# Patient Record
Sex: MALE | Race: WHITE | NOT HISPANIC OR LATINO | Employment: UNEMPLOYED | URBAN - METROPOLITAN AREA
[De-identification: names, ages, dates, MRNs, and addresses within clinical notes are randomized per-mention and may not be internally consistent; named-entity substitution may affect disease eponyms.]

---

## 2017-01-19 ENCOUNTER — TELEPHONE (OUTPATIENT)
Dept: CARDIOTHORACIC SURGERY | Facility: CLINIC | Age: 72
End: 2017-01-19

## 2017-01-19 NOTE — TELEPHONE ENCOUNTER
Received a call from Dr. Rico's nurse with a referral from [place] oncologist Dr. Cage. Referral was sent to Dr. Rico instead of thoracic surgery.  Pt was having SOB late last year and underwent a workup with his cardiologist at [facility]. CTA obtained on 1/13/17 and it noted a 3.1cm mass in NEIDA. Pt is also being followed by pulmonologist Dr. Ely. It is noted that Dr. Ely has ordered a lung biopsy.   Called the pt to acquire additional information and to explain the reason for referral. Pt states that Dr. Ely is primarily following him for this lung mass and has ordered the lung bx, however, it is not scheduled yet. I explained that if the bx is positive, he will likely require a PET scan. We will await further instruction from Dr. Ely's office on how to proceed with thoracic surgery referral after the pt has the lung bx. Pt reports that he had a PFT at [facility] as well as a nuclear stress test. Faxed a request to [facility], they faxed the PFT and stress test report.   Left a VM for Dr. Ely's nurse to call me back - will offer to fax these records to their office.

## 2017-01-19 NOTE — TELEPHONE ENCOUNTER
Received a call back from Dr. Ely's nurse, she updated me that the pt will get a PET on 1/26 and they are waiting on confirmation from the hospital for IR bx. She will fax me results as she receives them. She will also update Dr. Cage's office.

## 2017-01-31 ENCOUNTER — TELEPHONE (OUTPATIENT)
Dept: CARDIOTHORACIC SURGERY | Facility: CLINIC | Age: 72
End: 2017-01-31

## 2017-01-31 NOTE — TELEPHONE ENCOUNTER
Received a referral from Dr. Ely to Dr. Carrillo on this pt s/p PET and lung biopsy. Path is still pending for biopsy. NEIDA mass with SUV max of 11.4.   Spoke with the pt, he is agreeable to meet with Dr. Carrillo once pathology is confirmed and received. He states that he has an appt with a GI physician today and cardiology tomorrow. He will let me know if he is cleared for surgery by his cardiologist in the event surgery is indicated.   He is agreeable to meet with Dr. Carrillo on Wednesday 2/8 at 1030a, provided him with date/time/location information. He has the PET on CD and will inquire about the CTA chest to be sure he also has this image on CD. He will bring all imaging with him to the scheduled appt once pathology from lung bx is confirmed.   Called Dr. Ely's office, left a VM with my contact information to update his staff on the scheduled appt. Mailed appt slip along with campus map. Will touch base with the pt once pathology is confirmed.

## 2017-02-03 ENCOUNTER — TELEPHONE (OUTPATIENT)
Dept: CARDIOTHORACIC SURGERY | Facility: CLINIC | Age: 72
End: 2017-02-03

## 2017-02-03 NOTE — TELEPHONE ENCOUNTER
Called to check on the pt, his daughter answered his phone because the pt is in pre-op for his colonoscopy. He cancelled his cardiologist appt for yesterday due to bowel prep, it is r/s'ed to 2/9. Pt's daughter reports they did receive pathology from Dr. Ely's office. Called the office and they are closed on Fridays. Faxed a request for pathology report. Will f/u on Monday to acquire a pathology report.

## 2017-02-06 ENCOUNTER — TELEPHONE (OUTPATIENT)
Dept: CARDIOTHORACIC SURGERY | Facility: CLINIC | Age: 72
End: 2017-02-06

## 2017-02-06 NOTE — TELEPHONE ENCOUNTER
Called Dr. Ely's office x 2 and left voicemails, no return call or faxed pathology report. Spoke with the pt, he will go to Dr. Ely's office tomorrow to  a copy of his report. He also states that they biopsied 5 polyps during his colonoscopy last week. Path is still pending.  He agrees to bring pathology report and CD's with imaging to his confirmed appt for 2/8 at 1030. He received the appt information in the mail.

## 2017-02-08 ENCOUNTER — OFFICE VISIT (OUTPATIENT)
Dept: CARDIOTHORACIC SURGERY | Facility: CLINIC | Age: 72
End: 2017-02-08
Payer: MEDICARE

## 2017-02-08 VITALS
BODY MASS INDEX: 29.13 KG/M2 | DIASTOLIC BLOOD PRESSURE: 58 MMHG | HEART RATE: 93 BPM | WEIGHT: 203.5 LBS | HEIGHT: 70 IN | SYSTOLIC BLOOD PRESSURE: 96 MMHG | OXYGEN SATURATION: 79 %

## 2017-02-08 DIAGNOSIS — C34.12 MALIGNANT NEOPLASM OF UPPER LOBE OF LEFT LUNG: Primary | ICD-10-CM

## 2017-02-08 PROCEDURE — 99204 OFFICE O/P NEW MOD 45 MIN: CPT | Mod: S$GLB,,, | Performed by: THORACIC SURGERY (CARDIOTHORACIC VASCULAR SURGERY)

## 2017-02-08 PROCEDURE — 1160F RVW MEDS BY RX/DR IN RCRD: CPT | Mod: S$GLB,,, | Performed by: THORACIC SURGERY (CARDIOTHORACIC VASCULAR SURGERY)

## 2017-02-08 PROCEDURE — 1126F AMNT PAIN NOTED NONE PRSNT: CPT | Mod: S$GLB,,, | Performed by: THORACIC SURGERY (CARDIOTHORACIC VASCULAR SURGERY)

## 2017-02-08 PROCEDURE — 99999 PR PBB SHADOW E&M-EST. PATIENT-LVL III: CPT | Mod: PBBFAC,,, | Performed by: THORACIC SURGERY (CARDIOTHORACIC VASCULAR SURGERY)

## 2017-02-08 PROCEDURE — 1157F ADVNC CARE PLAN IN RCRD: CPT | Mod: S$GLB,,, | Performed by: THORACIC SURGERY (CARDIOTHORACIC VASCULAR SURGERY)

## 2017-02-08 PROCEDURE — 1159F MED LIST DOCD IN RCRD: CPT | Mod: S$GLB,,, | Performed by: THORACIC SURGERY (CARDIOTHORACIC VASCULAR SURGERY)

## 2017-02-08 RX ORDER — POLYETHYLENE GLYCOL-3350 AND ELECTROLYTES 236; 6.74; 5.86; 2.97; 22.74 G/274.31G; G/274.31G; G/274.31G; G/274.31G; G/274.31G
POWDER, FOR SOLUTION ORAL
COMMUNITY
Start: 2017-02-01 | End: 2017-07-05

## 2017-02-08 NOTE — PROGRESS NOTES
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 Patient follows with Dr. Ely for fibrosis and waxing/waning streaky hemoptysis and SOB, oncologist Dr. Cage. CTA obtained on 1/13/17 and it noted a 3.1cm mass in NEIDA. PET 1/26/17 significant for 2.5 x3.2cm NEIDA mass SUV max 11.4 and focal uptake in distal transverse colon. Colonoscopy last week - 20 polyps, 5 removed - pathology pending. Normal stress echo in Dec '16. PFTs on 12/16/16.  Patient sleeps with 2LNC. Reports Dr. Ely alternates antibiotics over the last year (doxycycline, Bactrim, cefoxime). Patient endorses 1 episode chest pain last week relieved by nitro. Scheduled to see cardiology with [facility] tomorrow. Currently on petal, ASA, Xarelto. Currently denies cp, SOB, cough, hemoptysis. Here today for surgical consultation.       Former smoker. Quit 27 years ago. Smoked for 30 years x 2ppd.   Retired. Worked as heavy  - outside.   PSH: CABG - 2011, tonsillectomy, Bilateral carpal tunnel release, trigger finger release, rotator cuff repair    Chief Complaint   Patient presents with    Consult       Review of patient's allergies indicates:   Allergen Reactions    Iodine and iodide containing products Hives     ALLERGIC TO SOFT SHELL CRAB ONLY       Current Outpatient Prescriptions   Medication Sig Dispense Refill    allopurinol (ZYLOPRIM) 100 MG tablet Take 100 mg by mouth once daily.      aspirin (ECOTRIN) 325 MG EC tablet Take 325 mg by mouth once daily.      atenolol (TENORMIN) 50 MG tablet Take 50 mg by mouth once daily.      cilostazol (PLETAL) 100 MG Tab 100 mg.      GAVILYTE-G 236-22.74-6.74 -5.86 gram suspension       pravastatin (PRAVACHOL) 40 MG tablet Take 40 mg by mouth  nightly.      ranitidine (ZANTAC) 75 MG tablet Take 75 mg by mouth nightly as needed.      terazosin (HYTRIN) 2 MG capsule Take 2 mg by mouth every evening. 2 TABS      XARELTO 20 mg Tab Take 20 mg by mouth once daily.       No current facility-administered medications for this visit.        Past Medical History   Diagnosis Date    Arthritis     BPH (benign prostatic hyperplasia)     Coronary artery disease      MI X 2    Gout, chronic     Heartburn     Hypertension     Myocardial infarction     PE (pulmonary embolism)     Presence of dental bridge      UPPER - NOT WEAR MUCH AS DOES NOT FIT WELL    Staph infection      Past Surgical History   Procedure Laterality Date    Cardiac surgery       CABG X 4 9-11    Tonsillectomy      Ctr       BILAT    Trigger finger release       right and left hands.    Rotator cuff repair       left     Family History   Problem Relation Age of Onset    Cancer Sister      gallbladder     Social History   Substance Use Topics    Smoking status: Former Smoker     Packs/day: 2.00     Years: 25.00     Quit date: 2/25/1990    Smokeless tobacco: Never Used    Alcohol use Yes      Comment: 2 PER DAY        Review of Systems:  Review of Systems   Constitutional: Negative for activity change, appetite change, chills, fatigue and fever.   HENT: Negative for congestion.    Eyes: Negative for pain.   Respiratory: Positive for cough (intermittent) and shortness of breath (intermittent).    Cardiovascular: Negative for palpitations and leg swelling.   Gastrointestinal: Negative for abdominal pain, constipation, diarrhea, nausea and vomiting.   Genitourinary: Negative for difficulty urinating.   Musculoskeletal: Negative for arthralgias.   Skin: Negative for color change.   Neurological: Negative for dizziness, seizures and syncope.   Psychiatric/Behavioral: The patient is not nervous/anxious.        OBJECTIVE:     Vital Signs (Most Recent)  Pulse: 93 (02/08/17 0934)  BP: (!)  "96/58 (02/08/17 0934)  SpO2: (!) 79 % (02/08/17 0934)  5' 10" (1.778 m)  92.3 kg (203 lb 7.8 oz)     Physical Exam:  Physical Exam   Constitutional: He is oriented to person, place, and time. He appears well-developed and well-nourished. No distress.   HENT:   Head: Normocephalic and atraumatic.   Eyes: EOM are normal.   Neck: Neck supple.   Cardiovascular: Normal rate, regular rhythm, normal heart sounds and intact distal pulses.  Exam reveals no gallop and no friction rub.    No murmur heard.  Pulmonary/Chest: Effort normal. No respiratory distress. He has decreased breath sounds in the left upper field. He has no wheezes. He has no rhonchi. He has no rales.   Abdominal: Soft.   Neurological: He is alert and oriented to person, place, and time.   Skin: Skin is warm and dry. No rash noted. He is not diaphoretic.   Psychiatric: He has a normal mood and affect. Thought content normal.   Vitals reviewed.      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 Colonoscopy pathology - pending ([facility]).    PLAN:Plan     Will present at thoracic multidisciplinary tumor board.   Keep follow-up with cardiology tomorrow.       ATTENDING ATTESTATION:    I evaluated the patient and I agree with the assessment and plan.  The patient is at high perioperative risk for multiple reasons.  He likely has a " dense inflammatory response with adhesion formation related to the prior CABG.  He also has a moderate to high reduction in his DLCO.  Furthermore, he has a hypercoagulable state and is at risk for recurrent pulmonary embolism.  He has a h/o right-sided PE and the right lung will be depended upon for oxygenation during surgery.  If he is deemed too high risk, I will refer him for definitive chemoradiation therapy, including an evaluation for SBRT.

## 2017-02-08 NOTE — MR AVS SNAPSHOT
[facility]  [address]  [facility] [address]  Phone: [phone]  Fax: [phone]                  Michael Shetty   2017 10:30 AM   Office Visit    Description:  Male : 1945   Provider:  Kristopher Carrillo MD   Department:  [facility]           Reason for Visit     Consult                To Do List           Goals (5 Years of Data)     None      [facility] On Call     [facility] On Call Nurse Care Line -  Assistance  Registered nurses in the [facility] On Call Center provide clinical advisement, health education, appointment booking, and other advisory services.  Call for this free service at [phone].             Medications           Message regarding Medications     Verify the changes and/or additions to your medication regime listed below are the same as discussed with your clinician today.  If any of these changes or additions are incorrect, please notify your healthcare provider.        STOP taking these medications     furosemide (LASIX) 20 MG tablet Take 20 mg by mouth once daily.    FLUZONE HIGH-DOSE , PF, 180 mcg/0.5 mL Syrg            Verify that the below list of medications is an accurate representation of the medications you are currently taking.  If none reported, the list may be blank. If incorrect, please contact your healthcare provider. Carry this list with you in case of emergency.           Current Medications     allopurinol (ZYLOPRIM) 100 MG tablet Take 100 mg by mouth once daily.    aspirin (ECOTRIN) 325 MG EC tablet Take 325 mg by mouth once daily.    atenolol (TENORMIN) 50 MG tablet Take 50 mg by mouth once daily.    cilostazol (PLETAL) 100 MG Tab 100 mg.    GAVILYTE-G 236-22.74-6.74 -5.86 gram suspension     pravastatin (PRAVACHOL) 40 MG tablet Take 40 mg by mouth nightly.    ranitidine (ZANTAC) 75 MG tablet Take 75 mg by mouth nightly as needed.    terazosin (HYTRIN) 2 MG capsule Take 2 mg by mouth every evening.  "2 TABS    XARELTO 20 mg Tab Take 20 mg by mouth once daily.           Clinical Reference Information           Your Vitals Were     BP Pulse Height Weight SpO2 BMI    96/58 93 5' 10" (1.778 m) 92.3 kg (203 lb 7.8 oz) 79% 29.2 kg/m2      Blood Pressure          Most Recent Value    BP  (!)  96/58      Allergies as of 2/8/2017     Iodine And Iodide Containing Products      Immunizations Administered on Date of Encounter - 2/8/2017     None      [facility] Sign-Up     Activating your [facility] account is as easy as 1-2-3!     1) Visit [email or web address], select Sign Up Now, enter this activation code and your date of birth, then select Next.  [email or web address]  Expires: 3/25/2017 10:45 AM      2) Create a username and password to use when you visit [facility] in the future and select a security question in case you lose your password and select Next.    3) Enter your e-mail address and click Sign Up!    Additional Information  If you have questions, please e-mail [email or web address] or call [phone] to talk to our [facility] staff. Remember, [facility] is NOT to be used for urgent needs. For medical emergencies, dial 911.         Language Assistance Services     ATTENTION: Language assistance services are available, free of charge. Please call [phone].      ATENCIÓN: Si habla español, tiene a collins disposición servicios gratuitos de asistencia lingüística. Llame al [phone].     CHÚ Ý: N?u b?n nói Ti?ng Vi?t, có các d?ch v? h? tr? ngôn ng? mi?n phí dành cho b?n. G?i s? [phone].         [facility] complies with applicable Federal civil rights laws and does not discriminate on the basis of race, color, national origin, age, disability, or sex.        "

## 2017-02-08 NOTE — LETTER
[facility]  [address]  [place] [address]  Phone: [phone]  Fax: [phone] February 8, 2017        Rodolfo Ely MD  [address] [facility] [address]    Patient: Michael Shetty   MR Number: [id number]   YOB: 1945   Date of Visit: 2/8/2017     Dear Dr. Ely:    Thank you for kindly consulting me to evaluated Mr. Shetty.  He presents today with newly diagnosed left upper lobe squamous cell cancer.  Of note, he has complicated past medical history, including a history of coronary artery disease status post coronary artery bypass grafting in 2011, history of a hypercoagulable state complicated by cerebrovascular accident, right-sided pulmonary embolism, and a current left calf deep venous thrombosis.  Lastly, he has a history of smoking-induced COPD with moderate reduction in his diffusion capacity.    While the left upper lobe squamous cell cancer is technically resectable, this is certainly not a straightforward case in the least.  I am confident that he will have significant scar tissue formation from the left pleural effusion that uniformly occurs post coronary artery bypass grafting.  Furthermore, the LIMA pedicle will lie in the area, where I will have to mobilize the left upper lobe away from the mediastinum.  This does not into account Mr. Shetty's history hypercoagulable disease and right-sided pulmonary embolism.  I will present Mr. Shetty's case at our weekly multidisciplinary lung conference.  If the consensus is to offer him surgery, I will proceed.  If, however, it is felt that the risks outweigh benefits from surgery, I will recommend that he receive definitive chemoradiation therapy.    Thank you for kindly soliciting my input into Mr. Shetty's care.    Sincerely,      Kristopher Carrillo MD  Section of Thoracic Surgery  [facility]    BLP/and    CC  MD Chris Carroll MD

## 2017-02-09 ENCOUNTER — DOCUMENTATION ONLY (OUTPATIENT)
Dept: CARDIOTHORACIC SURGERY | Facility: CLINIC | Age: 72
End: 2017-02-09

## 2017-02-09 NOTE — PROGRESS NOTES
Faxed patient's clinic note to referring provider, Dr. Ely. Faxed to # [phone] and called his office at phone # [phone] to confirm fax. Left a VM for Merna to notify her of the fax.   Dr. Carrillo spoke with Dr. Ely on the phone and discussed the plan for the pt to be presented at thoracic tumor board conference on 2/15/17.  Mailed CD's to pt's home as he requested to have his imaging back. Called the pt, notified him that the CD's are being mailed to his home.

## 2017-02-15 ENCOUNTER — TELEPHONE (OUTPATIENT)
Dept: CARDIOTHORACIC SURGERY | Facility: CLINIC | Age: 72
End: 2017-02-15

## 2017-02-15 ENCOUNTER — DOCUMENTATION ONLY (OUTPATIENT)
Dept: CARDIOTHORACIC SURGERY | Facility: CLINIC | Age: 72
End: 2017-02-15

## 2017-02-15 NOTE — TELEPHONE ENCOUNTER
Spoke with the pt about the thoracic tumor board recommendations, explained that the recommendations are not for surgical intervention, rather for radiation. Pt voices understanding that he does not need future f/u with thoracic surgery. He will await a phone call from Dr. Cage's office in regards to setting up an appt with rad/onc.      Called pt's oncologist, Dr. Cage and spoke with his nurse, Karla. Reported the plan from conference and Dr. Cage will refer the pt to rad/onc closer to his home. Faxed Dr. Carrillo's office note from 2/8 to her at fax #[phone]. Will call tomorrow to confirm receipt and ensure the pt's referral to rad/onc is set.  Called Dr. Ely's office and spoke with the phone staff, she will relay the message to Dr. Ely and his nurse Merna about the recommendation on referral to rad/onc.

## 2017-02-15 NOTE — PATIENT CARE CONFERENCE
[facility]      THORACIC MULTIDISCIPLINARY TUMOR BOARD  PATIENT REVIEW FORM  ________________________________________________________________________    CLINIC #: [id number]  DATE: 02/15/2017    TUMOR SITE:   NSCLC    PRESENTER:   Dr. 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 Patient is followed by Dr. Ely for fibrosis and waxing/waning streaky hemoptysis and SOB.  CTA obtained on 1/13/17 and it noted a 3.1cm mass in NEIDA. PET 1/26/17 significant for 2.5 x3.2cm NEIDA mass SUV max 11.4 and focal uptake in distal transverse colon. Colonoscopy last week - 20 polyps, 5 removed - pathology pending. Normal stress echo in Dec 2016. Patient sleeps with 2LNC. Patient endorses 1 episode chest pain last week relieved by nitro. Former smoker, quit 27 years ago, smoked for 30 years x 2ppd.     BOARD RECOMMENDATIONS:   Recommend SBRT as the pt is considered high risk for NEIDA lobectomy.  Stage is clinical staging based upon radiographs  CONSULT NEEDED:     [] Surgery    [] Hem/Onc    [x] Rad/Onc    [] Dietary                 [] Social Service    [] Psychology       [] Pulmonology    Clinical Stage: Tumor  Node(s)  Metastasis   Pathologic Stage: Tumor  Node(s)  Metastasis   Thyroid transcription factor (TTF): Negative       GROUP STAGE:     [] O    [] 1A    [x] IB    [] IIA    [] IIB     [] IIIA     [] IIIB     [] IIIC    [] IV                               [] Local recurrence     [] Regional recurrence     [] Distant recurrence                   [x] NSCLC     [] SCLC     Tumor type: Squamous cell    Unstageable:      [] Yes     [] No  Metastatic site(s):          [x] [facility] Treatment Guidelines reviewed and care planned is consistent with guidelines.         (i.e., NCCN, NCI, PD, ACO, AUA, etc.)    PRESENTATION AT  CANCER CONFERENCE:         [] Prospective    [] Retrospective     [] Follow-Up          [] Eligible for clinical trial

## 2017-02-27 ENCOUNTER — HISTORICAL (OUTPATIENT)
Dept: ADMINISTRATIVE | Facility: HOSPITAL | Age: 72
End: 2017-02-27

## 2017-03-01 ENCOUNTER — TELEPHONE (OUTPATIENT)
Dept: CARDIOTHORACIC SURGERY | Facility: CLINIC | Age: 72
End: 2017-03-01

## 2017-03-01 NOTE — TELEPHONE ENCOUNTER
Returned call concerning mailed copies of chest CD. Unable to locate CD's in mailroom.  Informed pt that he may have to return to hospital to obtain another copy for future appointments.  Verbalized understanding.

## 2017-05-17 ENCOUNTER — TELEPHONE (OUTPATIENT)
Dept: HEMATOLOGY/ONCOLOGY | Facility: CLINIC | Age: 72
End: 2017-05-17

## 2017-05-17 NOTE — TELEPHONE ENCOUNTER
----- Message from Viviane Diaz sent at 5/17/2017 11:38 AM CDT -----  Patient called in requesting someone to call him with his blood work results. Blood work was done 5/12 @[facility]. Please call patient back at [phone]

## 2017-05-17 NOTE — TELEPHONE ENCOUNTER
rtn call to pt labs discussed hgb 9.1/ hct 27.6 not a level we usually transfuse for. Pt c/o fatigue SOB and generalized weakness. Has appointment with pcp on 5/18/17 told to mention symptoms to Dr. Lara during appointment. If symptoms get worse in the mean time he should report to ER. Understanding verbalized. No other questions or concerns.

## 2017-05-19 DIAGNOSIS — C34.12 PRIMARY MALIGNANT NEOPLASM OF LEFT UPPER LOBE OF LUNG: ICD-10-CM

## 2017-05-22 ENCOUNTER — TELEPHONE (OUTPATIENT)
Dept: HEMATOLOGY/ONCOLOGY | Facility: CLINIC | Age: 72
End: 2017-05-22

## 2017-05-22 NOTE — TELEPHONE ENCOUNTER
Pt called in said he was released from hospital last Saturday and needed two week follow up. Pt said he has an appointment on 06/01/17 and that's close and would like to just keep that apt confirmed apt with pt

## 2017-05-25 RX ORDER — ASPIRIN 81 MG/1
TABLET ORAL
COMMUNITY
End: 2017-07-05 | Stop reason: ALTCHOICE

## 2017-05-25 RX ORDER — ATENOLOL 50 MG/1
TABLET ORAL
COMMUNITY
End: 2017-07-05 | Stop reason: DRUGHIGH

## 2017-05-25 RX ORDER — FUROSEMIDE 20 MG/1
TABLET ORAL
COMMUNITY
End: 2017-07-05

## 2017-05-25 RX ORDER — TERAZOSIN 2 MG/1
CAPSULE ORAL
COMMUNITY
End: 2017-07-05 | Stop reason: SDUPTHER

## 2017-05-25 RX ORDER — DOXYCYCLINE HYCLATE 100 MG
100 TABLET ORAL
Status: ON HOLD | COMMUNITY
End: 2019-01-01 | Stop reason: HOSPADM

## 2017-05-25 RX ORDER — NYSTATIN 100000 [USP'U]/ML
SUSPENSION ORAL
COMMUNITY
End: 2017-07-05

## 2017-05-25 RX ORDER — ALLOPURINOL 100 MG/1
100 TABLET ORAL DAILY
COMMUNITY

## 2017-05-25 RX ORDER — CILOSTAZOL 100 MG/1
TABLET ORAL
COMMUNITY
End: 2017-07-05 | Stop reason: SDUPTHER

## 2017-05-25 RX ORDER — CEFUROXIME AXETIL 500 MG/1
250 TABLET ORAL EVERY 12 HOURS
Status: ON HOLD | COMMUNITY
End: 2019-01-01 | Stop reason: HOSPADM

## 2017-05-25 RX ORDER — PRAVASTATIN SODIUM 40 MG/1
TABLET ORAL
COMMUNITY
End: 2017-07-05 | Stop reason: SDUPTHER

## 2017-05-25 RX ORDER — SULFAMETHOXAZOLE AND TRIMETHOPRIM 800; 160 MG/1; MG/1
TABLET ORAL
COMMUNITY
End: 2018-01-01

## 2017-06-01 ENCOUNTER — OFFICE VISIT (OUTPATIENT)
Dept: HEMATOLOGY/ONCOLOGY | Facility: CLINIC | Age: 72
End: 2017-06-01
Payer: MEDICARE

## 2017-06-01 VITALS
TEMPERATURE: 98 F | DIASTOLIC BLOOD PRESSURE: 70 MMHG | RESPIRATION RATE: 16 BRPM | HEIGHT: 69 IN | HEART RATE: 96 BPM | SYSTOLIC BLOOD PRESSURE: 110 MMHG | BODY MASS INDEX: 29.59 KG/M2 | WEIGHT: 199.81 LBS

## 2017-06-01 DIAGNOSIS — I25.10 CORONARY ARTERY DISEASE, ANGINA PRESENCE UNSPECIFIED, UNSPECIFIED VESSEL OR LESION TYPE, UNSPECIFIED WHETHER NATIVE OR TRANSPLANTED HEART: ICD-10-CM

## 2017-06-01 DIAGNOSIS — I25.2 HISTORY OF MI (MYOCARDIAL INFARCTION): ICD-10-CM

## 2017-06-01 DIAGNOSIS — C34.12 SQUAMOUS CELL CARCINOMA OF BRONCHUS IN LEFT UPPER LOBE: Primary | ICD-10-CM

## 2017-06-01 DIAGNOSIS — T45.1X5A ANEMIA ASSOCIATED WITH CHEMOTHERAPY: ICD-10-CM

## 2017-06-01 DIAGNOSIS — Z86.73 HISTORY OF CEREBROVASCULAR ACCIDENT (CVA) IN ADULTHOOD: ICD-10-CM

## 2017-06-01 DIAGNOSIS — Z86.718 HISTORY OF DEEP VENOUS THROMBOSIS (DVT) OF DISTAL VEIN OF LEFT LOWER EXTREMITY: ICD-10-CM

## 2017-06-01 DIAGNOSIS — D64.81 ANEMIA ASSOCIATED WITH CHEMOTHERAPY: ICD-10-CM

## 2017-06-01 DIAGNOSIS — Z86.711 HISTORY OF PULMONARY EMBOLISM: ICD-10-CM

## 2017-06-01 PROCEDURE — 99214 OFFICE O/P EST MOD 30 MIN: CPT | Mod: ,,, | Performed by: INTERNAL MEDICINE

## 2017-06-01 PROCEDURE — 1126F AMNT PAIN NOTED NONE PRSNT: CPT | Mod: ,,, | Performed by: INTERNAL MEDICINE

## 2017-06-01 PROCEDURE — 1159F MED LIST DOCD IN RCRD: CPT | Mod: ,,, | Performed by: INTERNAL MEDICINE

## 2017-06-01 RX ORDER — PREDNISONE 20 MG/1
TABLET ORAL
COMMUNITY
Start: 2017-05-21 | End: 2017-07-05 | Stop reason: ALTCHOICE

## 2017-06-01 RX ORDER — LEVOFLOXACIN 500 MG/1
TABLET, FILM COATED ORAL
COMMUNITY
Start: 2017-05-21 | End: 2017-07-05

## 2017-06-01 RX ORDER — HYDROCODONE BITARTRATE AND ACETAMINOPHEN 5; 325 MG/1; MG/1
TABLET ORAL
COMMUNITY
Start: 2017-02-28 | End: 2018-01-01

## 2017-06-01 RX ORDER — ALBUTEROL SULFATE 90 UG/1
1-2 AEROSOL, METERED RESPIRATORY (INHALATION) EVERY 6 HOURS PRN
COMMUNITY
Start: 2017-05-30

## 2017-06-01 RX ORDER — ONDANSETRON HYDROCHLORIDE 8 MG/1
TABLET, FILM COATED ORAL
COMMUNITY
Start: 2017-03-14 | End: 2018-01-01

## 2017-06-01 RX ORDER — DEXAMETHASONE 4 MG/1
TABLET ORAL
COMMUNITY
Start: 2017-03-14 | End: 2017-07-05

## 2017-06-01 NOTE — PROGRESS NOTES
[facility] Hematology/Oncology            PROGRESS NOTE      Subjective:       Patient ID:   NAME: Michael Shetty : 1945     72 y.o. male    Referring Doc: Michael Ellsworth MD  Other Physicians:    Chief Complaint:  Results (labs in media quest) and Follow-up (hospital [facility] admit  printed out)      History of Present Illness:     Patient returns today for a regularly scheduled follow-up visit.  The patient was recently hospitalized. He had bout of pneumonia and was hospitalized at [facility]; he saw Dr Ely in clinic the other day; he still has residual cough; no fevers; he is feeling much better; no N/V, or HA's            ROS:   GEN: normal without any fever, night sweats or weight loss  HEENT: normal with no HA's, sore throat, stiff neck, changes in vision  CV: normal with no CP, SOB, PND, STREETER or orthopnea  PULM: normal with no SOB, cough, hemoptysis, sputum or pleuritic pain  GI: normal with no abdominal pain, nausea, vomiting, constipation, diarrhea, melanotic stools, BRBPR, or hematemesis  : normal with no hematuria, dysuria  BREAST: normal with no mass, discharge, pain  SKIN: normal with no rash, erythema, bruising, or swelling    Allergies:  Review of patient's allergies indicates:   Allergen Reactions    Iodine and iodide containing products Hives     ALLERGIC TO SOFT SHELL CRAB ONLY    Shellfish containing products      soft shell crabs       Medications:    Current Outpatient Prescriptions:     allopurinol (ZYLOPRIM) 100 MG tablet, Take 100 mg by mouth once daily., Disp: , Rfl:     atenolol (TENORMIN) 50 MG tablet, Take by mouth., Disp: , Rfl:     cefUROXime (CEFTIN) 500 MG tablet, Take by mouth., Disp: , Rfl:     cilostazol (PLETAL) 100 MG Tab, 100 mg., Disp: , Rfl:     doxycycline (VIBRA-TABS) 100 MG tablet, Take by mouth., Disp: , Rfl:     pravastatin (PRAVACHOL) 40 MG tablet, Take 40 mg by mouth nightly., Disp: , Rfl:     ranitidine (ZANTAC 75) 75 MG tablet, Take by mouth.,  "Disp: , Rfl:     rivaroxaban (XARELTO) 20 mg Tab, Take by mouth., Disp: , Rfl:     sulfamethoxazole-trimethoprim 800-160mg (BACTRIM DS) 800-160 mg Tab, Take by mouth., Disp: , Rfl:     terazosin (HYTRIN) 2 MG capsule, Take by mouth., Disp: , Rfl:     VENTOLIN HFA 90 mcg/actuation inhaler, , Disp: , Rfl:     allopurinol (ZYLOPRIM) 100 MG tablet, Take by mouth., Disp: , Rfl:     aspirin (ECOTRIN) 325 MG EC tablet, Take 325 mg by mouth once daily., Disp: , Rfl:     aspirin (ECOTRIN) 81 MG EC tablet, Take by mouth., Disp: , Rfl:     atenolol (TENORMIN) 50 MG tablet, Take 50 mg by mouth once daily., Disp: , Rfl:     cilostazol (PLETAL) 100 MG Tab, Take by mouth., Disp: , Rfl:     dexamethasone (DECADRON) 4 MG Tab, , Disp: , Rfl:     folic acid-vit B6-vit B12 2.5-25-2 mg (FOLBIC OR EQUIV) 2.5-25-2 mg Tab, Take by mouth., Disp: , Rfl:     furosemide (LASIX) 20 MG tablet, Take by mouth., Disp: , Rfl:     GAVILYTE-G 236-22.74-6.74 -5.86 gram suspension, , Disp: , Rfl:     hydrocodone-acetaminophen 5-325mg (NORCO) 5-325 mg per tablet, , Disp: , Rfl:     levoFLOXacin (LEVAQUIN) 500 MG tablet, , Disp: , Rfl:     nystatin (MYCOSTATIN) 100,000 unit/mL suspension, Take by mouth., Disp: , Rfl:     ondansetron (ZOFRAN) 8 MG tablet, , Disp: , Rfl:     pravastatin (PRAVACHOL) 40 MG tablet, Take by mouth., Disp: , Rfl:     predniSONE (DELTASONE) 20 MG tablet, , Disp: , Rfl:     ranitidine (ZANTAC) 75 MG tablet, Take 75 mg by mouth nightly as needed., Disp: , Rfl:     terazosin (HYTRIN) 2 MG capsule, Take 2 mg by mouth every evening. 2 TABS, Disp: , Rfl:     XARELTO 20 mg Tab, Take 20 mg by mouth once daily., Disp: , Rfl:     PMHx/PSHx Updates:  See patient's last visit with me on 5/10/17.  See H&P on 12/9/2015      Pathology:  No matching staging information was found for the patient.          Objective:     Vitals:  Blood pressure 110/70, pulse 96, temperature 98.1 °F (36.7 °C), resp. rate 16, height 5' 9" (1.753 " m), weight 90.6 kg (199 lb 12.8 oz).    Physical Examination:   GEN: no apparent distress, comfortable; AAOx3  HEAD: atraumatic and normocephalic  EYES: no pallor, no icterus, PERRLA  ENT: OMM, no pharyngeal erythema, external ears WNL; no nasal discharge; no thrush  NECK: no masses, thyroid normal, trachea midline, no LAD/LN's, supple  CV: RRR with no murmur; normal pulse; normal S1 and S2; no pedal edema  CHEST: Normal respiratory effort; CTAB; normal breath sounds; no wheeze or crackles; good airflow with minimal coarseness  ABDOM: nontender and nondistended; soft; normal bowel sounds; no rebound/guarding  MUSC/Skeletal: ROM normal; no crepitus; joints normal; no deformities or arthropathy  EXTREM: no clubbing, cyanosis, inflammation or swelling  SKIN: no rashes, lesions, ulcers, petechiae or subcutaneous nodules  : no lucio  NEURO: grossly intact; motor/sensory WNL; AAOx3; no tremors  PSYCH: normal mood, affect and behavior  LYMPH: normal cervical, supraclavicular, axillary and groin LN's            Labs:   Lab Results   Component Value Date    WBC 6.5 02/25/2013    HGB 15.7 02/25/2013    HCT 47.6 02/25/2013    MCV 96.2 (H) 02/25/2013     02/25/2013    CMP  Sodium   Date Value Ref Range Status   02/25/2013 137 136 - 145 mmol/L Final     Potassium   Date Value Ref Range Status   02/25/2013 4.1 3.5 - 5.1 mmol/L Final     Chloride   Date Value Ref Range Status   02/25/2013 102 95 - 110 mmol/L Final     CO2   Date Value Ref Range Status   02/25/2013 22 (L) 23 - 29 mmol/L Final     BUN, Bld   Date Value Ref Range Status   02/25/2013 23 8 - 23 mg/dL Final     Creatinine   Date Value Ref Range Status   02/25/2013 1.1 0.5 - 1.4 mg/dL Final     Calcium   Date Value Ref Range Status   02/25/2013 9.3 8.7 - 10.5 mg/dL Final     Anion Gap   Date Value Ref Range Status   02/25/2013 13 5 - 15 meq/L Final     I have reviewed all available lab results and radiology reports.    Radiology/Diagnostic Studies:    CTA on  5/18    Assessment/Plan:   (1) 72 y.o. 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 Hold off on additional chemotherapy at this time and repeat scans in 2-3 months  2. F/u with dr Ely with pulm as scheduled (dec 5th)  3. F/u with dr Holliday with GI about eventual repeat colonoscopy  4. Check labs q month for now  5. RTc 1 month  6. Fax note to Dr Ely, Mg, Jodee Lewis and Wade            Discussion:     I have explained all of the above in detail and the patient understands all of the current recommendation(s). I have answered all of their questions to the best of my ability and to their complete satisfaction.   The patient is to continue with the current management plan.    RTC in 1 month        Electronically signed by Chirs Cage MD

## 2017-06-01 NOTE — LETTER
June 1, 2017      Michael Ellsworth MD  [address] [place] [address] [address]  [place] [address]           [facility]  [address] [facility]  [address]  [facility] [address]  Phone: [phone]  Fax: [phone]          Patient: Michael Shetty   MR Number: [id number]   YOB: 1945   Date of Visit: 6/1/2017       Dear Dr. Michael Ellsworth:    Thank you for referring Michael Shetty to me for evaluation. Attached you will find relevant portions of my assessment and plan of care.    If you have questions, please do not hesitate to call me. I look forward to following Michael Shetty along with you.    Sincerely,    Chris Cage MD    Enclosure  CC:  No Recipients    If you would like to receive this communication electronically, please contact [email or web address] or [phone] to request more information on "[employer]" Link access.    For providers and/or their staff who would like to refer a patient to [facility], please contact us through our one-stop-shop provider referral line, [facility], at [phone].    If you feel you have received this communication in error or would no longer like to receive these types of communications, please e-mail [email or web address]

## 2017-06-28 ENCOUNTER — OFFICE VISIT (OUTPATIENT)
Dept: RADIATION ONCOLOGY | Facility: CLINIC | Age: 72
End: 2017-06-28
Payer: MEDICARE

## 2017-06-28 VITALS
RESPIRATION RATE: 22 BRPM | BODY MASS INDEX: 29.68 KG/M2 | SYSTOLIC BLOOD PRESSURE: 93 MMHG | DIASTOLIC BLOOD PRESSURE: 59 MMHG | WEIGHT: 201 LBS | HEART RATE: 82 BPM

## 2017-06-28 DIAGNOSIS — C34.12 PRIMARY MALIGNANT NEOPLASM OF LEFT UPPER LOBE OF LUNG: Primary | ICD-10-CM

## 2017-06-28 PROCEDURE — 99215 OFFICE O/P EST HI 40 MIN: CPT | Mod: ,,, | Performed by: RADIOLOGY

## 2017-06-28 NOTE — PROGRESS NOTES
Michael Shetty  [id number]  1945  6/28/2017  Chris Cage Md  [address] [facility]  [address]  [place] [address]    DIAGNOSIS: IB, [id number] SCCa NEIDA    REASON FOR VISIT: Routine scheduled follow-up.    HISTORY OF PRESENT ILLNESS:   72M with bx-proven SCCa of NEIDA, a PET avid 3.2cm mass. No other sites of disease and a poor surgical candidate, FEV1 of 2L.    Completed definitive SBRT to 5000cGy in 5 frx ending 3/10/17.    INTERVAL HISTORY:   Mr. Shetty has completed 3c of adjuvant CTX with Dr. Cage and has incidental CTA associated with prior hospitalization that reveals tumor to have shrunk to 1.6cm.    He is planning for repeat C-scope in coming weeks. He has no complaints today and feels recovered from last CTX. No cough, rare trace hemoptysis is improved. No chest wall pain.    Review of Systems   Constitutional: Negative for appetite change, chills, fever and unexpected weight change.   HENT:   Negative for lump/mass, mouth sores, sore throat, trouble swallowing and voice change.    Eyes: Negative for eye problems and icterus.   Respiratory: Positive for hemoptysis and shortness of breath. Negative for chest tightness and cough.    Cardiovascular: Negative for chest pain and leg swelling.   Gastrointestinal: Negative for abdominal distention, abdominal pain, blood in stool, constipation, diarrhea, nausea and vomiting.   Genitourinary: Negative for bladder incontinence, difficulty urinating, dysuria, frequency, hematuria and nocturia.    Musculoskeletal: Negative for back pain, gait problem, neck pain and neck stiffness.   Neurological: Negative for extremity weakness, gait problem, headaches, numbness and seizures.   Hematological: Negative for adenopathy.     Past Medical History:   Diagnosis Date    Arthritis     BPH (benign prostatic hyperplasia)     Coronary artery disease     MI X 2    Gout, chronic     Heartburn     Hypertension     Myocardial infarction     PE (pulmonary embolism)      Presence of dental bridge     UPPER - NOT WEAR MUCH AS DOES NOT FIT WELL    Primary malignant neoplasm of left upper lobe of lung 5/19/2017    Staph infection      Past Surgical History:   Procedure Laterality Date    CARDIAC SURGERY      CABG X 4 9-11    CTR      BILAT    ROTATOR CUFF REPAIR      left    TONSILLECTOMY      TRIGGER FINGER RELEASE      right and left hands.     Social History     Social History    Marital status:      Spouse name: N/A    Number of children: N/A    Years of education: N/A     Social History Main Topics    Smoking status: Former Smoker     Packs/day: 2.00     Years: 25.00     Quit date: 2/25/1990    Smokeless tobacco: Never Used    Alcohol use Yes      Comment: 2 PER DAY    Drug use: No    Sexual activity: Not on file     Other Topics Concern    Not on file     Social History Narrative    No narrative on file     Family History   Problem Relation Age of Onset    Cancer Sister      gallbladder     Medication List with Changes/Refills   Current Medications    ALLOPURINOL (ZYLOPRIM) 100 MG TABLET    Take 100 mg by mouth once daily.    ALLOPURINOL (ZYLOPRIM) 100 MG TABLET    Take by mouth.    ASPIRIN (ECOTRIN) 325 MG EC TABLET    Take 325 mg by mouth once daily.    ASPIRIN (ECOTRIN) 81 MG EC TABLET    Take by mouth.    ATENOLOL (TENORMIN) 50 MG TABLET    Take 50 mg by mouth once daily.    ATENOLOL (TENORMIN) 50 MG TABLET    Take by mouth.    CEFUROXIME (CEFTIN) 500 MG TABLET    Take by mouth.    CILOSTAZOL (PLETAL) 100 MG TAB    100 mg.    CILOSTAZOL (PLETAL) 100 MG TAB    Take by mouth.    DEXAMETHASONE (DECADRON) 4 MG TAB        DOXYCYCLINE (VIBRA-TABS) 100 MG TABLET    Take by mouth.    FOLIC ACID-VIT B6-VIT B12 2.5-25-2 MG (FOLBIC OR EQUIV) 2.5-25-2 MG TAB    Take by mouth.    FUROSEMIDE (LASIX) 20 MG TABLET    Take by mouth.    GAVILYTE-G 236-22.74-6.74 -5.86 GRAM SUSPENSION        HYDROCODONE-ACETAMINOPHEN 5-325MG (NORCO) 5-325 MG PER TABLET         LEVOFLOXACIN (LEVAQUIN) 500 MG TABLET        NYSTATIN (MYCOSTATIN) 100,000 UNIT/ML SUSPENSION    Take by mouth.    ONDANSETRON (ZOFRAN) 8 MG TABLET        PRAVASTATIN (PRAVACHOL) 40 MG TABLET    Take 40 mg by mouth nightly.    PRAVASTATIN (PRAVACHOL) 40 MG TABLET    Take by mouth.    PREDNISONE (DELTASONE) 20 MG TABLET        RANITIDINE (ZANTAC 75) 75 MG TABLET    Take by mouth.    RANITIDINE (ZANTAC) 75 MG TABLET    Take 75 mg by mouth nightly as needed.    RIVAROXABAN (XARELTO) 20 MG TAB    Take by mouth.    SULFAMETHOXAZOLE-TRIMETHOPRIM 800-160MG (BACTRIM DS) 800-160 MG TAB    Take by mouth.    TERAZOSIN (HYTRIN) 2 MG CAPSULE    Take 2 mg by mouth every evening. 2 TABS    TERAZOSIN (HYTRIN) 2 MG CAPSULE    Take by mouth.    VENTOLIN HFA 90 MCG/ACTUATION INHALER        XARELTO 20 MG TAB    Take 20 mg by mouth once daily.     Review of patient's allergies indicates:   Allergen Reactions    Iodine and iodide containing products Hives     ALLERGIC TO SOFT SHELL CRAB ONLY    Shellfish containing products      soft shell crabs       QUALITY OF LIFE: 90%- Able to Carry on Normal Activity: Minor Symptoms of Disease    Vitals:    06/28/17 1027   BP: (!) 93/59   Pulse: 82   Resp: (!) 22   Weight: 91.2 kg (201 lb)   PainSc: 0-No pain       PHYSICAL EXAM:   GENERAL: alert; in no apparent distress.   HEAD: normocephalic, atraumatic.  EYES: pupils are equal, round, reactive to light and accommodation. Sclera anicteric. Conjunctiva not injected.   NOSE/THROAT: no nasal erythema or rhinorrhea. Oropharynx pink, without erythema, ulcerations or thrush.   NECK: no cervical motion rigidity; supple with no masses.  CHEST: clear to auscultation bilaterally; no wheezes, crackles or rubs. Patient is speaking comfortably on room air with normal work of breathing without using accessory muscles of respiration.  CARDIOVASCULAR: regular rate and rhythm; no murmurs, rubs or gallops.  MUSCULOSKELETAL: no tenderness to palpation along the  spine or scapulae. Normal range of motion.  NEUROLOGIC: cranial nerves II-XII intact bilaterally. Strength 5/5 in bilateral upper and lower extremities. Normal gait.  LYMPHATIC: no cervical, supraclavicular adenopathy appreciated bilaterally.   EXTREMITIES: mild clubbing; no cyanosis, edema.  SKIN: no erythema, rashes, ulcerations noted. Evidence of ecchymoses 2/2 blood thinners at B hands    ANCILLARY DATA:   5/18/17 CTA chest shows NEIDA mass now 1.4 x 1.6cm    ASSESSMENT: 72M with stage IB, [id number] SCCa NEIDA s/p definitive SBRT and adjuvant CTX.  PLAN:  Ms. Shetty is doing well and has nearly recovered from his CTX. He is planning another appt and CT C with Dr. Cage next month to eval his response with 5/17 CTA showing significant regression of tumor. He has no ill sequelae from SBRT and looks very good on today's visit. We discussed continued surveillance moving forward and I will continue to follow him with Dr. Cage and Dr. Ely.    All questions answered and contact information provided. Patient understands free to call us anytime with any questions or concerns regarding radiation therapy.    TIME SPENT WITH PATIENT: I have personally seen and evaluated this patient. Approximately 30 minutes were spent with the patient discussing follow-up careplan.     PHYSICIAN: Todd Olvera Jr, MD

## 2017-07-01 LAB
ALBUMIN SERPL-MCNC: 3.5 G/DL (ref 3.6–5.1)
ALBUMIN/GLOB SERPL: 1.5 (CALC) (ref 1–2.5)
ALP SERPL-CCNC: 67 U/L (ref 40–115)
ALT SERPL-CCNC: 13 U/L (ref 9–46)
AST SERPL-CCNC: 15 U/L (ref 10–35)
BASOPHILS # BLD AUTO: 21 CELLS/UL (ref 0–200)
BASOPHILS NFR BLD AUTO: 0.4 %
BILIRUB SERPL-MCNC: 2 MG/DL (ref 0.2–1.2)
BUN SERPL-MCNC: 17 MG/DL (ref 7–25)
BUN/CREAT SERPL: ABNORMAL (CALC) (ref 6–22)
CALCIUM SERPL-MCNC: 8.4 MG/DL (ref 8.6–10.3)
CHLORIDE SERPL-SCNC: 105 MMOL/L (ref 98–110)
CO2 SERPL-SCNC: 28 MMOL/L (ref 20–31)
CREAT SERPL-MCNC: 1.14 MG/DL (ref 0.7–1.18)
EOSINOPHIL # BLD AUTO: 68 CELLS/UL (ref 15–500)
EOSINOPHIL NFR BLD AUTO: 1.3 %
ERYTHROCYTE [DISTWIDTH] IN BLOOD BY AUTOMATED COUNT: 19 % (ref 11–15)
GFR SERPL CREATININE-BSD FRML MDRD: 64 ML/MIN/1.73M2
GLOBULIN SER CALC-MCNC: 2.3 G/DL (CALC) (ref 1.9–3.7)
GLUCOSE SERPL-MCNC: 98 MG/DL (ref 65–99)
HCT VFR BLD AUTO: 35 % (ref 38.5–50)
HGB BLD-MCNC: 11.3 G/DL (ref 13.2–17.1)
LYMPHOCYTES # BLD AUTO: 1014 CELLS/UL (ref 850–3900)
LYMPHOCYTES NFR BLD AUTO: 19.5 %
MCH RBC QN AUTO: 30.9 PG (ref 27–33)
MCHC RBC AUTO-ENTMCNC: 32.2 G/DL (ref 32–36)
MCV RBC AUTO: 95.8 FL (ref 80–100)
MONOCYTES # BLD AUTO: 530 CELLS/UL (ref 200–950)
MONOCYTES NFR BLD AUTO: 10.2 %
NEUTROPHILS # BLD AUTO: 3567 CELLS/UL (ref 1500–7800)
NEUTROPHILS NFR BLD AUTO: 68.6 %
PLATELET # BLD AUTO: 189 THOUSAND/UL (ref 140–400)
PMV BLD REES-ECKER: 6.5 FL (ref 7.5–12.5)
POTASSIUM SERPL-SCNC: 4.3 MMOL/L (ref 3.5–5.3)
PROT SERPL-MCNC: 5.8 G/DL (ref 6.1–8.1)
RBC # BLD AUTO: 3.65 MILLION/UL (ref 4.2–5.8)
SODIUM SERPL-SCNC: 140 MMOL/L (ref 135–146)
WBC # BLD AUTO: 5.2 THOUSAND/UL (ref 3.8–10.8)

## 2017-07-05 ENCOUNTER — OFFICE VISIT (OUTPATIENT)
Dept: HEMATOLOGY/ONCOLOGY | Facility: CLINIC | Age: 72
End: 2017-07-05
Payer: MEDICARE

## 2017-07-05 VITALS
SYSTOLIC BLOOD PRESSURE: 100 MMHG | BODY MASS INDEX: 29.23 KG/M2 | DIASTOLIC BLOOD PRESSURE: 62 MMHG | WEIGHT: 197.38 LBS | TEMPERATURE: 98 F | HEIGHT: 69 IN | HEART RATE: 90 BPM | RESPIRATION RATE: 18 BRPM

## 2017-07-05 DIAGNOSIS — C34.12 PRIMARY MALIGNANT NEOPLASM OF LEFT UPPER LOBE OF LUNG: Primary | ICD-10-CM

## 2017-07-05 DIAGNOSIS — Z86.711 HISTORY OF PULMONARY EMBOLISM: ICD-10-CM

## 2017-07-05 DIAGNOSIS — D64.81 ANEMIA ASSOCIATED WITH CHEMOTHERAPY: ICD-10-CM

## 2017-07-05 DIAGNOSIS — T45.1X5A ANEMIA ASSOCIATED WITH CHEMOTHERAPY: ICD-10-CM

## 2017-07-05 DIAGNOSIS — C34.12 SQUAMOUS CELL CARCINOMA OF BRONCHUS IN LEFT UPPER LOBE: ICD-10-CM

## 2017-07-05 DIAGNOSIS — Z86.718 HISTORY OF DEEP VENOUS THROMBOSIS (DVT) OF DISTAL VEIN OF LEFT LOWER EXTREMITY: ICD-10-CM

## 2017-07-05 PROCEDURE — 1126F AMNT PAIN NOTED NONE PRSNT: CPT | Mod: ,,, | Performed by: INTERNAL MEDICINE

## 2017-07-05 PROCEDURE — 1159F MED LIST DOCD IN RCRD: CPT | Mod: ,,, | Performed by: INTERNAL MEDICINE

## 2017-07-05 PROCEDURE — 99214 OFFICE O/P EST MOD 30 MIN: CPT | Mod: ,,, | Performed by: INTERNAL MEDICINE

## 2017-07-05 RX ORDER — HEPARIN 100 UNIT/ML
500 SYRINGE INTRAVENOUS
Status: CANCELLED | OUTPATIENT
Start: 2017-07-05

## 2017-07-05 RX ORDER — SODIUM CHLORIDE 0.9 % (FLUSH) 0.9 %
10 SYRINGE (ML) INJECTION
Status: CANCELLED | OUTPATIENT
Start: 2017-07-05

## 2017-07-05 RX ORDER — ATENOLOL 25 MG/1
25 TABLET ORAL DAILY PRN
COMMUNITY
End: 2018-01-01

## 2017-07-05 NOTE — PROGRESS NOTES
[facility] Hematology/Oncology            PROGRESS NOTE      Subjective:       Patient ID:   NAME: Michael Shetty : 1945     72 y.o. male    Referring Doc: Michael Ellsworth MD  Other Physicians:    Chief Complaint:  Lung ca f/u    History of Present Illness:     Patient returns today for a regularly scheduled follow-up visit.  The patient is doing ok with no new issues. Some chronic SOB and STREETER but otherwise stable. He  lsot a little weight about 4 lbs. No CP, Ha's or N/V; good appetite          ROS:   GEN: normal without any fever, night sweats or weight loss  HEENT: normal with no HA's, sore throat, stiff neck, changes in vision  CV: normal with no CP, SOB, PND, STREETER or orthopnea  PULM: normal with no SOB, cough, hemoptysis, sputum or pleuritic pain  GI: normal with no abdominal pain, nausea, vomiting, constipation, diarrhea, melanotic stools, BRBPR, or hematemesis  : normal with no hematuria, dysuria  BREAST: normal with no mass, discharge, pain  SKIN: normal with no rash, erythema, bruising, or swelling    Allergies:  Review of patient's allergies indicates:   Allergen Reactions    Iodine and iodide containing products Hives     ALLERGIC TO SOFT SHELL CRAB ONLY    Shellfish containing products      soft shell crabs       Medications:    Current Outpatient Prescriptions:     atenolol (TENORMIN) 25 MG tablet, Take 25 mg by mouth once daily., Disp: , Rfl:     cefUROXime (CEFTIN) 500 MG tablet, Take by mouth., Disp: , Rfl:     doxycycline (VIBRA-TABS) 100 MG tablet, Take by mouth., Disp: , Rfl:     pravastatin (PRAVACHOL) 40 MG tablet, Take 40 mg by mouth nightly., Disp: , Rfl:     ranitidine (ZANTAC 75) 75 MG tablet, Take by mouth., Disp: , Rfl:     rivaroxaban (XARELTO) 20 mg Tab, Take by mouth., Disp: , Rfl:     sulfamethoxazole-trimethoprim 800-160mg (BACTRIM DS) 800-160 mg Tab, Take by mouth., Disp: , Rfl:     terazosin (HYTRIN) 2 MG capsule, Take 2 mg by mouth every evening. 2 TABS, Disp:  ", Rfl:     VENTOLIN HFA 90 mcg/actuation inhaler, , Disp: , Rfl:     allopurinol (ZYLOPRIM) 100 MG tablet, Take by mouth., Disp: , Rfl:     cilostazol (PLETAL) 100 MG Tab, 100 mg., Disp: , Rfl:     hydrocodone-acetaminophen 5-325mg (NORCO) 5-325 mg per tablet, , Disp: , Rfl:     ondansetron (ZOFRAN) 8 MG tablet, , Disp: , Rfl:     PMHx/PSHx Updates:  See patient's last visit with me on 6/1/2017.  See H&P on 12/9/2015      Pathology:  Primary malignant neoplasm of left upper lobe of lung    Staging form: Lung, AJCC 7th Edition    - Clinical: Stage IB (T2a, N0, M0) - Signed by Todd Olvera Jr., MD on 6/28/2017          Objective:     Vitals:  Blood pressure 100/62, pulse 90, temperature 97.7 °F (36.5 °C), temperature source Oral, resp. rate 18, height 5' 9" (1.753 m), weight 89.5 kg (197 lb 6.4 oz).    Physical Examination:   GEN: no apparent distress, comfortable; AAOx3  HEAD: atraumatic and normocephalic  EYES: no pallor, no icterus, PERRLA  ENT: OMM, no pharyngeal erythema, external ears WNL; no nasal discharge; no thrush  NECK: no masses, thyroid normal, trachea midline, no LAD/LN's, supple  CV: RRR with no murmur; normal pulse; normal S1 and S2; no pedal edema  CHEST: Normal respiratory effort; CTAB; normal breath sounds; no wheeze or crackles  ABDOM: nontender and nondistended; soft; normal bowel sounds; no rebound/guarding  MUSC/Skeletal: ROM normal; no crepitus; joints normal; no deformities or arthropathy  EXTREM: no clubbing, cyanosis, inflammation or swelling  SKIN: no rashes, lesions, ulcers, petechiae or subcutaneous nodules  : no lucio  NEURO: grossly intact; motor/sensory WNL; AAOx3; no tremors  PSYCH: normal mood, affect and behavior  LYMPH: normal cervical, supraclavicular, axillary and groin LN's            Labs:   Lab Results   Component Value Date    WBC 5.2 06/30/2017    HGB 11.3 (L) 06/30/2017    HCT 35.0 (L) 06/30/2017    MCV 95.8 06/30/2017     06/30/2017    CMP  Sodium   Date " Value Ref Range Status   06/30/2017 140 135 - 146 mmol/L Final     Potassium   Date Value Ref Range Status   06/30/2017 4.3 3.5 - 5.3 mmol/L Final     Chloride   Date Value Ref Range Status   06/30/2017 105 98 - 110 mmol/L Final     CO2   Date Value Ref Range Status   06/30/2017 28 20 - 31 mmol/L Final     Glucose   Date Value Ref Range Status   06/30/2017 98 65 - 99 mg/dL Final     Comment:                   Fasting reference interval          BUN, Bld   Date Value Ref Range Status   06/30/2017 17 7 - 25 mg/dL Final     Creatinine   Date Value Ref Range Status   06/30/2017 1.14 0.70 - 1.18 mg/dL Final     Comment:     For patients >49 years of age, the reference limit  for Creatinine is approximately 13% higher for people  identified as -American.        02/25/2013 1.1 0.5 - 1.4 mg/dL Final     Calcium   Date Value Ref Range Status   06/30/2017 8.4 (L) 8.6 - 10.3 mg/dL Final   02/25/2013 9.3 8.7 - 10.5 mg/dL Final     Total Protein   Date Value Ref Range Status   06/30/2017 5.8 (L) 6.1 - 8.1 g/dL Final     Albumin   Date Value Ref Range Status   06/30/2017 3.5 (L) 3.6 - 5.1 g/dL Final     Total Bilirubin   Date Value Ref Range Status   06/30/2017 2.0 (H) 0.2 - 1.2 mg/dL Final     Alkaline Phosphatase   Date Value Ref Range Status   06/30/2017 67 40 - 115 U/L Final     AST   Date Value Ref Range Status   06/30/2017 15 10 - 35 U/L Final     ALT   Date Value Ref Range Status   06/30/2017 13 9 - 46 U/L Final     Anion Gap   Date Value Ref Range Status   02/25/2013 13 5 - 15 meq/L Final     eGFR if    Date Value Ref Range Status   06/30/2017 75 > OR = 60 mL/min/1.73m2 Final     eGFR if non    Date Value Ref Range Status   06/30/2017 64 > OR = 60 mL/min/1.73m2 Final     I have reviewed all available lab results and radiology reports.    Radiology/Diagnostic Studies:        Assessment/Plan:   (1) 72 y.o. 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 Hold off on additional chemotherapy at this time and repeat scans in 2 months  2. F/u with dr Ely with pulm as scheduled (dec 5th)  3. F/u with dr Holliday with GI with repeat colonoscopy due this Monday (he plans to hold the xarelto two days before)  4. Check labs q month for now  5. RTc 2 months  6. Fax note to Dr Ely, Mg, Joshua, Jodee and Wade  7. Set up PF        I spent over 25 mins with the patient, of which over half was face to face with the patient. Reviewing materials, labs, reports and studies. Making treatment and analytical decisions. Ordering necessary labs, tests and studies.      Discussion:     I have explained all of the above in detail and the patient understands all of the current recommendation(s). I have answered all of their questions to the best of my ability and to their complete satisfaction.   The patient is to continue with the current management plan.    RTC in  Aug 2017          Electronically signed by Chris Cage MD

## 2017-07-29 LAB
ALBUMIN SERPL-MCNC: 3.6 G/DL (ref 3.6–5.1)
ALBUMIN/GLOB SERPL: 1.4 (CALC) (ref 1–2.5)
ALP SERPL-CCNC: 69 U/L (ref 40–115)
ALT SERPL-CCNC: 10 U/L (ref 9–46)
AST SERPL-CCNC: 17 U/L (ref 10–35)
BASOPHILS # BLD AUTO: 31 CELLS/UL (ref 0–200)
BASOPHILS NFR BLD AUTO: 0.5 %
BILIRUB SERPL-MCNC: 1.4 MG/DL (ref 0.2–1.2)
BUN SERPL-MCNC: 19 MG/DL (ref 7–25)
BUN/CREAT SERPL: 15 (CALC) (ref 6–22)
CALCIUM SERPL-MCNC: 8.8 MG/DL (ref 8.6–10.3)
CHLORIDE SERPL-SCNC: 107 MMOL/L (ref 98–110)
CO2 SERPL-SCNC: 27 MMOL/L (ref 20–31)
CREAT SERPL-MCNC: 1.24 MG/DL (ref 0.7–1.18)
EOSINOPHIL # BLD AUTO: 79 CELLS/UL (ref 15–500)
EOSINOPHIL NFR BLD AUTO: 1.3 %
ERYTHROCYTE [DISTWIDTH] IN BLOOD BY AUTOMATED COUNT: 15.5 % (ref 11–15)
GFR SERPL CREATININE-BSD FRML MDRD: 58 ML/MIN/1.73M2
GLOBULIN SER CALC-MCNC: 2.6 G/DL (CALC) (ref 1.9–3.7)
GLUCOSE SERPL-MCNC: 110 MG/DL (ref 65–99)
HCT VFR BLD AUTO: 38.8 % (ref 38.5–50)
HGB BLD-MCNC: 12 G/DL (ref 13.2–17.1)
LYMPHOCYTES # BLD AUTO: 1061 CELLS/UL (ref 850–3900)
LYMPHOCYTES NFR BLD AUTO: 17.4 %
MCH RBC QN AUTO: 29.8 PG (ref 27–33)
MCHC RBC AUTO-ENTMCNC: 30.9 G/DL (ref 32–36)
MCV RBC AUTO: 96.3 FL (ref 80–100)
MONOCYTES # BLD AUTO: 537 CELLS/UL (ref 200–950)
MONOCYTES NFR BLD AUTO: 8.8 %
NEUTROPHILS # BLD AUTO: 4392 CELLS/UL (ref 1500–7800)
NEUTROPHILS NFR BLD AUTO: 72 %
PLATELET # BLD AUTO: 169 THOUSAND/UL (ref 140–400)
PMV BLD REES-ECKER: 8.9 FL (ref 7.5–12.5)
POTASSIUM SERPL-SCNC: 4.4 MMOL/L (ref 3.5–5.3)
PROT SERPL-MCNC: 6.2 G/DL (ref 6.1–8.1)
RBC # BLD AUTO: 4.03 MILLION/UL (ref 4.2–5.8)
SODIUM SERPL-SCNC: 143 MMOL/L (ref 135–146)
WBC # BLD AUTO: 6.1 THOUSAND/UL (ref 3.8–10.8)

## 2017-08-16 RX ORDER — HEPARIN 100 UNIT/ML
500 SYRINGE INTRAVENOUS
Status: CANCELLED | OUTPATIENT
Start: 2017-08-16

## 2017-08-16 RX ORDER — SODIUM CHLORIDE 0.9 % (FLUSH) 0.9 %
10 SYRINGE (ML) INJECTION
Status: CANCELLED | OUTPATIENT
Start: 2017-08-16

## 2017-09-01 LAB
ALBUMIN SERPL-MCNC: 3.5 G/DL (ref 3.6–5.1)
ALBUMIN/GLOB SERPL: 1.3 (CALC) (ref 1–2.5)
ALP SERPL-CCNC: 65 U/L (ref 40–115)
ALT SERPL-CCNC: 12 U/L (ref 9–46)
AST SERPL-CCNC: 15 U/L (ref 10–35)
BASOPHILS # BLD AUTO: 18 CELLS/UL (ref 0–200)
BASOPHILS NFR BLD AUTO: 0.3 %
BILIRUB SERPL-MCNC: 1.4 MG/DL (ref 0.2–1.2)
BUN SERPL-MCNC: 13 MG/DL (ref 7–25)
BUN/CREAT SERPL: ABNORMAL (CALC) (ref 6–22)
CALCIUM SERPL-MCNC: 8.7 MG/DL (ref 8.6–10.3)
CHLORIDE SERPL-SCNC: 107 MMOL/L (ref 98–110)
CO2 SERPL-SCNC: 29 MMOL/L (ref 20–31)
CREAT SERPL-MCNC: 1.06 MG/DL (ref 0.7–1.18)
EOSINOPHIL # BLD AUTO: 83 CELLS/UL (ref 15–500)
EOSINOPHIL NFR BLD AUTO: 1.4 %
ERYTHROCYTE [DISTWIDTH] IN BLOOD BY AUTOMATED COUNT: 15.5 % (ref 11–15)
GFR SERPL CREATININE-BSD FRML MDRD: 70 ML/MIN/1.73M2
GLOBULIN SER CALC-MCNC: 2.6 G/DL (CALC) (ref 1.9–3.7)
GLUCOSE SERPL-MCNC: 92 MG/DL (ref 65–99)
HCT VFR BLD AUTO: 40.5 % (ref 38.5–50)
HGB BLD-MCNC: 13 G/DL (ref 13.2–17.1)
LYMPHOCYTES # BLD AUTO: 1263 CELLS/UL (ref 850–3900)
LYMPHOCYTES NFR BLD AUTO: 21.4 %
MCH RBC QN AUTO: 29.8 PG (ref 27–33)
MCHC RBC AUTO-ENTMCNC: 32.1 G/DL (ref 32–36)
MCV RBC AUTO: 92.9 FL (ref 80–100)
MONOCYTES # BLD AUTO: 531 CELLS/UL (ref 200–950)
MONOCYTES NFR BLD AUTO: 9 %
NEUTROPHILS # BLD AUTO: 4006 CELLS/UL (ref 1500–7800)
NEUTROPHILS NFR BLD AUTO: 67.9 %
PLATELET # BLD AUTO: 185 THOUSAND/UL (ref 140–400)
PMV BLD REES-ECKER: 8.8 FL (ref 7.5–12.5)
POTASSIUM SERPL-SCNC: 4.8 MMOL/L (ref 3.5–5.3)
PROT SERPL-MCNC: 6.1 G/DL (ref 6.1–8.1)
RBC # BLD AUTO: 4.36 MILLION/UL (ref 4.2–5.8)
SODIUM SERPL-SCNC: 142 MMOL/L (ref 135–146)
WBC # BLD AUTO: 5.9 THOUSAND/UL (ref 3.8–10.8)

## 2017-09-08 LAB — GLUCOSE SERPL-MCNC: 110 MG/DL (ref 70–99)

## 2017-09-11 ENCOUNTER — OFFICE VISIT (OUTPATIENT)
Dept: HEMATOLOGY/ONCOLOGY | Facility: CLINIC | Age: 72
End: 2017-09-11
Payer: MEDICARE

## 2017-09-11 VITALS
BODY MASS INDEX: 28.84 KG/M2 | WEIGHT: 194.69 LBS | DIASTOLIC BLOOD PRESSURE: 64 MMHG | HEIGHT: 69 IN | TEMPERATURE: 98 F | RESPIRATION RATE: 18 BRPM | SYSTOLIC BLOOD PRESSURE: 97 MMHG | HEART RATE: 90 BPM

## 2017-09-11 DIAGNOSIS — C34.12 PRIMARY MALIGNANT NEOPLASM OF LEFT UPPER LOBE OF LUNG: ICD-10-CM

## 2017-09-11 DIAGNOSIS — Z86.711 HISTORY OF PULMONARY EMBOLISM: ICD-10-CM

## 2017-09-11 DIAGNOSIS — C34.12 SQUAMOUS CELL CARCINOMA OF BRONCHUS IN LEFT UPPER LOBE: Primary | ICD-10-CM

## 2017-09-11 DIAGNOSIS — Z86.718 HISTORY OF DEEP VENOUS THROMBOSIS (DVT) OF DISTAL VEIN OF LEFT LOWER EXTREMITY: ICD-10-CM

## 2017-09-11 PROCEDURE — 99214 OFFICE O/P EST MOD 30 MIN: CPT | Mod: ,,, | Performed by: INTERNAL MEDICINE

## 2017-09-11 PROCEDURE — 1159F MED LIST DOCD IN RCRD: CPT | Mod: ,,, | Performed by: INTERNAL MEDICINE

## 2017-09-11 PROCEDURE — 3008F BODY MASS INDEX DOCD: CPT | Mod: ,,, | Performed by: INTERNAL MEDICINE

## 2017-09-11 PROCEDURE — 1126F AMNT PAIN NOTED NONE PRSNT: CPT | Mod: ,,, | Performed by: INTERNAL MEDICINE

## 2017-09-11 NOTE — PROGRESS NOTES
"   [facility] Hematology/Oncology  PROGRESS NOTE      Subjective:       Patient ID:   NAME: Michael Shetty : 1945     72 y.o. male    Referring Doc: Michael Ellsworth MD  Other Physicians: Mg Ely    Chief Complaint:  Follow-up and Results (scan and labs in epic)      History of Present Illness:     Patient returns today for a regularly scheduled follow-up visit.  The patient is here to go over latest PET scan which was done last Friday; he had recent head cold which is resolving; he is feeling "good" overall; no CP, SOB, HA's or N/V. He had scope with dr minaya and it was clear of cancer per Dr Minaya. He sees Dr Minaya tomorrow            ROS:   GEN: normal without any fever, night sweats or weight loss  HEENT: normal with no HA's, sore throat, stiff neck, changes in vision  CV: normal with no CP, SOB, PND, STREETER or orthopnea  PULM: normal with no SOB, cough, hemoptysis, sputum or pleuritic pain  GI: normal with no abdominal pain, nausea, vomiting, constipation, diarrhea, melanotic stools, BRBPR, or hematemesis  : normal with no hematuria, dysuria  BREAST: normal with no mass, discharge, pain  SKIN: normal with no rash, erythema, bruising, or swelling    Allergies:  Review of patient's allergies indicates:   Allergen Reactions    Iodine and iodide containing products Hives     ALLERGIC TO SOFT SHELL CRAB ONLY    Shellfish containing products      soft shell crabs       Medications:    Current Outpatient Prescriptions:     allopurinol (ZYLOPRIM) 100 MG tablet, Take by mouth., Disp: , Rfl:     atenolol (TENORMIN) 25 MG tablet, Take 25 mg by mouth once daily., Disp: , Rfl:     cefUROXime (CEFTIN) 500 MG tablet, Take by mouth., Disp: , Rfl:     cilostazol (PLETAL) 100 MG Tab, 100 mg., Disp: , Rfl:     doxycycline (VIBRA-TABS) 100 MG tablet, Take by mouth., Disp: , Rfl:     hydrocodone-acetaminophen 5-325mg (NORCO) 5-325 mg per tablet, , Disp: , Rfl:     ondansetron (ZOFRAN) 8 MG tablet, , " "Disp: , Rfl:     pravastatin (PRAVACHOL) 40 MG tablet, Take 40 mg by mouth nightly., Disp: , Rfl:     ranitidine (ZANTAC 75) 75 MG tablet, Take by mouth., Disp: , Rfl:     rivaroxaban (XARELTO) 20 mg Tab, Take by mouth., Disp: , Rfl:     sulfamethoxazole-trimethoprim 800-160mg (BACTRIM DS) 800-160 mg Tab, Take by mouth., Disp: , Rfl:     terazosin (HYTRIN) 2 MG capsule, Take 2 mg by mouth every evening. 2 TABS, Disp: , Rfl:     VENTOLIN HFA 90 mcg/actuation inhaler, , Disp: , Rfl:     PMHx/PSHx Updates:  See patient's last visit with me on 7/5/2017.  See H&P on 12/9/2015        Pathology:  Primary malignant neoplasm of left upper lobe of lung    Staging form: Lung, AJCC 7th Edition    - Clinical: Stage IB (T2a, N0, M0) - Signed by Todd Olvera Jr., MD on 6/28/2017          Objective:     Vitals:  Blood pressure 97/64, pulse 90, temperature 98.2 °F (36.8 °C), resp. rate 18, height 5' 9" (1.753 m), weight 88.3 kg (194 lb 11.2 oz).    Physical Examination:   GEN: no apparent distress, comfortable; AAOx3  HEAD: atraumatic and normocephalic  EYES: no pallor, no icterus, PERRLA  ENT: OMM, no pharyngeal erythema, external ears WNL; no nasal discharge; no thrush  NECK: no masses, thyroid normal, trachea midline, no LAD/LN's, supple  CV: RRR with no murmur; normal pulse; normal S1 and S2; no pedal edema  CHEST: Normal respiratory effort; CTAB; normal breath sounds; no wheeze or crackles  ABDOM: nontender and nondistended; soft; normal bowel sounds; no rebound/guarding  MUSC/Skeletal: ROM normal; no crepitus; joints normal; no deformities or arthropathy  EXTREM: no clubbing, cyanosis, inflammation or swelling  SKIN: no rashes, lesions, ulcers, petechiae or subcutaneous nodules  : no lucio  NEURO: grossly intact; motor/sensory WNL; AAOx3; no tremors  PSYCH: normal mood, affect and behavior  LYMPH: normal cervical, supraclavicular, axillary and groin LN's            Labs:   8/31/17  Lab Results   Component Value Date "    WBC 5.9 08/31/2017    HGB 13.0 (L) 08/31/2017    HCT 40.5 08/31/2017    MCV 92.9 08/31/2017     08/31/2017     BMP  Lab Results   Component Value Date     08/31/2017    K 4.8 08/31/2017     08/31/2017    CO2 29 08/31/2017    BUN 13 08/31/2017    CREATININE 1.06 08/31/2017    CALCIUM 8.7 08/31/2017    ANIONGAP 13 02/25/2013    ESTGFRAFRICA 81 08/31/2017    EGFRNONAA 70 08/31/2017             Radiology/Diagnostic Studies:    Smhc Unknown Rad Eap    Result Date: 9/8/2017  INTEGRATED PET CT WITH IMAGE FUSION HISTORY:  C34.12 Z86.718 Z86.711 D64.81 subsequent treatment evaluation for left lung cancer. TECHNIQUE: Following the injection of 13.3 mCi of F-18 labeled FDG into a right antecubital vein, PET CT was performed from the vertex of the skull through the proximal thighs with an integrated full ring PET CT scanner with image fusion. The patient's serum glucose at the time of the exam was 110 mg/dL. COMPARISON: CTA chest dated 05/18/2017 and PET/CT dated 01/26/2017 FINDINGS: There is a 15 x 14 mm spiculated mass within the left upper lobe with mild FDG activity of 3.1. This has decreased in size when compared to the prior PET/CT. There is stable diffuse emphysematous changes throughout both lungs and fibrotic changes in the left upper lobe and lung bases. There is bronchiectasis with airspace disease in the right lower lobe. There is a 14 mm nonhypermetabolic nodule in the posterior middle left lower lobe which is stable. Inferior to the hypermetabolic nodule within the anterior aspect of the left upper lobe is a stable area of FDG activity with a max SUV of 4.7. There is no corresponding mass or osseous abnormality. There are reticular opacities within this region. This most likely represents infectious or inflammatory process. There are no new pulmonary nodules or infiltrates. There is no hilar, mediastinal or axillary adenopathy. The patient has had a prior CABG. There is bilateral  gynecomastia. CT of the head and neck show no intra-axial lesions or cervical adenopathy. There is moderate calcification at the carotid bifurcation. There is a right subclavian vein Port-A-Cath tip in the superior vena cava. CT of the abdomen and pelvis demonstrates physiologic activity in the GI tract and urinary system. There is sigmoid diverticulosis without diverticulitis. There is no adenopathy. The adrenal glands are normal. There is moderate vascular calcification. There are no lytic or blastic lesions. There are degenerative changes of the spine.     IMPRESSION: Decrease in size and FDG activity of the spiculated pleural-based lesion in the left upper lobe Stable FDG activity anteriorly in the left upper lobe corresponding to focal reticular opacity most likely infectious or inflammatory Diffuse emphysematous changes with fibrotic changes throughout the lungs Prior CABG No evidence of distant metastasis Read and electronically signed by: Trixie Bowens MD on 9/8/2017 1:31 PM CDT TRIXIE BOWENS MD      I have reviewed all available lab results and radiology reports.    Assessment/Plan:     (1) 72 y.o. male with diagnosis of NSCLC (Squamous cell)  - T2A lesion with 3.5cm involving NEIDA  - s/p monotherapy with XRT by Dr Olvera; followed by chemotherapy with 3 cycles of carbo/taxol  - followed by Dr Ely with Pulm  - CTA on 5/18 showed decrease size in the tumor mass     (2) prior admit with SOB and suspected pneumonia     (3) LLE DVT and PE hx - along with prior CVA  - followed by Dr Lewis  - on Xarelto and ASA per cardiology direction  - MTHFR gene abnormality     (4) Anemia secondary to chemotherapy     (5) CAD s/p MI in past - followed by Joshua     (6) Dr Holliday following and had colonoscopy     (7) BP issues - followed by Dr Lewis    PLAN:  1. F/u with with PCP, PULm, GI etc  2. Repeat scans in 3 months  3. Repeat labs in 3 months  4. RTC in 3 months  Fax note to Michael Ellsworth MD,  Mg Ely    I spent over 25 mins of time with the patient. Over half of this time was spent couseling and coordinating care.      Discussion:     I have explained all of the above in detail and the patient understands all of the current recommendation(s). I have answered all of their questions to the best of my ability and to their complete satisfaction.   The patient is to continue with the current management plan.            Electronically signed by Chris Cage MD

## 2017-09-11 NOTE — LETTER
September 11, 2017      Michael Ellsworth MD  [address] [place] [address] [address]  [facility] [address]           [facility] Hematology Oncology  [address] [facility]  [address]  [facility] [address]  Phone: [phone]  Fax: [phone]          Patient: Michael Shetty   MR Number: [id number]   YOB: 1945   Date of Visit: 9/11/2017       Dear Dr. Michael Ellsworth:    Thank you for referring Michael Shetty to me for evaluation. Attached you will find relevant portions of my assessment and plan of care.    If you have questions, please do not hesitate to call me. I look forward to following Michael Shetty along with you.    Sincerely,    Chris Cage MD    Enclosure  CC:  No Recipients    If you would like to receive this communication electronically, please contact [email or web address] or [phone] to request more information on [employer] Link access.    For providers and/or their staff who would like to refer a patient to [facility], please contact us through our one-stop-shop provider referral line, [facility] [place], at [phone].    If you feel you have received this communication in error or would no longer like to receive these types of communications, please e-mail [email or web address]

## 2017-09-27 RX ORDER — SODIUM CHLORIDE 0.9 % (FLUSH) 0.9 %
10 SYRINGE (ML) INJECTION
Status: CANCELLED | OUTPATIENT
Start: 2017-09-27

## 2017-09-27 RX ORDER — HEPARIN 100 UNIT/ML
500 SYRINGE INTRAVENOUS
Status: CANCELLED | OUTPATIENT
Start: 2017-09-27

## 2017-11-08 RX ORDER — SODIUM CHLORIDE 0.9 % (FLUSH) 0.9 %
10 SYRINGE (ML) INJECTION
Status: CANCELLED | OUTPATIENT
Start: 2017-11-08

## 2017-11-08 RX ORDER — HEPARIN 100 UNIT/ML
500 SYRINGE INTRAVENOUS
Status: CANCELLED | OUTPATIENT
Start: 2017-11-08

## 2017-12-06 LAB
ALBUMIN SERPL-MCNC: 3.7 G/DL (ref 3.6–5.1)
ALBUMIN/GLOB SERPL: 1.4 (CALC) (ref 1–2.5)
ALP SERPL-CCNC: 75 U/L (ref 40–115)
ALT SERPL-CCNC: 13 U/L (ref 9–46)
AST SERPL-CCNC: 17 U/L (ref 10–35)
BASOPHILS # BLD AUTO: 31 CELLS/UL (ref 0–200)
BASOPHILS NFR BLD AUTO: 0.5 %
BILIRUB SERPL-MCNC: 1.7 MG/DL (ref 0.2–1.2)
BUN SERPL-MCNC: 23 MG/DL (ref 7–25)
BUN/CREAT SERPL: 16 (CALC) (ref 6–22)
CALCIUM SERPL-MCNC: 8.7 MG/DL (ref 8.6–10.3)
CEA SERPL-MCNC: 3.1 NG/ML
CHLORIDE SERPL-SCNC: 103 MMOL/L (ref 98–110)
CO2 SERPL-SCNC: 26 MMOL/L (ref 20–31)
CREAT SERPL-MCNC: 1.4 MG/DL (ref 0.7–1.18)
EOSINOPHIL # BLD AUTO: 92 CELLS/UL (ref 15–500)
EOSINOPHIL NFR BLD AUTO: 1.5 %
ERYTHROCYTE [DISTWIDTH] IN BLOOD BY AUTOMATED COUNT: 16.6 % (ref 11–15)
GFR SERPL CREATININE-BSD FRML MDRD: 50 ML/MIN/1.73M2
GLOBULIN SER CALC-MCNC: 2.7 G/DL (CALC) (ref 1.9–3.7)
GLUCOSE SERPL-MCNC: 99 MG/DL (ref 65–99)
HCT VFR BLD AUTO: 42.7 % (ref 38.5–50)
HGB BLD-MCNC: 14.1 G/DL (ref 13.2–17.1)
LYMPHOCYTES # BLD AUTO: 1055 CELLS/UL (ref 850–3900)
LYMPHOCYTES NFR BLD AUTO: 17.3 %
MCH RBC QN AUTO: 30.5 PG (ref 27–33)
MCHC RBC AUTO-ENTMCNC: 33 G/DL (ref 32–36)
MCV RBC AUTO: 92.2 FL (ref 80–100)
MONOCYTES # BLD AUTO: 494 CELLS/UL (ref 200–950)
MONOCYTES NFR BLD AUTO: 8.1 %
NEUTROPHILS # BLD AUTO: 4429 CELLS/UL (ref 1500–7800)
NEUTROPHILS NFR BLD AUTO: 72.6 %
PLATELET # BLD AUTO: 164 THOUSAND/UL (ref 140–400)
PMV BLD REES-ECKER: 8.7 FL (ref 7.5–12.5)
POTASSIUM SERPL-SCNC: 4.3 MMOL/L (ref 3.5–5.3)
PROT SERPL-MCNC: 6.4 G/DL (ref 6.1–8.1)
RBC # BLD AUTO: 4.63 MILLION/UL (ref 4.2–5.8)
SODIUM SERPL-SCNC: 138 MMOL/L (ref 135–146)
WBC # BLD AUTO: 6.1 THOUSAND/UL (ref 3.8–10.8)

## 2017-12-14 ENCOUNTER — TELEPHONE (OUTPATIENT)
Dept: HEMATOLOGY/ONCOLOGY | Facility: CLINIC | Age: 72
End: 2017-12-14

## 2017-12-14 LAB — ISTAT CREATININE: 1.2 MG/DL (ref 0.6–1.4)

## 2017-12-14 NOTE — TELEPHONE ENCOUNTER
----- Message from Chris Cage MD sent at 12/14/2017  3:36 PM CST -----  Call patient - scan looks stable

## 2017-12-20 RX ORDER — SODIUM CHLORIDE 0.9 % (FLUSH) 0.9 %
10 SYRINGE (ML) INJECTION
Status: CANCELLED | OUTPATIENT
Start: 2017-12-20

## 2017-12-20 RX ORDER — HEPARIN 100 UNIT/ML
500 SYRINGE INTRAVENOUS
Status: CANCELLED | OUTPATIENT
Start: 2017-12-20

## 2017-12-28 ENCOUNTER — OFFICE VISIT (OUTPATIENT)
Dept: HEMATOLOGY/ONCOLOGY | Facility: CLINIC | Age: 72
End: 2017-12-28
Payer: MEDICARE

## 2017-12-28 ENCOUNTER — OFFICE VISIT (OUTPATIENT)
Dept: RADIATION ONCOLOGY | Facility: CLINIC | Age: 72
End: 2017-12-28
Payer: MEDICARE

## 2017-12-28 VITALS
HEART RATE: 97 BPM | WEIGHT: 198.63 LBS | DIASTOLIC BLOOD PRESSURE: 63 MMHG | SYSTOLIC BLOOD PRESSURE: 100 MMHG | TEMPERATURE: 98 F | RESPIRATION RATE: 18 BRPM | BODY MASS INDEX: 29.33 KG/M2

## 2017-12-28 VITALS — WEIGHT: 200 LBS | BODY MASS INDEX: 29.53 KG/M2

## 2017-12-28 DIAGNOSIS — Z86.718 HISTORY OF DEEP VENOUS THROMBOSIS (DVT) OF DISTAL VEIN OF LEFT LOWER EXTREMITY: Primary | ICD-10-CM

## 2017-12-28 DIAGNOSIS — C34.12 SQUAMOUS CELL CARCINOMA OF BRONCHUS IN LEFT UPPER LOBE: ICD-10-CM

## 2017-12-28 DIAGNOSIS — D64.81 ANEMIA ASSOCIATED WITH CHEMOTHERAPY: ICD-10-CM

## 2017-12-28 DIAGNOSIS — C34.12 PRIMARY MALIGNANT NEOPLASM OF LEFT UPPER LOBE OF LUNG: Primary | ICD-10-CM

## 2017-12-28 DIAGNOSIS — C34.12 PRIMARY MALIGNANT NEOPLASM OF LEFT UPPER LOBE OF LUNG: ICD-10-CM

## 2017-12-28 DIAGNOSIS — K86.2 PANCREATIC CYST: ICD-10-CM

## 2017-12-28 DIAGNOSIS — Z86.711 HISTORY OF PULMONARY EMBOLISM: ICD-10-CM

## 2017-12-28 DIAGNOSIS — T45.1X5A ANEMIA ASSOCIATED WITH CHEMOTHERAPY: ICD-10-CM

## 2017-12-28 PROCEDURE — 99215 OFFICE O/P EST HI 40 MIN: CPT | Mod: ,,, | Performed by: RADIOLOGY

## 2017-12-28 PROCEDURE — 99214 OFFICE O/P EST MOD 30 MIN: CPT | Mod: ,,, | Performed by: INTERNAL MEDICINE

## 2017-12-28 RX ORDER — DOXYCYCLINE 100 MG/1
100 CAPSULE ORAL
COMMUNITY
Start: 2017-10-09 | End: 2018-01-01

## 2017-12-28 NOTE — PROGRESS NOTES
"   [facility] Hematology/Oncology  PROGRESS NOTE      Subjective:       Patient ID:   NAME: Michael Shetty : 1945     72 y.o. male    Referring Doc: Jodee  Other Physicians: Mg Ely    Chief Complaint:      History of Present Illness:     Patient returns today for a regularly scheduled follow-up visit.  The patient is here to go over latest scan which was done ; he had recent head cold with cough; he is overall feeling "good" overall; no CP, SOB, HA's or N/V. He had colonoscopy in 2017 with Dr Holliday and sees him again in 2018. He has been having some changes in his stool and his latest bilirubin was elevated.         ROS:   GEN: normal without any fever, night sweats or weight loss  HEENT: normal with no HA's, sore throat, stiff neck, changes in vision  CV: normal with no CP, SOB, PND, STREETER or orthopnea  PULM: normal with no SOB, cough, hemoptysis, sputum or pleuritic pain  GI: normal with no abdominal pain, nausea, vomiting, constipation, diarrhea, melanotic stools, BRBPR, or hematemesis  : normal with no hematuria, dysuria  BREAST: normal with no mass, discharge, pain  SKIN: normal with no rash, erythema, bruising, or swelling    Allergies:  Review of patient's allergies indicates:   Allergen Reactions    Iodine and iodide containing products Hives     ALLERGIC TO SOFT SHELL CRAB ONLY    Shellfish containing products      soft shell crabs       Medications:    Current Outpatient Prescriptions:     allopurinol (ZYLOPRIM) 100 MG tablet, Take by mouth., Disp: , Rfl:     atenolol (TENORMIN) 25 MG tablet, Take 25 mg by mouth once daily., Disp: , Rfl:     cefUROXime (CEFTIN) 500 MG tablet, Take by mouth., Disp: , Rfl:     cilostazol (PLETAL) 100 MG Tab, 100 mg., Disp: , Rfl:     doxycycline (VIBRA-TABS) 100 MG tablet, Take by mouth., Disp: , Rfl:     doxycycline (VIBRAMYCIN) 100 MG Cap, , Disp: , Rfl:     hydrocodone-acetaminophen 5-325mg (NORCO) 5-325 mg per tablet, , Disp: , " Rfl:     ondansetron (ZOFRAN) 8 MG tablet, , Disp: , Rfl:     pravastatin (PRAVACHOL) 40 MG tablet, Take 40 mg by mouth nightly., Disp: , Rfl:     ranitidine (ZANTAC 75) 75 MG tablet, Take by mouth., Disp: , Rfl:     rivaroxaban (XARELTO) 20 mg Tab, Take by mouth., Disp: , Rfl:     sulfamethoxazole-trimethoprim 800-160mg (BACTRIM DS) 800-160 mg Tab, Take by mouth., Disp: , Rfl:     terazosin (HYTRIN) 2 MG capsule, Take 2 mg by mouth every evening. 2 TABS, Disp: , Rfl:     VENTOLIN HFA 90 mcg/actuation inhaler, , Disp: , Rfl:     PMHx/PSHx Updates:  See patient's last visit with me on 9/11/2017  See H&P on 12/9/2015        Pathology:  Primary malignant neoplasm of left upper lobe of lung    Staging form: Lung, AJCC 7th Edition    - Clinical: Stage IB (T2a, N0, M0) - Signed by Todd Olvera Jr., MD on 6/28/2017          Objective:     Vitals:  Blood pressure 100/63, pulse 97, temperature 98.4 °F (36.9 °C), resp. rate 18, weight 90.1 kg (198 lb 9.6 oz).    Physical Examination:   GEN: no apparent distress, comfortable; AAOx3  HEAD: atraumatic and normocephalic  EYES: no pallor, no icterus, PERRLA  ENT: OMM, no pharyngeal erythema, external ears WNL; no nasal discharge; no thrush  NECK: no masses, thyroid normal, trachea midline, no LAD/LN's, supple  CV: RRR with no murmur; normal pulse; normal S1 and S2; no pedal edema  CHEST: Normal respiratory effort; CTAB; normal breath sounds; no wheeze or crackles  ABDOM: nontender and nondistended; soft; normal bowel sounds; no rebound/guarding  MUSC/Skeletal: ROM normal; no crepitus; joints normal; no deformities or arthropathy  EXTREM: no clubbing, cyanosis, inflammation or swelling  SKIN: no rashes, lesions, ulcers, petechiae or subcutaneous nodules  : no lucio  NEURO: grossly intact; motor/sensory WNL; AAOx3; no tremors  PSYCH: normal mood, affect and behavior  LYMPH: normal cervical, supraclavicular, axillary and groin LN's            Labs:     CEA was 3.1  Lab  Results   Component Value Date    WBC 6.1 12/05/2017    HGB 14.1 12/05/2017    HCT 42.7 12/05/2017    MCV 92.2 12/05/2017     12/05/2017     BMP  Lab Results   Component Value Date     12/05/2017    K 4.3 12/05/2017     12/05/2017    CO2 26 12/05/2017    BUN 23 12/05/2017    CREATININE 1.40 (H) 12/05/2017    CALCIUM 8.7 12/05/2017    ANIONGAP 13 02/25/2013    ESTGFRAFRICA 58 (L) 12/05/2017    EGFRNONAA 50 (L) 12/05/2017     Lab Results   Component Value Date    ALT 13 12/05/2017    AST 17 12/05/2017    ALKPHOS 75 12/05/2017    BILITOT 1.7 (H) 12/05/2017           Radiology/Diagnostic Studies:    CT scan 12/14/2017  IMPRESSION:    1. Unchanged size of left upper lobe and left lower lobe nodules since  09/08/2017, suggesting at least partially treated malignancy.    2. No new abnormality of concern for regional or distant metastasis.    3. Unchanged severe emphysema.    4. No significant change of 13 x 21 mm cystic lesion in pancreatic head. This  could reflect a nonneoplastic mucinous cyst or other low-grade lesion such as  side branch IPMN (intraductal papillary mucinous neoplasm).    5. Focal fat stranding in right lower quadrant mesenteric fat adjacent to  segment of terminal ileum where mild terminal ileal wall thickening is  questioned, although the appearance could be related to inadequate distention.  Focal inflammation related to infectious or inflammatory enteritis is a  consideration, with the appearance otherwise nonspecific.  I have reviewed all available lab results and radiology reports.    Assessment/Plan:     (1) 72 y.o. male with diagnosis of NSCLC (Squamous cell)  - T2A lesion with 3.5cm involving NEIDA  - s/p monotherapy with XRT by Dr Olvera; followed by chemotherapy with 3 cycles of carbo/taxol  - followed by Dr Ely with Pulm - seen him on Dec 5th and f/u on Feb 6th  - CTA on 5/18 showed decrease size in the tumor mass  - latest CT on 12/14 with stable lung findings     (2)  prior admit with SOB and suspected pneumonia     (3) LLE DVT and PE hx - along with prior CVA  - followed by Dr Lewis  - on Xarelto and ASA per cardiology direction  - MTHFR gene abnormality     (4) Anemia secondary to chemotherapy - resolved     (5) CAD s/p MI in past - followed by Joshua     (6)  Colonoscopy in July 2017 with Dr Holliday - he sees him again on Jan 11th     (7) BP issues - followed by Dr Lewis    (8) Pancreatic nodule/cyst - stable in size - he is seeing Dr Holliday on Jan 11th  - increase in bilirubin    PLAN:  1. F/u with with PCP, PULm, GI etc  2. Repeat scans in 3 months  3. Repeat labs in 3 months  4. To see Dr Holliday on Jan 11th - will see if he needs EGD or ERCP to evaluate liver/pancreas; also make referral to Dr Knapp  4. RTC in 1 month  Fax note to Michael Ellsworth MD, Mg Ely Barrios    I spent over 25 mins of time with the patient. Over half of this time was spent couseling and coordinating care.      Discussion:     I have explained all of the above in detail and the patient understands all of the current recommendation(s). I have answered all of their questions to the best of my ability and to their complete satisfaction.   The patient is to continue with the current management plan.            Electronically signed by Chris Cage MD

## 2017-12-28 NOTE — LETTER
December 28, 2017      Michael Ellsworth MD  [address] [place] [address] [address]  [place] [address]           [facility] Hematology Oncology  [address] [facility]  [address]  [facility] [address]  Phone: [phone]  Fax: [phone]          Patient: Michael Shetty   MR Number: [id number]   YOB: 1945   Date of Visit: 12/28/2017       Dear Dr. Michael Ellsworth:    Thank you for referring Michael Shetty to me for evaluation. Attached you will find relevant portions of my assessment and plan of care.    If you have questions, please do not hesitate to call me. I look forward to following Michael Shetty along with you.    Sincerely,    Chris Cage MD    Enclosure  CC:  No Recipients    If you would like to receive this communication electronically, please contact [email or web address] or [phone] to request more information on [employer] Link access.    For providers and/or their staff who would like to refer a patient to [facility], please contact us through our one-stop-shop provider referral line, [facility], at [phone].    If you feel you have received this communication in error or would no longer like to receive these types of communications, please e-mail [email or web address]

## 2017-12-28 NOTE — PROGRESS NOTES
Michael Shetty  [id number]  1945  12/28/2017  Chris Cage Md  [address] [facility]  [address]  [place] [address]    DIAGNOSIS: IB, [id number] SCCa NEIDA    REASON FOR VISIT: Routine scheduled follow-up.    HISTORY OF PRESENT ILLNESS:   72M with bx-proven SCCa of NEIDA, a PET avid 3.2cm mass. No other sites of disease and a poor surgical candidate, FEV1 of 2L.    Completed definitive SBRT to 5000cGy in 5 frx ending 3/10/17.    Cmpleted 4c of adjuvant CTX with Dr. Cage and has incidental CTA associated with prior hospitalization that reveals tumor to have shrunk to 1.6cm.    INTERVAL HISTORY:   At today's visit patient is denying cough or hemoptysis he does have baseline shortness of breath as well as dyspnea on exertion. He is denying vision, hearing, cognition speech or gait changes. He does report having light-colored stools with occasional dark urine. He is not appreciating any difference in the color of his skin he does report having diarrhea although he hasn't appreciated this to be malodorous or to float. He also reports his sugar levels have been running higher than usual with his highest number recorded in the 130s. He presents today with an updated CT scan.    Review of Systems   Constitutional: Positive for chills and unexpected weight change. Negative for appetite change and fever.   HENT:   Negative for lump/mass, mouth sores, sore throat, trouble swallowing and voice change.    Eyes: Negative for eye problems and icterus.   Respiratory: Positive for shortness of breath. Negative for chest tightness and cough.    Cardiovascular: Negative for chest pain and leg swelling.   Gastrointestinal: Positive for diarrhea. Negative for abdominal distention, abdominal pain, blood in stool, constipation, nausea and vomiting.   Genitourinary: Negative for bladder incontinence, difficulty urinating, dysuria, frequency, hematuria and nocturia.    Musculoskeletal: Negative for back pain, gait problem, neck pain and  neck stiffness.   Neurological: Negative for extremity weakness, gait problem, headaches, numbness and seizures.   Hematological: Negative for adenopathy.     Past Medical History:   Diagnosis Date    Arthritis     BPH (benign prostatic hyperplasia)     Coronary artery disease     MI X 2    Gout, chronic     Heartburn     Hypertension     Myocardial infarction     PE (pulmonary embolism)     Presence of dental bridge     UPPER - NOT WEAR MUCH AS DOES NOT FIT WELL    Primary malignant neoplasm of left upper lobe of lung 5/19/2017    Staph infection      Past Surgical History:   Procedure Laterality Date    CARDIAC SURGERY      CABG X 4 9-11    CTR      BILAT    ROTATOR CUFF REPAIR      left    TONSILLECTOMY      TRIGGER FINGER RELEASE      right and left hands.     Social History     Social History    Marital status:      Spouse name: N/A    Number of children: N/A    Years of education: N/A     Social History Main Topics    Smoking status: Former Smoker     Packs/day: 2.00     Years: 25.00     Quit date: 2/25/1990    Smokeless tobacco: Never Used    Alcohol use Yes      Comment: 2 PER DAY    Drug use: No    Sexual activity: Not Asked     Other Topics Concern    None     Social History Narrative    None     Family History   Problem Relation Age of Onset    Cancer Sister      gallbladder     Medication List with Changes/Refills   Current Medications    ALLOPURINOL (ZYLOPRIM) 100 MG TABLET    Take by mouth.    ATENOLOL (TENORMIN) 25 MG TABLET    Take 25 mg by mouth once daily.    CEFUROXIME (CEFTIN) 500 MG TABLET    Take by mouth.    CILOSTAZOL (PLETAL) 100 MG TAB    100 mg.    DOXYCYCLINE (VIBRA-TABS) 100 MG TABLET    Take by mouth.    HYDROCODONE-ACETAMINOPHEN 5-325MG (NORCO) 5-325 MG PER TABLET        ONDANSETRON (ZOFRAN) 8 MG TABLET        PRAVASTATIN (PRAVACHOL) 40 MG TABLET    Take 40 mg by mouth nightly.    RANITIDINE (ZANTAC 75) 75 MG TABLET    Take by mouth.    RIVAROXABAN  (XARELTO) 20 MG TAB    Take by mouth.    SULFAMETHOXAZOLE-TRIMETHOPRIM 800-160MG (BACTRIM DS) 800-160 MG TAB    Take by mouth.    TERAZOSIN (HYTRIN) 2 MG CAPSULE    Take 2 mg by mouth every evening. 2 TABS    VENTOLIN HFA 90 MCG/ACTUATION INHALER         Review of patient's allergies indicates:   Allergen Reactions    Iodine and iodide containing products Hives     ALLERGIC TO SOFT SHELL CRAB ONLY    Shellfish containing products      soft shell crabs       QUALITY OF LIFE: 90%- Able to Carry on Normal Activity: Minor Symptoms of Disease    Vitals:    12/28/17 1324   Weight: 90.7 kg (200 lb)       PHYSICAL EXAM:   GENERAL: alert; in no apparent distress.   HEAD: normocephalic, atraumatic.  EYES: pupils are equal, round, reactive to light and accommodation. Sclera anicteric. Conjunctiva not injected.   NOSE/THROAT: no nasal erythema or rhinorrhea. Oropharynx pink, without erythema, ulcerations or thrush.   NECK: no cervical motion rigidity; supple with no masses.  CHEST: clear to auscultation bilaterally; no wheezes, crackles or rubs. Patient is speaking comfortably on room air with normal work of breathing without using accessory muscles of respiration.  CARDIOVASCULAR: regular rate and rhythm; no murmurs, rubs or gallops.  MUSCULOSKELETAL: no tenderness to palpation along the spine or scapulae. Normal range of motion.  NEUROLOGIC: cranial nerves II-XII intact bilaterally. Strength 5/5 in bilateral upper and lower extremities. Normal gait.  LYMPHATIC: no cervical, supraclavicular adenopathy appreciated bilaterally.   EXTREMITIES: mild clubbing; no cyanosis, edema.  SKIN: no erythema, rashes, ulcerations noted.     ANCILLARY DATA:   12/17 CT C/AP  1. Unchanged size of left upper lobe and left lower lobe nodules since 09/08/2017, suggesting at least partially treated malignancy. 2. No new abnormality of concern for regional or distant metastasis. 3. Unchanged severe emphysema. 4. No significant change of 13 x 21 mm  cystic lesion in pancreatic head. This could reflect a nonneoplastic mucinous cyst or other low-grade lesion such as side branch IPMN (intraductal papillary mucinous neoplasm). 5. Focal fat stranding in right lower quadrant mesenteric fat adjacent to segment of terminal ileum where mild terminal ileal wall thickening is questioned, although the appearance could be related to inadequate distention. Focal inflammation related to infectious or inflammatory enteritis is a consideration, with the appearance otherwise nonspecific.    ASSESSMENT: 72M with stage IB, [id number] SCCa NEIDA s/p definitive SBRT and adjuvant CTX.  PLAN:  Ms. Shetty tolerated his stereotactic body radiation therapy very well. He did some difficulty tolerating the adjuvant chemotherapy but I believe did complete 4 cycles. He reports he continues with baseline shortness of breath and feels as though he has chills since he went through all of his therapy. His CT of the chest demonstrates stability of the treated lesion which at this point may be residual scar tissue. There is an incidental appreciation of a pancreatic cyst which radiologist feels is stable after reviewing the December 2015 CT the abdomen and pelvis. On further questioning however the patient presents labwork demonstrates a bilirubin of 1.7, personal record of elevated glucose levels, lesli-colored stools, and occasional dark urine. Given this constellation of symptoms and the radiographic findings have spoken with Dr. Cage and we agreed he should meet with a endoscopist for consideration of evaluation of his pancreatic head cyst. I will carbon copy this note to Dr. Crowe who has been following the patient with colonoscopies. Notably September 2017 PET/CT scan did not show FDG avidity at this site. Regarding the chest findings I am very pleased with the radiographic response and we'll continue to follow patient every 6 months.     All questions answered and contact information  provided. Patient understands free to call us anytime with any questions or concerns regarding radiation therapy.    TIME SPENT WITH PATIENT: I have personally seen and evaluated this patient. Approximately 30 minutes were spent with the patient discussing follow-up careplan.     PHYSICIAN: Todd Olvera Jr, MD

## 2018-01-01 ENCOUNTER — HISTORICAL (OUTPATIENT)
Dept: ADMINISTRATIVE | Facility: HOSPITAL | Age: 73
End: 2018-01-01

## 2018-01-01 ENCOUNTER — DOCUMENTATION ONLY (OUTPATIENT)
Dept: RADIATION ONCOLOGY | Facility: CLINIC | Age: 73
End: 2018-01-01

## 2018-01-01 ENCOUNTER — TELEPHONE (OUTPATIENT)
Dept: GASTROENTEROLOGY | Facility: CLINIC | Age: 73
End: 2018-01-01

## 2018-01-01 ENCOUNTER — OFFICE VISIT (OUTPATIENT)
Dept: RADIATION ONCOLOGY | Facility: CLINIC | Age: 73
End: 2018-01-01
Payer: MEDICARE

## 2018-01-01 ENCOUNTER — OUTSIDE PLACE OF SERVICE (OUTPATIENT)
Dept: PULMONOLOGY | Facility: CLINIC | Age: 73
End: 2018-01-01

## 2018-01-01 ENCOUNTER — OFFICE VISIT (OUTPATIENT)
Dept: HEMATOLOGY/ONCOLOGY | Facility: CLINIC | Age: 73
End: 2018-01-01
Payer: MEDICARE

## 2018-01-01 ENCOUNTER — TELEPHONE (OUTPATIENT)
Dept: HEMATOLOGY/ONCOLOGY | Facility: CLINIC | Age: 73
End: 2018-01-01

## 2018-01-01 ENCOUNTER — OFFICE VISIT (OUTPATIENT)
Dept: GASTROENTEROLOGY | Facility: CLINIC | Age: 73
End: 2018-01-01
Payer: MEDICARE

## 2018-01-01 ENCOUNTER — HOSPITAL ENCOUNTER (INPATIENT)
Facility: HOSPITAL | Age: 73
LOS: 21 days | Discharge: NURSING, REHAB OR LONG-TERM CARE | DRG: 189 | End: 2019-01-15
Attending: INTERNAL MEDICINE | Admitting: INTERNAL MEDICINE
Payer: MEDICARE

## 2018-01-01 ENCOUNTER — CLINICAL SUPPORT (OUTPATIENT)
Dept: HEMATOLOGY/ONCOLOGY | Facility: CLINIC | Age: 73
End: 2018-01-01
Payer: MEDICARE

## 2018-01-01 VITALS
WEIGHT: 193 LBS | RESPIRATION RATE: 20 BRPM | BODY MASS INDEX: 28.58 KG/M2 | HEIGHT: 69 IN | SYSTOLIC BLOOD PRESSURE: 122 MMHG | DIASTOLIC BLOOD PRESSURE: 62 MMHG | HEART RATE: 110 BPM | TEMPERATURE: 98 F

## 2018-01-01 VITALS
DIASTOLIC BLOOD PRESSURE: 51 MMHG | HEART RATE: 141 BPM | BODY MASS INDEX: 28.42 KG/M2 | SYSTOLIC BLOOD PRESSURE: 120 MMHG | TEMPERATURE: 99 F | WEIGHT: 191.88 LBS | HEIGHT: 69 IN

## 2018-01-01 VITALS
WEIGHT: 191 LBS | HEIGHT: 69 IN | SYSTOLIC BLOOD PRESSURE: 110 MMHG | BODY MASS INDEX: 28.21 KG/M2 | WEIGHT: 186.81 LBS | TEMPERATURE: 97 F | BODY MASS INDEX: 27.67 KG/M2 | HEART RATE: 120 BPM | DIASTOLIC BLOOD PRESSURE: 68 MMHG

## 2018-01-01 VITALS
DIASTOLIC BLOOD PRESSURE: 64 MMHG | SYSTOLIC BLOOD PRESSURE: 99 MMHG | HEART RATE: 130 BPM | TEMPERATURE: 98 F | BODY MASS INDEX: 28.09 KG/M2 | RESPIRATION RATE: 18 BRPM | WEIGHT: 190.19 LBS

## 2018-01-01 VITALS
TEMPERATURE: 98 F | HEIGHT: 69 IN | SYSTOLIC BLOOD PRESSURE: 85 MMHG | DIASTOLIC BLOOD PRESSURE: 42 MMHG | HEART RATE: 109 BPM | WEIGHT: 192 LBS | BODY MASS INDEX: 28.44 KG/M2

## 2018-01-01 VITALS
RESPIRATION RATE: 20 BRPM | HEART RATE: 120 BPM | TEMPERATURE: 98 F | SYSTOLIC BLOOD PRESSURE: 110 MMHG | DIASTOLIC BLOOD PRESSURE: 72 MMHG

## 2018-01-01 VITALS
HEART RATE: 123 BPM | BODY MASS INDEX: 27.76 KG/M2 | SYSTOLIC BLOOD PRESSURE: 96 MMHG | DIASTOLIC BLOOD PRESSURE: 58 MMHG | TEMPERATURE: 98 F | WEIGHT: 188 LBS | RESPIRATION RATE: 18 BRPM

## 2018-01-01 VITALS
BODY MASS INDEX: 29.07 KG/M2 | DIASTOLIC BLOOD PRESSURE: 60 MMHG | TEMPERATURE: 98 F | HEIGHT: 69 IN | SYSTOLIC BLOOD PRESSURE: 100 MMHG | WEIGHT: 196.31 LBS | HEART RATE: 120 BPM

## 2018-01-01 VITALS
DIASTOLIC BLOOD PRESSURE: 59 MMHG | SYSTOLIC BLOOD PRESSURE: 90 MMHG | HEART RATE: 99 BPM | RESPIRATION RATE: 20 BRPM | TEMPERATURE: 98 F

## 2018-01-01 VITALS
HEART RATE: 135 BPM | TEMPERATURE: 98 F | SYSTOLIC BLOOD PRESSURE: 95 MMHG | HEIGHT: 69 IN | BODY MASS INDEX: 28.44 KG/M2 | DIASTOLIC BLOOD PRESSURE: 62 MMHG | WEIGHT: 192 LBS | RESPIRATION RATE: 22 BRPM

## 2018-01-01 VITALS
WEIGHT: 198.38 LBS | RESPIRATION RATE: 18 BRPM | BODY MASS INDEX: 29.3 KG/M2 | SYSTOLIC BLOOD PRESSURE: 85 MMHG | DIASTOLIC BLOOD PRESSURE: 52 MMHG | TEMPERATURE: 98 F | HEART RATE: 103 BPM

## 2018-01-01 VITALS
HEART RATE: 128 BPM | DIASTOLIC BLOOD PRESSURE: 56 MMHG | BODY MASS INDEX: 28.14 KG/M2 | SYSTOLIC BLOOD PRESSURE: 93 MMHG | TEMPERATURE: 98 F | WEIGHT: 190 LBS | HEIGHT: 69 IN

## 2018-01-01 VITALS
WEIGHT: 191.38 LBS | DIASTOLIC BLOOD PRESSURE: 62 MMHG | SYSTOLIC BLOOD PRESSURE: 104 MMHG | RESPIRATION RATE: 16 BRPM | TEMPERATURE: 98 F | BODY MASS INDEX: 28.35 KG/M2 | HEIGHT: 69 IN | HEART RATE: 120 BPM

## 2018-01-01 DIAGNOSIS — Z86.711 HISTORY OF PULMONARY EMBOLISM: Primary | ICD-10-CM

## 2018-01-01 DIAGNOSIS — C34.12 SQUAMOUS CELL CARCINOMA OF BRONCHUS IN LEFT UPPER LOBE: ICD-10-CM

## 2018-01-01 DIAGNOSIS — C34.12 PRIMARY MALIGNANT NEOPLASM OF LEFT UPPER LOBE OF LUNG: Primary | ICD-10-CM

## 2018-01-01 DIAGNOSIS — J15.1 PNEUMONIA OF LEFT LOWER LOBE DUE TO PSEUDOMONAS SPECIES: ICD-10-CM

## 2018-01-01 DIAGNOSIS — T45.1X5A ANEMIA ASSOCIATED WITH CHEMOTHERAPY: ICD-10-CM

## 2018-01-01 DIAGNOSIS — C34.12 PRIMARY MALIGNANT NEOPLASM OF LEFT UPPER LOBE OF LUNG: ICD-10-CM

## 2018-01-01 DIAGNOSIS — Z86.711 HISTORY OF PULMONARY EMBOLISM: ICD-10-CM

## 2018-01-01 DIAGNOSIS — C34.90 MALIGNANT NEOPLASM OF LUNG, UNSPECIFIED LATERALITY, UNSPECIFIED PART OF LUNG: ICD-10-CM

## 2018-01-01 DIAGNOSIS — T45.1X5A CHEMOTHERAPY-INDUCED NEUTROPENIA: ICD-10-CM

## 2018-01-01 DIAGNOSIS — D64.81 ANEMIA ASSOCIATED WITH CHEMOTHERAPY: ICD-10-CM

## 2018-01-01 DIAGNOSIS — J96.21 ACUTE ON CHRONIC RESPIRATORY FAILURE WITH HYPOXEMIA: ICD-10-CM

## 2018-01-01 DIAGNOSIS — C34.12 SQUAMOUS CELL CARCINOMA OF BRONCHUS IN LEFT UPPER LOBE: Primary | ICD-10-CM

## 2018-01-01 DIAGNOSIS — K21.9 GASTROESOPHAGEAL REFLUX DISEASE, ESOPHAGITIS PRESENCE NOT SPECIFIED: ICD-10-CM

## 2018-01-01 DIAGNOSIS — Z12.9 SCREENING FOR CANCER: ICD-10-CM

## 2018-01-01 DIAGNOSIS — K63.5 POLYP OF COLON, UNSPECIFIED PART OF COLON, UNSPECIFIED TYPE: ICD-10-CM

## 2018-01-01 DIAGNOSIS — Z86.718 HISTORY OF DEEP VENOUS THROMBOSIS (DVT) OF DISTAL VEIN OF LEFT LOWER EXTREMITY: ICD-10-CM

## 2018-01-01 DIAGNOSIS — J44.9 CHRONIC OBSTRUCTIVE PULMONARY DISEASE, UNSPECIFIED COPD TYPE: ICD-10-CM

## 2018-01-01 DIAGNOSIS — C34.90 SQUAMOUS CELL CARCINOMA OF LUNG, UNSPECIFIED LATERALITY: Primary | ICD-10-CM

## 2018-01-01 DIAGNOSIS — D70.1 CHEMOTHERAPY-INDUCED NEUTROPENIA: ICD-10-CM

## 2018-01-01 DIAGNOSIS — R91.1 PULMONARY NODULE, RIGHT: Primary | ICD-10-CM

## 2018-01-01 DIAGNOSIS — D64.9 ANEMIA, UNSPECIFIED TYPE: ICD-10-CM

## 2018-01-01 DIAGNOSIS — R91.1 PULMONARY NODULE, RIGHT: ICD-10-CM

## 2018-01-01 DIAGNOSIS — I26.99 PE (PULMONARY THROMBOEMBOLISM): ICD-10-CM

## 2018-01-01 DIAGNOSIS — Z86.718 HISTORY OF DEEP VENOUS THROMBOSIS (DVT) OF DISTAL VEIN OF LEFT LOWER EXTREMITY: Primary | ICD-10-CM

## 2018-01-01 DIAGNOSIS — D36.9 ADENOMATOUS POLYPS: Primary | ICD-10-CM

## 2018-01-01 DIAGNOSIS — K86.9 PANCREATIC LESION: Primary | ICD-10-CM

## 2018-01-01 DIAGNOSIS — R06.02 SOB (SHORTNESS OF BREATH): ICD-10-CM

## 2018-01-01 DIAGNOSIS — D49.0 IPMN (INTRADUCTAL PAPILLARY MUCINOUS NEOPLASM): ICD-10-CM

## 2018-01-01 DIAGNOSIS — Z86.010 HX OF ADENOMATOUS COLONIC POLYPS: ICD-10-CM

## 2018-01-01 LAB
ALBUMIN SERPL-MCNC: 2.7 G/DL (ref 3.1–4.7)
ALBUMIN SERPL-MCNC: 2.8 G/DL (ref 3.1–4.7)
ALBUMIN SERPL-MCNC: 3.1 G/DL (ref 3.1–4.7)
ALBUMIN SERPL-MCNC: 3.8 G/DL (ref 3.6–5.1)
ALBUMIN/GLOB SERPL: 1.4 (CALC) (ref 1–2.5)
ALLENS TEST: ABNORMAL
ALP SERPL-CCNC: 46 IU/L (ref 40–104)
ALP SERPL-CCNC: 46 IU/L (ref 40–104)
ALP SERPL-CCNC: 49 IU/L (ref 40–104)
ALP SERPL-CCNC: 66 U/L (ref 40–115)
ALT (SGPT): 39 IU/L (ref 3–33)
ALT (SGPT): 43 IU/L (ref 3–33)
ALT (SGPT): 48 IU/L (ref 3–33)
ALT SERPL-CCNC: 17 U/L (ref 9–46)
ANION GAP SERPL CALC-SCNC: 10 MMOL/L
ANION GAP SERPL CALC-SCNC: 11 MMOL/L
ANION GAP SERPL CALC-SCNC: 6 MMOL/L
ANION GAP SERPL CALC-SCNC: 8 MMOL/L
ANION GAP SERPL CALC-SCNC: 8 MMOL/L
APTT BLDCRRT: <21 SEC
APTT PPP: 111.3 SEC (ref 21.7–37.8)
APTT PPP: 130.9 SEC (ref 21.7–37.8)
APTT PPP: 139.8 SEC (ref 21.7–37.8)
APTT PPP: 149.8 SEC (ref 21.7–37.8)
APTT PPP: 25.6 SEC (ref 21.7–37.8)
APTT PPP: 29.7 SEC (ref 21.7–37.8)
APTT PPP: 31 SEC (ref 21.7–37.8)
APTT PPP: 32.3 SEC (ref 21.7–37.8)
APTT PPP: 43.9 SEC (ref 21.7–37.8)
APTT PPP: 51.3 SEC (ref 21.7–37.8)
APTT PPP: 56.7 SEC (ref 21.7–37.8)
APTT PPP: 63.2 SEC (ref 21.7–37.8)
APTT PPP: 72 SEC (ref 21.7–37.8)
APTT PPP: 72.8 SEC (ref 21.7–37.8)
APTT PPP: 76 SEC (ref 21.7–37.8)
APTT PPP: 97.2 SEC (ref 21.7–37.8)
ASCENDING AORTA: 4.3 CM
AST SERPL-CCNC: 16 U/L (ref 10–35)
AST SERPL-CCNC: 30 IU/L (ref 10–40)
AST SERPL-CCNC: 31 IU/L (ref 10–40)
AST SERPL-CCNC: 40 IU/L (ref 10–40)
BACTERIA BLD CULT: NORMAL
BACTERIA BLD CULT: NORMAL
BACTERIA SPEC AEROBE CULT: NORMAL
BACTERIA SPEC AEROBE CULT: NORMAL
BASOPHILS # BLD AUTO: 0.01 K/UL
BASOPHILS # BLD AUTO: 0.02 K/UL
BASOPHILS # BLD AUTO: 0.03 K/UL
BASOPHILS # BLD AUTO: 28 CELLS/UL (ref 0–200)
BASOPHILS NFR BLD AUTO: 0.5 %
BASOPHILS NFR BLD: 0.1 %
BASOPHILS NFR BLD: 0.2 %
BASOPHILS NFR BLD: 0.4 %
BILIRUB SERPL-MCNC: 2 MG/DL (ref 0.3–1)
BILIRUB SERPL-MCNC: 2.1 MG/DL (ref 0.3–1)
BILIRUB SERPL-MCNC: 2.2 MG/DL (ref 0.3–1)
BILIRUB SERPL-MCNC: 2.9 MG/DL (ref 0.2–1.2)
BNP SERPL-MCNC: 1336 PG/ML
BSA FOR ECHO PROCEDURE: 2.02 M2
BUN SERPL-MCNC: 16 MG/DL (ref 7–25)
BUN SERPL-MCNC: 23 MG/DL
BUN SERPL-MCNC: 27 MG/DL (ref 8–20)
BUN SERPL-MCNC: 28 MG/DL
BUN SERPL-MCNC: 28 MG/DL (ref 8–20)
BUN SERPL-MCNC: 30 MG/DL
BUN SERPL-MCNC: 31 MG/DL
BUN SERPL-MCNC: 32 MG/DL (ref 8–20)
BUN SERPL-MCNC: 34 MG/DL
BUN/CREAT SERPL: ABNORMAL (CALC) (ref 6–22)
CALCIUM SERPL-MCNC: 7.6 MG/DL
CALCIUM SERPL-MCNC: 7.6 MG/DL (ref 7.7–10.4)
CALCIUM SERPL-MCNC: 7.8 MG/DL (ref 7.7–10.4)
CALCIUM SERPL-MCNC: 7.9 MG/DL
CALCIUM SERPL-MCNC: 7.9 MG/DL (ref 7.7–10.4)
CALCIUM SERPL-MCNC: 8.1 MG/DL
CALCIUM SERPL-MCNC: 8.4 MG/DL
CALCIUM SERPL-MCNC: 8.6 MG/DL (ref 8.6–10.3)
CHLORIDE SERPL-SCNC: 102 MMOL/L
CHLORIDE SERPL-SCNC: 103 MMOL/L
CHLORIDE SERPL-SCNC: 104 MMOL/L
CHLORIDE SERPL-SCNC: 104 MMOL/L
CHLORIDE SERPL-SCNC: 104 MMOL/L (ref 98–110)
CHLORIDE SERPL-SCNC: 105 MMOL/L
CHLORIDE SERPL-SCNC: 99 MMOL/L
CHLORIDE SERPL-SCNC: 99 MMOL/L
CHLORIDE: 102 MMOL/L (ref 98–110)
CHLORIDE: 104 MMOL/L (ref 98–110)
CHLORIDE: 106 MMOL/L (ref 98–110)
CO2 SERPL-SCNC: 25 MMOL/L
CO2 SERPL-SCNC: 25.9 MMOL/L (ref 22.8–31.6)
CO2 SERPL-SCNC: 26 MMOL/L
CO2 SERPL-SCNC: 27 MMOL/L (ref 20–32)
CO2 SERPL-SCNC: 27.9 MMOL/L (ref 22.8–31.6)
CO2 SERPL-SCNC: 27.9 MMOL/L (ref 22.8–31.6)
CO2 SERPL-SCNC: 28 MMOL/L
CO2 SERPL-SCNC: 29 MMOL/L
CO2 SERPL-SCNC: 30 MMOL/L
CO2 SERPL-SCNC: 31 MMOL/L
CO2 SERPL-SCNC: 32 MMOL/L
CREAT SERPL-MCNC: 0.8 MG/DL
CREAT SERPL-MCNC: 0.9 MG/DL
CREAT SERPL-MCNC: 1 MG/DL
CREAT SERPL-MCNC: 1.01 MG/DL (ref 0.7–1.18)
CREATININE: 0.83 MG/DL (ref 0.6–1.4)
CREATININE: 0.88 MG/DL (ref 0.6–1.4)
CREATININE: 0.94 MG/DL (ref 0.6–1.4)
CV ECHO LV RWT: 0.3 CM
DELSYS: ABNORMAL
DIFFERENTIAL METHOD: ABNORMAL
DOP CALC LVOT AREA: 4.71 CM2
DOP CALC LVOT DIAMETER: 2.45 CM
ECHO LV POSTERIOR WALL: 0.82 CM (ref 0.6–1.1)
EOSINOPHIL # BLD AUTO: 0 K/UL
EOSINOPHIL # BLD AUTO: 39 CELLS/UL (ref 15–500)
EOSINOPHIL NFR BLD AUTO: 0.7 %
EOSINOPHIL NFR BLD: 0 %
EOSINOPHIL NFR BLD: 0 %
EOSINOPHIL NFR BLD: 0.1 %
EOSINOPHIL NFR BLD: 0.2 %
EOSINOPHIL NFR BLD: 0.3 %
ERYTHROCYTE [DISTWIDTH] IN BLOOD BY AUTOMATED COUNT: 16.5 % (ref 11–15)
ERYTHROCYTE [DISTWIDTH] IN BLOOD BY AUTOMATED COUNT: 17.5 %
ERYTHROCYTE [DISTWIDTH] IN BLOOD BY AUTOMATED COUNT: 17.6 %
ERYTHROCYTE [DISTWIDTH] IN BLOOD BY AUTOMATED COUNT: 17.8 %
ERYTHROCYTE [DISTWIDTH] IN BLOOD BY AUTOMATED COUNT: 18 %
ERYTHROCYTE [DISTWIDTH] IN BLOOD BY AUTOMATED COUNT: 18.1 %
EST. GFR  (AFRICAN AMERICAN): >60 ML/MIN/1.73 M^2
EST. GFR  (NON AFRICAN AMERICAN): >60 ML/MIN/1.73 M^2
FIO2: 60
FLOW: 15
FRACTIONAL SHORTENING: 9 % (ref 28–44)
GFR SERPL CREATININE-BSD FRML MDRD: 74 ML/MIN/1.73M2
GLOBULIN SER CALC-MCNC: 2.7 G/DL (CALC) (ref 1.9–3.7)
GLUCOSE SERPL-MCNC: 107 MG/DL
GLUCOSE SERPL-MCNC: 110 MG/DL
GLUCOSE SERPL-MCNC: 140 MG/DL
GLUCOSE SERPL-MCNC: 144 MG/DL
GLUCOSE SERPL-MCNC: 177 MG/DL
GLUCOSE SERPL-MCNC: 180 MG/DL
GLUCOSE SERPL-MCNC: 185 MG/DL
GLUCOSE SERPL-MCNC: 91 MG/DL (ref 65–99)
GLUCOSE SERPL-MCNC: 94 MG/DL (ref 70–99)
GLUCOSE: 121 MG/DL (ref 70–99)
GLUCOSE: 122 MG/DL (ref 70–99)
GLUCOSE: 126 MG/DL (ref 70–99)
GRAM STN SPEC: NORMAL
HCO3 UR-SCNC: 26.5 MMOL/L (ref 24–28)
HCT VFR BLD AUTO: 24.1 % (ref 39–55)
HCT VFR BLD AUTO: 24.2 % (ref 39–55)
HCT VFR BLD AUTO: 24.6 % (ref 39–55)
HCT VFR BLD AUTO: 24.7 % (ref 39–55)
HCT VFR BLD AUTO: 25.1 % (ref 39–55)
HCT VFR BLD AUTO: 26.3 % (ref 39–55)
HCT VFR BLD AUTO: 26.3 % (ref 39–55)
HCT VFR BLD AUTO: 30.9 %
HCT VFR BLD AUTO: 31.4 %
HCT VFR BLD AUTO: 32.1 %
HCT VFR BLD AUTO: 39.3 %
HCT VFR BLD AUTO: 39.6 %
HCT VFR BLD AUTO: 45.4 % (ref 38.5–50)
HGB BLD-MCNC: 12 G/DL
HGB BLD-MCNC: 12.1 G/DL
HGB BLD-MCNC: 15.1 G/DL (ref 13.2–17.1)
HGB BLD-MCNC: 8.1 G/DL (ref 14–16)
HGB BLD-MCNC: 8.1 G/DL (ref 14–16)
HGB BLD-MCNC: 8.2 G/DL (ref 14–16)
HGB BLD-MCNC: 8.3 G/DL (ref 14–16)
HGB BLD-MCNC: 8.4 G/DL (ref 14–16)
HGB BLD-MCNC: 8.6 G/DL (ref 14–16)
HGB BLD-MCNC: 8.9 G/DL (ref 14–16)
HGB BLD-MCNC: 9.3 G/DL
HGB BLD-MCNC: 9.3 G/DL
HGB BLD-MCNC: 9.8 G/DL
IMM GRANULOCYTES # BLD AUTO: 0.15 K/UL
IMM GRANULOCYTES # BLD AUTO: 0.18 K/UL
IMM GRANULOCYTES # BLD AUTO: 0.19 K/UL
IMM GRANULOCYTES # BLD AUTO: 0.19 K/UL
IMM GRANULOCYTES # BLD AUTO: 0.2 K/UL
IMM GRANULOCYTES NFR BLD AUTO: 1.8 %
IMM GRANULOCYTES NFR BLD AUTO: 2.2 %
IMM GRANULOCYTES NFR BLD AUTO: 2.2 %
IMM GRANULOCYTES NFR BLD AUTO: 2.7 %
IMM GRANULOCYTES NFR BLD AUTO: 3.1 %
INR PPP: 1.1
INR PPP: 1.1
INTERVENTRICULAR SEPTUM: 0.59 CM (ref 0.6–1.1)
ISTAT CREATININE: 1.2 MG/DL (ref 0.6–1.4)
LA MAJOR: 6.53 CM
LA WIDTH: 3.7 CM
LEFT ATRIUM SIZE: 3.31 CM
LEFT INTERNAL DIMENSION IN SYSTOLE: 4.95 CM (ref 2.1–4)
LEFT VENTRICLE DIASTOLIC VOLUME INDEX: 72.33 ML/M2
LEFT VENTRICLE DIASTOLIC VOLUME: 144.21 ML
LEFT VENTRICLE MASS INDEX: 67.5 G/M2
LEFT VENTRICLE SYSTOLIC VOLUME INDEX: 57.9 ML/M2
LEFT VENTRICLE SYSTOLIC VOLUME: 115.45 ML
LEFT VENTRICULAR INTERNAL DIMENSION IN DIASTOLE: 5.45 CM (ref 3.5–6)
LEFT VENTRICULAR MASS: 134.51 G
LYMPHOCYTES # BLD AUTO: 0.4 K/UL
LYMPHOCYTES # BLD AUTO: 0.5 K/UL
LYMPHOCYTES # BLD AUTO: 0.5 K/UL
LYMPHOCYTES # BLD AUTO: 0.6 K/UL
LYMPHOCYTES # BLD AUTO: 0.7 K/UL
LYMPHOCYTES # BLD AUTO: 528 CELLS/UL (ref 850–3900)
LYMPHOCYTES NFR BLD AUTO: 9.6 %
LYMPHOCYTES NFR BLD: 5 %
LYMPHOCYTES NFR BLD: 6.4 %
LYMPHOCYTES NFR BLD: 7.9 %
LYMPHOCYTES NFR BLD: 8.5 %
LYMPHOCYTES NFR BLD: 8.8 %
MAGNESIUM SERPL-MCNC: 1.8 MG/DL (ref 1.5–2.6)
MAGNESIUM SERPL-MCNC: 1.9 MG/DL (ref 1.5–2.6)
MAGNESIUM SERPL-MCNC: 2 MG/DL
MAGNESIUM SERPL-MCNC: 2 MG/DL (ref 1.5–2.6)
MAGNESIUM SERPL-MCNC: 2.2 MG/DL
MAGNESIUM SERPL-MCNC: 2.3 MG/DL
MAGNESIUM SERPL-MCNC: 2.4 MG/DL
MAGNESIUM SERPL-MCNC: 2.4 MG/DL
MCH RBC QN AUTO: 30.9 PG
MCH RBC QN AUTO: 31.4 PG
MCH RBC QN AUTO: 31.4 PG
MCH RBC QN AUTO: 31.7 PG
MCH RBC QN AUTO: 32 PG (ref 27–33)
MCH RBC QN AUTO: 32.3 PG
MCH RBC QN AUTO: 32.6 PG (ref 25–35)
MCH RBC QN AUTO: 32.7 PG (ref 25–35)
MCH RBC QN AUTO: 32.8 PG (ref 25–35)
MCH RBC QN AUTO: 32.8 PG (ref 25–35)
MCH RBC QN AUTO: 32.9 PG (ref 25–35)
MCH RBC QN AUTO: 33.1 PG (ref 25–35)
MCH RBC QN AUTO: 33.1 PG (ref 25–35)
MCHC RBC AUTO-ENTMCNC: 29.6 G/DL
MCHC RBC AUTO-ENTMCNC: 30.1 G/DL
MCHC RBC AUTO-ENTMCNC: 30.3 G/DL
MCHC RBC AUTO-ENTMCNC: 30.5 G/DL
MCHC RBC AUTO-ENTMCNC: 30.8 G/DL
MCHC RBC AUTO-ENTMCNC: 32.7 G/DL (ref 31–36)
MCHC RBC AUTO-ENTMCNC: 33.2 G/DL (ref 31–36)
MCHC RBC AUTO-ENTMCNC: 33.3 G/DL (ref 32–36)
MCHC RBC AUTO-ENTMCNC: 33.5 G/DL (ref 31–36)
MCHC RBC AUTO-ENTMCNC: 33.5 G/DL (ref 31–36)
MCHC RBC AUTO-ENTMCNC: 33.6 G/DL (ref 31–36)
MCHC RBC AUTO-ENTMCNC: 33.7 G/DL (ref 31–36)
MCHC RBC AUTO-ENTMCNC: 33.8 G/DL (ref 31–36)
MCV RBC AUTO: 103 FL
MCV RBC AUTO: 104 FL
MCV RBC AUTO: 104 FL
MCV RBC AUTO: 105 FL
MCV RBC AUTO: 105 FL
MCV RBC AUTO: 96.2 FL (ref 80–100)
MCV RBC AUTO: 97.7 FL (ref 80–100)
MCV RBC AUTO: 97.8 FL (ref 80–100)
MCV RBC AUTO: 98 FL (ref 80–100)
MCV RBC AUTO: 98.8 FL (ref 80–100)
MCV RBC AUTO: 99.6 FL (ref 80–100)
MODE: ABNORMAL
MONOCYTES # BLD AUTO: 0.2 K/UL
MONOCYTES # BLD AUTO: 0.3 K/UL
MONOCYTES # BLD AUTO: 0.4 K/UL
MONOCYTES # BLD AUTO: 330 CELLS/UL (ref 200–950)
MONOCYTES NFR BLD AUTO: 6 %
MONOCYTES NFR BLD: 2.7 %
MONOCYTES NFR BLD: 2.8 %
MONOCYTES NFR BLD: 4.4 %
MONOCYTES NFR BLD: 4.6 %
MONOCYTES NFR BLD: 4.7 %
NEUTROPHILS # BLD AUTO: 4.7 K/UL
NEUTROPHILS # BLD AUTO: 4576 CELLS/UL (ref 1500–7800)
NEUTROPHILS # BLD AUTO: 5.3 K/UL
NEUTROPHILS # BLD AUTO: 7.1 K/UL
NEUTROPHILS # BLD AUTO: 7.6 K/UL
NEUTROPHILS # BLD AUTO: 9.6 K/UL
NEUTROPHILS NFR BLD AUTO: 83.2 %
NEUTROPHILS NFR BLD: 83.8 %
NEUTROPHILS NFR BLD: 84.3 %
NEUTROPHILS NFR BLD: 85.8 %
NEUTROPHILS NFR BLD: 86.7 %
NEUTROPHILS NFR BLD: 89.9 %
NRBC BLD-RTO: 0 /100 WBC
NRBC BLD-RTO: 1 /100 WBC
NRBC BLD-RTO: 1 /100 WBC
NRBC BLD-RTO: 2 /100 WBC
NRBC BLD-RTO: 2 /100 WBC
NUCLEATED RBCS: 1 %
NUCLEATED RBCS: 1 %
NUCLEATED RBCS: 2 %
NUCLEATED RBCS: 3 %
NUCLEATED RBCS: 4 %
NUCLEATED RBCS: 5 %
NUCLEATED RBCS: 6 %
PCO2 BLDA: 36.6 MMHG (ref 35–45)
PH SMN: 7.47 [PH] (ref 7.35–7.45)
PHOSPHATE SERPL-MCNC: 2.5 MG/DL
PHOSPHATE SERPL-MCNC: 2.6 MG/DL
PHOSPHATE SERPL-MCNC: 2.7 MG/DL
PHOSPHATE SERPL-MCNC: 2.8 MG/DL
PHOSPHATE SERPL-MCNC: 3.7 MG/DL
PHOSPHATE SERPL-MCNC: 3.8 MG/DL
PHOSPHATE SERPL-MCNC: 3.8 MG/DL
PISA TR MAX VEL: 3.96 M/S
PLATELET # BLD AUTO: 109 K/UL
PLATELET # BLD AUTO: 127 K/UL
PLATELET # BLD AUTO: 131 K/UL
PLATELET # BLD AUTO: 137 K/UL
PLATELET # BLD AUTO: 138 K/UL
PLATELET # BLD AUTO: 156 THOUSAND/UL (ref 140–400)
PLATELET # BLD AUTO: 37 K/UL (ref 140–440)
PLATELET # BLD AUTO: 37 K/UL (ref 140–440)
PLATELET # BLD AUTO: 45 K/UL (ref 140–440)
PLATELET # BLD AUTO: 48 K/UL (ref 140–440)
PLATELET # BLD AUTO: 53 K/UL (ref 140–440)
PLATELET # BLD AUTO: 55 K/UL (ref 140–440)
PLATELET # BLD AUTO: 55 K/UL (ref 140–440)
PMV BLD AUTO: 10 FL
PMV BLD AUTO: 9.1 FL
PMV BLD AUTO: 9.3 FL
PMV BLD AUTO: 9.6 FL
PMV BLD AUTO: 9.8 FL
PMV BLD REES-ECKER: 9.3 FL (ref 7.5–12.5)
PO2 BLDA: 63 MMHG (ref 80–100)
POC BE: 3 MMOL/L
POC SATURATED O2: 93 % (ref 95–100)
POC TCO2: 28 MMOL/L (ref 23–27)
POTASSIUM SERPL-SCNC: 3.1 MMOL/L
POTASSIUM SERPL-SCNC: 3.7 MMOL/L (ref 3.5–5)
POTASSIUM SERPL-SCNC: 3.8 MMOL/L
POTASSIUM SERPL-SCNC: 3.9 MMOL/L
POTASSIUM SERPL-SCNC: 3.9 MMOL/L
POTASSIUM SERPL-SCNC: 3.9 MMOL/L (ref 3.5–5)
POTASSIUM SERPL-SCNC: 4.2 MMOL/L
POTASSIUM SERPL-SCNC: 4.2 MMOL/L (ref 3.5–5)
POTASSIUM SERPL-SCNC: 4.3 MMOL/L
POTASSIUM SERPL-SCNC: 4.3 MMOL/L (ref 3.5–5.3)
POTASSIUM SERPL-SCNC: 4.4 MMOL/L
PROT SERPL-MCNC: 5.3 G/DL (ref 6–8.2)
PROT SERPL-MCNC: 5.3 G/DL (ref 6–8.2)
PROT SERPL-MCNC: 5.5 G/DL (ref 6–8.2)
PROT SERPL-MCNC: 6.5 G/DL (ref 6.1–8.1)
PROTHROMBIN TIME: 11.4 SEC
PROTHROMBIN TIME: 11.7 SEC
RA MAJOR: 6.16 CM
RA PRESSURE: 3 MMHG
RA WIDTH: 4.56 CM
RBC # BLD AUTO: 2.46 M/UL (ref 4.3–5.9)
RBC # BLD AUTO: 2.47 M/UL (ref 4.3–5.9)
RBC # BLD AUTO: 2.5 M/UL (ref 4.3–5.9)
RBC # BLD AUTO: 2.51 M/UL (ref 4.3–5.9)
RBC # BLD AUTO: 2.57 M/UL (ref 4.3–5.9)
RBC # BLD AUTO: 2.64 M/UL (ref 4.3–5.9)
RBC # BLD AUTO: 2.69 M/UL (ref 4.3–5.9)
RBC # BLD AUTO: 2.96 M/UL
RBC # BLD AUTO: 3.01 M/UL
RBC # BLD AUTO: 3.12 M/UL
RBC # BLD AUTO: 3.75 M/UL
RBC # BLD AUTO: 3.79 M/UL
RBC # BLD AUTO: 4.72 MILLION/UL (ref 4.2–5.8)
RIGHT VENTRICULAR END-DIASTOLIC DIMENSION: 4.49 CM
SAMPLE: ABNORMAL
SINUS: 3.85 CM
SITE: ABNORMAL
SODIUM SERPL-SCNC: 139 MMOL/L
SODIUM SERPL-SCNC: 139 MMOL/L
SODIUM SERPL-SCNC: 140 MMOL/L
SODIUM SERPL-SCNC: 140 MMOL/L
SODIUM SERPL-SCNC: 140 MMOL/L (ref 135–146)
SODIUM SERPL-SCNC: 141 MMOL/L
SODIUM SERPL-SCNC: 141 MMOL/L
SODIUM SERPL-SCNC: 142 MMOL/L
SODIUM: 137 MMOL/L (ref 134–144)
SODIUM: 138 MMOL/L (ref 134–144)
SODIUM: 139 MMOL/L (ref 134–144)
STJ: 3.35 CM
TR MAX PG: 62.73 MMHG
TRICUSPID ANNULAR PLANE SYSTOLIC EXCURSION: 0.92 CM
TROPONIN I SERPL DL<=0.01 NG/ML-MCNC: 0.06 NG/ML
TV REST PULMONARY ARTERY PRESSURE: 66 MMHG
WBC # BLD AUTO: 0.8 K/UL (ref 5–10)
WBC # BLD AUTO: 1.1 K/UL (ref 5–10)
WBC # BLD AUTO: 10 K/UL (ref 5–10)
WBC # BLD AUTO: 10.68 K/UL
WBC # BLD AUTO: 10.7 K/UL (ref 5–10)
WBC # BLD AUTO: 2 K/UL (ref 5–10)
WBC # BLD AUTO: 5.3 K/UL (ref 5–10)
WBC # BLD AUTO: 5.5 THOUSAND/UL (ref 3.8–10.8)
WBC # BLD AUTO: 5.59 K/UL
WBC # BLD AUTO: 6.35 K/UL
WBC # BLD AUTO: 8.26 K/UL
WBC # BLD AUTO: 8.74 K/UL
WBC # BLD AUTO: 9.9 K/UL (ref 5–10)

## 2018-01-01 PROCEDURE — 27100171 HC OXYGEN HIGH FLOW UP TO 24 HOURS

## 2018-01-01 PROCEDURE — 94761 N-INVAS EAR/PLS OXIMETRY MLT: CPT

## 2018-01-01 PROCEDURE — 84484 ASSAY OF TROPONIN QUANT: CPT

## 2018-01-01 PROCEDURE — 84100 ASSAY OF PHOSPHORUS: CPT

## 2018-01-01 PROCEDURE — 87205 SMEAR GRAM STAIN: CPT

## 2018-01-01 PROCEDURE — 25000003 PHARM REV CODE 250: Performed by: STUDENT IN AN ORGANIZED HEALTH CARE EDUCATION/TRAINING PROGRAM

## 2018-01-01 PROCEDURE — 87077 CULTURE AEROBIC IDENTIFY: CPT

## 2018-01-01 PROCEDURE — 63600175 PHARM REV CODE 636 W HCPCS: Performed by: STUDENT IN AN ORGANIZED HEALTH CARE EDUCATION/TRAINING PROGRAM

## 2018-01-01 PROCEDURE — 1101F PT FALLS ASSESS-DOCD LE1/YR: CPT | Mod: ,,, | Performed by: INTERNAL MEDICINE

## 2018-01-01 PROCEDURE — 63600175 PHARM REV CODE 636 W HCPCS: Performed by: INTERNAL MEDICINE

## 2018-01-01 PROCEDURE — 80048 BASIC METABOLIC PNL TOTAL CA: CPT

## 2018-01-01 PROCEDURE — 99233 PR SUBSEQUENT HOSPITAL CARE,LEVL III: ICD-10-PCS | Mod: GC,,, | Performed by: INTERNAL MEDICINE

## 2018-01-01 PROCEDURE — 94664 DEMO&/EVAL PT USE INHALER: CPT

## 2018-01-01 PROCEDURE — 25000242 PHARM REV CODE 250 ALT 637 W/ HCPCS: Performed by: STUDENT IN AN ORGANIZED HEALTH CARE EDUCATION/TRAINING PROGRAM

## 2018-01-01 PROCEDURE — 99900035 HC TECH TIME PER 15 MIN (STAT)

## 2018-01-01 PROCEDURE — 20000000 HC ICU ROOM

## 2018-01-01 PROCEDURE — 87186 SC STD MICRODIL/AGAR DIL: CPT

## 2018-01-01 PROCEDURE — 99291 CRITICAL CARE FIRST HOUR: CPT | Mod: GC,,, | Performed by: INTERNAL MEDICINE

## 2018-01-01 PROCEDURE — 99223 PR INITIAL HOSPITAL CARE,LEVL III: ICD-10-PCS | Mod: ,,, | Performed by: INTERNAL MEDICINE

## 2018-01-01 PROCEDURE — 83735 ASSAY OF MAGNESIUM: CPT

## 2018-01-01 PROCEDURE — 99233 PR SUBSEQUENT HOSPITAL CARE,LEVL III: ICD-10-PCS | Mod: ,,, | Performed by: INTERNAL MEDICINE

## 2018-01-01 PROCEDURE — 99215 OFFICE O/P EST HI 40 MIN: CPT | Mod: ,,, | Performed by: INTERNAL MEDICINE

## 2018-01-01 PROCEDURE — 99214 OFFICE O/P EST MOD 30 MIN: CPT | Mod: ,,, | Performed by: INTERNAL MEDICINE

## 2018-01-01 PROCEDURE — 27100092 HC HIGH FLOW DELIVERY CANNULA

## 2018-01-01 PROCEDURE — 3008F BODY MASS INDEX DOCD: CPT | Mod: ,,, | Performed by: INTERNAL MEDICINE

## 2018-01-01 PROCEDURE — 36600 WITHDRAWAL OF ARTERIAL BLOOD: CPT

## 2018-01-01 PROCEDURE — 1126F AMNT PAIN NOTED NONE PRSNT: CPT | Mod: ,,, | Performed by: INTERNAL MEDICINE

## 2018-01-01 PROCEDURE — 94640 AIRWAY INHALATION TREATMENT: CPT

## 2018-01-01 PROCEDURE — 99233 SBSQ HOSP IP/OBS HIGH 50: CPT | Mod: GC,,, | Performed by: INTERNAL MEDICINE

## 2018-01-01 PROCEDURE — 99233 SBSQ HOSP IP/OBS HIGH 50: CPT | Mod: ,,, | Performed by: INTERNAL MEDICINE

## 2018-01-01 PROCEDURE — 87040 BLOOD CULTURE FOR BACTERIA: CPT

## 2018-01-01 PROCEDURE — 94668 MNPJ CHEST WALL SBSQ: CPT

## 2018-01-01 PROCEDURE — 87070 CULTURE OTHR SPECIMN AEROBIC: CPT

## 2018-01-01 PROCEDURE — 99232 SBSQ HOSP IP/OBS MODERATE 35: CPT | Mod: ,,, | Performed by: INTERNAL MEDICINE

## 2018-01-01 PROCEDURE — 27000646 HC AEROBIKA DEVICE

## 2018-01-01 PROCEDURE — 99232 PR SUBSEQUENT HOSPITAL CARE,LEVL II: ICD-10-PCS | Mod: ,,, | Performed by: INTERNAL MEDICINE

## 2018-01-01 PROCEDURE — 85025 COMPLETE CBC W/AUTO DIFF WBC: CPT

## 2018-01-01 PROCEDURE — 82803 BLOOD GASES ANY COMBINATION: CPT

## 2018-01-01 PROCEDURE — 1101F PT FALLS ASSESS-DOCD LE1/YR: CPT | Mod: ,,, | Performed by: RADIOLOGY

## 2018-01-01 PROCEDURE — 99291 PR CRITICAL CARE, E/M 30-74 MINUTES: ICD-10-PCS | Mod: GC,,, | Performed by: INTERNAL MEDICINE

## 2018-01-01 PROCEDURE — 99291 PR CRITICAL CARE, E/M 30-74 MINUTES: ICD-10-PCS | Mod: ,,, | Performed by: INTERNAL MEDICINE

## 2018-01-01 PROCEDURE — 85610 PROTHROMBIN TIME: CPT | Mod: 91

## 2018-01-01 PROCEDURE — 99223 1ST HOSP IP/OBS HIGH 75: CPT | Mod: ,,, | Performed by: INTERNAL MEDICINE

## 2018-01-01 PROCEDURE — 99215 OFFICE O/P EST HI 40 MIN: CPT | Mod: ,,, | Performed by: RADIOLOGY

## 2018-01-01 PROCEDURE — 99214 OFFICE O/P EST MOD 30 MIN: CPT | Mod: ,,, | Performed by: RADIOLOGY

## 2018-01-01 PROCEDURE — 99291 CRITICAL CARE FIRST HOUR: CPT | Mod: ,,, | Performed by: INTERNAL MEDICINE

## 2018-01-01 PROCEDURE — 99024 POSTOP FOLLOW-UP VISIT: CPT | Mod: ,,, | Performed by: RADIOLOGY

## 2018-01-01 PROCEDURE — 83880 ASSAY OF NATRIURETIC PEPTIDE: CPT

## 2018-01-01 PROCEDURE — 1159F MED LIST DOCD IN RCRD: CPT | Mod: ,,, | Performed by: INTERNAL MEDICINE

## 2018-01-01 PROCEDURE — 85730 THROMBOPLASTIN TIME PARTIAL: CPT

## 2018-01-01 RX ORDER — HEPARIN 100 UNIT/ML
500 SYRINGE INTRAVENOUS
Status: CANCELLED | OUTPATIENT
Start: 2018-01-01

## 2018-01-01 RX ORDER — SODIUM CHLORIDE 0.9 % (FLUSH) 0.9 %
10 SYRINGE (ML) INJECTION
Status: CANCELLED | OUTPATIENT
Start: 2018-01-01

## 2018-01-01 RX ORDER — TIOTROPIUM BROMIDE 18 UG/1
1 CAPSULE ORAL; RESPIRATORY (INHALATION) DAILY
Status: DISCONTINUED | OUTPATIENT
Start: 2018-01-01 | End: 2019-01-01 | Stop reason: HOSPADM

## 2018-01-01 RX ORDER — POLYETHYLENE GLYCOL 3350, SODIUM CHLORIDE, SODIUM BICARBONATE, POTASSIUM CHLORIDE 420; 11.2; 5.72; 1.48 G/4L; G/4L; G/4L; G/4L
POWDER, FOR SOLUTION ORAL
COMMUNITY
Start: 2018-01-11 | End: 2018-01-01

## 2018-01-01 RX ORDER — BUDESONIDE 0.5 MG/2ML
0.5 INHALANT ORAL EVERY 12 HOURS
Status: DISCONTINUED | OUTPATIENT
Start: 2018-01-01 | End: 2019-01-01 | Stop reason: HOSPADM

## 2018-01-01 RX ORDER — HEPARIN SODIUM,PORCINE/D5W 25000/250
18 INTRAVENOUS SOLUTION INTRAVENOUS CONTINUOUS
Status: DISCONTINUED | OUTPATIENT
Start: 2018-01-01 | End: 2018-01-01

## 2018-01-01 RX ORDER — MULTIVITAMIN
1 TABLET ORAL DAILY
COMMUNITY

## 2018-01-01 RX ORDER — SODIUM CHLORIDE 0.9 % (FLUSH) 0.9 %
3 SYRINGE (ML) INJECTION
Status: DISCONTINUED | OUTPATIENT
Start: 2018-01-01 | End: 2019-01-01 | Stop reason: HOSPADM

## 2018-01-01 RX ORDER — FUROSEMIDE 10 MG/ML
40 INJECTION INTRAMUSCULAR; INTRAVENOUS ONCE
Status: COMPLETED | OUTPATIENT
Start: 2018-01-01 | End: 2018-01-01

## 2018-01-01 RX ORDER — TAMSULOSIN HYDROCHLORIDE 0.4 MG/1
0.4 CAPSULE ORAL DAILY
Status: ON HOLD | COMMUNITY
End: 2019-01-01 | Stop reason: HOSPADM

## 2018-01-01 RX ORDER — IPRATROPIUM BROMIDE AND ALBUTEROL SULFATE 2.5; .5 MG/3ML; MG/3ML
3 SOLUTION RESPIRATORY (INHALATION) EVERY 6 HOURS
Status: DISCONTINUED | OUTPATIENT
Start: 2018-01-01 | End: 2019-01-01

## 2018-01-01 RX ORDER — FUROSEMIDE 20 MG/1
20 TABLET ORAL DAILY
Status: DISCONTINUED | OUTPATIENT
Start: 2018-01-01 | End: 2019-01-01

## 2018-01-01 RX ORDER — MIDODRINE HYDROCHLORIDE 5 MG/1
5 TABLET ORAL 3 TIMES DAILY
Status: DISCONTINUED | OUTPATIENT
Start: 2018-01-01 | End: 2018-01-01

## 2018-01-01 RX ORDER — PRAVASTATIN SODIUM 40 MG/1
40 TABLET ORAL NIGHTLY
Status: DISCONTINUED | OUTPATIENT
Start: 2018-01-01 | End: 2019-01-01 | Stop reason: HOSPADM

## 2018-01-01 RX ORDER — PREDNISONE 20 MG/1
60 TABLET ORAL DAILY
Status: DISCONTINUED | OUTPATIENT
Start: 2018-01-01 | End: 2019-01-01

## 2018-01-01 RX ORDER — ATENOLOL 25 MG/1
25 TABLET ORAL DAILY PRN
Status: ON HOLD | COMMUNITY
End: 2019-01-01 | Stop reason: HOSPADM

## 2018-01-01 RX ORDER — FUROSEMIDE 10 MG/ML
40 INJECTION INTRAMUSCULAR; INTRAVENOUS
Status: COMPLETED | OUTPATIENT
Start: 2018-01-01 | End: 2018-01-01

## 2018-01-01 RX ORDER — FUROSEMIDE 10 MG/ML
40 INJECTION INTRAMUSCULAR; INTRAVENOUS 2 TIMES DAILY
Status: DISCONTINUED | OUTPATIENT
Start: 2018-01-01 | End: 2018-01-01

## 2018-01-01 RX ORDER — MEROPENEM AND SODIUM CHLORIDE 1 G/50ML
1 INJECTION, SOLUTION INTRAVENOUS
Status: DISCONTINUED | OUTPATIENT
Start: 2018-01-01 | End: 2019-01-01 | Stop reason: HOSPADM

## 2018-01-01 RX ORDER — ALLOPURINOL 100 MG/1
100 TABLET ORAL DAILY
Status: DISCONTINUED | OUTPATIENT
Start: 2018-01-01 | End: 2019-01-01 | Stop reason: HOSPADM

## 2018-01-01 RX ORDER — POTASSIUM CHLORIDE 20 MEQ/15ML
40 SOLUTION ORAL ONCE
Status: COMPLETED | OUTPATIENT
Start: 2018-01-01 | End: 2018-01-01

## 2018-01-01 RX ORDER — NITROGLYCERIN 0.3 MG/1
0.3 TABLET SUBLINGUAL EVERY 5 MIN PRN
Status: ON HOLD | COMMUNITY
End: 2019-01-01 | Stop reason: HOSPADM

## 2018-01-01 RX ORDER — CIPROFLOXACIN 250 MG/1
250 TABLET, FILM COATED ORAL 2 TIMES DAILY
Status: ON HOLD | COMMUNITY
End: 2019-01-01 | Stop reason: HOSPADM

## 2018-01-01 RX ORDER — PANTOPRAZOLE SODIUM 40 MG/1
40 TABLET, DELAYED RELEASE ORAL DAILY
Status: DISCONTINUED | OUTPATIENT
Start: 2018-01-01 | End: 2019-01-01 | Stop reason: HOSPADM

## 2018-01-01 RX ADMIN — APIXABAN 5 MG: 5 TABLET, FILM COATED ORAL at 09:12

## 2018-01-01 RX ADMIN — BUDESONIDE 0.5 MG: 0.5 INHALANT RESPIRATORY (INHALATION) at 08:12

## 2018-01-01 RX ADMIN — APIXABAN 5 MG: 5 TABLET, FILM COATED ORAL at 08:12

## 2018-01-01 RX ADMIN — POTASSIUM CHLORIDE 40 MEQ: 20 SOLUTION ORAL at 05:12

## 2018-01-01 RX ADMIN — MEROPENEM AND SODIUM CHLORIDE 1 G: 1 INJECTION, SOLUTION INTRAVENOUS at 11:12

## 2018-01-01 RX ADMIN — ATENOLOL 12.5 MG: 25 TABLET ORAL at 08:12

## 2018-01-01 RX ADMIN — PRAVASTATIN SODIUM 40 MG: 40 TABLET ORAL at 10:12

## 2018-01-01 RX ADMIN — IPRATROPIUM BROMIDE AND ALBUTEROL SULFATE 3 ML: .5; 3 SOLUTION RESPIRATORY (INHALATION) at 12:12

## 2018-01-01 RX ADMIN — PANTOPRAZOLE SODIUM 40 MG: 40 TABLET, DELAYED RELEASE ORAL at 08:12

## 2018-01-01 RX ADMIN — PRAVASTATIN SODIUM 40 MG: 40 TABLET ORAL at 09:12

## 2018-01-01 RX ADMIN — BUDESONIDE 0.5 MG: 0.5 INHALANT RESPIRATORY (INHALATION) at 04:12

## 2018-01-01 RX ADMIN — ATENOLOL 12.5 MG: 25 TABLET ORAL at 09:12

## 2018-01-01 RX ADMIN — IPRATROPIUM BROMIDE AND ALBUTEROL SULFATE 3 ML: .5; 3 SOLUTION RESPIRATORY (INHALATION) at 01:12

## 2018-01-01 RX ADMIN — HEPARIN SODIUM AND DEXTROSE 18 UNITS/KG/HR: 10000; 5 INJECTION INTRAVENOUS at 04:12

## 2018-01-01 RX ADMIN — THERA TABS 1 TABLET: TAB at 09:12

## 2018-01-01 RX ADMIN — BUDESONIDE 0.5 MG: 0.5 INHALANT RESPIRATORY (INHALATION) at 07:12

## 2018-01-01 RX ADMIN — MEROPENEM AND SODIUM CHLORIDE 1 G: 1 INJECTION, SOLUTION INTRAVENOUS at 10:12

## 2018-01-01 RX ADMIN — IPRATROPIUM BROMIDE AND ALBUTEROL SULFATE 3 ML: .5; 3 SOLUTION RESPIRATORY (INHALATION) at 08:12

## 2018-01-01 RX ADMIN — THERA TABS 1 TABLET: TAB at 08:12

## 2018-01-01 RX ADMIN — PIPERACILLIN AND TAZOBACTAM 4.5 G: 4; .5 INJECTION, POWDER, LYOPHILIZED, FOR SOLUTION INTRAVENOUS; PARENTERAL at 07:12

## 2018-01-01 RX ADMIN — MEROPENEM AND SODIUM CHLORIDE 1 G: 1 INJECTION, SOLUTION INTRAVENOUS at 02:12

## 2018-01-01 RX ADMIN — PREDNISONE 60 MG: 20 TABLET ORAL at 09:12

## 2018-01-01 RX ADMIN — PREDNISONE 60 MG: 20 TABLET ORAL at 08:12

## 2018-01-01 RX ADMIN — PRAVASTATIN SODIUM 40 MG: 40 TABLET ORAL at 08:12

## 2018-01-01 RX ADMIN — PANTOPRAZOLE SODIUM 40 MG: 40 TABLET, DELAYED RELEASE ORAL at 09:12

## 2018-01-01 RX ADMIN — TIOTROPIUM BROMIDE 18 MCG: 18 CAPSULE ORAL; RESPIRATORY (INHALATION) at 08:12

## 2018-01-01 RX ADMIN — TIOTROPIUM BROMIDE 18 MCG: 18 CAPSULE ORAL; RESPIRATORY (INHALATION) at 09:12

## 2018-01-01 RX ADMIN — IPRATROPIUM BROMIDE AND ALBUTEROL SULFATE 3 ML: .5; 3 SOLUTION RESPIRATORY (INHALATION) at 07:12

## 2018-01-01 RX ADMIN — ALLOPURINOL 100 MG: 100 TABLET ORAL at 08:12

## 2018-01-01 RX ADMIN — FUROSEMIDE 40 MG: 10 INJECTION, SOLUTION INTRAMUSCULAR; INTRAVENOUS at 02:12

## 2018-01-01 RX ADMIN — PIPERACILLIN AND TAZOBACTAM 4.5 G: 4; .5 INJECTION, POWDER, LYOPHILIZED, FOR SOLUTION INTRAVENOUS; PARENTERAL at 11:12

## 2018-01-01 RX ADMIN — PIPERACILLIN AND TAZOBACTAM 4.5 G: 4; .5 INJECTION, POWDER, LYOPHILIZED, FOR SOLUTION INTRAVENOUS; PARENTERAL at 04:12

## 2018-01-01 RX ADMIN — MEROPENEM AND SODIUM CHLORIDE 1 G: 1 INJECTION, SOLUTION INTRAVENOUS at 05:12

## 2018-01-01 RX ADMIN — PIPERACILLIN AND TAZOBACTAM 4.5 G: 4; .5 INJECTION, POWDER, LYOPHILIZED, FOR SOLUTION INTRAVENOUS; PARENTERAL at 03:12

## 2018-01-01 RX ADMIN — FUROSEMIDE 20 MG: 20 TABLET ORAL at 04:12

## 2018-01-01 RX ADMIN — ALLOPURINOL 100 MG: 100 TABLET ORAL at 09:12

## 2018-01-01 RX ADMIN — APIXABAN 5 MG: 5 TABLET, FILM COATED ORAL at 10:12

## 2018-01-01 RX ADMIN — PIPERACILLIN AND TAZOBACTAM 4.5 G: 4; .5 INJECTION, POWDER, LYOPHILIZED, FOR SOLUTION INTRAVENOUS; PARENTERAL at 12:12

## 2018-01-01 RX ADMIN — ATENOLOL 12.5 MG: 25 TABLET ORAL at 10:12

## 2018-01-01 RX ADMIN — MEROPENEM AND SODIUM CHLORIDE 1 G: 1 INJECTION, SOLUTION INTRAVENOUS at 06:12

## 2018-01-01 RX ADMIN — BUDESONIDE 0.5 MG: 0.5 INHALANT RESPIRATORY (INHALATION) at 09:12

## 2018-01-01 RX ADMIN — TIOTROPIUM BROMIDE 18 MCG: 18 CAPSULE ORAL; RESPIRATORY (INHALATION) at 03:12

## 2018-01-01 RX ADMIN — PIPERACILLIN AND TAZOBACTAM 4.5 G: 4; .5 INJECTION, POWDER, LYOPHILIZED, FOR SOLUTION INTRAVENOUS; PARENTERAL at 08:12

## 2018-01-01 RX ADMIN — FUROSEMIDE 20 MG: 20 TABLET ORAL at 08:12

## 2018-01-01 RX ADMIN — FUROSEMIDE 40 MG: 10 INJECTION, SOLUTION INTRAMUSCULAR; INTRAVENOUS at 06:12

## 2018-01-01 RX ADMIN — FUROSEMIDE 40 MG: 10 INJECTION, SOLUTION INTRAMUSCULAR; INTRAVENOUS at 09:12

## 2018-01-01 RX ADMIN — PREDNISONE 60 MG: 20 TABLET ORAL at 03:12

## 2018-01-01 RX ADMIN — APIXABAN 5 MG: 5 TABLET, FILM COATED ORAL at 06:12

## 2018-01-01 RX ADMIN — MEROPENEM AND SODIUM CHLORIDE 1 G: 1 INJECTION, SOLUTION INTRAVENOUS at 07:12

## 2018-01-01 RX ADMIN — THERA TABS 1 TABLET: TAB at 12:12

## 2018-01-11 ENCOUNTER — OFFICE VISIT (OUTPATIENT)
Dept: GASTROENTEROLOGY | Facility: CLINIC | Age: 73
End: 2018-01-11
Payer: MEDICARE

## 2018-01-11 VITALS
WEIGHT: 198.63 LBS | RESPIRATION RATE: 16 BRPM | HEART RATE: 97 BPM | HEIGHT: 69 IN | TEMPERATURE: 98 F | BODY MASS INDEX: 29.42 KG/M2 | DIASTOLIC BLOOD PRESSURE: 72 MMHG | SYSTOLIC BLOOD PRESSURE: 110 MMHG

## 2018-01-11 DIAGNOSIS — J44.9 CHRONIC OBSTRUCTIVE PULMONARY DISEASE, UNSPECIFIED COPD TYPE: ICD-10-CM

## 2018-01-11 DIAGNOSIS — J45.909 ASTHMA, UNSPECIFIED ASTHMA SEVERITY, UNSPECIFIED WHETHER COMPLICATED, UNSPECIFIED WHETHER PERSISTENT: ICD-10-CM

## 2018-01-11 DIAGNOSIS — K86.89 PANCREATIC MASS: ICD-10-CM

## 2018-01-11 DIAGNOSIS — C34.90 MALIGNANT NEOPLASM OF LUNG, UNSPECIFIED LATERALITY, UNSPECIFIED PART OF LUNG: ICD-10-CM

## 2018-01-11 DIAGNOSIS — D12.6 DYSPLASIA OF COLON: ICD-10-CM

## 2018-01-11 DIAGNOSIS — Z95.1 HX OF CABG: ICD-10-CM

## 2018-01-11 DIAGNOSIS — Z86.711 HISTORY OF PULMONARY EMBOLISM: ICD-10-CM

## 2018-01-11 DIAGNOSIS — D36.9 ADENOMATOUS POLYPS: ICD-10-CM

## 2018-01-11 DIAGNOSIS — Z86.010 HX OF ADENOMATOUS COLONIC POLYPS: Primary | ICD-10-CM

## 2018-01-11 PROCEDURE — 99215 OFFICE O/P EST HI 40 MIN: CPT | Mod: ,,, | Performed by: INTERNAL MEDICINE

## 2018-01-11 RX ORDER — POLYETHYLENE GLYCOL 3350, SODIUM SULFATE ANHYDROUS, SODIUM BICARBONATE, SODIUM CHLORIDE, POTASSIUM CHLORIDE 236; 22.74; 6.74; 5.86; 2.97 G/4L; G/4L; G/4L; G/4L; G/4L
4 POWDER, FOR SOLUTION ORAL ONCE
Qty: 1 BOTTLE | Refills: 0 | Status: SHIPPED | OUTPATIENT
Start: 2018-01-11 | End: 2018-01-11

## 2018-01-18 ENCOUNTER — OFFICE VISIT (OUTPATIENT)
Dept: HEMATOLOGY/ONCOLOGY | Facility: CLINIC | Age: 73
End: 2018-01-18
Payer: MEDICARE

## 2018-01-18 VITALS
TEMPERATURE: 98 F | BODY MASS INDEX: 28.61 KG/M2 | WEIGHT: 193.19 LBS | HEIGHT: 69 IN | SYSTOLIC BLOOD PRESSURE: 72 MMHG | DIASTOLIC BLOOD PRESSURE: 41 MMHG | RESPIRATION RATE: 18 BRPM | HEART RATE: 109 BPM

## 2018-01-18 DIAGNOSIS — Z86.711 HISTORY OF PULMONARY EMBOLISM: ICD-10-CM

## 2018-01-18 DIAGNOSIS — Z86.718 HISTORY OF DEEP VENOUS THROMBOSIS (DVT) OF DISTAL VEIN OF LEFT LOWER EXTREMITY: Primary | ICD-10-CM

## 2018-01-18 DIAGNOSIS — D64.81 ANEMIA ASSOCIATED WITH CHEMOTHERAPY: ICD-10-CM

## 2018-01-18 DIAGNOSIS — C34.12 SQUAMOUS CELL CARCINOMA OF BRONCHUS IN LEFT UPPER LOBE: ICD-10-CM

## 2018-01-18 DIAGNOSIS — C34.12 PRIMARY MALIGNANT NEOPLASM OF LEFT UPPER LOBE OF LUNG: ICD-10-CM

## 2018-01-18 DIAGNOSIS — T45.1X5A ANEMIA ASSOCIATED WITH CHEMOTHERAPY: ICD-10-CM

## 2018-01-18 PROCEDURE — 99214 OFFICE O/P EST MOD 30 MIN: CPT | Mod: ,,, | Performed by: INTERNAL MEDICINE

## 2018-01-18 NOTE — PROGRESS NOTES
[facility] Hematology/Oncology  PROGRESS NOTE      Subjective:       Patient ID:   NAME: Michael Shetty : 1945     73 y.o. male    Referring Doc: Jodee  Other Physicians: Mg Ely Barrios, Dauterive, Wade    Chief Complaint:      History of Present Illness:     Patient returns today for a regularly scheduled follow-up visit.  He has been doing ok; no CP, SOB, HA's or N/V. He had colonoscopy in 2017 with Dr Holliday and saw him again in 2018 with repeat colonoscopy planned for 2018. On  he is having EGD with ERCP with Dr Knapp.         ROS:   GEN: normal without any fever, night sweats or weight loss  HEENT: normal with no HA's, sore throat, stiff neck, changes in vision  CV: normal with no CP, SOB, PND, STREETER or orthopnea  PULM: normal with no SOB, cough, hemoptysis, sputum or pleuritic pain  GI: normal with no abdominal pain, nausea, vomiting, constipation, diarrhea, melanotic stools, BRBPR, or hematemesis  : normal with no hematuria, dysuria  BREAST: normal with no mass, discharge, pain  SKIN: normal with no rash, erythema, bruising, or swelling    Allergies:  Review of patient's allergies indicates:   Allergen Reactions    Iodine and iodide containing products Hives     ALLERGIC TO SOFT SHELL CRAB ONLY    Shellfish containing products      soft shell crabs       Medications:    Current Outpatient Prescriptions:     allopurinol (ZYLOPRIM) 100 MG tablet, Take by mouth., Disp: , Rfl:     atenolol (TENORMIN) 25 MG tablet, Take 25 mg by mouth once daily., Disp: , Rfl:     cefUROXime (CEFTIN) 500 MG tablet, Take by mouth., Disp: , Rfl:     cilostazol (PLETAL) 100 MG Tab, 100 mg., Disp: , Rfl:     doxycycline (VIBRA-TABS) 100 MG tablet, Take by mouth., Disp: , Rfl:     doxycycline (VIBRAMYCIN) 100 MG Cap, , Disp: , Rfl:     hydrocodone-acetaminophen 5-325mg (NORCO) 5-325 mg per tablet, , Disp: , Rfl:     ondansetron (ZOFRAN) 8 MG tablet, , Disp: , Rfl:      "pravastatin (PRAVACHOL) 40 MG tablet, Take 40 mg by mouth nightly., Disp: , Rfl:     ranitidine (ZANTAC 75) 75 MG tablet, Take by mouth., Disp: , Rfl:     rivaroxaban (XARELTO) 20 mg Tab, Take by mouth., Disp: , Rfl:     sulfamethoxazole-trimethoprim 800-160mg (BACTRIM DS) 800-160 mg Tab, Take by mouth., Disp: , Rfl:     terazosin (HYTRIN) 2 MG capsule, Take 2 mg by mouth every evening. 2 TABS, Disp: , Rfl:     VENTOLIN HFA 90 mcg/actuation inhaler, , Disp: , Rfl:     PMHx/PSHx Updates:  See patient's last visit with me on 12/28/2017  See H&P on 12/9/2015        Pathology:  Primary malignant neoplasm of left upper lobe of lung    Staging form: Lung, AJCC 7th Edition    - Clinical: Stage IB (T2a, N0, M0) - Signed by Todd Olvera Jr., MD on 6/28/2017          Objective:     Vitals:  Blood pressure (!) 72/41, pulse 109, temperature 98 °F (36.7 °C), resp. rate 18, height 5' 9" (1.753 m), weight 87.6 kg (193 lb 3.2 oz).    Physical Examination:   GEN: no apparent distress, comfortable; AAOx3  HEAD: atraumatic and normocephalic  EYES: no pallor, no icterus, PERRLA  ENT: OMM, no pharyngeal erythema, external ears WNL; no nasal discharge; no thrush  NECK: no masses, thyroid normal, trachea midline, no LAD/LN's, supple  CV: RRR with no murmur; normal pulse; normal S1 and S2; no pedal edema  CHEST: Normal respiratory effort; CTAB; normal breath sounds; no wheeze or crackles  ABDOM: nontender and nondistended; soft; normal bowel sounds; no rebound/guarding  MUSC/Skeletal: ROM normal; no crepitus; joints normal; no deformities or arthropathy  EXTREM: no clubbing, cyanosis, inflammation or swelling  SKIN: no rashes, lesions, ulcers, petechiae or subcutaneous nodules  : no lucio  NEURO: grossly intact; motor/sensory WNL; AAOx3; no tremors  PSYCH: normal mood, affect and behavior  LYMPH: normal cervical, supraclavicular, axillary and groin LN's            Labs:     CEA was 3.1  Lab Results   Component Value Date    WBC " 6.1 12/05/2017    HGB 14.1 12/05/2017    HCT 42.7 12/05/2017    MCV 92.2 12/05/2017     12/05/2017     BMP  Lab Results   Component Value Date     12/05/2017    K 4.3 12/05/2017     12/05/2017    CO2 26 12/05/2017    BUN 23 12/05/2017    CREATININE 1.40 (H) 12/05/2017    CALCIUM 8.7 12/05/2017    ANIONGAP 13 02/25/2013    ESTGFRAFRICA 58 (L) 12/05/2017    EGFRNONAA 50 (L) 12/05/2017     Lab Results   Component Value Date    ALT 13 12/05/2017    AST 17 12/05/2017    ALKPHOS 75 12/05/2017    BILITOT 1.7 (H) 12/05/2017           Radiology/Diagnostic Studies:    CT scan 12/14/2017  IMPRESSION:    1. Unchanged size of left upper lobe and left lower lobe nodules since  09/08/2017, suggesting at least partially treated malignancy.    2. No new abnormality of concern for regional or distant metastasis.    3. Unchanged severe emphysema.    4. No significant change of 13 x 21 mm cystic lesion in pancreatic head. This  could reflect a nonneoplastic mucinous cyst or other low-grade lesion such as  side branch IPMN (intraductal papillary mucinous neoplasm).    5. Focal fat stranding in right lower quadrant mesenteric fat adjacent to  segment of terminal ileum where mild terminal ileal wall thickening is  questioned, although the appearance could be related to inadequate distention.  Focal inflammation related to infectious or inflammatory enteritis is a  consideration, with the appearance otherwise nonspecific.  I have reviewed all available lab results and radiology reports.    Assessment/Plan:     (1) 72 y.o. male with diagnosis of NSCLC (Squamous cell)  - T2A lesion with 3.5cm involving NEIDA  - s/p monotherapy with XRT by Dr Olvera; followed by chemotherapy with 3 cycles of carbo/taxol  - followed by Dr Ely with Pulm - seen him on Dec 5th and f/u on Feb 6th  - CTA on 5/18 showed decrease size in the tumor mass  - latest CT on 12/14 with stable lung findings     (2) prior admit with SOB and suspected  pneumonia     (3) LLE DVT and PE hx - along with prior CVA  - followed by Dr Lewis  - on Xarelto and ASA per cardiology direction  - MTHFR gene abnormality     (4) Anemia secondary to chemotherapy - resolved     (5) CAD s/p MI in past - followed by Joshua     (6)  Colonoscopy in July 2017 with Dr Holliday - he sees him again on Jan 11th     (7) BP issues - followed by Dr Lewis    (8) Pancreatic nodule/cyst - stable in size - he saw Dr Holliday on Jan 11th; and he has ERCP on Jan 30th planned with Dr Knapp  - increase in bilirubin    PLAN:  1. F/u with with PCP, PULm, GI etc  2. Planned ERCP with Dr Knapp on Jan 30th  4. RTC in 1 month  Fax note to Mg Ely Barrios, Dauterive, Wade and Jodee    I spent over 25 mins of time with the patient. Over half of this time was spent couseling and coordinating care.      Discussion:     I have explained all of the above in detail and the patient understands all of the current recommendation(s). I have answered all of their questions to the best of my ability and to their complete satisfaction.   The patient is to continue with the current management plan.            Electronically signed by Chris Cage MD

## 2018-02-14 NOTE — PROGRESS NOTES
[facility] Hematology/Oncology  PROGRESS NOTE      Subjective:       Patient ID:   NAME: Michael Shetty : 1945     73 y.o. male    Referring Doc: Jodee  Other Physicians: Mg Ely Barrios, Dauterive, Wade    Chief Complaint:  Lung ca f/u    History of Present Illness:     Patient returns today for a regularly scheduled follow-up visit.  He has been doing ok; no CP, SOB, HA's or N/V. He had colonoscopy in 2017 with Dr Holliday and saw him again in 2018 with repeat colonoscopy planned for 2018. On  he had an EGD with ERCP with Dr Knapp but the lesion was too small to biopsy. He saw Dr Ely two weeks ago. He is still on oral antibiotics.         ROS:   GEN: normal without any fever, night sweats or weight loss  HEENT: normal with no HA's, sore throat, stiff neck, changes in vision  CV: normal with no CP, SOB, PND, STREETER or orthopnea  PULM: normal with no SOB, cough, hemoptysis, sputum or pleuritic pain  GI: normal with no abdominal pain, nausea, vomiting, constipation, diarrhea, melanotic stools, BRBPR, or hematemesis  : normal with no hematuria, dysuria  BREAST: normal with no mass, discharge, pain  SKIN: normal with no rash, erythema, bruising, or swelling    Allergies:  Review of patient's allergies indicates:   Allergen Reactions    Iodine and iodide containing products Hives     ALLERGIC TO SOFT SHELL CRAB ONLY    Shellfish containing products      soft shell crabs       Medications:    Current Outpatient Prescriptions:     allopurinol (ZYLOPRIM) 100 MG tablet, Take by mouth., Disp: , Rfl:     atenolol (TENORMIN) 25 MG tablet, Take 25 mg by mouth once daily., Disp: , Rfl:     cefUROXime (CEFTIN) 500 MG tablet, Take by mouth., Disp: , Rfl:     cilostazol (PLETAL) 100 MG Tab, 100 mg., Disp: , Rfl:     doxycycline (VIBRA-TABS) 100 MG tablet, Take by mouth., Disp: , Rfl:     doxycycline (VIBRAMYCIN) 100 MG Cap, , Disp: , Rfl:      hydrocodone-acetaminophen 5-325mg (NORCO) 5-325 mg per tablet, , Disp: , Rfl:     ondansetron (ZOFRAN) 8 MG tablet, , Disp: , Rfl:     peg-electrolyte soln 420 gram SolR, , Disp: , Rfl:     pravastatin (PRAVACHOL) 40 MG tablet, Take 40 mg by mouth nightly., Disp: , Rfl:     ranitidine (ZANTAC 75) 75 MG tablet, Take by mouth., Disp: , Rfl:     rivaroxaban (XARELTO) 20 mg Tab, Take by mouth., Disp: , Rfl:     sulfamethoxazole-trimethoprim 800-160mg (BACTRIM DS) 800-160 mg Tab, Take by mouth., Disp: , Rfl:     terazosin (HYTRIN) 2 MG capsule, Take 4 mg by mouth every evening. 2 TABS, Disp: , Rfl:     VENTOLIN HFA 90 mcg/actuation inhaler, , Disp: , Rfl:     PMHx/PSHx Updates:  See patient's last visit with me on 1/18/2018  See H&P on 12/9/2015        Pathology:  Primary malignant neoplasm of left upper lobe of lung    Staging form: Lung, AJCC 7th Edition    - Clinical: Stage IB (T2a, N0, M0) - Signed by Todd Olvera Jr., MD on 6/28/2017          Objective:     Vitals:  Blood pressure (!) 85/52, pulse 103, temperature 97.8 °F (36.6 °C), resp. rate 18, weight 90 kg (198 lb 6.4 oz).    Physical Examination:   GEN: no apparent distress, comfortable; AAOx3  HEAD: atraumatic and normocephalic  EYES: no pallor, no icterus, PERRLA  ENT: OMM, no pharyngeal erythema, external ears WNL; no nasal discharge; no thrush  NECK: no masses, thyroid normal, trachea midline, no LAD/LN's, supple  CV: RRR with no murmur; normal pulse; normal S1 and S2; no pedal edema  CHEST: Normal respiratory effort; CTAB; normal breath sounds; no wheeze or crackles  ABDOM: nontender and nondistended; soft; normal bowel sounds; no rebound/guarding  MUSC/Skeletal: ROM normal; no crepitus; joints normal; no deformities or arthropathy  EXTREM: no clubbing, cyanosis, inflammation or swelling  SKIN: no rashes, lesions, ulcers, petechiae or subcutaneous nodules  : no lucio  NEURO: grossly intact; motor/sensory WNL; AAOx3; no tremors  PSYCH: normal  mood, affect and behavior  LYMPH: normal cervical, supraclavicular, axillary and groin LN's            Labs:   12/5/2017  CEA was 3.1  Lab Results   Component Value Date    WBC 6.1 12/05/2017    HGB 14.1 12/05/2017    HCT 42.7 12/05/2017    MCV 92.2 12/05/2017     12/05/2017     BMP  Lab Results   Component Value Date     12/05/2017    K 4.3 12/05/2017     12/05/2017    CO2 26 12/05/2017    BUN 23 12/05/2017    CREATININE 1.40 (H) 12/05/2017    CALCIUM 8.7 12/05/2017    ANIONGAP 13 02/25/2013    ESTGFRAFRICA 58 (L) 12/05/2017    EGFRNONAA 50 (L) 12/05/2017     Lab Results   Component Value Date    ALT 13 12/05/2017    AST 17 12/05/2017    ALKPHOS 75 12/05/2017    BILITOT 1.7 (H) 12/05/2017           Radiology/Diagnostic Studies:    CT scan 12/14/2017  IMPRESSION:    1. Unchanged size of left upper lobe and left lower lobe nodules since  09/08/2017, suggesting at least partially treated malignancy.    2. No new abnormality of concern for regional or distant metastasis.    3. Unchanged severe emphysema.    4. No significant change of 13 x 21 mm cystic lesion in pancreatic head. This  could reflect a nonneoplastic mucinous cyst or other low-grade lesion such as  side branch IPMN (intraductal papillary mucinous neoplasm).    5. Focal fat stranding in right lower quadrant mesenteric fat adjacent to  segment of terminal ileum where mild terminal ileal wall thickening is  questioned, although the appearance could be related to inadequate distention.  Focal inflammation related to infectious or inflammatory enteritis is a  consideration, with the appearance otherwise nonspecific.  I have reviewed all available lab results and radiology reports.    Assessment/Plan:     (1) 72 y.o. male with diagnosis of NSCLC (Squamous cell)  - T2A lesion with 3.5cm involving NEIDA  - s/p monotherapy with XRT by Dr Olvera; followed by chemotherapy with 3 cycles of carbo/taxol  - followed by Dr Ely with Pulm - seen him  on Dec 5th and f/u on Feb 6th  - CTA on 5/18 showed decrease size in the tumor mass  - latest CT on 12/14 with stable lung findings     (2) prior admit with SOB and suspected pneumonia     (3) LLE DVT and PE hx - along with prior CVA  - followed by Dr Lewis  - on Xarelto and ASA per cardiology direction  - MTHFR gene abnormality     (4) Anemia secondary to chemotherapy - resolved     (5) CAD s/p MI in past - followed by Joshua     (6)  Colonoscopy in July 2017 with Dr Holliday - he sees him again on Jan 11th     (7) BP issues - followed by Dr Lewis    (8) Pancreatic nodule/cyst - stable in size - he saw Dr Holliday on Jan 11th; and he had ERCP on Jan 30th with Dr Knapp but the lesion in question was too small to biopsy  - increase in bilirubin    PLAN:  1. F/u with with PCP, PULm, GI etc  2. Need reports from Dr Knapp from Jan 30th  3. Repeat CT chest/abdom in April or May  4. RTC in 2-3 months  Fax note to Mg Ely Barrios, Dauterive, Gwendolyn    I spent over 25 mins of time with the patient. Over half of this time was spent couseling and coordinating care.      Discussion:     I have explained all of the above in detail and the patient understands all of the current recommendation(s). I have answered all of their questions to the best of my ability and to their complete satisfaction.   The patient is to continue with the current management plan.            Electronically signed by Chris Cage MD

## 2018-02-14 NOTE — LETTER
February 14, 2018      Michael Ellsworth MD  [address] [place] [address] [address]  [facility] [address]           [facility] Hematology Oncology  [address] [facility]  [address]  [facility] [address]  Phone: [phone]  Fax: [phone]          Patient: Michael Shetty   MR Number: [id number]   YOB: 1945   Date of Visit: 2/14/2018       Dear Dr. Michael Ellsworth:    Thank you for referring Michael Shetty to me for evaluation. Attached you will find relevant portions of my assessment and plan of care.    If you have questions, please do not hesitate to call me. I look forward to following Michael Shetty along with you.    Sincerely,    Chris Cage MD    Enclosure  CC:  No Recipients    If you would like to receive this communication electronically, please contact [email or web address] or [phone] to request more information on [employer] access.    For providers and/or their staff who would like to refer a patient to [facility], please contact us through our one-stop-shop provider referral line, [facility], at [phone].    If you feel you have received this communication in error or would no longer like to receive these types of communications, please e-mail [email or web address]

## 2018-04-12 NOTE — TELEPHONE ENCOUNTER
Patient called and wants to speak with you about medications that he has been off since Colonoscopy on yesterday. Please call patient.

## 2018-04-26 NOTE — PROGRESS NOTES
[facility] Established Patient Visit    Subjective:           PCP: Michael Ellsworth    Chief Complaint: Follow-up (colonoscopy )       HPI:  Michael Shetty is a 73 y.o. male here for for follow-upof  Colonoscopy. He shouldn't have a lesion in the transverse colon may need EMR case was discussed with Dr. CAN  He is going to see the patient in the office on 15th of May and then consider possible EMR. Have discussed in detail regarding his medical problems including the hyper coagulable syndrome and he needs to be off the anticoagulants as little as possible. CT scan of the chest abdomen pelvis was reviewed under direct vision and he was no evidence of any recurrent malignant disease nodule in his lung has gotten minimally larger than before.     ROS:   Constitutional: No fevers, chills, weight loss  ENT: No allergies, sore throat, rhinorrhea  CV: No chest pain, palpitations, edema  Pulm: No cough, shortness of breath, wheezing  Ophtho: No vision changes  GI: No blood in stools, change in bowel habits, nausea/vomiting  Denies alternating diarrhea/constipation.   Derm: No rash  Heme: No easy bruising or lymphadenopathy  MSK: No arthralgias or myalgias  : No dysuria, hematuria, frequency, polyuria, or flank tenderness  Endo: No hot or cold intolerance  Neuro: No syncope or seizure, or dizziness  Psych: No hallucination, depression or suicidal ideation      Medical History:  has a past medical history of Arthritis; BPH (benign prostatic hyperplasia); Coronary artery disease; Gout, chronic; Heartburn; Hypertension; Myocardial infarction; PE (pulmonary embolism); Presence of dental bridge; Primary malignant neoplasm of left upper lobe of lung (5/19/2017); and Staph infection.      Surgical History:  has a past surgical history that includes Cardiac surgery; Tonsillectomy; CTR; Trigger finger release; and Rotator cuff repair.    Family History:   Family History   Problem Relation Age of Onset    Cancer  "Sister      gallbladder       Social History:   Social History   Substance Use Topics    Smoking status: Former Smoker     Packs/day: 2.00     Years: 25.00     Quit date: 2/25/1990    Smokeless tobacco: Never Used    Alcohol use Yes      Comment: 2 PER DAY       The patient's social and family histories were reviewed by me and updated in the appropriate section of the electronic medical record.    Allergies:   Review of patient's allergies indicates:   Allergen Reactions    Iodine and iodide containing products Hives     ALLERGIC TO SOFT SHELL CRAB ONLY    Shellfish containing products      soft shell crabs         Medications:   Current Outpatient Prescriptions   Medication Sig Dispense Refill    allopurinol (ZYLOPRIM) 100 MG tablet Take 100 mg by mouth once daily.       atenolol (TENORMIN) 25 MG tablet Take 25 mg by mouth daily as needed.       cefUROXime (CEFTIN) 500 MG tablet Take 500 mg by mouth. Every third week      cilostazol (PLETAL) 100 MG Tab 100 mg 2 (two) times daily.       doxycycline (VIBRA-TABS) 100 MG tablet Take 100 mg by mouth. Every third week      pravastatin (PRAVACHOL) 40 MG tablet Take 40 mg by mouth nightly.      ranitidine (ZANTAC 75) 75 MG tablet Take 75 mg by mouth nightly.       rivaroxaban (XARELTO) 20 mg Tab Take 20 mg by mouth once daily.       sulfamethoxazole-trimethoprim 800-160mg (BACTRIM DS) 800-160 mg Tab Take by mouth.      terazosin (HYTRIN) 2 MG capsule Take 4 mg by mouth every evening. 2 TABS      VENTOLIN HFA 90 mcg/actuation inhaler Inhale 1-2 puffs into the lungs every 6 (six) hours as needed.        No current facility-administered medications for this visit.      All medications and past history have been reviewed.        Objective:        Vital Signs:  Blood pressure 122/62, pulse 110, temperature 98.1 °F (36.7 °C), height 5' 9" (1.753 m), weight 87.5 kg (193 lb). Body mass index is 28.5 kg/m².    Physical Exam:   General Appearance: Well appearing in " no acute distress, well developed, well                nourished  Head: Normocephalic, without obvious abnormality  Eyes:  Pupils equal, round and reactive to light  Throat: Lips, mucosa, and tongue normal; teeth and gums normal  Lungs: CTA bilaterally in anterior and posterior fields, no wheezes, no crackles  Heart:  Regular rate and rhythm, no murmurs heard  Abdomen: Soft, non tender, non distended with positive bowel sounds in all four quadrants. No hepatosplenomegaly, ascites, or mass. Negative for succusion splash  : male   Extremities: No cyanosis, edema or deformity  Skin: No rash  Neurologic: Normal exam    Labs:  Lab Results   Component Value Date    WBC 6.1 12/05/2017    HGB 14.1 12/05/2017    HCT 42.7 12/05/2017     12/05/2017    ALT 13 12/05/2017    AST 17 12/05/2017     12/05/2017    K 4.3 12/05/2017     12/05/2017    CREATININE 1.40 (H) 12/05/2017    BUN 23 12/05/2017    CO2 26 12/05/2017       Imaging:     All lab results and imaging results have been reviewed and discussed with the patient      Assessment:       No diagnosis found.    Plan:       There are no diagnoses linked to this encounter.    See HPI    No Follow-up on file.    The plan was discussed with the patient and all questions/concerns have been answered to the patient's satisfaction.        Electronically signed by Eula Holliday MD    This note was dictated using voice recognition software and may contain grammatical errors.

## 2018-04-30 NOTE — PROGRESS NOTES
[facility] Hematology/Oncology  PROGRESS NOTE      Subjective:       Patient ID:   NAME: Michael Shetty : 1945     73 y.o. male    Referring Doc: Jodee  Other Physicians: Mg Ely Barrios, Dauterive, Mannina    Chief Complaint:  Lung ca f/u    History of Present Illness:     Patient returns today for a regularly scheduled follow-up visit.  He has been doing ok; no CP, SOB, HA's or N/V. He had recent chest scan on .       ROS:   GEN: normal without any fever, night sweats or weight loss  HEENT: normal with no HA's, sore throat, stiff neck, changes in vision  CV: normal with no CP, SOB, PND, STREETER or orthopnea  PULM: normal with no SOB, cough, hemoptysis, sputum or pleuritic pain  GI: normal with no abdominal pain, nausea, vomiting, constipation, diarrhea, melanotic stools, BRBPR, or hematemesis  : normal with no hematuria, dysuria  BREAST: normal with no mass, discharge, pain  SKIN: normal with no rash, erythema, bruising, or swelling    Allergies:  Review of patient's allergies indicates:   Allergen Reactions    Iodine and iodide containing products Hives     ALLERGIC TO SOFT SHELL CRAB ONLY    Shellfish containing products      soft shell crabs       Medications:    Current Outpatient Prescriptions:     allopurinol (ZYLOPRIM) 100 MG tablet, Take 100 mg by mouth once daily. , Disp: , Rfl:     atenolol (TENORMIN) 25 MG tablet, Take 25 mg by mouth daily as needed. , Disp: , Rfl:     cefUROXime (CEFTIN) 500 MG tablet, Take 500 mg by mouth. Every third week, Disp: , Rfl:     cilostazol (PLETAL) 100 MG Tab, 100 mg 2 (two) times daily. , Disp: , Rfl:     doxycycline (VIBRA-TABS) 100 MG tablet, Take 100 mg by mouth. Every third week, Disp: , Rfl:     pravastatin (PRAVACHOL) 40 MG tablet, Take 40 mg by mouth nightly., Disp: , Rfl:     ranitidine (ZANTAC 75) 75 MG tablet, Take 75 mg by mouth nightly. , Disp: , Rfl:     rivaroxaban (XARELTO) 20 mg Tab, Take 20 mg by mouth once daily. , Disp: ,  Rfl:     sulfamethoxazole-trimethoprim 800-160mg (BACTRIM DS) 800-160 mg Tab, Take by mouth., Disp: , Rfl:     terazosin (HYTRIN) 2 MG capsule, Take 4 mg by mouth every evening. 2 TABS, Disp: , Rfl:     VENTOLIN HFA 90 mcg/actuation inhaler, Inhale 1-2 puffs into the lungs every 6 (six) hours as needed. , Disp: , Rfl:     PMHx/PSHx Updates:  See patient's last visit with me on 1/18/2018  See H&P on 12/9/2015        Pathology:  Cancer Staging  Primary malignant neoplasm of left upper lobe of lung  Staging form: Lung, AJCC 7th Edition  - Clinical: Stage IB (T2a, N0, M0) - Signed by Todd Olvera Jr., MD on 6/28/2017            Objective:     Vitals:  There were no vitals taken for this visit.    Physical Examination:   GEN: no apparent distress, comfortable; AAOx3  HEAD: atraumatic and normocephalic  EYES: no pallor, no icterus, PERRLA  ENT: OMM, no pharyngeal erythema, external ears WNL; no nasal discharge; no thrush  NECK: no masses, thyroid normal, trachea midline, no LAD/LN's, supple  CV: RRR with no murmur; normal pulse; normal S1 and S2; no pedal edema  CHEST: Normal respiratory effort; CTAB; normal breath sounds; no wheeze or crackles  ABDOM: nontender and nondistended; soft; normal bowel sounds; no rebound/guarding  MUSC/Skeletal: ROM normal; no crepitus; joints normal; no deformities or arthropathy  EXTREM: no clubbing, cyanosis, inflammation or swelling  SKIN: no rashes, lesions, ulcers, petechiae or subcutaneous nodules  : no lucio  NEURO: grossly intact; motor/sensory WNL; AAOx3; no tremors  PSYCH: normal mood, affect and behavior  LYMPH: normal cervical, supraclavicular, axillary and groin LN's            Labs:   12/5/2017  CEA was 3.1  Lab Results   Component Value Date    WBC 6.1 12/05/2017    HGB 14.1 12/05/2017    HCT 42.7 12/05/2017    MCV 92.2 12/05/2017     12/05/2017     BMP  Lab Results   Component Value Date     12/05/2017    K 4.3 12/05/2017     12/05/2017    CO2 26  12/05/2017    BUN 23 12/05/2017    CREATININE 1.40 (H) 12/05/2017    CALCIUM 8.7 12/05/2017    ANIONGAP 13 02/25/2013    ESTGFRAFRICA 58 (L) 12/05/2017    EGFRNONAA 50 (L) 12/05/2017     Lab Results   Component Value Date    ALT 13 12/05/2017    AST 17 12/05/2017    ALKPHOS 75 12/05/2017    BILITOT 1.7 (H) 12/05/2017           Radiology/Diagnostic Studies:    CT scan 12/14/2017  IMPRESSION:    1. Unchanged size of left upper lobe and left lower lobe nodules since  09/08/2017, suggesting at least partially treated malignancy.    2. No new abnormality of concern for regional or distant metastasis.    3. Unchanged severe emphysema.    4. No significant change of 13 x 21 mm cystic lesion in pancreatic head. This  could reflect a nonneoplastic mucinous cyst or other low-grade lesion such as  side branch IPMN (intraductal papillary mucinous neoplasm).    5. Focal fat stranding in right lower quadrant mesenteric fat adjacent to  segment of terminal ileum where mild terminal ileal wall thickening is  questioned, although the appearance could be related to inadequate distention.  Focal inflammation related to infectious or inflammatory enteritis is a  consideration, with the appearance otherwise nonspecific.  I have reviewed all available lab results and radiology reports.      CT's 4/23/2018  IMPRESSION: Slight increase in size of the nodular density in the left upper  lobe with stable nodule within the medial left lower lobe    Stable diffuse emphysematous changes with chronic fibrotic changes    Stable patchy airspace disease within the right lung base with associated  calcifications    Prior CABG    Colonic diverticulosis    Right inguinal hernia    No evidence of metastatic disease  Assessment/Plan:     (1) 73 y.o. male with diagnosis of NSCLC (Squamous cell)  - T2A lesion with 3.5cm involving NEIDA  - s/p monotherapy with XRT by Dr Olvera; followed by chemotherapy with 3 cycles of carbo/taxol  - followed by Dr Ely  with Pulm - seen him on Dec 5th and f/u on Feb 6th  - CTA on 5/18/17 showed decrease size in the tumor mass  - CT on 12/14/17 with stable lung findings  - Latest CT scans show slight increase in size of the NEIDA nodule     (2) prior admit with SOB and suspected pneumonia     (3) LLE DVT and PE hx - along with prior CVA  - followed by Dr Lewis  - on Xarelto and ASA per cardiology direction  - MTHFR gene abnormality     (4) Anemia secondary to chemotherapy - resolved     (5) CAD s/p MI in past - followed by Joshua     (6)  Colonoscopy in July 2017 with Dr Holliday - He is seeing Dr Hunter at [facility] on May 15th 2018    (7) BP issues - followed by Dr Lewis    (8) Pancreatic nodule/cyst - stable in size - he saw Dr Holliday on Jan 11th; and he had ERCP on Jan 30th with Dr Knapp but the lesion in question was too small to biopsy  - increase in bilirubin  - he now seeing Dr Bobby Hunter on May 15th in [place]    PLAN:  1. F/u with with PCP, PULM, GI etc  2. proceed with GI workup with Dr Hunter  3. Schedule PET and f/u with Dr Ely  to evaluate latest CT findings on latest scan involving NEIDA  4. RTC in 1 month      Fax note to Mg Ely Barrios, Ra, Wade and Jodee    I spent over 25 mins of time with the patient. Over half of this time was spent couseling and coordinating care.      Discussion:     I have explained all of the above in detail and the patient understands all of the current recommendation(s). I have answered all of their questions to the best of my ability and to their complete satisfaction.   The patient is to continue with the current management plan.            Electronically signed by Chris Cage MD

## 2018-04-30 NOTE — LETTER
April 30, 2018      Michael Ellsworth MD  [address] [place] [address] [address]  [facility] [address]           [facility]  [address] [facility]  [address]  [facility] [address]  Phone: [phone]  Fax: [phone]          Patient: Michael Shetty   MR Number: [id number]   YOB: 1945   Date of Visit: 4/30/2018       Dear Dr. Michael Ellsworth:    Thank you for referring Michael Shetty to me for evaluation. Attached you will find relevant portions of my assessment and plan of care.    If you have questions, please do not hesitate to call me. I look forward to following Michael Shetty along with you.    Sincerely,    Chris Cage MD    Enclosure  CC:  No Recipients    If you would like to receive this communication electronically, please contact [email or web address] or [phone] to request more information on [employer] Link access.    For providers and/or their staff who would like to refer a patient to [facility], please contact us through our one-stop-shop provider referral line, [facility], [place], at [phone].    If you feel you have received this communication in error or would no longer like to receive these types of communications, please e-mail [email or web address]

## 2018-05-31 NOTE — LETTER
May 31, 2018      Michael Ellsworth MD  [address] [place] [address] [address]  [facility] [address]           [facility]ematology On[facility]  [address] [facility]  [address]  [facility] [address]  Phone: [phone]  Fax: [phone]          Patient: Michael Shetty   MR Number: [id number]   YOB: 1945   Date of Visit: 5/31/2018       Dear Dr. Michael Ellsworth:    Thank you for referring Michael Shetty to me for evaluation. Attached you will find relevant portions of my assessment and plan of care.    If you have questions, please do not hesitate to call me. I look forward to following Michael Shetty along with you.    Sincerely,    Chris Cage MD    Enclosure  CC:  No Recipients    If you would like to receive this communication electronically, please contact [email or web address] or [phone] to request more information on [employer] Link access.    For providers and/or their staff who would like to refer a patient to [facility], please contact us through our one-stop-shop provider referral line, [facility], at [phone].    If you feel you have received this communication in error or would no longer like to receive these types of communications, please e-mail [email or web address]

## 2018-05-31 NOTE — PROGRESS NOTES
[facility] Hematology/Oncology  PROGRESS NOTE      Subjective:       Patient ID:   NAME: Michael Shetty : 1945     73 y.o. male    Referring Doc: Jodee  Other Physicians: Mg Ely Barrios, Dauterive, Mannina    Chief Complaint:  Lung ca f/u    History of Present Illness:     Patient returns today for a regularly scheduled follow-up visit.  He has been doing ok; no CP, SOB, HA's or N/V. He had recent chest scan on 18 and then a PEt scan on 2018. He saw Dr Ely on 2018.       ROS:   GEN: normal without any fever, night sweats or weight loss  HEENT: normal with no HA's, sore throat, stiff neck, changes in vision  CV: normal with no CP, SOB, PND, STREETER or orthopnea  PULM: normal with no SOB, cough, hemoptysis, sputum or pleuritic pain  GI: normal with no abdominal pain, nausea, vomiting, constipation, diarrhea, melanotic stools, BRBPR, or hematemesis  : normal with no hematuria, dysuria  BREAST: normal with no mass, discharge, pain  SKIN: normal with no rash, erythema, bruising, or swelling    Allergies:  Review of patient's allergies indicates:   Allergen Reactions    Iodine and iodide containing products Hives     ALLERGIC TO SOFT SHELL CRAB ONLY    Shellfish containing products      soft shell crabs       Medications:    Current Outpatient Prescriptions:     allopurinol (ZYLOPRIM) 100 MG tablet, Take 100 mg by mouth once daily. , Disp: , Rfl:     atenolol (TENORMIN) 25 MG tablet, Take 25 mg by mouth daily as needed. , Disp: , Rfl:     cefUROXime (CEFTIN) 500 MG tablet, Take 500 mg by mouth. Every third week, Disp: , Rfl:     cilostazol (PLETAL) 100 MG Tab, 100 mg 2 (two) times daily. , Disp: , Rfl:     doxycycline (VIBRA-TABS) 100 MG tablet, Take 100 mg by mouth. Every third week, Disp: , Rfl:     pravastatin (PRAVACHOL) 40 MG tablet, Take 40 mg by mouth nightly., Disp: , Rfl:     ranitidine (ZANTAC 75) 75 MG tablet, Take 75 mg by mouth nightly. , Disp: , Rfl:      rivaroxaban (XARELTO) 20 mg Tab, Xarelto 20 mg tablet  Take 1 tablet every day by oral route., Disp: , Rfl:     sulfamethoxazole-trimethoprim 800-160mg (BACTRIM DS) 800-160 mg Tab, Take by mouth., Disp: , Rfl:     terazosin (HYTRIN) 2 MG capsule, Take 4 mg by mouth every evening. 2 TABS, Disp: , Rfl:     VENTOLIN HFA 90 mcg/actuation inhaler, Inhale 1-2 puffs into the lungs every 6 (six) hours as needed. , Disp: , Rfl:     PMHx/PSHx Updates:  See patient's last visit with me on 1/18/2018  See H&P on 12/9/2015        Pathology:  Cancer Staging  Primary malignant neoplasm of left upper lobe of lung  Staging form: Lung, AJCC 7th Edition  - Clinical: Stage IB (T2a, N0, M0) - Signed by Todd Olvera Jr., MD on 6/28/2017          Objective:     Vitals:  There were no vitals taken for this visit.    Physical Examination:   GEN: no apparent distress, comfortable; AAOx3  HEAD: atraumatic and normocephalic  EYES: no pallor, no icterus, PERRLA  ENT: OMM, no pharyngeal erythema, external ears WNL; no nasal discharge; no thrush  NECK: no masses, thyroid normal, trachea midline, no LAD/LN's, supple  CV: RRR with no murmur; normal pulse; normal S1 and S2; no pedal edema  CHEST: Normal respiratory effort; CTAB; normal breath sounds; no wheeze or crackles  ABDOM: nontender and nondistended; soft; normal bowel sounds; no rebound/guarding  MUSC/Skeletal: ROM normal; no crepitus; joints normal; no deformities or arthropathy  EXTREM: no clubbing, cyanosis, inflammation or swelling  SKIN: no rashes, lesions, ulcers, petechiae or subcutaneous nodules  : no lucio  NEURO: grossly intact; motor/sensory WNL; AAOx3; no tremors  PSYCH: normal mood, affect and behavior  LYMPH: normal cervical, supraclavicular, axillary and groin LN's            Labs:   12/5/2017  CEA was 3.1  Lab Results   Component Value Date    WBC 6.1 12/05/2017    HGB 14.1 12/05/2017    HCT 42.7 12/05/2017    MCV 92.2 12/05/2017     12/05/2017     [facility]  Lab  Results   Component Value Date     12/05/2017    K 4.3 12/05/2017     12/05/2017    CO2 26 12/05/2017    BUN 23 12/05/2017    CREATININE 1.40 (H) 12/05/2017    CALCIUM 8.7 12/05/2017    ANIONGAP 13 02/25/2013    ESTGFRAFRICA 58 (L) 12/05/2017    EGFRNONAA 50 (L) 12/05/2017     Lab Results   Component Value Date    ALT 13 12/05/2017    AST 17 12/05/2017    ALKPHOS 75 12/05/2017    BILITOT 1.7 (H) 12/05/2017           Radiology/Diagnostic Studies:      PET 5/9/2018:  IMPRESSION:    1. Ill-defined groundglass opacity now lies at site of previous confluent left  upper lobe nodular density on 04/23/2018 CT, with improved appearance  suggesting resolving alveolar opacity either reflecting treated malignancy or  improved infectious or inflammatory consolidation.    2. Unchanged 12 mm left lower lobe nodular density with very little associated  FDG activity since 09/08/2017.    3. Pleural based foci of FDG uptake laterally in left hemithorax showing little  change since 09/08/2017. Nearby calcified left pleural plaque suggesting this  could be inflammatory in nature.    4. Increased FDG uptake which is focal at site of right lower lobe  consolidative subpleural opacity measuring 35 x 13 mm with interval enlargement  since 09/08/2017. Although this could represent infectious or inflammatory  consolidation, progressive atelectasis, parenchymal scarring, developing  malignancy is also a consideration. Continued CT thorax without IV contrast  follow-up is recommended in 3 months.      CT scan 12/14/2017  IMPRESSION:    1. Unchanged size of left upper lobe and left lower lobe nodules since  09/08/2017, suggesting at least partially treated malignancy.    2. No new abnormality of concern for regional or distant metastasis.    3. Unchanged severe emphysema.    4. No significant change of 13 x 21 mm cystic lesion in pancreatic head. This  could reflect a nonneoplastic mucinous cyst or other low-grade lesion such  as  side branch IPMN (intraductal papillary mucinous neoplasm).    5. Focal fat stranding in right lower quadrant mesenteric fat adjacent to  segment of terminal ileum where mild terminal ileal wall thickening is  questioned, although the appearance could be related to inadequate distention.  Focal inflammation related to infectious or inflammatory enteritis is a  consideration, with the appearance otherwise nonspecific.  I have reviewed all available lab results and radiology reports.      CT's 4/23/2018  IMPRESSION: Slight increase in size of the nodular density in the left upper  lobe with stable nodule within the medial left lower lobe    Stable diffuse emphysematous changes with chronic fibrotic changes    Stable patchy airspace disease within the right lung base with associated  calcifications    Prior CABG    Colonic diverticulosis    Right inguinal hernia    No evidence of metastatic disease  Assessment/Plan:     (1) 73 y.o. male with diagnosis of NSCLC (Squamous cell)  - T2A lesion with 3.5cm involving NEIDA  - s/p monotherapy with XRT by Dr Olvera; followed by chemotherapy with 3 cycles of carbo/taxol  - followed by Dr Ely with Pulm - seen him on Dec 5th and f/u on Feb 6th  - CTA on 5/18/17 showed decrease size in the tumor mass  - CT on 12/14/17 with stable lung findings  - Latest CT scans show slight increase in size of the NEIDA nodule  - he had repeat PET on 5/9/2018 with over stable except for the one spot in the right lung  - he sees Dr Ely again June 6th and Dr Olvera on June 28th  - he may need XRT to this spot if feasible     (2) prior admit with SOB and suspected pneumonia     (3) LLE DVT and PE hx - along with prior CVA  - followed by Dr Lewis  - on Xarelto and ASA per cardiology direction  - MTHFR gene abnormality     (4) Anemia secondary to chemotherapy - resolved     (5) CAD s/p MI in past - followed by Joshua     (6)  Colonoscopy in July 2017 with Dr Holliday - He is saw Dr Hunter  at [facility] on May 15th 2018 and they are planning endoscopies on June 14th    (7) BP issues - followed by Dr Lewis    (8) Pancreatic nodule/cyst - stable in size - he saw Dr Holliday on Jan 11th; and he had ERCP on Jan 30th with Dr Knapp but the lesion in question was too small to biopsy  - increase in bilirubin  - he saw Dr Bobby Hunter on May 15th in [place] and they are planning endoscopy with biopsy on June 14th    PLAN:  1. F/u with with PCP, PULM, GI, rad/onc etc  2. proceed with GI workup with Dr Hunter  3. F/u with Dr Ely and Dr Olvera - he may need XRT to this lung nodule on the right that is getting bigger  4. RTC in 1 month      Fax note to Mg Ely Barrios, Ra, Wade, Dale and Jodee    I spent over 25 mins of time with the patient. Over half of this time was spent couseling and coordinating care.      Discussion:     I have explained all of the above in detail and the patient understands all of the current recommendation(s). I have answered all of their questions to the best of my ability and to their complete satisfaction.   The patient is to continue with the current management plan.            Electronically signed by Chris Cage MD

## 2018-06-15 PROBLEM — R91.1 PULMONARY NODULE, RIGHT: Status: ACTIVE | Noted: 2018-01-01

## 2018-06-15 NOTE — PROGRESS NOTES
Michael Shetty  [id number]  1945  6/15/2018  No referring provider defined for this encounter.    DIAGNOSIS: IB, [id number] SCCa NEIDA with suspected non-small cell lung carcinoma of right lower lobe    REASON FOR VISIT: Routine scheduled follow-up.    HISTORY OF PRESENT ILLNESS:   72M with bx-proven SCCa of NEIDA, a PET avid 3.2cm mass. No other sites of disease and a poor surgical candidate, FEV1 of 2L.    Completed definitive SBRT to 5000cGy in 5 frx ending 3/10/17.    Cmpleted 4c of adjuvant CTX with Dr. Cage and has incidental CTA associated with prior hospitalization that reveals tumor to have shrunk to 1.6cm.    *4/18 CT C/AP  Slight increase in size of the nodular density in the left upper lobe with stable nodule within the medial left lower lobe Stable diffuse emphysematous changes with chronic fibrotic changes Stable patchy airspace disease within the right lung base with associated calcifications Prior CABG Colonic diverticulosis Right inguinal hernia No evidence of metastatic disease    INTERVAL HISTORY:   *5/18 PET/CT  1. Ill-defined groundglass opacity now lies at site of previous confluent left upper lobe nodular density on 04/23/2018 CT, with improved appearance suggesting resolving alveolar opacity either reflecting treated malignancy or improved infectious or inflammatory consolidation. 2. Unchanged 12 mm left lower lobe nodular density with very little associated FDG activity since 09/08/2017. 3. Pleural based foci of FDG uptake laterally in left hemithorax showing little change since 09/08/2017. Nearby calcified left pleural plaque suggesting this could be inflammatory in nature. 4. Increased FDG uptake which is focal at site of right lower lobe consolidative subpleural opacity measuring 35 x 13 mm with interval enlargement since 09/08/2017. Although this could represent infectious or inflammatory consolidation, progressive atelectasis, parenchymal scarring, developing malignancy is also a  consideration. Continued CT thorax without IV contrast follow-up is recommended in 3 months.    *Dr. Ely: pt cannot undergo bx safely; not accessible by bronch*  *Dr. Cage: consider RT; +/- CTX    At today's visit patient denies fever, chills, chest pain, cough or hemoptysis. He continues to have baseline shortness of breath. He denies appetite, energy or weight changes. He denies vision, hearing, cognition, speech changes, focal numbness or weakness. He recent underwent colonoscopy at [facility] and reports feeling dehydrated after completing his preparation.    Review of Systems   Constitutional: Negative for appetite change and fever.   HENT:   Negative for lump/mass, mouth sores, sore throat, trouble swallowing and voice change.    Eyes: Negative for eye problems and icterus.   Respiratory: Positive for shortness of breath. Negative for chest tightness and cough.    Cardiovascular: Negative for chest pain and leg swelling.   Gastrointestinal: Negative for abdominal distention, abdominal pain, blood in stool, constipation, nausea and vomiting.   Genitourinary: Negative for bladder incontinence, difficulty urinating, dysuria, frequency, hematuria and nocturia.    Musculoskeletal: Negative for back pain, gait problem, neck pain and neck stiffness.   Neurological: Negative for extremity weakness, gait problem, headaches, numbness and seizures.   Hematological: Negative for adenopathy.     Past Medical History:   Diagnosis Date    Arthritis     BPH (benign prostatic hyperplasia)     Coronary artery disease     MI X 2    Gout, chronic     Heartburn     Hypertension     Myocardial infarction     PE (pulmonary embolism)     Presence of dental bridge     UPPER - NOT WEAR MUCH AS DOES NOT FIT WELL    Primary malignant neoplasm of left upper lobe of lung 5/19/2017    Staph infection      Past Surgical History:   Procedure Laterality Date    CARDIAC SURGERY      CABG X 4 9-11    CTR       BILAT    ROTATOR CUFF REPAIR      left    TONSILLECTOMY      TRIGGER FINGER RELEASE      right and left hands.     Social History     Social History    Marital status:      Spouse name: N/A    Number of children: N/A    Years of education: N/A     Social History Main Topics    Smoking status: Former Smoker     Packs/day: 2.00     Years: 25.00     Quit date: 2/25/1990    Smokeless tobacco: Never Used    Alcohol use Yes      Comment: 2 PER DAY    Drug use: No    Sexual activity: Not Asked     Other Topics Concern    None     Social History Narrative    None     Family History   Problem Relation Age of Onset    Cancer Sister         gallbladder     Medication List with Changes/Refills   Current Medications    ALLOPURINOL (ZYLOPRIM) 100 MG TABLET    Take 100 mg by mouth once daily.     ATENOLOL (TENORMIN) 25 MG TABLET    Take 25 mg by mouth daily as needed.     CEFUROXIME (CEFTIN) 500 MG TABLET    Take 250 mg by mouth every 12 (twelve) hours. Every third week    CILOSTAZOL (PLETAL) 100 MG TAB    100 mg 2 (two) times daily.     CIPROFLOXACIN HCL (CIPRO) 250 MG TABLET    Take 250 mg by mouth 2 (two) times daily.    DOXYCYCLINE (VIBRA-TABS) 100 MG TABLET    Take 100 mg by mouth. Every third week    PRAVASTATIN (PRAVACHOL) 40 MG TABLET    Take 40 mg by mouth nightly.    RANITIDINE (ZANTAC 75) 75 MG TABLET    Take 75 mg by mouth nightly.     RIVAROXABAN (XARELTO) 20 MG TAB    Xarelto 20 mg tablet   Take 1 tablet every day by oral route.    TERAZOSIN (HYTRIN) 2 MG CAPSULE    Take 4 mg by mouth every evening. 2 TABS    VENTOLIN HFA 90 MCG/ACTUATION INHALER    Inhale 1-2 puffs into the lungs every 6 (six) hours as needed.    Discontinued Medications    SULFAMETHOXAZOLE-TRIMETHOPRIM 800-160MG (BACTRIM DS) 800-160 MG TAB    Take by mouth.     Review of patient's allergies indicates:   Allergen Reactions    Iodine and iodide containing products Hives     ALLERGIC TO SOFT SHELL CRAB ONLY    Shellfish  "containing products      soft shell crabs       QUALITY OF LIFE: 90%- Able to Carry on Normal Activity: Minor Symptoms of Disease    Vitals:    06/15/18 1500   BP: 95/62   Pulse: (!) 135   Resp: (!) 22   Temp: 97.6 °F (36.4 °C)   TempSrc: Oral   Weight: 87.1 kg (192 lb)   Height: 5' 9" (1.753 m)   PainSc: 0-No pain       PHYSICAL EXAM:   GENERAL: alert; in no apparent distress.   HEAD: normocephalic, atraumatic.  EYES: pupils are equal, round, reactive to light and accommodation. Sclera anicteric. Conjunctiva not injected.   NOSE/THROAT: no nasal erythema or rhinorrhea. Oropharynx pink, without erythema, ulcerations or thrush.   NECK: no cervical motion rigidity; supple with no masses.  CHEST: clear to auscultation bilaterally; no wheezes, crackles or rubs. Patient is speaking comfortably on room air with normal work of breathing without using accessory muscles of respiration.  CARDIOVASCULAR: regular rate and rhythm; no murmurs, rubs or gallops.  MUSCULOSKELETAL: no tenderness to palpation along the spine or scapulae. Normal range of motion.  NEUROLOGIC: cranial nerves II-XII intact bilaterally. Strength 5/5 in bilateral upper and lower extremities. Normal gait.  LYMPHATIC: no cervical, supraclavicular adenopathy appreciated bilaterally.   EXTREMITIES: mild clubbing; no cyanosis, edema.  SKIN: no erythema, rashes, ulcerations noted.     ANCILLARY DATA:   As above    ASSESSMENT: 72M with stage IB, [id number] SCCa NEIDA s/p definitive SBRT and adjuvant CTX now with suspected non-small cell lung carcinoma of right lower lobe.  PLAN:  Mr. Shetty presents today with enlarging right lower lobe irregularity with evidence of PET avidity concerning for malignancy. He was previously treated with stereotactic body radiation therapy to a left upper lobe lesion and is had a nice response at the site. He completed 3 cycles of adjuvant chemotherapy as well. He is without complaint at today's visit is very concerned regarding this new " finding and would like to be aggressive with therapy. His pulmonologist Dr. Ely feels this will not be amenable to biopsy and Dr. Cage recommends consideration of radiotherapy. In reviewing the films I suspect this can be approached stereotactically with maximal sparing of the normal lung parenchyma and using ablative dosing. I carefully explained to the patient until I have his CT simulation completed I'll not be able to ensure back and provide a 5 fraction course and ultimately may have to stretch this out to 8 fractions. He expressed understanding. We discussed again the process of CT simulation using 4D CT technology, abdominal compression, rigid immobilization and daily image guidance prior to treatment. The patient is familiar with the process and would like to proceed.    We discussed the risks and benefits of the above treatment and have gone over in detail the acute and late toxicities of radiation therapy to the right lower lobe. The patient expressed  understanding and has signed a consent form which is included in the patients chart. The patient has our contact information and understands that they are free to contact us at any time with questions or concerns regarding radiation therapy.    All questions answered and contact information provided. Patient understands free to call us anytime with any questions or concerns regarding radiation therapy.    TIME SPENT WITH PATIENT: I have personally seen and evaluated this patient. Approximately 30 minutes were spent with the patient discussing follow-up careplan.     PHYSICIAN: Todd Olvera Jr, MD

## 2018-06-15 NOTE — PROGRESS NOTES
Michael Shetty  [id number]  1945  6/15/2018  No referring provider defined for this encounter.    DIAGNOSIS: Cancer Staging  Primary malignant neoplasm of left upper lobe of lung  Staging form: Lung, AJCC 7th Edition  - Clinical: Stage IB (T2a, N0, M0) - Signed by Todd Olvera Jr., MD on 6/28/2017      TREATMENT SITE(S): Right lower lobe    INTENT: CURATIVE    TREATMENT SETTING: RT ALONE     MODALITY: PHOTON    TECHNIQUE:  STEREOTACTIC BODY RADIOTHERAPY (SBRT)    IMRT MEDICAL NECESSITY: Target volume irregular shape/proximate to critical structures, Narrow margins needed to protect adjacent structures, High precision necessary, Conventional planning maneuvers insufficient, Concave target volume with critical tissues in concavity and Dose escalation exceeds conventional treatment planning    I have personally performed treatment planning for the patient, reviewing relevant history/physical and imaging. I have defined GTV, CTV, PTV and organs at risk.     In order to accomplish this plan, I am ordering:  SIMULATION: CT SIMULATION + 4D RESPIRATORY MIPP    CONTRAST: none    TO ACCOMPLISH REPRODUCIBLE POSITION: VACLOC and PROLOCK (SBRT)    DEVICES FOR BEAM SHAPING: CUSTOMIZED MLC    CUSTOMIZED BOLUS: none    IMAGING: CBCT DAILY    I have ordered a weekly physics check.    SPECIAL PHYSICS CONSULT: YES  REASON: SBRT    SPECIAL TREATMENT CIRCUMSTANCE: YES  Concurrent or recent administration of chemotherapeutic agents which are known potent radiosensitizers and thus will require vigilant monitoring for exaggerated radiation toxicities.    LABS: NONE    ANTICIPATED PRESCRIPTION TO ISOCENTER: 50Gy/5frx    ENERGY: 6X    TREATMENT: DAILY    PHYSICIAN: Todd Olvera Jr, MD

## 2018-06-27 NOTE — PROGRESS NOTES
[facility] Established Patient Visit    Subjective:           PCP: Michael Ellsworth    Chief Complaint: Follow-up       HPI:  Michael Shetty is a 73 y.o. male here for f/u of recent endoscopy. Patient was seen by Dr. Hunter and had a colonoscopy done. This needs to be repeated. Unfortunately, patient has had recurrent lung malignancy and is going to start radiation this coming Thursday. A detailed examination was done and probably would consider holding his colonoscopy until he finishes his radiation.       ROS:   Constitutional: No fevers, chills, weight loss  ENT: No allergies, sore throat, rhinorrhea  CV: No chest pain, palpitations, edema  Pulm: No cough, shortness of breath, wheezing  Ophtho: No vision changes  GI: No blood in stools, change in bowel habits, nausea/vomiting  Denies alternating diarrhea/constipation.   Derm: No rash  Heme: No easy bruising or lymphadenopathy  MSK: No arthralgias or myalgias  : No dysuria, hematuria, frequency, polyuria, or flank tenderness  Endo: No hot or cold intolerance  Neuro: No syncope or seizure, or dizziness  Psych: No hallucination, depression or suicidal ideation      Medical History:  has a past medical history of Arthritis; BPH (benign prostatic hyperplasia); Coronary artery disease; Gout, chronic; Heartburn; Hypertension; Myocardial infarction; PE (pulmonary embolism); Presence of dental bridge; Primary malignant neoplasm of left upper lobe of lung (5/19/2017); and Staph infection.      Surgical History:  has a past surgical history that includes Cardiac surgery; Tonsillectomy; CTR; Trigger finger release; and Rotator cuff repair.    Family History:   Family History   Problem Relation Age of Onset    Cancer Sister         gallbladder       Social History:   Social History   Substance Use Topics    Smoking status: Former Smoker     Packs/day: 2.00     Years: 25.00     Quit date: 2/25/1990    Smokeless tobacco: Never Used    Alcohol use Yes       "Comment: 2 PER DAY       The patient's social and family histories were reviewed by me and updated in the appropriate section of the electronic medical record.    Allergies:   Review of patient's allergies indicates:   Allergen Reactions    Shellfish containing products      soft shell crabs         Medications:   Current Outpatient Prescriptions   Medication Sig Dispense Refill    allopurinol (ZYLOPRIM) 100 MG tablet Take 100 mg by mouth once daily.       atenolol (TENORMIN) 25 MG tablet Take 25 mg by mouth daily as needed.      cefUROXime (CEFTIN) 500 MG tablet Take 250 mg by mouth every 12 (twelve) hours. Every third week      cilostazol (PLETAL) 100 MG Tab 100 mg 2 (two) times daily.       ciprofloxacin HCl (CIPRO) 250 MG tablet Take 250 mg by mouth 2 (two) times daily.      doxycycline (VIBRA-TABS) 100 MG tablet Take 100 mg by mouth. Every third week      multivitamin (MEN'S MULTI-VITAMIN) per tablet Take 1 tablet by mouth once daily.      nitroGLYCERIN (NITROSTAT) 0.3 MG SL tablet Place 0.3 mg under the tongue every 5 (five) minutes as needed for Chest pain.      pravastatin (PRAVACHOL) 40 MG tablet Take 40 mg by mouth nightly.      ranitidine (ZANTAC 75) 75 MG tablet Take 75 mg by mouth nightly.       rivaroxaban (XARELTO) 20 mg Tab Xarelto 20 mg tablet   Take 1 tablet every day by oral route.      terazosin (HYTRIN) 2 MG capsule Take 4 mg by mouth every evening. 2 TABS      VENTOLIN HFA 90 mcg/actuation inhaler Inhale 1-2 puffs into the lungs every 6 (six) hours as needed.        No current facility-administered medications for this visit.      All medications and past history have been reviewed.        Objective:        Vital Signs:  Blood pressure 104/62, pulse (!) 120, temperature 98.2 °F (36.8 °C), resp. rate 16, height 5' 9" (1.753 m), weight 86.8 kg (191 lb 6.4 oz). Body mass index is 28.26 kg/m².    Physical Exam:   General Appearance: Well appearing in no acute distress, well developed, " well                nourished  Head: Normocephalic, without obvious abnormality  Eyes:  Pupils equal, round and reactive to light  Throat: Lips, mucosa, and tongue normal; teeth and gums normal  Lungs: CTA bilaterally in anterior and posterior fields, no wheezes, no crackles  Heart:  Regular rate and rhythm, no murmurs heard  Abdomen: Soft, non tender, non distended with positive bowel sounds in all four quadrants. No hepatosplenomegaly, ascites, or mass. Negative for succusion splash  : male   Extremities: No cyanosis, edema or deformity  Skin: No rash  Neurologic: Normal exam    Labs:  Lab Results   Component Value Date    WBC 6.1 12/05/2017    HGB 14.1 12/05/2017    HCT 42.7 12/05/2017     12/05/2017    ALT 13 12/05/2017    AST 17 12/05/2017     12/05/2017    K 4.3 12/05/2017     12/05/2017    CREATININE 1.40 (H) 12/05/2017    BUN 23 12/05/2017    CO2 26 12/05/2017       Imaging:     All lab results and imaging results have been reviewed and discussed with the patient      Assessment:       No diagnosis found.    Plan:       There are no diagnoses linked to this encounter.    See HPI    No Follow-up on file.    The plan was discussed with the patient and all questions/concerns have been answered to the patient's satisfaction.        Electronically signed by Eula Holliday MD    This note was dictated using voice recognition software and may contain grammatical errors.

## 2018-06-28 NOTE — LETTER
June 28, 2018      Michael Ellsworth MD  [address] [place] [address] [address]  [place] [address]           [facility] - Hematology Oncology  [address]  [address]  [place] [address]  Phone: [phone]  Fax: [phone]          Patient: Michael Shetty   MR Number: [id number]   YOB: 1945   Date of Visit: 6/28/2018       Dear Dr. Michael Ellsworth:    Thank you for referring Michael Shetty to me for evaluation. Attached you will find relevant portions of my assessment and plan of care.    If you have questions, please do not hesitate to call me. I look forward to following Michael Shetty along with you.    Sincerely,    Chris Cage MD    Enclosure  CC:  No Recipients    If you would like to receive this communication electronically, please contact [email or web address] or [phone] to request more information on [employer] Link access.    For providers and/or their staff who would like to refer a patient to [facility], please contact us through our one-stop-shop provider referral line, [facility], at [phone].    If you feel you have received this communication in error or would no longer like to receive these types of communications, please e-mail [email or web address]

## 2018-06-28 NOTE — PROGRESS NOTES
[facility] Hematology/Oncology  PROGRESS NOTE      Subjective:       Patient ID:   NAME: Michael Shetty : 1945     73 y.o. male    Referring Doc: Jodee  Other Physicians: Mg Ely Barrios, Dauterive, Mannina    Chief Complaint:  Lung ca f/u    History of Present Illness:     Patient returns today for a regularly scheduled follow-up visit.  He has been doing ok; no CP, SOB, HA's or N/V. He had recent chest scan on 18 and then a PET scan on 2018. He saw Dr Ely on 2018. He is starting radiation this Tuesday with Dr Olvera. He saw Dr Hernandez yesterday; he had endoscopies with Dr Hunter on .       ROS:   GEN: normal without any fever, night sweats or weight loss  HEENT: normal with no HA's, sore throat, stiff neck, changes in vision  CV: normal with no CP, SOB, PND, STREETER or orthopnea  PULM: normal with no SOB, cough, hemoptysis, sputum or pleuritic pain  GI: normal with no abdominal pain, nausea, vomiting, constipation, diarrhea, melanotic stools, BRBPR, or hematemesis  : normal with no hematuria, dysuria  BREAST: normal with no mass, discharge, pain  SKIN: normal with no rash, erythema, bruising, or swelling    Allergies:  Review of patient's allergies indicates:   Allergen Reactions    Iodine and iodide containing products Hives     ALLERGIC TO SOFT SHELL CRAB ONLY    Shellfish containing products      soft shell crabs       Medications:    Current Outpatient Prescriptions:     allopurinol (ZYLOPRIM) 100 MG tablet, Take 100 mg by mouth once daily. , Disp: , Rfl:     atenolol (TENORMIN) 25 MG tablet, Take 25 mg by mouth daily as needed., Disp: , Rfl:     cefUROXime (CEFTIN) 500 MG tablet, Take 250 mg by mouth every 12 (twelve) hours. Every third week, Disp: , Rfl:     cilostazol (PLETAL) 100 MG Tab, 100 mg 2 (two) times daily. , Disp: , Rfl:     ciprofloxacin HCl (CIPRO) 250 MG tablet, Take 250 mg by mouth 2 (two) times daily., Disp: , Rfl:     doxycycline (VIBRA-TABS)  100 MG tablet, Take 100 mg by mouth. Every third week, Disp: , Rfl:     multivitamin (MEN'S MULTI-VITAMIN) per tablet, Take 1 tablet by mouth once daily., Disp: , Rfl:     nitroGLYCERIN (NITROSTAT) 0.3 MG SL tablet, Place 0.3 mg under the tongue every 5 (five) minutes as needed for Chest pain., Disp: , Rfl:     pravastatin (PRAVACHOL) 40 MG tablet, Take 40 mg by mouth nightly., Disp: , Rfl:     ranitidine (ZANTAC 75) 75 MG tablet, Take 75 mg by mouth nightly. , Disp: , Rfl:     rivaroxaban (XARELTO) 20 mg Tab, Xarelto 20 mg tablet  Take 1 tablet every day by oral route., Disp: , Rfl:     terazosin (HYTRIN) 2 MG capsule, Take 4 mg by mouth every evening. 2 TABS, Disp: , Rfl:     VENTOLIN HFA 90 mcg/actuation inhaler, Inhale 1-2 puffs into the lungs every 6 (six) hours as needed. , Disp: , Rfl:     PMHx/PSHx Updates:  See patient's last visit with me on 5/31/2018  See H&P on 12/9/2015        Pathology:  Cancer Staging  Primary malignant neoplasm of left upper lobe of lung  Staging form: Lung, AJCC 7th Edition  - Clinical: Stage IB (T2a, N0, M0) - Signed by Todd Olvera Jr., MD on 6/28/2017          Objective:     Vitals:  Blood pressure 99/64, pulse (!) 130, temperature 97.6 °F (36.4 °C), resp. rate 18, weight 86.3 kg (190 lb 3.2 oz).    Physical Examination:   GEN: no apparent distress, comfortable; AAOx3  HEAD: atraumatic and normocephalic  EYES: no pallor, no icterus, PERRLA  ENT: OMM, no pharyngeal erythema, external ears WNL; no nasal discharge; no thrush  NECK: no masses, thyroid normal, trachea midline, no LAD/LN's, supple  CV: RRR with no murmur; normal pulse; normal S1 and S2; no pedal edema  CHEST: Normal respiratory effort; CTAB; normal breath sounds; no wheeze or crackles  ABDOM: nontender and nondistended; soft; normal bowel sounds; no rebound/guarding  MUSC/Skeletal: ROM normal; no crepitus; joints normal; no deformities or arthropathy  EXTREM: no clubbing, cyanosis, inflammation or  swelling  SKIN: no rashes, lesions, ulcers, petechiae or subcutaneous nodules  : no lucio  NEURO: grossly intact; motor/sensory WNL; AAOx3; no tremors  PSYCH: normal mood, affect and behavior  LYMPH: normal cervical, supraclavicular, axillary and groin LN's            Labs:   12/5/2017  CEA was 3.1  Lab Results   Component Value Date    WBC 6.1 12/05/2017    HGB 14.1 12/05/2017    HCT 42.7 12/05/2017    MCV 92.2 12/05/2017     12/05/2017     BMP  Lab Results   Component Value Date     12/05/2017    K 4.3 12/05/2017     12/05/2017    CO2 26 12/05/2017    BUN 23 12/05/2017    CREATININE 1.40 (H) 12/05/2017    CALCIUM 8.7 12/05/2017    ANIONGAP 13 02/25/2013    ESTGFRAFRICA 58 (L) 12/05/2017    EGFRNONAA 50 (L) 12/05/2017     Lab Results   Component Value Date    ALT 13 12/05/2017    AST 17 12/05/2017    ALKPHOS 75 12/05/2017    BILITOT 1.7 (H) 12/05/2017           Radiology/Diagnostic Studies:      PET 5/9/2018:  IMPRESSION:    1. Ill-defined groundglass opacity now lies at site of previous confluent left  upper lobe nodular density on 04/23/2018 CT, with improved appearance  suggesting resolving alveolar opacity either reflecting treated malignancy or  improved infectious or inflammatory consolidation.    2. Unchanged 12 mm left lower lobe nodular density with very little associated  FDG activity since 09/08/2017.    3. Pleural based foci of FDG uptake laterally in left hemithorax showing little  change since 09/08/2017. Nearby calcified left pleural plaque suggesting this  could be inflammatory in nature.    4. Increased FDG uptake which is focal at site of right lower lobe  consolidative subpleural opacity measuring 35 x 13 mm with interval enlargement  since 09/08/2017. Although this could represent infectious or inflammatory  consolidation, progressive atelectasis, parenchymal scarring, developing  malignancy is also a consideration. Continued CT thorax without IV contrast  follow-up is  recommended in 3 months.      CT scan 12/14/2017  IMPRESSION:    1. Unchanged size of left upper lobe and left lower lobe nodules since  09/08/2017, suggesting at least partially treated malignancy.    2. No new abnormality of concern for regional or distant metastasis.    3. Unchanged severe emphysema.    4. No significant change of 13 x 21 mm cystic lesion in pancreatic head. This  could reflect a nonneoplastic mucinous cyst or other low-grade lesion such as  side branch IPMN (intraductal papillary mucinous neoplasm).    5. Focal fat stranding in right lower quadrant mesenteric fat adjacent to  segment of terminal ileum where mild terminal ileal wall thickening is  questioned, although the appearance could be related to inadequate distention.  Focal inflammation related to infectious or inflammatory enteritis is a  consideration, with the appearance otherwise nonspecific.  I have reviewed all available lab results and radiology reports.      CT's 4/23/2018  IMPRESSION: Slight increase in size of the nodular density in the left upper  lobe with stable nodule within the medial left lower lobe    Stable diffuse emphysematous changes with chronic fibrotic changes    Stable patchy airspace disease within the right lung base with associated  calcifications    Prior CABG    Colonic diverticulosis    Right inguinal hernia    No evidence of metastatic disease  Assessment/Plan:     (1) 73 y.o. male with diagnosis of NSCLC (Squamous cell)  - T2A lesion with 3.5cm involving NEIDA  - s/p monotherapy with XRT by Dr Olvera; followed by chemotherapy with 3 cycles of carbo/taxol  - followed by Dr Ely with Pulm - seen him on Dec 5th and f/u on Feb 6th  - CTA on 5/18/17 showed decrease size in the tumor mass  - CT on 12/14/17 with stable lung findings  - Latest CT scans show slight increase in size of the NEIDA nodule  - he had repeat PET on 5/9/2018 with over stable except for the one spot in the right lung  - he saw Dr Ely  again June 6th and saw Dr Olvera on June 19th  - he starting XRT again for the isolated spot next Tuesday     (2) prior admit with SOB and suspected pneumonia     (3) LLE DVT and PE hx - along with prior CVA  - followed by Dr Lewis  - on Xarelto and ASA per cardiology direction  - MTHFR gene abnormality     (4) Anemia secondary to chemotherapy - resolved     (5) CAD s/p MI in past - followed by Joshua     (6)  Colonoscopy in July 2017 with Dr Holliday - He is saw Dr Galdamez at [facility] on May 15th 2018 and had endoscopies on June 14th  - suspicious findings in the pancreas but they were unable to biopsy    (7) BP issues - followed by Dr Lewis    (8) Pancreatic nodule/cyst - stable in size - he saw Dr Holliday on Jan 11th; and he had ERCP on Jan 30th with Dr Knapp but the lesion in question was too small to biopsy  - increase in bilirubin  - he saw Dr Bobby Galdamez on May 15th in [place] and they are planning endoscopy with biopsy on June 14th      1. 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 Primary malignant neoplasm of left upper lobe of lung     3. History of deep venous thrombosis (DVT) of distal vein of left lower extremity     4. History of pulmonary embolism     5. Pulmonary nodule, right     6. Anemia associated with chemotherapy         PLAN:  1. F/u with with PCP, PULM, GI, rad/onc etc  2. proceed with monotherapy XRT starting next Tuesday  3. Need notes from Dr Hernandez and Dr galdamez  4. He will most likely need to resume chemotherapy or immunologic therapy sometime thereafter once XRT completed (need to see how he does with XRT first)  5. RTC in 3 weeks      Fax note to Mg Ely Barrios, Dauterive, Dale Olvera and Jodee    I spent over 25 mins of time with the patient. Over half of this time was spent couseling and coordinating care.      Discussion:     I have explained all of the above in detail and the patient understands all of the current recommendation(s).  I have answered all of their questions to the best of my ability and to their complete satisfaction.   The patient is to continue with the current management plan.            Electronically signed by Chris Cage MD

## 2018-07-03 NOTE — PROGRESS NOTES
Michael Shetty  [id number]  1945  7/3/2018  No referring provider defined for this encounter.  DIAGNOSIS: IB, [id number] SCCa NEIDA with suspected non-small cell lung carcinoma of right lower lobe      TODAY'S DOSE (cGy): 1000    CURRENT DOSE (cGy): 1000/5000    SITE: right lower lobe    TITLE OF PROCEDURE: STEREOTACTIC BODY RADIATION THERAPY    PROCEDURE IN DETAIL:  Patient arrived as an outpatient to the [facility] Radiation Oncology department and was escorted to the [facility] Linear Accelerator vault and placed on the treatment couch and immobilized in customized Prolock device with respiratory compression. Once found to be in good alignment with in-room GPS positioning system, cone beam CT was completed in axial, coronal and sagittal planes. I personally confirmed agreement with planning CT scan and treatment was delivered using 6MV photons prescribed to isocenter using an IMRT algorithm. At completion of treatment, patient developed no ill sequelae and was released from Prolock immobilization and given instructions for next fraction/follow-up.    PHYSICIAN: Todd Olvera Jr, MD

## 2018-07-05 NOTE — PROGRESS NOTES
Michael Shetty  [id number]  1945  7/5/2018  No referring provider defined for this encounter.     Diagnosis:  IB, [id number] SCCa NEIDA with suspected non-small cell lung carcinoma of right lower lobe    TODAY'S DOSE (cGy): 1000 cGy    SITE: Right lower lobe mass    TITLE OF PROCEDURE: STEREOTACTIC BODY RADIATION THERAPY    PROCEDURE IN DETAIL:  Patient arrived as an outpatient to the [facility] Radiation Oncology department and was escorted to the [facility] Linear Accelerator vault and placed on the treatment couch and immobilized in customized Prolock device with respiratory compression. Once found to be in good alignment with in-room GPS positioning system, cone beam CT was completed in axial, coronal and sagittal planes. I personally confirmed agreement with planning CT scan and treatment was delivered using 6MV photons prescribed to isocenter using an IMRT algorithm. At completion of treatment, patient developed no ill sequelae and was released from Prolock immobilization and given instructions for next fraction/follow-up.    This now completes his fraction #2.  Total dose 2000 cGy. Patient doing well    PHYSICIAN: Michael Owens MD

## 2018-07-09 NOTE — PROGRESS NOTES
Michael Shetty  [id number]  1945  7/9/2018  No referring provider defined for this encounter.    DIAGNOSIS: IB, [id number] SCCa NEIDA with suspected non-small cell lung carcinoma of right lower lobe    TODAY'S DOSE (cGy): 1000 cGy    SITE: Right lower lobe mass    TITLE OF PROCEDURE: STEREOTACTIC BODY RADIATION THERAPY    PROCEDURE IN DETAIL:  Patient arrived as an outpatient to the [facility] Radiation Oncology department and was escorted to the [facility] Linear Accelerator vault and placed on the treatment couch and immobilized in customized Prolock device with respiratory compression. Once found to be in good alignment with in-room GPS positioning system, cone beam CT was completed in axial, coronal and sagittal planes. I personally confirmed agreement with planning CT scan and treatment was delivered using 6MV photons prescribed to isocenter using an IMRT algorithm. At completion of treatment, patient developed no ill sequelae and was released from Prolock immobilization and given instructions for next fraction/follow-up.    This now completes fraction #3. He has 2 more fractions to complete his treatment course.    PHYSICIAN: Michael Owens MD

## 2018-07-11 NOTE — PROGRESS NOTES
Michael Shetty  [id number]  1945  7/11/2018  No referring provider defined for this encounter.  DIAGNOSIS: IB, [id number] SCCa NEIDA with suspected non-small cell lung carcinoma of right lower lobe      TODAY'S DOSE (cGy): 1000    CURRENT DOSE (cGy): 4000/5000    SITE: right lower lobe    TITLE OF PROCEDURE: STEREOTACTIC BODY RADIATION THERAPY    PROCEDURE IN DETAIL:  Patient arrived as an outpatient to the [facility] Radiation Oncology department and was escorted to the [facility] Linear Accelerator vault and placed on the treatment couch and immobilized in customized Prolock device with respiratory compression. Once found to be in good alignment with in-room GPS positioning system, cone beam CT was completed in axial, coronal and sagittal planes. I personally confirmed agreement with planning CT scan and treatment was delivered using 6MV photons prescribed to isocenter using an IMRT algorithm. At completion of treatment, patient developed no ill sequelae and was released from Prolock immobilization and given instructions for next fraction/follow-up.    PHYSICIAN: Todd Olvera Jr, MD

## 2018-07-13 NOTE — PROGRESS NOTES
Michael Shetty  [id number]  1945  7/13/2018  No referring provider defined for this encounter.  DIAGNOSIS: IB, [id number] SCCa NEIDA with suspected non-small cell lung carcinoma of right lower lobe      TODAY'S DOSE (cGy): 1000    CURRENT DOSE (cGy): 5000/5000    SITE: right lower lobe    TITLE OF PROCEDURE: STEREOTACTIC BODY RADIATION THERAPY    PROCEDURE IN DETAIL:  Patient arrived as an outpatient to the [facility] Radiation Oncology department and was escorted to the [facility] Linear Accelerator vault and placed on the treatment couch and immobilized in customized Prolock device with respiratory compression. Once found to be in good alignment with in-room GPS positioning system, cone beam CT was completed in axial, coronal and sagittal planes. I personally confirmed agreement with planning CT scan and treatment was delivered using 6MV photons prescribed to isocenter using an IMRT algorithm. At completion of treatment, patient developed no ill sequelae and was released from Prolock immobilization and given instructions for next fraction/follow-up.    PHYSICIAN: Todd Olvera Jr, MD

## 2018-07-31 NOTE — PROGRESS NOTES
[facility] Hematology/Oncology  PROGRESS NOTE      Subjective:       Patient ID:   NAME: Michael Shetty : 1945     73 y.o. male    Referring Doc: Jodee  Other Physicians: Mg Ely, Ra Lewis Mannina    Chief Complaint:  Lung ca f/u    History of Present Illness:     Patient returns today for a regularly scheduled follow-up visit.  He has been doing ok; no CP, SOB, HA's or N/V. He completed 5 XRT's on  with Dr Olvera; he had endoscopies with Dr Hunter on 18. He is doing ok but has a lot of fatigue and STREETER. He saw Dr Olvera on  and saw Dr Holliday on . He sees Dr Ely again on Oct 23rd.       ROS:   GEN: normal without any fever, night sweats or weight loss  HEENT: normal with no HA's, sore throat, stiff neck, changes in vision  CV: normal with no CP, SOB, PND, STREETER or orthopnea  PULM: normal with no SOB, cough, hemoptysis, sputum or pleuritic pain  GI: normal with no abdominal pain, nausea, vomiting, constipation, diarrhea, melanotic stools, BRBPR, or hematemesis  : normal with no hematuria, dysuria  BREAST: normal with no mass, discharge, pain  SKIN: normal with no rash, erythema, bruising, or swelling    Allergies:  Review of patient's allergies indicates:   Allergen Reactions    Iodine and iodide containing products Hives     ALLERGIC TO SOFT SHELL CRAB ONLY    Shellfish containing products      soft shell crabs       Medications:    Current Outpatient Prescriptions:     allopurinol (ZYLOPRIM) 100 MG tablet, Take 100 mg by mouth once daily. , Disp: , Rfl:     atenolol (TENORMIN) 25 MG tablet, Take 25 mg by mouth daily as needed., Disp: , Rfl:     cefUROXime (CEFTIN) 500 MG tablet, Take 250 mg by mouth every 12 (twelve) hours. Every third week, Disp: , Rfl:     cilostazol (PLETAL) 100 MG Tab, 100 mg 2 (two) times daily. , Disp: , Rfl:     ciprofloxacin HCl (CIPRO) 250 MG tablet, Take 250 mg by mouth 2 (two) times daily., Disp: , Rfl:     doxycycline  "(VIBRA-TABS) 100 MG tablet, Take 100 mg by mouth. Every third week, Disp: , Rfl:     multivitamin (MEN'S MULTI-VITAMIN) per tablet, Take 1 tablet by mouth once daily., Disp: , Rfl:     nitroGLYCERIN (NITROSTAT) 0.3 MG SL tablet, Place 0.3 mg under the tongue every 5 (five) minutes as needed for Chest pain., Disp: , Rfl:     pravastatin (PRAVACHOL) 40 MG tablet, Take 40 mg by mouth nightly., Disp: , Rfl:     ranitidine (ZANTAC 75) 75 MG tablet, Take 75 mg by mouth nightly. , Disp: , Rfl:     rivaroxaban (XARELTO) 20 mg Tab, Xarelto 20 mg tablet  Take 1 tablet every day by oral route., Disp: , Rfl:     terazosin (HYTRIN) 2 MG capsule, Take 4 mg by mouth every evening. 2 TABS, Disp: , Rfl:     VENTOLIN HFA 90 mcg/actuation inhaler, Inhale 1-2 puffs into the lungs every 6 (six) hours as needed. , Disp: , Rfl:     PMHx/PSHx Updates:  See patient's last visit with me on 6/28/2018  See H&P on 12/9/2015        Pathology:  Cancer Staging  Primary malignant neoplasm of left upper lobe of lung  Staging form: Lung, AJCC 7th Edition  - Clinical: Stage IB (T2a, N0, M0) - Signed by Todd Olvera Jr., MD on 6/28/2017          Objective:     Vitals:  Blood pressure (!) 120/51, pulse (!) 141, temperature 98.8 °F (37.1 °C), height 5' 9" (1.753 m), weight 87 kg (191 lb 14.4 oz).    Physical Examination:   GEN: no apparent distress, comfortable; AAOx3  HEAD: atraumatic and normocephalic  EYES: no pallor, no icterus, PERRLA  ENT: OMM, no pharyngeal erythema, external ears WNL; no nasal discharge; no thrush  NECK: no masses, thyroid normal, trachea midline, no LAD/LN's, supple  CV: RRR with no murmur; normal pulse; normal S1 and S2; no pedal edema  CHEST: Normal respiratory effort; CTAB; normal breath sounds; no wheeze or crackles  ABDOM: nontender and nondistended; soft; normal bowel sounds; no rebound/guarding  MUSC/Skeletal: ROM normal; no crepitus; joints normal; no deformities or arthropathy  EXTREM: no clubbing, cyanosis, " inflammation or swelling  SKIN: no rashes, lesions, ulcers, petechiae or subcutaneous nodules  : no lucio  NEURO: grossly intact; motor/sensory WNL; AAOx3; no tremors  PSYCH: normal mood, affect and behavior  LYMPH: normal cervical, supraclavicular, axillary and groin LN's            Labs:   5/15/2018 on chart        Radiology/Diagnostic Studies:      PET 5/9/2018:  IMPRESSION:    1. Ill-defined groundglass opacity now lies at site of previous confluent left  upper lobe nodular density on 04/23/2018 CT, with improved appearance  suggesting resolving alveolar opacity either reflecting treated malignancy or  improved infectious or inflammatory consolidation.    2. Unchanged 12 mm left lower lobe nodular density with very little associated  FDG activity since 09/08/2017.    3. Pleural based foci of FDG uptake laterally in left hemithorax showing little  change since 09/08/2017. Nearby calcified left pleural plaque suggesting this  could be inflammatory in nature.    4. Increased FDG uptake which is focal at site of right lower lobe  consolidative subpleural opacity measuring 35 x 13 mm with interval enlargement  since 09/08/2017. Although this could represent infectious or inflammatory  consolidation, progressive atelectasis, parenchymal scarring, developing  malignancy is also a consideration. Continued CT thorax without IV contrast  follow-up is recommended in 3 months.      CT scan 12/14/2017  IMPRESSION:    1. Unchanged size of left upper lobe and left lower lobe nodules since  09/08/2017, suggesting at least partially treated malignancy.    2. No new abnormality of concern for regional or distant metastasis.    3. Unchanged severe emphysema.    4. No significant change of 13 x 21 mm cystic lesion in pancreatic head. This  could reflect a nonneoplastic mucinous cyst or other low-grade lesion such as  side branch IPMN (intraductal papillary mucinous neoplasm).    5. Focal fat stranding in right lower quadrant  mesenteric fat adjacent to  segment of terminal ileum where mild terminal ileal wall thickening is  questioned, although the appearance could be related to inadequate distention.  Focal inflammation related to infectious or inflammatory enteritis is a  consideration, with the appearance otherwise nonspecific.  I have reviewed all available lab results and radiology reports.      CT's 4/23/2018  IMPRESSION: Slight increase in size of the nodular density in the left upper  lobe with stable nodule within the medial left lower lobe    Stable diffuse emphysematous changes with chronic fibrotic changes    Stable patchy airspace disease within the right lung base with associated  calcifications    Prior CABG    Colonic diverticulosis    Right inguinal hernia    No evidence of metastatic disease  Assessment/Plan:     (1) 73 y.o. male with diagnosis of NSCLC (Squamous cell)  - T2A lesion with 3.5cm involving NEIDA  - s/p monotherapy with XRT by Dr Olvera; followed by chemotherapy with 3 cycles of carbo/taxol  - followed by Dr Ely with Pulm - seen him on Dec 5th and f/u on Feb 6th  - CTA on 5/18/17 showed decrease size in the tumor mass  - CT on 12/14/17 with stable lung findings  - Latest CT scans show slight increase in size of the NEIDA nodule  - he had repeat PET on 5/9/2018 with over stable except for the one spot in the right lung  - he saw Dr Ely again June 6th and saw Dr Olvera on June 19th  - he completed XRT x 5 on July 13th     (2) prior admit with SOB and suspected pneumonia     (3) LLE DVT and PE hx - along with prior CVA  - followed by Dr Lewis  - on Xarelto and ASA per cardiology direction  - MTHFR gene abnormality     (4) Anemia secondary to chemotherapy - resolved     (5) CAD s/p MI in past - followed by Joshua     (6)  Colonoscopy in July 2017 with Dr Holliday - He is saw Dr Hunter at [facility] on May 15th 2018 and had endoscopies on June 14th  - suspicious findings in the pancreas but they were unable  to biopsy     (7) BP issues - followed by Dr Lewis    (8) Pancreatic nodule/cyst - stable in size - he saw Dr Holliday on Jan 11th; and he had ERCP on Jan 30th with Dr Knapp but the lesion in question was too small to biopsy  - increase in bilirubin  - he saw Dr Bobby Hunter on May 15th in [place] and they did endoscopy with biopsy on June 14th and were supposed to do repeat scope but patient cancelled that appointment at request of Dr Holliday      1. 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 Primary malignant neoplasm of left upper lobe of lung     3. History of pulmonary embolism     4. History of deep venous thrombosis (DVT) of distal vein of left lower extremity     5. Pulmonary nodule, right     6. Anemia associated with chemotherapy         PLAN:  1. F/u with with PCP, PULM, GI, rad/onc etc  2. He will need a break from the recent XRT prior to proceeding with any form of therapy  3. discussed potential need to resume chemotherapy or immunologic therapy; however, I am concerned about the pancreas and the possibility that this could be another primary  4. He sees Dr Olvera on Aug 6th  5. RTC next Aug 6th      Fax note to Mg Ely Barrios, Ra, Dale Olvera and Jodee    I spent over 25 mins of time with the patient. Over half of this time was spent couseling and coordinating care.      Discussion:     I have explained all of the above in detail and the patient understands all of the current recommendation(s). I have answered all of their questions to the best of my ability and to their complete satisfaction.   The patient is to continue with the current management plan.            Electronically signed by Chris Cage MD

## 2018-07-31 NOTE — LETTER
July 31, 2018      Michael Ellsworth MD  [address] [place] [address] [address]  [place] [address]           [facility] - Hematology Oncology  [address] [facility]  [address]  [place] [address]  Phone: [phone]  Fax: [phone]          Patient: Michael Shetty   MR Number: [id number]   YOB: 1945   Date of Visit: 7/31/2018       Dear Dr. Michael Ellsworth:    Thank you for referring Michael Shetty to me for evaluation. Attached you will find relevant portions of my assessment and plan of care.    If you have questions, please do not hesitate to call me. I look forward to following Michael Shetty along with you.    Sincerely,    Chris Cage MD    Enclosure  CC:  No Recipients    If you would like to receive this communication electronically, please contact [email or web address] or [phone] to request more information on [employer] access.    For providers and/or their staff who would like to refer a patient to [facility], please contact us through our one-stop-shop provider referral line, [facility], at [phone].    If you feel you have received this communication in error or would no longer like to receive these types of communications, please e-mail [email or web address]

## 2018-08-06 NOTE — PROGRESS NOTES
Michael CONNOLLY Sena  [id number]  1945  8/6/2018  Chris Cage Md  [address] [facility]  [address]  [place] [address]    DIAGNOSIS: IB, [id number] SCCa NEIDA with suspected non-small cell lung carcinoma of right lower lobe    REASON FOR VISIT: Routine scheduled follow-up.    HISTORY OF PRESENT ILLNESS:   72M with bx-proven SCCa of NEIDA, a PET avid 3.2cm mass. No other sites of disease and a poor surgical candidate, FEV1 of 2L.    Completed definitive SBRT to 5000cGy in 5 frx ending 3/10/17.    Cmpleted 4c of adjuvant CTX with Dr. Cage and has incidental CTA associated with prior hospitalization that reveals tumor to have shrunk to 1.6cm.    *4/18 CT C/AP  Slight increase in size of the nodular density in the left upper lobe with stable nodule within the medial left lower lobe Stable diffuse emphysematous changes with chronic fibrotic changes Stable patchy airspace disease within the right lung base with associated calcifications Prior CABG Colonic diverticulosis Right inguinal hernia No evidence of metastatic disease    *5/18 PET/CT  1. Ill-defined groundglass opacity now lies at site of previous confluent left upper lobe nodular density on 04/23/2018 CT, with improved appearance suggesting resolving alveolar opacity either reflecting treated malignancy or improved infectious or inflammatory consolidation. 2. Unchanged 12 mm left lower lobe nodular density with very little associated FDG activity since 09/08/2017. 3. Pleural based foci of FDG uptake laterally in left hemithorax showing little change since 09/08/2017. Nearby calcified left pleural plaque suggesting this could be inflammatory in nature. 4. Increased FDG uptake which is focal at site of right lower lobe consolidative subpleural opacity measuring 35 x 13 mm with interval enlargement since 09/08/2017. Although this could represent infectious or inflammatory consolidation, progressive atelectasis, parenchymal scarring, developing malignancy is also a  consideration. Continued CT thorax without IV contrast follow-up is recommended in 3 months.    *Dr. Ely: pt cannot undergo bx safely; not accessible by bronch*  *Dr. Cage: consider RT; +/- CTX    Patient completed stereotactic body radiation therapy, 5000 cGy in 5 fractions in July 2018. At the end of treatment patient had mild exacerbation of his shortness of breath.    INTERVAL HISTORY:   6/18 EUS Dr. Hunter  Cystic lesion in pancreatic body sonographically suspicious for branched intraductal papillary mucinous neoplasm; no biopsy obtained    Patient of therapy patient has met with Dr. Cage who recommended presenting his case to multidisciplinary tumor conference to discuss further workup and/or empiric treatment to suspicious pancreatic mass. It appears as though this cannot be safely biopsied.    Patient tolerated his most recent radiation course well and at today's visit denies fever, chills, chest pain. He continues to have shortness of breath and dyspnea on exertion but denies cough or hemoptysis. He denies appetite, energy or weight changes. He denies dark urine, pale stool, pruritus or discoloration of the eyes and skin. He denies right upper quadrant pain.    Review of Systems   Constitutional: Negative for appetite change, chills, fever and unexpected weight change.   HENT:   Negative for lump/mass, mouth sores, sore throat, trouble swallowing and voice change.    Eyes: Negative for eye problems and icterus.   Respiratory: Positive for shortness of breath. Negative for chest tightness, cough and hemoptysis.    Cardiovascular: Negative for chest pain and leg swelling.   Gastrointestinal: Negative for abdominal distention, abdominal pain, blood in stool, constipation, diarrhea, nausea and vomiting.   Genitourinary: Negative for bladder incontinence, difficulty urinating, dysuria, frequency, hematuria and nocturia.    Musculoskeletal: Negative for back pain, gait problem, neck pain and neck  stiffness.   Neurological: Negative for extremity weakness, gait problem, headaches, numbness and seizures.   Hematological: Negative for adenopathy.     Past Medical History:   Diagnosis Date    Arthritis     BPH (benign prostatic hyperplasia)     Coronary artery disease     MI X 2    Gout, chronic     Heartburn     Hypertension     Myocardial infarction     PE (pulmonary embolism)     Presence of dental bridge     UPPER - NOT WEAR MUCH AS DOES NOT FIT WELL    Primary malignant neoplasm of left upper lobe of lung 5/19/2017    Staph infection      Past Surgical History:   Procedure Laterality Date    CARDIAC SURGERY      CABG X 4 9-11    CTR      BILAT    ROTATOR CUFF REPAIR      left    TONSILLECTOMY      TRIGGER FINGER RELEASE      right and left hands.     Social History     Social History    Marital status:      Spouse name: N/A    Number of children: N/A    Years of education: N/A     Social History Main Topics    Smoking status: Former Smoker     Packs/day: 2.00     Years: 25.00     Quit date: 2/25/1990    Smokeless tobacco: Never Used    Alcohol use Yes      Comment: 2 PER DAY    Drug use: No    Sexual activity: Not Asked     Other Topics Concern    None     Social History Narrative    None     Family History   Problem Relation Age of Onset    Cancer Sister         gallbladder     Medication List with Changes/Refills   Current Medications    ALLOPURINOL (ZYLOPRIM) 100 MG TABLET    Take 100 mg by mouth once daily.     ATENOLOL (TENORMIN) 25 MG TABLET    Take 25 mg by mouth daily as needed.    CEFUROXIME (CEFTIN) 500 MG TABLET    Take 250 mg by mouth every 12 (twelve) hours. Every third week    CILOSTAZOL (PLETAL) 100 MG TAB    100 mg 2 (two) times daily.     CIPROFLOXACIN HCL (CIPRO) 250 MG TABLET    Take 250 mg by mouth 2 (two) times daily.    DOXYCYCLINE (VIBRA-TABS) 100 MG TABLET    Take 100 mg by mouth. Every third week    MULTIVITAMIN (MEN'S MULTI-VITAMIN) PER TABLET     Take 1 tablet by mouth once daily.    NITROGLYCERIN (NITROSTAT) 0.3 MG SL TABLET    Place 0.3 mg under the tongue every 5 (five) minutes as needed for Chest pain.    PRAVASTATIN (PRAVACHOL) 40 MG TABLET    Take 40 mg by mouth nightly.    RANITIDINE (ZANTAC 75) 75 MG TABLET    Take 75 mg by mouth nightly.     RIVAROXABAN (XARELTO) 20 MG TAB    Xarelto 20 mg tablet   Take 1 tablet every day by oral route.    TERAZOSIN (HYTRIN) 2 MG CAPSULE    Take 4 mg by mouth every evening. 2 TABS    VENTOLIN HFA 90 MCG/ACTUATION INHALER    Inhale 1-2 puffs into the lungs every 6 (six) hours as needed.      Review of patient's allergies indicates:   Allergen Reactions    Iodine and iodide containing products Hives     ALLERGIC TO SOFT SHELL CRAB ONLY    Shellfish containing products      soft shell crabs       QUALITY OF LIFE: 80%    Vitals:    08/06/18 1058   Weight: 86.6 kg (191 lb)   PainSc: 0-No pain       PHYSICAL EXAM:   GENERAL: alert; in no apparent distress.   HEAD: normocephalic, atraumatic.  EYES: pupils are equal, round, reactive to light and accommodation. Sclera anicteric. Conjunctiva not injected.   NOSE/THROAT: no nasal erythema or rhinorrhea. Oropharynx pink, without erythema, ulcerations or thrush.   NECK: no cervical motion rigidity; supple with no masses.  CHEST: clear to auscultation bilaterally; no wheezes, crackles or rubs. Patient is speaking comfortably on room air with normal work of breathing without using accessory muscles of respiration. Poor air movement  CARDIOVASCULAR: regular rate and rhythm; no murmurs, rubs or gallops.  MUSCULOSKELETAL: no tenderness to palpation along the spine or scapulae. Normal range of motion.  NEUROLOGIC: cranial nerves II-XII intact bilaterally. Strength 5/5 in bilateral upper and lower extremities. Normal gait.  LYMPHATIC: no cervical, supraclavicular adenopathy appreciated bilaterally.   EXTREMITIES: mild clubbing; no cyanosis, edema.  SKIN: no erythema, rashes,  ulcerations noted.     ANCILLARY DATA:   As above    ASSESSMENT: 72M with stage IB, [id number] SCCa NEIDA s/p definitive SBRT and adjuvant CTX s/p 50Gy SBRT RLL, now with suspicious pancreatic head mass.  PLAN:  Mr. Shetty tolerated his most recent radiation course very well with a mild exacerbation of his shortness of breath that has since corrected. He will need follow-up imaging presumed at 3, 6 and 12 months with CT of the chest. This will be weighed with consideration of further workup for pancreatic body mass suspicious for IMPN. We will discuss both his lung condition as well as pink-red mass at multidisciplinary tumor conference tomorrow. If the team is in agreement the pancreatic mass is consistent with malignancy I suspect the patient would not be a surgical candidate and I have RT spoke with Dr. Cage regarding empiric chemoradiation with presumed 5-FU sensitization. In this instance we would hold off on further imaging until after treatment. However, the conference may elect further observation the pancreas and lung in which case imaging may proceed further treatment. I've explained these scenarios to the patient and he expressed understanding. He'll meet with Dr. Cage following the review of multidisciplinary tumor conference tomorrow. For now return to clinic in 3 months.    The patient has our contact information and understands that they are free to contact us at any time with questions or concerns regarding radiation therapy.    All questions answered and contact information provided. Patient understands free to call us anytime with any questions or concerns regarding radiation therapy.    TIME SPENT WITH PATIENT: I have personally seen and evaluated this patient. Approximately 30 minutes were spent with the patient discussing follow-up careplan.     PHYSICIAN: Todd Olvera Jr, MD

## 2018-08-08 NOTE — PROGRESS NOTES
[facility] Hematology/Oncology  PROGRESS NOTE      Subjective:       Patient ID:   NAME: Michael Shetty : 1945     73 y.o. male    Referring Doc: Jodee  Other Physicians: Mg Ely Barrios, Dauterive, Mannina    Chief Complaint:  Lung ca f/u    History of Present Illness:     Patient returns today for a regularly scheduled follow-up visit.  He has been doing ok; no CP, SOB, HA's or N/V. He completed 5 XRT's on  with Dr Olvera; he had endoscopies with Dr Hunter on 18. He saw Dr Olvera on Monday and saw Dr Holliday on . He sees Dr Ely again on Oct 23rd. I discussed his case at the [facility] Tumor Board yesterday where Dr Az Castaneda and Dr Olvera, pathology, surgery, and radiology. I also discussed with Dr Knapp yesterday by phone. His breathing is about the same.       ROS:   GEN: normal without any fever, night sweats or weight loss  HEENT: normal with no HA's, sore throat, stiff neck, changes in vision  CV: normal with no CP, SOB, PND, STREETER or orthopnea  PULM: normal with no SOB, cough, hemoptysis, sputum or pleuritic pain  GI: normal with no abdominal pain, nausea, vomiting, constipation, diarrhea, melanotic stools, BRBPR, or hematemesis  : normal with no hematuria, dysuria  BREAST: normal with no mass, discharge, pain  SKIN: normal with no rash, erythema, bruising, or swelling    Allergies:  Review of patient's allergies indicates:   Allergen Reactions    Iodine and iodide containing products Hives     ALLERGIC TO SOFT SHELL CRAB ONLY    Shellfish containing products      soft shell crabs       Medications:    Current Outpatient Prescriptions:     allopurinol (ZYLOPRIM) 100 MG tablet, Take 100 mg by mouth once daily. , Disp: , Rfl:     atenolol (TENORMIN) 25 MG tablet, Take 25 mg by mouth daily as needed., Disp: , Rfl:     cefUROXime (CEFTIN) 500 MG tablet, Take 250 mg by mouth every 12 (twelve) hours. Every third week, Disp: , Rfl:     cilostazol (PLETAL) 100 MG  "Tab, 100 mg 2 (two) times daily. , Disp: , Rfl:     ciprofloxacin HCl (CIPRO) 250 MG tablet, Take 250 mg by mouth 2 (two) times daily., Disp: , Rfl:     doxycycline (VIBRA-TABS) 100 MG tablet, Take 100 mg by mouth. Every third week, Disp: , Rfl:     multivitamin (MEN'S MULTI-VITAMIN) per tablet, Take 1 tablet by mouth once daily., Disp: , Rfl:     nitroGLYCERIN (NITROSTAT) 0.3 MG SL tablet, Place 0.3 mg under the tongue every 5 (five) minutes as needed for Chest pain., Disp: , Rfl:     pravastatin (PRAVACHOL) 40 MG tablet, Take 40 mg by mouth nightly., Disp: , Rfl:     ranitidine (ZANTAC 75) 75 MG tablet, Take 75 mg by mouth nightly. , Disp: , Rfl:     rivaroxaban (XARELTO) 20 mg Tab, Xarelto 20 mg tablet  Take 1 tablet every day by oral route., Disp: , Rfl:     terazosin (HYTRIN) 2 MG capsule, Take 4 mg by mouth every evening. 2 TABS, Disp: , Rfl:     VENTOLIN HFA 90 mcg/actuation inhaler, Inhale 1-2 puffs into the lungs every 6 (six) hours as needed. , Disp: , Rfl:     PMHx/PSHx Updates:  See patient's last visit with me on 6/28/2018  See H&P on 12/9/2015        Pathology:  Cancer Staging  Primary malignant neoplasm of left upper lobe of lung  Staging form: Lung, AJCC 7th Edition  - Clinical: Stage IB (T2a, N0, M0) - Signed by Todd Olvera Jr., MD on 6/28/2017          Objective:     Vitals:  Blood pressure (!) 93/56, pulse (!) 128, temperature 97.8 °F (36.6 °C), height 5' 9" (1.753 m), weight 86.2 kg (190 lb).    Physical Examination:   GEN: no apparent distress, comfortable; AAOx3  HEAD: atraumatic and normocephalic  EYES: no pallor, no icterus, PERRLA  ENT: OMM, no pharyngeal erythema, external ears WNL; no nasal discharge; no thrush  NECK: no masses, thyroid normal, trachea midline, no LAD/LN's, supple  CV: RRR with no murmur; normal pulse; normal S1 and S2; no pedal edema  CHEST: Normal respiratory effort; CTAB; normal breath sounds; no wheeze or crackles  ABDOM: nontender and nondistended; soft; " normal bowel sounds; no rebound/guarding  MUSC/Skeletal: ROM normal; no crepitus; joints normal; no deformities or arthropathy  EXTREM: no clubbing, cyanosis, inflammation or swelling  SKIN: no rashes, lesions, ulcers, petechiae or subcutaneous nodules  : no lucio  NEURO: grossly intact; motor/sensory WNL; AAOx3; no tremors  PSYCH: normal mood, affect and behavior  LYMPH: normal cervical, supraclavicular, axillary and groin LN's            Labs:   5/15/2018 on chart        Radiology/Diagnostic Studies:      PET 5/9/2018:  IMPRESSION:    1. Ill-defined groundglass opacity now lies at site of previous confluent left  upper lobe nodular density on 04/23/2018 CT, with improved appearance  suggesting resolving alveolar opacity either reflecting treated malignancy or  improved infectious or inflammatory consolidation.    2. Unchanged 12 mm left lower lobe nodular density with very little associated  FDG activity since 09/08/2017.    3. Pleural based foci of FDG uptake laterally in left hemithorax showing little  change since 09/08/2017. Nearby calcified left pleural plaque suggesting this  could be inflammatory in nature.    4. Increased FDG uptake which is focal at site of right lower lobe  consolidative subpleural opacity measuring 35 x 13 mm with interval enlargement  since 09/08/2017. Although this could represent infectious or inflammatory  consolidation, progressive atelectasis, parenchymal scarring, developing  malignancy is also a consideration. Continued CT thorax without IV contrast  follow-up is recommended in 3 months.      CT scan 12/14/2017  IMPRESSION:    1. Unchanged size of left upper lobe and left lower lobe nodules since  09/08/2017, suggesting at least partially treated malignancy.    2. No new abnormality of concern for regional or distant metastasis.    3. Unchanged severe emphysema.    4. No significant change of 13 x 21 mm cystic lesion in pancreatic head. This  could reflect a nonneoplastic  mucinous cyst or other low-grade lesion such as  side branch IPMN (intraductal papillary mucinous neoplasm).    5. Focal fat stranding in right lower quadrant mesenteric fat adjacent to  segment of terminal ileum where mild terminal ileal wall thickening is  questioned, although the appearance could be related to inadequate distention.  Focal inflammation related to infectious or inflammatory enteritis is a  consideration, with the appearance otherwise nonspecific.  I have reviewed all available lab results and radiology reports.      CT's 4/23/2018  IMPRESSION: Slight increase in size of the nodular density in the left upper  lobe with stable nodule within the medial left lower lobe    Stable diffuse emphysematous changes with chronic fibrotic changes    Stable patchy airspace disease within the right lung base with associated  calcifications    Prior CABG    Colonic diverticulosis    Right inguinal hernia    No evidence of metastatic disease  Assessment/Plan:     (1) 73 y.o. male with diagnosis of NSCLC (Squamous cell)  - T2A lesion with 3.5cm involving NEIDA  - s/p monotherapy with XRT by Dr Olvera; followed by chemotherapy with 3 cycles of carbo/taxol  - followed by Dr Ely with Pulm - seen him on Dec 5th and f/u on Feb 6th  - CTA on 5/18/17 showed decrease size in the tumor mass  - CT on 12/14/17 with stable lung findings  - Latest CT scans show slight increase in size of the NEIDA nodule  - he had repeat PET on 5/9/2018 with over stable except for the one spot in the right lung  - he saw Dr Ely again June 6th and saw Dr Olvera on June 19th  - he completed XRT x 5 on July 13th  -  I discussed his case at the [facility] Tumor Board yesterday where Dr Az Castaneda and Dr Olvera, pathology, surgery, and radiology.   - the boards recommendation was to proceed with observation only with regular scans; they felt he was too high risk for surgery and immunologics     (2) prior admit with SOB and suspected  pneumonia     (3) LLE DVT and PE hx - along with prior CVA  - followed by Dr Lewis  - on Xarelto and ASA per cardiology direction  - MTHFR gene abnormality     (4) Anemia secondary to chemotherapy - resolved     (5) CAD s/p MI in past - followed by Joshua     (6)  Colonoscopy in July 2017 with Dr Holliday - He is saw Dr Hunter at [facility] on May 15th 2018 and had endoscopies on June 14th  - suspicious findings in the pancreas but they were unable to biopsy     (7) BP issues - followed by Dr Lewis    (8) Pancreatic nodule/cyst - stable in size - he saw Dr Holliday on Jan 11th; and he had ERCP on Jan 30th with Dr Knapp but the lesion in question was too small to biopsy  - increase in bilirubin  - he saw Dr Bobby Hunter on May 15th in [place] and they did endoscopy with biopsy on June 14th and were supposed to do repeat scope but patient cancelled that appointment at request of Dr Holliday  - the scope was suspicious but not conclusive since they were unable to get biopsy of a intraductal papillary mucinous neoplasm  - I discussed with Dr Knapp yesterday and he feels that this nodule is stable and most likely benign      1. 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 Primary malignant neoplasm of left upper lobe of lung     3. Anemia associated with chemotherapy     4. History of pulmonary embolism     5. History of deep venous thrombosis (DVT) of distal vein of left lower extremity     6. Pulmonary nodule, right         PLAN:  1. F/u with with PCP, PULM, GI, rad/onc etc  2. Discussed referral to MD Downey and patient will think about it  3.  Repeat PET/CT scan  4. RTC 4 weeks      Fax note to Mg Ely Barrios, Dauterive, Mannina, Joshi and Jodee    I spent over 25 mins of time with the patient. Over half of this time was spent couseling and coordinating care.      Discussion:     I have explained all of the above in detail and the patient understands all of the current  recommendation(s). I have answered all of their questions to the best of my ability and to their complete satisfaction.   The patient is to continue with the current management plan.            Electronically signed by Chris Cage MD

## 2018-08-21 NOTE — PROGRESS NOTES
[facility] Established Patient Visit    Subjective:           PCP: Michael Ellsworth    Chief Complaint: Gastroesophageal Reflux (8 week follow up ) and Anemia       HPI:  Michael Shetty is a 73 y.o. male here for his multiple problems. He had carcinoma of the lung. He has 2 tumors. His pancreatitis is suspected to be IPMN and we are keeping an eye on it . He has a lesion on his lungs for which he will have a PET scan done on 8/30/18. He has a colonic lesion for which he will need a colonoscopy after a consultation with an oncologist. He is going to have a PET scan this month and which will make the decision as to what to do with his problems. Patient will be referred to Dr. Hunter for EMR.       ROS:   Constitutional: No fevers, chills, weight loss  ENT: No allergies, sore throat, rhinorrhea  CV: No chest pain, palpitations, edema  Pulm: No cough, shortness of breath, wheezing  Ophtho: No vision changes  GI: No blood in stools, change in bowel habits, nausea/vomiting  Denies alternating diarrhea/constipation.   Derm: No rash  Heme: No easy bruising or lymphadenopathy  MSK: No arthralgias or myalgias  : No dysuria, hematuria, frequency, polyuria, or flank tenderness  Endo: No hot or cold intolerance  Neuro: No syncope or seizure, or dizziness  Psych: No hallucination, depression or suicidal ideation      Medical History:  has a past medical history of Arthritis, BPH (benign prostatic hyperplasia), Coronary artery disease, Gout, chronic, Heartburn, Hypertension, Myocardial infarction, PE (pulmonary embolism), Presence of dental bridge, Primary malignant neoplasm of left upper lobe of lung (5/19/2017), and Staph infection.      Surgical History:  has a past surgical history that includes Cardiac surgery; Tonsillectomy; CTR; Trigger finger release; Rotator cuff repair; and OSTEOTOMY, METACARPAL BONE (Right, 3/1/2013).    Family History:   Family History   Problem Relation Age of Onset    Cancer Sister          gallbladder       Social History:   Social History     Tobacco Use    Smoking status: Former Smoker     Packs/day: 2.00     Years: 25.00     Pack years: 50.00     Last attempt to quit: 1990     Years since quittin.5    Smokeless tobacco: Never Used   Substance Use Topics    Alcohol use: Yes     Comment: 2 PER DAY    Drug use: No       The patient's social and family histories were reviewed by me and updated in the appropriate section of the electronic medical record.    Allergies:   Review of patient's allergies indicates:   Allergen Reactions    Shellfish containing products      soft shell crabs         Medications:   Current Outpatient Medications   Medication Sig Dispense Refill    allopurinol (ZYLOPRIM) 100 MG tablet Take 100 mg by mouth once daily.       atenolol (TENORMIN) 25 MG tablet Take 25 mg by mouth daily as needed.      cefUROXime (CEFTIN) 500 MG tablet Take 250 mg by mouth every 12 (twelve) hours. Every third week      cilostazol (PLETAL) 100 MG Tab 100 mg 2 (two) times daily.       ciprofloxacin HCl (CIPRO) 250 MG tablet Take 250 mg by mouth 2 (two) times daily.      doxycycline (VIBRA-TABS) 100 MG tablet Take 100 mg by mouth. Every third week      multivitamin (MEN'S MULTI-VITAMIN) per tablet Take 1 tablet by mouth once daily.      nitroGLYCERIN (NITROSTAT) 0.3 MG SL tablet Place 0.3 mg under the tongue every 5 (five) minutes as needed for Chest pain.      pravastatin (PRAVACHOL) 40 MG tablet Take 40 mg by mouth nightly.      ranitidine (ZANTAC 75) 75 MG tablet Take 75 mg by mouth nightly.       rivaroxaban (XARELTO) 20 mg Tab Xarelto 20 mg tablet   Take 1 tablet every day by oral route.      terazosin (HYTRIN) 2 MG capsule Take 4 mg by mouth every evening. 2 TABS      VENTOLIN HFA 90 mcg/actuation inhaler Inhale 1-2 puffs into the lungs every 6 (six) hours as needed.        No current facility-administered medications for this visit.      All medications and past  "history have been reviewed.        Objective:        Vital Signs:  Blood pressure 110/68, pulse (!) 120, temperature 97.3 °F (36.3 °C), height 5' 9" (1.753 m), weight 84.7 kg (186 lb 12.8 oz). Body mass index is 27.59 kg/m².    Physical Exam:   General Appearance: Well appearing in no acute distress, well developed, well                nourished  Head: Normocephalic, without obvious abnormality  Eyes:  Pupils equal, round and reactive to light  Throat: Lips, mucosa, and tongue normal; teeth and gums normal  Lungs: CTA bilaterally in anterior and posterior fields, no wheezes, no crackles  Heart:  Regular rate and rhythm, no murmurs heard  Abdomen: Soft, non tender, non distended with positive bowel sounds in all four quadrants. No hepatosplenomegaly, ascites, or mass. Negative for succusion splash  : male   Extremities: No cyanosis, edema or deformity  Skin: No rash  Neurologic: Normal exam    Labs:  Lab Results   Component Value Date    WBC 6.1 12/05/2017    HGB 14.1 12/05/2017    HCT 42.7 12/05/2017     12/05/2017    ALT 13 12/05/2017    AST 17 12/05/2017     12/05/2017    K 4.3 12/05/2017     12/05/2017    CREATININE 1.40 (H) 12/05/2017    BUN 23 12/05/2017    CO2 26 12/05/2017       Imaging:     All lab results and imaging results have been reviewed and discussed with the patient      Assessment:       No diagnosis found.    Plan:       There are no diagnoses linked to this encounter.    See HPI    No Follow-up on file.    The plan was discussed with the patient and all questions/concerns have been answered to the patient's satisfaction.        Electronically signed by Eula Holliday MD    This note was dictated using voice recognition software and may contain grammatical errors.      "

## 2018-09-04 NOTE — TELEPHONE ENCOUNTER
----- Message from Ellen Barrera sent at 9/4/2018  9:47 AM CDT -----  PT HAD PET SCAN ON 8/30/18.  DOES DR DENNIS WANT HIM TO SEE DR CAN FOR ANOTHER COLON AND POSSIBLY ANOTHER EUS?  DR DENNIS WANTED TO BE REMINDED.

## 2018-09-06 PROBLEM — T45.1X5A CHEMOTHERAPY-INDUCED NEUTROPENIA: Status: ACTIVE | Noted: 2018-01-01

## 2018-09-06 PROBLEM — D70.1 CHEMOTHERAPY-INDUCED NEUTROPENIA: Status: ACTIVE | Noted: 2018-01-01

## 2018-09-06 NOTE — LETTER
September 6, 2018      Michael Ellsworth MD  [address] [place] [address] [address]  [place] [address]           [facility] - Hematology Oncology  [address] [facility]  [address]  [place] [address]  Phone: [phone]  Fax: [phone]          Patient: Michael Shetty   MR Number: [id number]   YOB: 1945   Date of Visit: 9/6/2018       Dear Dr. Michael Ellsworth:    Thank you for referring Michael Shetty to me for evaluation. Attached you will find relevant portions of my assessment and plan of care.    If you have questions, please do not hesitate to call me. I look forward to following Michael Shetty along with you.    Sincerely,    Chris Cage MD    Enclosure  CC:  No Recipients    If you would like to receive this communication electronically, please contact [email or web address] or [phone] to request more information on [employer] access.    For providers and/or their staff who would like to refer a patient to [facility], please contact us through our one-stop-shop provider referral line, [facility], at [phone].    If you feel you have received this communication in error or would no longer like to receive these types of communications, please e-mail [email or web address]

## 2018-09-06 NOTE — PROGRESS NOTES
[facility]  PROGRESS NOTE      Subjective:       Patient ID:   NAME: Michael Shetty : 1945     73 y.o. male    Referring Doc: Jodee  Other Physicians: Mg Ely Barrios, Dauterive, Mannina    Chief Complaint:  Lung ca f/u    History of Present Illness:     Patient returns today for a regularly scheduled follow-up visit.  He has been doing ok; no CP, SOB, HA's or N/V. He completed 5 XRT's on  with Dr Olvera; he had endoscopies with Dr Hunter on 18. He sees Dr Ely again on Oct 23rd. I previously discussed his case at the [facility] Board. Breathing is stable.  Repeat PEt scan unfortunately is suggestive of progressive disease.     ROS:   GEN: normal without any fever, night sweats or weight loss  HEENT: normal with no HA's, sore throat, stiff neck, changes in vision  CV: normal with no CP, SOB, PND, STREETER or orthopnea  PULM: normal with no SOB, cough, hemoptysis, sputum or pleuritic pain  GI: normal with no abdominal pain, nausea, vomiting, constipation, diarrhea, melanotic stools, BRBPR, or hematemesis  : normal with no hematuria, dysuria  BREAST: normal with no mass, discharge, pain  SKIN: normal with no rash, erythema, bruising, or swelling    Allergies:  Review of patient's allergies indicates:   Allergen Reactions    Iodine and iodide containing products Hives     ALLERGIC TO SOFT SHELL CRAB ONLY    Shellfish containing products      soft shell crabs       Medications:    Current Outpatient Medications:     allopurinol (ZYLOPRIM) 100 MG tablet, Take 100 mg by mouth once daily. , Disp: , Rfl:     atenolol (TENORMIN) 25 MG tablet, Take 25 mg by mouth daily as needed., Disp: , Rfl:     cefUROXime (CEFTIN) 500 MG tablet, Take 250 mg by mouth every 12 (twelve) hours. Every third week, Disp: , Rfl:     cilostazol (PLETAL) 100 MG Tab, 100 mg 2 (two) times daily. , Disp: , Rfl:     ciprofloxacin HCl (CIPRO) 250 MG tablet, Take 250 mg by mouth 2 (two) times  daily., Disp: , Rfl:     doxycycline (VIBRA-TABS) 100 MG tablet, Take 100 mg by mouth. Every third week, Disp: , Rfl:     multivitamin (MEN'S MULTI-VITAMIN) per tablet, Take 1 tablet by mouth once daily., Disp: , Rfl:     nitroGLYCERIN (NITROSTAT) 0.3 MG SL tablet, Place 0.3 mg under the tongue every 5 (five) minutes as needed for Chest pain., Disp: , Rfl:     pravastatin (PRAVACHOL) 40 MG tablet, Take 40 mg by mouth nightly., Disp: , Rfl:     ranitidine (ZANTAC 75) 75 MG tablet, Take 75 mg by mouth nightly. , Disp: , Rfl:     rivaroxaban (XARELTO) 20 mg Tab, Xarelto 20 mg tablet  Take 1 tablet every day by oral route., Disp: , Rfl:     terazosin (HYTRIN) 2 MG capsule, Take 4 mg by mouth every evening. 2 TABS, Disp: , Rfl:     VENTOLIN HFA 90 mcg/actuation inhaler, Inhale 1-2 puffs into the lungs every 6 (six) hours as needed. , Disp: , Rfl:     PMHx/PSHx Updates:  See patient's last visit with me on 6/28/2018  See H&P on 12/9/2015        Pathology:  Cancer Staging  Primary malignant neoplasm of left upper lobe of lung  Staging form: Lung, AJCC 7th Edition  - Clinical: Stage IB (T2a, N0, M0) - Signed by Todd Olvera Jr., MD on 6/28/2017          Objective:     Vitals:  Blood pressure (!) 96/58, pulse (!) 123, temperature 97.5 °F (36.4 °C), resp. rate 18, weight 85.3 kg (188 lb).    Physical Examination:   GEN: no apparent distress, comfortable; AAOx3  HEAD: atraumatic and normocephalic  EYES: no pallor, no icterus, PERRLA  ENT: OMM, no pharyngeal erythema, external ears WNL; no nasal discharge; no thrush  NECK: no masses, thyroid normal, trachea midline, no LAD/LN's, supple  CV: RRR with no murmur; normal pulse; normal S1 and S2; no pedal edema  CHEST: Normal respiratory effort; CTAB; normal breath sounds; no wheeze or crackles  ABDOM: nontender and nondistended; soft; normal bowel sounds; no rebound/guarding  MUSC/Skeletal: ROM normal; no crepitus; joints normal; no deformities or arthropathy  EXTREM: no  clubbing, cyanosis, inflammation or swelling  SKIN: no rashes, lesions, ulcers, petechiae or subcutaneous nodules  : no lucio  NEURO: grossly intact; motor/sensory WNL; AAOx3; no tremors  PSYCH: normal mood, affect and behavior  LYMPH: normal cervical, supraclavicular, axillary and groin LN's            Labs:   5/15/2018 on chart        Radiology/Diagnostic Studies:      PET/CT 8/8/2018  IMPRESSION:    1. Enlarging focal ground glass opacity involving the anterior subpleural  aspect of the left upper lobe, with increasing FDG avidity. This is concerning  for disease progression.  2. Essentially stable CT appearance of FDG avid irregular parenchymal opacity  involving the lateral aspect of the left upper lobe.  3. Diminished FDG avidity of dependent right lower lobe airspace opacity and  prominent precarinal lymph node.  4. No evidence of extrathoracic metastasis.  5. New right inguinal hernia containing nonobstructed small bowel and mesentery.  6. Atherosclerosis and other stable findings as above      PET 5/9/2018:  IMPRESSION:    1. Ill-defined groundglass opacity now lies at site of previous confluent left  upper lobe nodular density on 04/23/2018 CT, with improved appearance  suggesting resolving alveolar opacity either reflecting treated malignancy or  improved infectious or inflammatory consolidation.    2. Unchanged 12 mm left lower lobe nodular density with very little associated  FDG activity since 09/08/2017.    3. Pleural based foci of FDG uptake laterally in left hemithorax showing little  change since 09/08/2017. Nearby calcified left pleural plaque suggesting this  could be inflammatory in nature.    4. Increased FDG uptake which is focal at site of right lower lobe  consolidative subpleural opacity measuring 35 x 13 mm with interval enlargement  since 09/08/2017. Although this could represent infectious or inflammatory  consolidation, progressive atelectasis, parenchymal scarring,  developing  malignancy is also a consideration. Continued CT thorax without IV contrast  follow-up is recommended in 3 months.      CT scan 12/14/2017  IMPRESSION:    1. Unchanged size of left upper lobe and left lower lobe nodules since  09/08/2017, suggesting at least partially treated malignancy.    2. No new abnormality of concern for regional or distant metastasis.    3. Unchanged severe emphysema.    4. No significant change of 13 x 21 mm cystic lesion in pancreatic head. This  could reflect a nonneoplastic mucinous cyst or other low-grade lesion such as  side branch IPMN (intraductal papillary mucinous neoplasm).    5. Focal fat stranding in right lower quadrant mesenteric fat adjacent to  segment of terminal ileum where mild terminal ileal wall thickening is  questioned, although the appearance could be related to inadequate distention.  Focal inflammation related to infectious or inflammatory enteritis is a  consideration, with the appearance otherwise nonspecific.  I have reviewed all available lab results and radiology reports.      CT's 4/23/2018  IMPRESSION: Slight increase in size of the nodular density in the left upper  lobe with stable nodule within the medial left lower lobe    Stable diffuse emphysematous changes with chronic fibrotic changes    Stable patchy airspace disease within the right lung base with associated  calcifications    Prior CABG    Colonic diverticulosis    Right inguinal hernia    No evidence of metastatic disease  Assessment/Plan:     (1) 73 y.o. male with diagnosis of NSCLC (Squamous cell)  - T2A lesion with 3.5cm involving NEIDA  - s/p monotherapy with XRT by Dr Olvera; followed by chemotherapy with 3 cycles of carbo/taxol  - followed by Dr Ely with Pulm - seen him on Dec 5th and f/u on Feb 6th  - CTA on 5/18/17 showed decrease size in the tumor mass  - CT on 12/14/17 with stable lung findings  - Latest CT scans show slight increase in size of the NEIDA nodule  - he had  repeat PET on 5/9/2018 with over stable except for the one spot in the right lung  - he saw Dr Ely again June 6th and saw Dr Olvera on June 19th  - he completed XRT x 5 on July 13th  -  I discussed his case at the [facility] Tumor Board yesterday where Dr Az Csataneda and Dr Olvera, pathology, surgery, and radiology.   - the boards recommendation previously was to proceed with observation only with regular scans; they felt he was too high risk for surgery and immunologics  - however, repeat PET on 8/8/2018 is showing an enlarging nodule in NEIDA with increasing FDG activity and worrisome for progressive disease     (2) prior admit with SOB and suspected pneumonia     (3) LLE DVT and PE hx - along with prior CVA  - followed by Dr Lewis  - on Xarelto and ASA per cardiology direction  - MTHFR gene abnormality     (4) Anemia secondary to chemotherapy - resolved     (5) CAD s/p MI in past - followed by Joshua     (6)  Colonoscopy in July 2017 with Dr Holliday - He is saw Dr Hunter at [facility] on May 15th 2018 and had endoscopies on June 14th  - suspicious findings in the pancreas but they were unable to biopsy     (7) BP issues - followed by Dr Lewis    (8) Pancreatic nodule/cyst - stable in size - he saw Dr Holliday on Jan 11th; and he had ERCP on Jan 30th with Dr Knapp but the lesion in question was too small to biopsy  - increase in bilirubin  - he saw Dr Bobby Hunter on May 15th in [place] and they did endoscopy with biopsy on June 14th and were supposed to do repeat scope but patient cancelled that appointment at request of Dr Holliday  - the scope was suspicious but not conclusive since they were unable to get biopsy of a intraductal papillary mucinous neoplasm  - I discussed with Dr Knapp yesterday and he feels that this nodule is stable and most likely benign      1. History of deep venous thrombosis (DVT) of distal vein of left lower extremity  Comprehensive metabolic panel    CBC auto differential     Ambulatory referral to [facility]    Comprehensive metabolic panel    CBC auto differential   2. History of pulmonary embolism  Comprehensive metabolic panel    CBC auto differential    Ambulatory referral to [facility]    Comprehensive metabolic panel    CBC auto differential   3. Pulmonary nodule, right  Comprehensive metabolic panel    CBC auto differential    Ambulatory referral to [facility]    Comprehensive metabolic panel    CBC auto differential   4. Squamous cell carcinoma of bronchus in left upper lobe  Comprehensive metabolic panel    CBC auto differential    Ambulatory referral to [facility]    Comprehensive metabolic panel    CBC auto differential   5. Primary malignant neoplasm of left upper lobe of lung  Comprehensive metabolic panel    CBC auto differential    Ambulatory referral to [facility]    Comprehensive metabolic panel    CBC auto differential   6. Anemia associated with chemotherapy  Comprehensive metabolic panel    CBC auto differential    Ambulatory referral to [facility]    Comprehensive metabolic panel    CBC auto differential   7. Chemotherapy-induced neutropenia  Comprehensive metabolic panel    CBC auto differential    Ambulatory referral to [facility]    Comprehensive metabolic panel    CBC auto differential       PLAN:  1. F/u with with PCP, PULM, GI, rad/onc etc  2. Previously discussed referral to [facility] but patient is declining  3.  Discussed resuming some form of chemotherapy and he is very much agreeable - discussed and provided literature on carbo/gemzar  4. Discussed the potential side-effects from the drugs and set up with chemotherapy school  5. He will need weekly labs  6. RTC 3-4 weeks      Fax note to Mg Ely Barrios, Dauterive, Mannina, Joshi and Jodee    I spent over 45 mins of time with the patient. Over half of this time was spent couseling and coordinating care.      Discussion:     I have explained all of the above in detail and  the patient understands all of the current recommendation(s). I have answered all of their questions to the best of my ability and to their complete satisfaction.   The patient is to continue with the current management plan.            Electronically signed by Chris Cage MD

## 2018-09-10 NOTE — PROGRESS NOTES
Michael CATHLEEN Shetty  [id number]    [facility]    TITLE: PLAN OF CARE FOR THE CHEMOTHERAPY PATIENT / TEACHING PROTOCOL    PURPOSE: To involve the patient / significant other in the plan of care and to provide teaching to the significant other & patient receiving chemotherapy.    LEVEL: Independent.    CONTENT: The Plan of Care for the chemotherapy patient is individualized and appropriate to the patients needs, strengths, limitations, & goals.  Education includes information regarding chemotherapy side effects, the treatment itself, and self-care  Activities.    GOAL / OUTCOME STANDARDS    PHYSIOLOGIC: The client will remain free or experience minimal side effects or toxicities throughout the chemotherapy treatment period.     PSYCHOLOGIC: The client/significant others will demonstrate positive coping mechanisms in relation to chemotherapy and its side effects.      COGINITIVE: The client/significant others will verbalize understanding of self-care measure to avoid/minimize side effects of the chemotherapy regime.    EVALUATION / COMMENT KEY:    V = Audiovisual/Video  S = Successfully meets outcome  N = Needs further instruction  NA = Not applicable to the patient  P = Previous knowledge  U = Unable to comprehend  * = See progress notes          PLAN OF CARE  INFORMATION TO BE DELIVERED / NURSING INTERVENTIONS DATE EVALUATION   1. Assessment of client/caregiver,         knowledge of cancer diagnosis,         and chemotherapy as a treatment. 1a. Evaluate patient/caregiver learning ability    b. Plan teaching sessions with patient/caregiver according to needs and present anxiety level/ability to learn.    c. Provide Chemotherapy Education Packet,        Mouth Care Protocol,         Specific Patient Education Sheets. 09/10/2018 S   2. Individual chemotherapy treatment         plan. 2a. Review of Chemotherapy Education handout from [employer]            09/10/2018   S   3. Knowledge Deficit &  Self-Management of general side effects common to all chemotherapy:  a. Nausea/Vomiting  b.   Diarrhea  c. Mouth Care  d. Dental care  e. Constipation  f. Hair Loss  g. Potential for infection  h. Fatigue   3a. Reinforce that the majority of side effects from chemotherapy are reversible and are  controlled both in the hospital and at home        (blood counts recover, hair grows back).   b.  Refer to the following for reinforcement of         information post-treatment:  1. Mouth Care Protocol.  2. Bowel Protocol for constipation or diarrhea.  3.  Drug Specific Chemotherapy Information Sheets for each medication patient receiving.    09/10/2018     S     PLAN OF CARE  INFORMATION TO BE DELIVERED / NURSING INTERVENTIONS DATE EVALUATION   h. Potential for bleeding         i. Potential anemia/fatigue         j. Potential sunburn         k. Birth control measures  l. Safety measures post treatment 4.  Chemotherapy Home Care Instruction  and Safety Information Sheet.  A. patient/caregivers to thoroughly cook shellfish (shrimp, crab, etc) to decrease the chance of infection.    B.  Use sunscreen and protective clothing while in the sun.   09/10/2018      4. Knowledge deficit & Self Management of Drug Specific  Side Effects.    a. BLADDER EFFECTS        (Hemorrhagic Cystitis)                  Preventable with adequate hydration; occurs 2-3 days or more post treatment.   1.  Instruct patient to:  a.   Void at least every 2 hours; increase intake.  b.   DO NOT hold urine; go when urge is felt.  c.    Empty bladder at bedtime and on         awakening.  d.   Observe for color changes (red to tea           colored), amount and frequency changes.  e.   Notify oncologist of any abnormalities           in urine or voiding or if you cannot               drink adequate fluids.   09/10/2018   S   b.   CHANGES IN URINE        COLOR:      1.   Instruct patient:  a.   Most evident in first 2-3 voidings after            administration.  b. Lasts less than 24 hours.  c. If urine is discolored 2 or more days post- treatment, notify oncologist.      09/10/2018 S   c.    KIDNEY EFFECTS           (Nephrotoxicity)   1.  Instruct patient to:  a.   Drink 8-16 glasses of fluid/day the day   pre-treatment and 3-4 days post-treatment to maintain hydration; the best way to minimize kidney problems.  b.   Notify oncologist immediately if unable to drink fluids or if changes are noted in urinary elimination.     09/10/2018   S   a. PULMONARY TOXICITY    1. Instruct patient to report symptoms such as shortness of breath, chest pain, shallow breathing, or chest wall discomfort to physician.  2. Reinforce preventative measures used by the health care team.  a. Baseline and periodic PFT and chest x-ray.   09/10/2018   S     PLAN OF CARE INFORMATION TO BE DELIVERED / NURSING INTERVENTIONS DATE EVALUATION   b. NERVE & MUSCLE EFFECTS (neurotoxocity; neuropathy, possible visual/hearing changes)        3. Instruct patient to:    a. Report numbness or tingling of the hands/feet, loss of fine motor movement (buttoning shirt, tying shoelaces), or gait changes to your oncologist.  b. If numbness/tingling are present:  1. protect feet with shoes at all times.  2. Use gloves for washing dishes/gardening & potholders in kitchen.       09/10/2018   S   c. CARDIOTOXICITY  Decreased effectiveness of             cardiac function. Effective are                  cumulative and irreversible.                                    CARDIAC ARRYTHMIAS              4   Instruct:  a. Heart function may be tested before treatment and perdiocally during treatment.  b. Notify oncologist of irregular pulse, palpitations, shortness of breath, or swelling in lower extremities/feet.          Taxol and Taxotere can cause arrhythmias on infusion that resolve once infusion discontinued. Instruct nurse if any irregularity felt.    09/10/2018   S   d. EXTRAVASTION  Occurs when vesicants  leak outside of vein and cause damage to the skin and underlying tissues.   1. Reinforce preventive measures used to avoid complications.  a. Fresh IV site or central line monitored continuously with vesicant IVP.  b. Continuous infusion via central line site and blood return monitored periodically around the clock.  2. Instruct to:  a. Notify nurse of any discomfort, burning, stinging, etc. at IV site during chemotherapy administration.  b. Notify oncologist of any redness, pain, or swelling at IV site after discharge from hospital.   09/10/2018   S   e. HYPERSENSITIVITY can happen with any medication.   1. Instruct patient:  a. Nurse is with them during the initial part of treatment and will be close by to monitor.  b. Pre-medication ordered by the oncologist must be taken on time. If doses are missed, treatment will need to be re-scheduled.  c. Skin redness, itching, or hives appearing after discharge should be reported to oncologist. 09/10/2018   S       PLAN OF CARE INFORMATION TO BE DELIVERED / NURSING INTERVENTIONS DATE EVALUATION   f. FLU-LIKE SYNDROME      1. Instruct patient symptoms are hard to prevent and may include fever, shaking chills, muscle and body aches.  a. Taking prescribed medications from physician if needed.  b. Adequate fluids are important.    2. Reinforce the need to call if temperature is         elevated to 100.4 or more  09/10/2018   S   g. HAND-FOOT SYNDROME  causes painful, symmetric swelling and redness of palms and soles                  5. Instruct patient to report any numbness or tingling in the hands or feet.  6. Explain prevention techniques, such as     a. Use heavy moisturizers to lessen skin dryness and itching, but to avoid if skin is cracked or broken  b. Bathe in tepid water, use non-perfumed soap, and wash gently. Baths with oatmeal or diluted baking soda may be soothing.  c. Avoid tight fitting shoes and repetitive actions, such as rubbing hands or applying pressure to  hands/feet.  7. Review measures to take should syndrome occur:  a. Cold compresses and elevation for          edema  b. Pain medications and other measures as ordered by oncologist.   4.   Syndrome resolves few weeks after therapy. 09/10/2018   S   5. DISCHARGE PLANNING /        EDUCATION 1.    Explain importance of compliance with follow- up  tests (CBC, CMP).  2.    Verify patient/caregiver know:  a.    Oncologists office phone number.  b.    Dates of follow-up appointments.  c.    Prescriptions given for nausea  3.   Review side effects to monitor and notify          oncologist about.  4.   Reinforce the need for patient and caregivers to:  a.    Review information given.  b.    Call oncologists office with questions          or symptoms  5.   Provide [facility] [place] Brochure make referrals if needed for financial or .   09/10/2018   S     PROGRESS NOTES: I met with the patient today for chemotherapy education. he will be starting treatment with Gemzar/Carboplatin. We discussed the mechanism of action, potential side effects of this treatment as well as ways he can manage them at home. Some of these side effects include but or not limited to fever, nausea, vomiting, decreased appetite, fatigue, weakness, cytopenias, myalgia/arthralgia, constipation, diarrhea, bleeding, headache, shortness of breath, nail changes, taste change, hair thinning/loss, mood disturbances, or edema. We also discussed dietary modifications he should make although this will be discussed in more detail with the dietician. he was provided with anti-emetic medication, a copy of all of the information we discussed today as well as our contact information. he will be provided a schedule on his first day of treatment. We will obtain labs on a weekly basis and the patient will follow-up with the physician for toxicity monitoring throughout treatment. All questions were answered and an informed consent was obtained. he was  reminded to certainly contact us sooner if needed.

## 2018-10-02 NOTE — TELEPHONE ENCOUNTER
Called nurses station and gave them the order to hold grannix and take off neutropenic precautions

## 2018-10-24 NOTE — PROGRESS NOTES
[facility] Hematology/Oncology  PROGRESS NOTE      Subjective:       Patient ID:   NAME: Michael Shetty : 1945     73 y.o. male    Referring Doc: Jodee  Other Physicians: Mg Ely Barrios, Dauterive, Mannina    Chief Complaint:  Lung ca f/u    History of Present Illness:     Patient returns today for a regularly scheduled follow-up visit.  He just got out of rehab where he had been for respiratory issues after recently hospitalization at [facility] for recurrent pulmonary emboli. He is here with his daughter. He is feeling better. He is on O2 portable. His breathing is stable. He is on Eliquis now. He wants to resume chemotherapy - will set up for next week as long the labs are ok       ROS:   GEN: normal without any fever, night sweats or weight loss  HEENT: normal with no HA's, sore throat, stiff neck, changes in vision  CV: normal with no CP, SOB, PND, STREETER or orthopnea  PULM: normal with no SOB, cough, hemoptysis, sputum or pleuritic pain  GI: normal with no abdominal pain, nausea, vomiting, constipation, diarrhea, melanotic stools, BRBPR, or hematemesis  : normal with no hematuria, dysuria  BREAST: normal with no mass, discharge, pain  SKIN: normal with no rash, erythema, bruising, or swelling    Allergies:  Review of patient's allergies indicates:   Allergen Reactions    Iodine and iodide containing products Hives     ALLERGIC TO SOFT SHELL CRAB ONLY    Shellfish containing products      soft shell crabs       Medications:    Current Outpatient Medications:     allopurinol (ZYLOPRIM) 100 MG tablet, Take 100 mg by mouth once daily. , Disp: , Rfl:     apixaban (ELIQUIS ORAL), Take by mouth., Disp: , Rfl:     atenolol (TENORMIN) 25 MG tablet, Take 25 mg by mouth daily as needed., Disp: , Rfl:     cilostazol (PLETAL) 100 MG Tab, 100 mg 2 (two) times daily. , Disp: , Rfl:     multivitamin (MEN'S MULTI-VITAMIN) per tablet, Take 1 tablet by mouth once daily., Disp: , Rfl:     nitroGLYCERIN  (NITROSTAT) 0.3 MG SL tablet, Place 0.3 mg under the tongue every 5 (five) minutes as needed for Chest pain., Disp: , Rfl:     pravastatin (PRAVACHOL) 40 MG tablet, Take 40 mg by mouth nightly., Disp: , Rfl:     PREDNISOLONE ORAL, Take by mouth., Disp: , Rfl:     ranitidine (ZANTAC 75) 75 MG tablet, Take 75 mg by mouth nightly. , Disp: , Rfl:     tamsulosin (FLOMAX) 0.4 mg Cap, Take 0.4 mg by mouth once daily., Disp: , Rfl:     VENTOLIN HFA 90 mcg/actuation inhaler, Inhale 1-2 puffs into the lungs every 6 (six) hours as needed. , Disp: , Rfl:     cefUROXime (CEFTIN) 500 MG tablet, Take 250 mg by mouth every 12 (twelve) hours. Every third week, Disp: , Rfl:     ciprofloxacin HCl (CIPRO) 250 MG tablet, Take 250 mg by mouth 2 (two) times daily., Disp: , Rfl:     doxycycline (VIBRA-TABS) 100 MG tablet, Take 100 mg by mouth. Every third week, Disp: , Rfl:     rivaroxaban (XARELTO) 20 mg Tab, Xarelto 20 mg tablet  Take 1 tablet every day by oral route., Disp: , Rfl:     terazosin (HYTRIN) 2 MG capsule, Take 4 mg by mouth every evening. 2 TABS, Disp: , Rfl:     PMHx/PSHx Updates:  See patient's last visit with me on 9/6/2018  See H&P on 12/9/2015        Pathology:  Cancer Staging  Primary malignant neoplasm of left upper lobe of lung  Staging form: Lung, AJCC 7th Edition  - Clinical: Stage IB (T2a, N0, M0) - Signed by Todd Olvera Jr., MD on 6/28/2017          Objective:     Vitals:  Blood pressure (!) 90/59, pulse 99, temperature 98.4 °F (36.9 °C), resp. rate 20.    Physical Examination:   GEN: no apparent distress, comfortable; AAOx3  HEAD: atraumatic and normocephalic  EYES: no pallor, no icterus, PERRLA  ENT: OMM, no pharyngeal erythema, external ears WNL; no nasal discharge; no thrush  NECK: no masses, thyroid normal, trachea midline, no LAD/LN's, supple  CV: RRR with no murmur; normal pulse; normal S1 and S2; no pedal edema  CHEST: Normal respiratory effort; CTAB; normal breath sounds; no wheeze or  crackles  ABDOM: nontender and nondistended; soft; normal bowel sounds; no rebound/guarding  MUSC/Skeletal: ROM normal; no crepitus; joints normal; no deformities or arthropathy  EXTREM: no clubbing, cyanosis, inflammation or swelling  SKIN: no rashes, lesions, ulcers, petechiae or subcutaneous nodules  : no lucio  NEURO: grossly intact; motor/sensory WNL; AAOx3; no tremors  PSYCH: normal mood, affect and behavior  LYMPH: normal cervical, supraclavicular, axillary and groin LN's            Labs:   10/6/2018     Lab Results   Component Value Date    WBC 10.0 10/06/2018    HGB 8.6 (L) 10/06/2018    HCT 26.3 (L) 10/06/2018    MCV 99.6 10/06/2018    PLT 55 (L) 10/06/2018     BMP  Lab Results   Component Value Date     10/01/2018    K 4.2 10/01/2018     10/01/2018    CO2 27.9 10/01/2018    BUN 32 (H) 10/01/2018    CREATININE 0.94 10/01/2018    CALCIUM 7.8 10/01/2018    ANIONGAP 13 02/25/2013    ESTGFRAFRICA 75 09/21/2018    EGFRNONAA 65 09/21/2018     Lab Results   Component Value Date    ALT 18 09/21/2018    AST 31 10/01/2018    ALKPHOS 46 10/01/2018    BILITOT 2.2 (H) 10/01/2018               Radiology/Diagnostic Studies:      PET/CT 8/8/2018  IMPRESSION:    1. Enlarging focal ground glass opacity involving the anterior subpleural  aspect of the left upper lobe, with increasing FDG avidity. This is concerning  for disease progression.  2. Essentially stable CT appearance of FDG avid irregular parenchymal opacity  involving the lateral aspect of the left upper lobe.  3. Diminished FDG avidity of dependent right lower lobe airspace opacity and  prominent precarinal lymph node.  4. No evidence of extrathoracic metastasis.  5. New right inguinal hernia containing nonobstructed small bowel and mesentery.  6. Atherosclerosis and other stable findings as above      PET 5/9/2018:  IMPRESSION:    1. Ill-defined groundglass opacity now lies at site of previous confluent left  upper lobe nodular density on  04/23/2018 CT, with improved appearance  suggesting resolving alveolar opacity either reflecting treated malignancy or  improved infectious or inflammatory consolidation.    2. Unchanged 12 mm left lower lobe nodular density with very little associated  FDG activity since 09/08/2017.    3. Pleural based foci of FDG uptake laterally in left hemithorax showing little  change since 09/08/2017. Nearby calcified left pleural plaque suggesting this  could be inflammatory in nature.    4. Increased FDG uptake which is focal at site of right lower lobe  consolidative subpleural opacity measuring 35 x 13 mm with interval enlargement  since 09/08/2017. Although this could represent infectious or inflammatory  consolidation, progressive atelectasis, parenchymal scarring, developing  malignancy is also a consideration. Continued CT thorax without IV contrast  follow-up is recommended in 3 months.      CT scan 12/14/2017  IMPRESSION:    1. Unchanged size of left upper lobe and left lower lobe nodules since  09/08/2017, suggesting at least partially treated malignancy.    2. No new abnormality of concern for regional or distant metastasis.    3. Unchanged severe emphysema.    4. No significant change of 13 x 21 mm cystic lesion in pancreatic head. This  could reflect a nonneoplastic mucinous cyst or other low-grade lesion such as  side branch IPMN (intraductal papillary mucinous neoplasm).    5. Focal fat stranding in right lower quadrant mesenteric fat adjacent to  segment of terminal ileum where mild terminal ileal wall thickening is  questioned, although the appearance could be related to inadequate distention.  Focal inflammation related to infectious or inflammatory enteritis is a  consideration, with the appearance otherwise nonspecific.  I have reviewed all available lab results and radiology reports.      CT's 4/23/2018  IMPRESSION: Slight increase in size of the nodular density in the left upper  lobe with stable nodule  within the medial left lower lobe    Stable diffuse emphysematous changes with chronic fibrotic changes    Stable patchy airspace disease within the right lung base with associated  calcifications    Prior CABG    Colonic diverticulosis    Right inguinal hernia    No evidence of metastatic disease  Assessment/Plan:     (1) 73 y.o. male with diagnosis of NSCLC (Squamous cell)  - T2A lesion with 3.5cm involving NEIDA  - s/p monotherapy with XRT by Dr Olvera; followed by chemotherapy with 3 cycles of carbo/taxol  - followed by Dr Ely with Pulm - seen him on Dec 5th and f/u on Feb 6th  - CTA on 5/18/17 showed decrease size in the tumor mass  - CT on 12/14/17 with stable lung findings  - Latest CT scans show slight increase in size of the NEIDA nodule  - he had repeat PET on 5/9/2018 with over stable except for the one spot in the right lung  - he saw Dr Ely again June 6th and saw Dr Olvera on June 19th  - he completed XRT x 5 on July 13th  -  I discussed his case at the [facility] Tumor Board yesterday where Dr Az Castaneda and Dr Olvera, pathology, surgery, and radiology.   - the boards recommendation previously was to proceed with observation only with regular scans; they felt he was too high risk for surgery and immunologics  - however, repeat PET on 8/8/2018 is showing an enlarging nodule in NEIDA with increasing FDG activity and worrisome for progressive disease   - he received his first cycle of chemotherapy with carbo/gemzar the week before his hospitalization      (2) prior admit with SOB and suspected pneumonia     (3) LLE DVT and PE hx - along with prior CVA  - followed by Dr Lewis  - on Xarelto and ASA per cardiology direction  - MTHFR gene abnormality     (4) Anemia secondary to chemotherapy - resolved     (5) CAD s/p MI in past - followed by Joshua     (6)  Colonoscopy in July 2017 with Dr Holliday - He is saw Dr Hunter at [facility] on May 15th 2018 and had endoscopies on June 14th  - suspicious findings in  the pancreas but they were unable to biopsy     (7) BP issues - followed by Dr Lewis    (8) Pancreatic nodule/cyst - stable in size - he saw Dr Holliday on Jan 11th; and he had ERCP on Jan 30th with Dr Knapp but the lesion in question was too small to biopsy  - increase in bilirubin  - he saw Dr Bobby Hunter on May 15th in [place] and they did endoscopy with biopsy on June 14th and were supposed to do repeat scope but patient cancelled that appointment at request of Dr Holliday  - the scope was suspicious but not conclusive since they were unable to get biopsy of a intraductal papillary mucinous neoplasm  - I discussed with Dr Knapp yesterday and he feels that this nodule is stable and most likely benign      1. History of deep venous thrombosis (DVT) of distal vein of left lower extremity     2. History of pulmonary embolism     3. Squamous cell carcinoma of bronchus in left upper lobe     4. Primary malignant neoplasm of left upper lobe of lung     5. Chemotherapy-induced neutropenia     6. Anemia associated with chemotherapy         PLAN:  1. F/u with with PCP, PULM, GI, rad/onc etc  2. Previously discussed referral to MD Downey but patient declined  3.  He wants to resume chemotherapy - will check labs this week; set up for chemotherapy next Wednesday if labs are adequate; will reduce dose by 20%  4.  Check labs weekly    RTC 3-4 weeks      Fax note to Mg Ely Barrios, Dauterive, Dale Olvera and Jodee    I spent over 45 mins of time with the patient. Over half of this time was spent couseling and coordinating care.      Discussion:     I have explained all of the above in detail and the patient understands all of the current recommendation(s). I have answered all of their questions to the best of my ability and to their complete satisfaction.   The patient is to continue with the current management plan.            Electronically signed by Chris Cage MD

## 2018-10-30 NOTE — TELEPHONE ENCOUNTER
----- Message from Ema Arevalo sent at 10/30/2018 11:15 AM CDT -----  Pt needs to know if labs are good enough for chemo tomorrow.    # [phone]    Thank you,  Ema

## 2018-11-07 NOTE — TELEPHONE ENCOUNTER
Called angelina 11/07/2018 told her to take patient off schedule he went home from hospital on hospice

## 2018-11-08 NOTE — PROGRESS NOTES
Michael Shetty  [id number]  1945  11/8/2018  No referring provider defined for this encounter.    DIAGNOSIS: IB, [id number] SCCa NEIDA with suspected non-small cell lung carcinoma of right lower lobe    REASON FOR VISIT: Routine scheduled follow-up.    HISTORY OF PRESENT ILLNESS:   72M with bx-proven SCCa of NEIDA, a PET avid 3.2cm mass. No other sites of disease and a poor surgical candidate, FEV1 of 2L.    Completed definitive SBRT to 5000cGy in 5 frx ending 3/10/17.    Cmpleted 4c of adjuvant CTX with Dr. Cage and has incidental CTA associated with prior hospitalization that reveals tumor to have shrunk to 1.6cm.    *4/18 CT C/AP  Slight increase in size of the nodular density in the left upper lobe with stable nodule within the medial left lower lobe Stable diffuse emphysematous changes with chronic fibrotic changes Stable patchy airspace disease within the right lung base with associated calcifications Prior CABG Colonic diverticulosis Right inguinal hernia No evidence of metastatic disease    *5/18 PET/CT  1. Ill-defined groundglass opacity now lies at site of previous confluent left upper lobe nodular density on 04/23/2018 CT, with improved appearance suggesting resolving alveolar opacity either reflecting treated malignancy or improved infectious or inflammatory consolidation. 2. Unchanged 12 mm left lower lobe nodular density with very little associated FDG activity since 09/08/2017. 3. Pleural based foci of FDG uptake laterally in left hemithorax showing little change since 09/08/2017. Nearby calcified left pleural plaque suggesting this could be inflammatory in nature. 4. Increased FDG uptake which is focal at site of right lower lobe consolidative subpleural opacity measuring 35 x 13 mm with interval enlargement since 09/08/2017. Although this could represent infectious or inflammatory consolidation, progressive atelectasis, parenchymal scarring, developing malignancy is also a consideration. Continued  CT thorax without IV contrast follow-up is recommended in 3 months.    *Dr. Ely: pt cannot undergo bx safely; not accessible by bronch*  *Dr. Cage: consider RT; +/- CTX    Patient completed stereotactic body radiation therapy, 5000 cGy in 5 fractions in July 2018. At the end of treatment patient had mild exacerbation of his shortness of breath.    INTERVAL HISTORY:   6/18 EUS Dr. Hunter  Cystic lesion in pancreatic body sonographically suspicious for branched intraductal papillary mucinous neoplasm; no biopsy obtained    Patient of therapy patient has met with Dr. Cage who recommended presenting his case to multidisciplinary tumor conference to discuss further workup and/or empiric treatment to suspicious pancreatic mass. It appears as though this cannot be safely biopsied and recommendation was to continue to monitor radiographically.    Patient is scheduled to begin systemic therapy and was hospitalized with primary embolus and change to Eliquis. He was discharged to an LTAC and re-presented for consideration systemic therapy with laboratory evaluation concerning for elevated white blood count, subsequent workup revealing pneumonia which required inpatient stay and he has recently been discharged on oral antibiotics and steroids. He has follow-up with pulmonology next week and with Dr. Cage the week after.    Today he denies fever, chills, chest pain. He does endorse occasional cough that is nonproductive and shortness of breath requiring supplement oxygen at times up to 5 L.    Review of Systems   Constitutional: Negative for appetite change, chills, fever and unexpected weight change.   HENT:   Negative for lump/mass, mouth sores, sore throat, trouble swallowing and voice change.    Eyes: Negative for eye problems and icterus.   Respiratory: Positive for shortness of breath. Negative for chest tightness, cough and hemoptysis.    Cardiovascular: Negative for chest pain and leg swelling.    Gastrointestinal: Negative for abdominal distention, abdominal pain, blood in stool, constipation, diarrhea, nausea and vomiting.   Genitourinary: Negative for bladder incontinence, difficulty urinating, dysuria, frequency, hematuria and nocturia.    Musculoskeletal: Negative for back pain, gait problem, neck pain and neck stiffness.   Neurological: Negative for extremity weakness, gait problem, headaches, numbness and seizures.   Hematological: Negative for adenopathy. Bruises/bleeds easily.     Past Medical History:   Diagnosis Date    Arthritis     BPH (benign prostatic hyperplasia)     Chemotherapy-induced neutropenia 2018    Chemotherapy-induced neutropenia 2018    Coronary artery disease     MI X 2    Gout, chronic     Heartburn     Hypertension     Myocardial infarction     PE (pulmonary embolism)     Presence of dental bridge     UPPER - NOT WEAR MUCH AS DOES NOT FIT WELL    Primary malignant neoplasm of left upper lobe of lung 2017    Staph infection      Past Surgical History:   Procedure Laterality Date    CARDIAC SURGERY      CABG X 4 9-11    CTR      BILAT    OSTEOTOMY, METACARPAL BONE Right 3/1/2013    Performed by Daron Jalloh Jr., MD at [facility] OR    ROTATOR CUFF REPAIR      left    TONSILLECTOMY      TRIGGER FINGER RELEASE      right and left hands.     Social History     Socioeconomic History    Marital status:      Spouse name: None    Number of children: None    Years of education: None    Highest education level: None   Social Needs    Financial resource strain: None    Food insecurity - worry: None    Food insecurity - inability: None    Transportation needs - medical: None    Transportation needs - non-medical: None   Occupational History    None   Tobacco Use    Smoking status: Former Smoker     Packs/day: 2.00     Years: 25.00     Pack years: 50.00     Last attempt to quit: 1990     Years since quittin.7    Smokeless tobacco:  Never Used   Substance and Sexual Activity    Alcohol use: Yes     Comment: 2 PER DAY    Drug use: No    Sexual activity: None   Other Topics Concern    None   Social History Narrative    None     Family History   Problem Relation Age of Onset    Cancer Sister         gallbladder        Medication List           Accurate as of 11/8/18  4:57 PM. If you have any questions, ask your nurse or doctor.               CONTINUE taking these medications    atenolol 25 MG tablet  Commonly known as:  TENORMIN     cefUROXime 500 MG tablet  Commonly known as:  CEFTIN     cilostazol 100 MG Tab  Commonly known as:  PLETAL     ciprofloxacin HCl 250 MG tablet  Commonly known as:  CIPRO     doxycycline 100 MG tablet  Commonly known as:  VIBRA-TABS     ELIQUIS ORAL     MEN'S MULTI-VITAMIN per tablet  Generic drug:  multivitamin     nitroGLYCERIN 0.3 MG SL tablet  Commonly known as:  NITROSTAT     pravastatin 40 MG tablet  Commonly known as:  PRAVACHOL     PREDNISOLONE ORAL     tamsulosin 0.4 mg Cap  Commonly known as:  FLOMAX     terazosin 2 MG capsule  Commonly known as:  HYTRIN     VENTOLIN HFA 90 mcg/actuation inhaler  Generic drug:  albuterol     XARELTO 20 mg Tab  Generic drug:  rivaroxaban     ZANTAC 75 75 MG tablet  Generic drug:  ranitidine     ZYLOPRIM 100 MG tablet  Generic drug:  allopurinol          Review of patient's allergies indicates:   Allergen Reactions    Iodine and iodide containing products Hives     ALLERGIC TO SOFT SHELL CRAB ONLY    Shellfish containing products      soft shell crabs       QUALITY OF LIFE: 70%    Vitals:    11/08/18 1518   PainSc: 0-No pain       PHYSICAL EXAM:   GENERAL: alert; in no apparent distress.   HEAD: normocephalic, atraumatic.  EYES: pupils are equal, round, reactive to light and accommodation. Sclera anicteric. Conjunctiva not injected.   NOSE/THROAT: no nasal erythema or rhinorrhea. Oropharynx pink, without erythema, ulcerations or thrush.   NECK: no cervical motion  rigidity; supple with no masses.  CHEST: clear to auscultation bilaterally; no definitive wheezes, crackles or rubs. Patient with normal work of breathing without using accessory muscles of respiration. Poor air movement; he is on 3 L of oxygen at today's visit.  CARDIOVASCULAR: regular rate and rhythm; no murmurs, rubs or gallops.  MUSCULOSKELETAL: no tenderness to palpation along the spine or scapulae. Normal range of motion.  NEUROLOGIC: cranial nerves II-XII intact bilaterally. Strength 5/5 in bilateral upper and lower extremities. Normal gait.  LYMPHATIC: no cervical, supraclavicular adenopathy appreciated bilaterally.   EXTREMITIES: mild clubbing; no cyanosis, edema.  SKIN: Ecchymoses and senile purpura bilateral upper extremities    ANCILLARY DATA:   9/18 CT C  Positive study for pulmonary embolism with large burden of tubular branching occlusive thrombus filling several dilated right lower lobe segmental pulmonary arteries, plus other sites of nonocclusive thrombus across right middle lobe and left lower lobe segmental and subsegmental branches.      ASSESSMENT: 72M with stage IB, [id number] SCCa NEIDA s/p definitive SBRT and adjuvant CTX s/p 50Gy SBRT RLL.  PLAN:  Mr. Shetty tolerated his radiotherapy courses well was treated with adjuvant systemic therapy after the first course. He was plan for further systemic therapy after decision by multidisciplinary tumor conference to observe pancreatic abnormality. Unfortunately he developed primary embolus requiring change in his anticoagulation and prolonged rehabilitation stay. He then presented with pneumonia and is previously been discharged and remains on antibiotics. I directed him to discuss the utility of prophylactic antibiotic therapy which he was on prior to hospitalization, with Dr. Ely his pulmonologist. He also remains on oxygen was inquiring about the utility of a humidifier which I directed him to avoid until discussing with pulmonology. I reviewed  his films which are difficult to interpret in the setting of pneumonia and point embolus but appears as though he has at least stable disease from the 2 previously treated sites. He will continue on antibiotic therapy and prednisone until presenting to pulmonology and will be reconsidered for systemic therapy the following week when he meets with Dr. Cage. He can follow-up with [facility] as needed.    The patient has our contact information and understands that they are free to contact us at any time with questions or concerns regarding radiation therapy.    All questions answered and contact information provided. Patient understands free to call us anytime with any questions or concerns regarding radiation therapy.    TIME SPENT WITH PATIENT: I have personally seen and evaluated this patient. Approximately 30 minutes were spent with the patient discussing follow-up careplan.     PHYSICIAN: Todd Olvera Jr, MD

## 2018-12-11 NOTE — PROGRESS NOTES
[facility] Hematology/Oncology  PROGRESS NOTE      Subjective:       Patient ID:   NAME: Michael Shetty : 1945     73 y.o. male    Referring Doc: Jodee  Other Physicians: Mg Ely Barrios, Dauterive, Mannina    Chief Complaint:  Lung ca f/u    History of Present Illness:     Patient returns today for a regularly scheduled follow-up visit.  He has been in and out of the hospital and was just recently discharged from [facility]. He is here with   His wife. He is still requiring O2 and has chronic STREETER and SOB. He uses a scooter to move around. He had another bout of pneumonia and has since seen Dr Ely and finished the hospital oral antibiotics.           ROS:   GEN: normal without any fever, night sweats or weight loss  HEENT: normal with no HA's, sore throat, stiff neck, changes in vision  CV: normal with no CP, SOB, PND, STREETER or orthopnea  PULM: normal with no SOB, cough, hemoptysis, sputum or pleuritic pain  GI: normal with no abdominal pain, nausea, vomiting, constipation, diarrhea, melanotic stools, BRBPR, or hematemesis  : normal with no hematuria, dysuria  BREAST: normal with no mass, discharge, pain  SKIN: normal with no rash, erythema, bruising, or swelling    Allergies:  Review of patient's allergies indicates:   Allergen Reactions    Iodine and iodide containing products Hives     ALLERGIC TO SOFT SHELL CRAB ONLY    Shellfish containing products      soft shell crabs       Medications:    Current Outpatient Medications:     allopurinol (ZYLOPRIM) 100 MG tablet, Take 100 mg by mouth once daily. , Disp: , Rfl:     apixaban (ELIQUIS ORAL), Take by mouth., Disp: , Rfl:     atenolol (TENORMIN) 25 MG tablet, Take 25 mg by mouth daily as needed., Disp: , Rfl:     cefUROXime (CEFTIN) 500 MG tablet, Take 250 mg by mouth every 12 (twelve) hours. Every third week, Disp: , Rfl:     cilostazol (PLETAL) 100 MG Tab, 100 mg 2 (two) times daily. , Disp: , Rfl:     ciprofloxacin HCl (CIPRO) 250 MG  tablet, Take 250 mg by mouth 2 (two) times daily., Disp: , Rfl:     doxycycline (VIBRA-TABS) 100 MG tablet, Take 100 mg by mouth. Every third week, Disp: , Rfl:     multivitamin (MEN'S MULTI-VITAMIN) per tablet, Take 1 tablet by mouth once daily., Disp: , Rfl:     nitroGLYCERIN (NITROSTAT) 0.3 MG SL tablet, Place 0.3 mg under the tongue every 5 (five) minutes as needed for Chest pain., Disp: , Rfl:     pravastatin (PRAVACHOL) 40 MG tablet, Take 40 mg by mouth nightly., Disp: , Rfl:     PREDNISOLONE ORAL, Take by mouth., Disp: , Rfl:     ranitidine (ZANTAC 75) 75 MG tablet, Take 75 mg by mouth nightly. , Disp: , Rfl:     rivaroxaban (XARELTO) 20 mg Tab, Xarelto 20 mg tablet  Take 1 tablet every day by oral route., Disp: , Rfl:     tamsulosin (FLOMAX) 0.4 mg Cap, Take 0.4 mg by mouth once daily., Disp: , Rfl:     terazosin (HYTRIN) 2 MG capsule, Take 4 mg by mouth every evening. 2 TABS, Disp: , Rfl:     VENTOLIN HFA 90 mcg/actuation inhaler, Inhale 1-2 puffs into the lungs every 6 (six) hours as needed. , Disp: , Rfl:     PMHx/PSHx Updates:  See patient's last visit with me on 10/25//2018  See H&P on 12/9/2015        Pathology:  Cancer Staging  Primary malignant neoplasm of left upper lobe of lung  Staging form: Lung, AJCC 7th Edition  - Clinical: Stage IB (T2a, N0, M0) - Signed by Todd Olvera Jr., MD on 6/28/2017          Objective:     Vitals:  Blood pressure 110/72, pulse (!) 120, temperature 97.9 °F (36.6 °C), resp. rate 20.    Physical Examination:   GEN: no apparent distress, comfortable; AAOx3  HEAD: atraumatic and normocephalic  EYES: no pallor, no icterus, PERRLA  ENT: OMM, no pharyngeal erythema, external ears WNL; no nasal discharge; no thrush  NECK: no masses, thyroid normal, trachea midline, no LAD/LN's, supple  CV: RRR with no murmur; normal pulse; normal S1 and S2; no pedal edema  CHEST: diffuse decrease BS; no wheeze; on O2 continuously  ABDOM: nontender and nondistended; soft; normal  bowel sounds; no rebound/guarding  MUSC/Skeletal: ROM normal; no crepitus; joints normal; no deformities or arthropathy; uses scooter  EXTREM: no clubbing, cyanosis, inflammation or swelling  SKIN: no rashes, lesions, ulcers, petechiae or subcutaneous nodules  : no lucio  NEURO: grossly intact; motor/sensory WNL; AAOx3; no tremors  PSYCH: normal mood, affect and behavior  LYMPH: normal cervical, supraclavicular, axillary and groin LN's            Labs:   12/6/2018     Lab Results   Component Value Date    WBC 14.7 (H) 12/06/2018    HGB 12.6 (L) 12/06/2018    HCT 37.4 (L) 12/06/2018    MCV 99.7 12/06/2018     12/06/2018     BMP  Lab Results   Component Value Date     12/06/2018    K 4.4 12/06/2018    CL 99 12/06/2018    CO2 32 12/06/2018    BUN 24 12/06/2018    CREATININE 0.81 12/06/2018    CALCIUM 8.9 12/06/2018    ANIONGAP 13 02/25/2013    ESTGFRAFRICA 103 12/06/2018    EGFRNONAA 89 12/06/2018     Lab Results   Component Value Date    ALT 22 12/06/2018    AST 20 12/06/2018    ALKPHOS 53 12/06/2018    BILITOT 2.0 (H) 12/06/2018               Radiology/Diagnostic Studies:      CTA 11/27/2018:    IMPRESSION: Positive PE study.    1. Small to moderate volume of occlusive pulmonary thromboembolus in the  posterior medial segment of the right lower lobe similar in imaging  appearance/distribution the previous exam. There is a consolidative airspace  opacity in the same distribution.    2. No evidence of right heart strain or failure.    3. Unchanged ectasia of the ace ascending thoracic aorta (4.4 cm in diameter)  with atheromatous calcifications in the aorta and coronary arteries status post  CABG.    4. Moderate to severe apical predominant emphysematous lung disease.    5. Unchanged mixed groundglass and interstitial lung markings bilaterally with  a peripheral and basilar predominance.      PET/CT 8/8/2018  IMPRESSION:    1. Enlarging focal ground glass opacity involving the anterior subpleural  aspect  of the left upper lobe, with increasing FDG avidity. This is concerning  for disease progression.  2. Essentially stable CT appearance of FDG avid irregular parenchymal opacity  involving the lateral aspect of the left upper lobe.  3. Diminished FDG avidity of dependent right lower lobe airspace opacity and  prominent precarinal lymph node.  4. No evidence of extrathoracic metastasis.  5. New right inguinal hernia containing nonobstructed small bowel and mesentery.  6. Atherosclerosis and other stable findings as above      PET 5/9/2018:  IMPRESSION:    1. Ill-defined groundglass opacity now lies at site of previous confluent left  upper lobe nodular density on 04/23/2018 CT, with improved appearance  suggesting resolving alveolar opacity either reflecting treated malignancy or  improved infectious or inflammatory consolidation.    2. Unchanged 12 mm left lower lobe nodular density with very little associated  FDG activity since 09/08/2017.    3. Pleural based foci of FDG uptake laterally in left hemithorax showing little  change since 09/08/2017. Nearby calcified left pleural plaque suggesting this  could be inflammatory in nature.    4. Increased FDG uptake which is focal at site of right lower lobe  consolidative subpleural opacity measuring 35 x 13 mm with interval enlargement  since 09/08/2017. Although this could represent infectious or inflammatory  consolidation, progressive atelectasis, parenchymal scarring, developing  malignancy is also a consideration. Continued CT thorax without IV contrast  follow-up is recommended in 3 months.      CT scan 12/14/2017  IMPRESSION:    1. Unchanged size of left upper lobe and left lower lobe nodules since  09/08/2017, suggesting at least partially treated malignancy.    2. No new abnormality of concern for regional or distant metastasis.    3. Unchanged severe emphysema.    4. No significant change of 13 x 21 mm cystic lesion in pancreatic head. This  could reflect a  nonneoplastic mucinous cyst or other low-grade lesion such as  side branch IPMN (intraductal papillary mucinous neoplasm).    5. Focal fat stranding in right lower quadrant mesenteric fat adjacent to  segment of terminal ileum where mild terminal ileal wall thickening is  questioned, although the appearance could be related to inadequate distention.  Focal inflammation related to infectious or inflammatory enteritis is a  consideration, with the appearance otherwise nonspecific.  I have reviewed all available lab results and radiology reports.      CT's 4/23/2018  IMPRESSION: Slight increase in size of the nodular density in the left upper  lobe with stable nodule within the medial left lower lobe    Stable diffuse emphysematous changes with chronic fibrotic changes    Stable patchy airspace disease within the right lung base with associated  calcifications    Prior CABG    Colonic diverticulosis    Right inguinal hernia    No evidence of metastatic disease  Assessment/Plan:     (1) 73 y.o. male with diagnosis of NSCLC (Squamous cell)  - T2A lesion with 3.5cm involving NEIDA  - s/p monotherapy with XRT by Dr Olvera; followed by chemotherapy with 3 cycles of carbo/taxol  - followed by Dr Ely with Pulm - seen him on Dec 5th and f/u on Feb 6th  - CTA on 5/18/17 showed decrease size in the tumor mass  - CT on 12/14/17 with stable lung findings  - Latest CT scans show slight increase in size of the NEIDA nodule  - he had repeat PET on 5/9/2018 with over stable except for the one spot in the right lung  - he saw Dr Ely again June 6th and saw Dr Olvera on June 19th  - he completed XRT x 5 on July 13th  -  I discussed his case at the [facility] Board yesterday where Dr Az Castaneda and Dr Olvera, pathology, surgery, and radiology.   - the boards recommendation previously was to proceed with observation only with regular scans; they felt he was too high risk for surgery and immunologics  - however, repeat PET on  8/8/2018 is showing an enlarging nodule in NEIDA with increasing FDG activity and worrisome for progressive disease   - he received his first and only cycle of chemotherapy with carbo/gemzar the week before his prior hospitalization      (2) re-admit with SOB and suspected pneumonia again at end of Nov 2018   - repeat CTA looks stable per report     (3) LLE DVT and PE hx - along with prior CVA  - followed by Dr Lewis  - on Xarelto and ASA per cardiology direction  - MTHFR gene abnormality     (4) Anemia secondary to chemotherapy - resolved     (5) CAD s/p MI in past - followed by Joshua     (6)  Colonoscopy in July 2017 with Dr Holliday - He is saw Dr Hunter at [facility] on May 15th 2018 and had endoscopies on June 14th  - suspicious findings in the pancreas but they were unable to biopsy     (7) BP issues - followed by Dr Lewis    (8) Pancreatic nodule/cyst - stable in size - he saw Dr Holliday on Jan 11th; and he had ERCP on Jan 30th with Dr Knapp but the lesion in question was too small to biopsy  - increase in bilirubin  - he saw Dr Bobby Hunter on May 15th in [place] and they did endoscopy with biopsy on June 14th and were supposed to do repeat scope but patient cancelled that appointment at request of Dr Holliday  - the scope was suspicious but not conclusive since they were unable to get biopsy of a intraductal papillary mucinous neoplasm  - I discussed with Dr Knapp yesterday and he feels that this nodule is stable and most likely benign      1. 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 History of deep venous thrombosis (DVT) of distal vein of left lower extremity     3. History of pulmonary embolism     4. Primary malignant neoplasm of left upper lobe of lung     5. Chemotherapy-induced neutropenia     6. Anemia associated with chemotherapy         PLAN:  1. F/u with with PCP, PULM, GI, rad/onc etc  2. Previously discussed referral to MD [facility] but patient declined  3.  Will  consider resuming chemotherapy in Jan 2019 - will most likely need to reduce dose by 20%  4.  Check labs prior to next visit    RTC 3-4 weeks      Fax note to Mg Ely Barrios, Dauterive, Mannina, Joshi and Jodee    I spent over 45 mins of time with the patient. Over half of this time was spent couseling and coordinating care.      Discussion:     I have explained all of the above in detail and the patient understands all of the current recommendation(s). I have answered all of their questions to the best of my ability and to their complete satisfaction.   The patient is to continue with the current management plan.            Electronically signed by Chris Cage MD

## 2018-12-11 NOTE — LETTER
December 11, 2018      Michael Ellsworth MD  [address] [place] [address] [address]  [place] [address]           [facility] - Hematology Oncology  [address] [facility]  [address]  [place] [address]  Phone: [phone]  Fax: [phone]          Patient: Michael Shetty   MR Number: [id number]   YOB: 1945   Date of Visit: 12/11/2018       Dear Dr. Michael Ellsworth:    Thank you for referring Michael Shetty to me for evaluation. Attached you will find relevant portions of my assessment and plan of care.    If you have questions, please do not hesitate to call me. I look forward to following Michael Shetty along with you.    Sincerely,    Chris Cage MD    Enclosure  CC:  No Recipients    If you would like to receive this communication electronically, please contact [email or web address] or [phone] to request more information on [employer] Link access.    For providers and/or their staff who would like to refer a patient to [facility], please contact us through our one-stop-shop provider referral line, [facility], at [phone].    If you feel you have received this communication in error or would no longer like to receive these types of communications, please e-mail [email or web address]

## 2018-12-25 PROBLEM — I95.89 CHRONIC HYPOTENSION: Status: ACTIVE | Noted: 2018-01-01

## 2018-12-25 PROBLEM — J18.9 CHRONIC PNEUMONIA: Status: ACTIVE | Noted: 2018-01-01

## 2018-12-25 PROBLEM — J43.9 PULMONARY EMPHYSEMA: Status: ACTIVE | Noted: 2018-01-01

## 2018-12-25 PROBLEM — I26.09 OTHER PULMONARY EMBOLISM WITH ACUTE COR PULMONALE: Status: ACTIVE | Noted: 2018-01-01

## 2018-12-25 PROBLEM — J96.21 ACUTE ON CHRONIC RESPIRATORY FAILURE WITH HYPOXEMIA: Status: ACTIVE | Noted: 2018-01-01

## 2018-12-25 PROBLEM — I25.810 CORONARY ARTERY DISEASE INVOLVING CORONARY BYPASS GRAFT: Status: ACTIVE | Noted: 2018-01-01

## 2018-12-25 NOTE — PLAN OF CARE
Problem: Adult Inpatient Plan of Care  Goal: Plan of Care Review  Outcome: Ongoing (interventions implemented as appropriate)  SR/ST on telemetry, 2+ pulses, MAP > 65  AAOx4, moves all extremities, up to commode  High flow NC 25L 70% FiO2, NRB mask @ bedside if SOB  NPO, voids per bedpan  Voids per urinal, hernia belt on  Heparin gtt to be started    VS and assessment per flowsheet

## 2018-12-25 NOTE — HPI
Mr. 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     HPI: 4 days ago he had acute SOB requring 10L of oxygen at home when he is at a baseline of 6L. At [facility] his CTPA showed known PE of RLL pulmonary artery & increased size of right sided pleural effusion. Doppler u/s of BLE was negative for DVT. OSH continued patient on home eliquis regimen of 5mg BID and per patient did not notice improvement in oxygen requirements so he was sent to [facility].      Vitals during initial interview: 135/75, -104, 25-20L comfort flow O2 FiO2 70%.       Of note patient has chronic severe pseudomonas PNA for which he takes an alternating regimen of doxycycline, cipro, & cefuroxime. He quit smoking 30 years ago.

## 2018-12-26 PROBLEM — Z51.5 PALLIATIVE CARE ENCOUNTER: Status: ACTIVE | Noted: 2018-01-01

## 2018-12-26 PROBLEM — I27.82 CHRONIC PULMONARY EMBOLISM: Status: ACTIVE | Noted: 2018-01-01

## 2018-12-26 PROBLEM — Z71.89 GOALS OF CARE, COUNSELING/DISCUSSION: Status: ACTIVE | Noted: 2018-01-01

## 2018-12-26 NOTE — ASSESSMENT & PLAN NOTE
74yo presents with acute SOB on a background of PEs & MTHFR mutation found to have recurrent PE on CTPA  -Interventional cardiology consulted  -bedside TTE performed 12/25/2018  -seems less likely to be a candidate for catheter assisted thrombolysis at this time due to his active cancer  -appreciate recs  -continuing eliquis 5mg BID for now

## 2018-12-26 NOTE — CONSULTS
Palliative Care Acknowledgement of Consult - 12/26/18    Consult received. Palliative Care Provider: Dr. DREAD Dunn will touch base with team prior to seeing patient. Full consult to follow.    Thank you for allowing us to be a part of the care of this patient.          Alyssa Nuñez, FERNANDO, ACHP-SW

## 2018-12-26 NOTE — PLAN OF CARE
Problem: Adult Inpatient Plan of Care  Goal: Plan of Care Review  Outcome: Ongoing (interventions implemented as appropriate)  VSS within the shift. Patient is conscious and coherent. Afebrile without pain concerns. On sinus rhythm. No hypotension noted. Comfort flow titrated accordingly following an O2 sat goal of 88 to 92%. Productive cough noted. NPO diet observed. Voiding freely. Patient is still for evaluation for endovascular intervention. POC discussed with patient will continue to monitor..

## 2018-12-26 NOTE — ASSESSMENT & PLAN NOTE
Palliative Care Encounter / Goals of care discussion:     Narrative:   Michael Shetty is a 73 y.o. male patient with lung cancer, undergoing CTX, XRT (on hold for PE and PNA), clotting disorder s/p several PE, now severe hypoxema requiring increasing doses of O2 and likely superimposed PNA    1: Pain / Physical symptom Assessment:   - pain assesment: denies   - dyspnea assessment: moderate to severe   - anxiety assessment: mild   - depression assessment: denies    2: Social Background    - lives with daughter    3: Palliative care meeting participants:    Daughter and patient    4: Goals of care Discussion details:   Pt. Has good insight in his disease and is aware of his prognosis   His goal is to return home as independent as possible. He would like to receive home health after discharge.    He is hopeful that with ABX his O2 requirement will decline and he will be able to return home   He will consider continuing CTX once he sees his recovery.      Will need to assign POA, will do paperwork   We will discuss extent of care desired and complete [place] Post on follow up   Will further clarify his wishes in case of code blue (partial code currently)    5: Goals of care Decisions / Recommendations / Plan:   Goals defined   Will assign POA on follow up   Will complete [place] Post   Will discuss discharge options including hospice on follow up (currently opposed).     6: Follow up plans:    daily    Thank you for your consult. I will follow along with you. Please call [phone] with questions.

## 2018-12-26 NOTE — HPI
Michael Shetty is a nice 74 yo mald with a past history of MTHFR mutation and several past PE and DVT episodes. He had been on several OAC, most recently on Eliquis. He has SCLC and is currently undergoing CTX and XRT, treatment had been paused due to PE and recurrent pseudomonas PNA.   He developed acute worsening of his dyspnea (usually able to ambulate to BR and do his ADL on ~ 5L O2), severe exertional dyspnea and needing to increase his oxygen to 10l/min.   He presented to [facility] but was transferred to our facility for evaluation of catheter assisted thrombolysis.     He was thought not a candidate for thrombolysis due to poor risk benefit ratio, he is felt to have a pneumonic infiltrate and is currently on ABX. He is requiring high flow O2 to sustain O2 saturation of ~89%.     I am being asked to discuss goals of care with the pt.

## 2018-12-26 NOTE — H&P
[facility]  Critical Care Medicine  History & Physical    Patient Name: Michael Shetty  MRN: [id number]  Admission Date: 12/25/2018  Hospital Length of Stay: 0 days  Code Status: Partial Code  Attending Physician: Rowdy Valdivia MD   Primary Care Provider: Michael Ellsworth MD   Principal Problem: Acute on chronic respiratory failure with hypoxemia    Subjective:     HPI:  Mr. Shetty is a 72yo man with PMH of MTHFR mutation, history of PEs on Eliquis, CAD s/p CABGn with chronically elevated troponin, hypotension on midodrine, squamous cell lung carcinoma of NEIDA s/p 1 cycle of adjuvant CTX & radiation therapy (which was stopped due to recurrent pseudomonas PNA & PEs), COPD, 54 pack years (quit 1990), chronic severe pseudomonas pna (3 abx for over a year) who presents as a transfer from [facility] for Interventional Cardiology evaluation for catheter assisted thrombolysis.     HPI: 4 days ago he had acute SOB requring 10L of oxygen at home when he is at a baseline of 6L. At [facility] his CTPA showed known PE of RLL pulmonary artery & increased size of right sided pleural effusion. Doppler u/s of BLE was negative for DVT. OSH continued patient on home eliquis regimen of 5mg BID and per patient did not notice improvement in oxygen requirements so he was sent to [facility] [place].      Vitals during initial interview: 135/75, -104, 25-20L comfort flow O2 FiO2 70%.       Of note patient has chronic severe pseudomonas PNA for which he takes an alternating regimen of doxycycline, cipro, & cefuroxime. He quit smoking 30 years ago.     Hospital/ICU Course:  No notes on file     Past Medical History:   Diagnosis Date    Arthritis     BPH (benign prostatic hyperplasia)     Chemotherapy-induced neutropenia 9/6/2018    Chemotherapy-induced neutropenia 9/6/2018    Coronary artery disease     MI X 2    Gout, chronic     Heartburn     Hypertension     Myocardial infarction     PE (pulmonary embolism)     Presence of dental  bridge     UPPER - NOT WEAR MUCH AS DOES NOT FIT WELL    Primary malignant neoplasm of left upper lobe of lung 2017    Staph infection        Past Surgical History:   Procedure Laterality Date    CARDIAC SURGERY      CABG X 4 9-11    CTR      BILAT    OSTEOTOMY, METACARPAL BONE Right 3/1/2013    Performed by [facility]oh [facility] OR    ROTATOR CUFF REPAIR      left    TONSILLECTOMY      TRIGGER FINGER RELEASE      right and left hands.       Review of patient's allergies indicates:   Allergen Reactions    Shellfish containing products      soft shell crabs       Family History     Problem Relation (Age of Onset)    Cancer Sister        Tobacco Use    Smoking status: Former Smoker     Packs/day: 2.00     Years: 25.00     Pack years: 50.00     Last attempt to quit: 1990     Years since quittin.8    Smokeless tobacco: Never Used   Substance and Sexual Activity    Alcohol use: Yes     Comment: 2 PER DAY    Drug use: No    Sexual activity: Not on file      Review of Systems   Constitutional: Negative for chills, fatigue, fever and unexpected weight change.   Respiratory: Positive for cough and shortness of breath. Negative for wheezing.    Cardiovascular: Negative for chest pain, palpitations and leg swelling.   Gastrointestinal: Negative for abdominal distention, constipation, diarrhea, nausea and vomiting.        Hernia   Endocrine: Negative for cold intolerance and heat intolerance.   Genitourinary: Negative for decreased urine volume, difficulty urinating, flank pain, frequency, hematuria and urgency.   Musculoskeletal: Negative for arthralgias, back pain and myalgias.   Skin: Negative for wound.   Neurological: Negative for facial asymmetry and numbness.   Psychiatric/Behavioral: Negative for agitation, behavioral problems, confusion and decreased concentration. The patient is not nervous/anxious.      Objective:     Vital Signs (Most Recent):  Temp: 97.6 °F (36.4 °C)  (12/25/18 1334)  Pulse: 84 (12/25/18 1800)  Resp: (!) 26 (12/25/18 1800)  BP: (!) 152/89 (12/25/18 1800)  SpO2: (!) 94 % (12/25/18 1800) Vital Signs (24h Range):  Temp:  [97.6 °F (36.4 °C)] 97.6 °F (36.4 °C)  Pulse:  [] 84  Resp:  [17-33] 26  SpO2:  [88 %-98 %] 94 %  BP: (123-152)/(68-89) 152/89   Weight: 83.7 kg (184 lb 8.4 oz)  Body mass index is 27.25 kg/m².      Intake/Output Summary (Last 24 hours) at 12/25/2018 1924  Last data filed at 12/25/2018 1715  Gross per 24 hour   Intake 109.27 ml   Output 525 ml   Net -415.73 ml       Physical Exam   Constitutional: He is oriented to person, place, and time. He appears well-developed and well-nourished. No distress.   On 30L O2 comfort flow FiO2 70%   HENT:   Head: Normocephalic and atraumatic.   Eyes: EOM are normal. Pupils are equal, round, and reactive to light.   Neck: Normal range of motion. Neck supple. No JVD present.   Cardiovascular: Regular rhythm.   Murmur heard.  Tachycardia  Systolic murmur   Pulmonary/Chest: No respiratory distress.   Abdominal: Soft. Bowel sounds are normal.   Musculoskeletal: He exhibits no edema.   Chronic bruising on BUE   Neurological: He is alert and oriented to person, place, and time. No cranial nerve deficit. Coordination normal.   Skin: Skin is warm and dry.   Psychiatric: He has a normal mood and affect. His behavior is normal. Judgment and thought content normal.       Vents:  Oxygen Concentration (%): 70 (12/25/18 1800)  Lines/Drains/Airways     Peripheral Intravenous Line                 Peripheral IV - Single Lumen 12/25/18 1300 Right Antecubital less than 1 day         Peripheral IV - Single Lumen 12/25/18 1500 Left Antecubital less than 1 day              Significant Labs:    CBC/Anemia Profile:  Recent Labs   Lab 12/25/18  1504   WBC 8.74   HGB 9.3*   HCT 31.4*   *   *   RDW 17.6*        Chemistries:  Recent Labs   Lab 12/25/18  1504      K 4.3      CO2 28   BUN 30*   CREATININE 1.0    CALCIUM 8.4*   MG 2.3   PHOS 2.6*       All pertinent labs within the past 24 hours have been reviewed.    Significant Imaging: I have reviewed and interpreted all pertinent imaging results/findings within the past 24 hours.    Assessment/Plan:     Pulmonary   * Acute on chronic respiratory failure with hypoxemia    72yo presents with acute SOB on a background of PEs & MTHFR mutation found to have recurrent PE on CTPA  -Interventional cardiology consulted  -bedside TTE performed 12/25/2018  -seems less likely to be a candidate for catheter assisted thrombolysis at this time due to his active cancer  -appreciate recs  -continuing eliquis 5mg BID for now     Chronic pneumonia    Chronic severe Pseudomonas PNA   -continue zosyn     Pulmonary emphysema    -duo nebs q6hr   -ICS budesonide 0.5mg q12hr  -tiotroprium 18mcg   -prednisone 60mg PO      Cardiac/Vascular   Coronary artery disease involving coronary bypass graft    S/p CABG     Chronic hypotension    -on midodrine 5mg TID  -BP stable  -will hold if sBP >130     Oncology   Primary malignant neoplasm of left upper lobe of lung    73 y.o. male with diagnosis of NSCLC (Squamous cell)  - T2A lesion with 3.5cm involving NEIDA  - s/p monotherapy with XRT by Dr Olvera; followed by chemotherapy with 3 cycles of carbo/taxol  - followed by Dr Ely with Pulm - seen him on Dec 5th and f/u on Feb 6th  - CTA on 5/18/17 showed decrease size in the tumor mass  - CT on 12/14/17 with stable lung findings  - Latest CT scans show slight increase in size of the NEIDA nodule  - he had repeat PET on 5/9/2018 with over stable except for the one spot in the right lung  - he saw Dr Ely again June 6th and saw Dr Olvera on June 19th  - he completed XRT x 5 on July 13th  -  discussed his case at the [facility] Tumor Board yesterday where Dr Az Castaneda and Dr Olvera, pathology, surgery, and radiology.   - the boards recommendation previously was to proceed with observation only with  regular scans; they felt he was too high risk for surgery and immunologics  - however, repeat PET on 8/8/2018 is showing an enlarging nodule in NEIDA with increasing FDG activity and worrisome for progressive disease   - he received his first and only cycle of chemotherapy with carbo/gemzar the week before his prior hospitalization             Critical Care Daily Checklist:    A: Awake: RASS Goal/Actual Goal: RASS Goal: 0-->alert and calm  Actual: Rob Agitation Sedation Scale (RASS): Alert and calm   B: Spontaneous Breathing Trial Performed?     C: SAT & SBT Coordinated?  n/a                      D: Delirium: CAM-ICU Overall CAM-ICU: Negative   E: Early Mobility Performed? Yes   F: Feeding Goal:    Status:     Current Diet Order   Procedures    Diet NPO      AS: Analgesia/Sedation n/a   T: Thromboembolic Prophylaxis    H: HOB > 300 Yes   U: Stress Ulcer Prophylaxis (if needed) yes   G: Glucose Control n/a   B: Bowel Function     I: Indwelling Catheter (Lines & Bassett) Necessity n/a   D: De-escalation of Antimicrobials/Pharmacotherapies zosyn    Plan for the day/ETD Pending cardiology recs     Code Status:  Family/Goals of Care: Partial Code  DNI        Critical secondary to Patient has a condition that poses threat to life and bodily function: Pulmonary Embolism and Severe Respiratory Distress     Critical care was time spent personally by me on the following activities: development of treatment plan with patient or surrogate and bedside caregivers, discussions with consultants, evaluation of patient's response to treatment, examination of patient, ordering and performing treatments and interventions, ordering and review of laboratory studies, ordering and review of radiographic studies, pulse oximetry, re-evaluation of patient's condition. This critical care time did not overlap with that of any other provider or involve time for any procedures.     Elisa Pagan MD  Internal Medicine, PGY-I  CCS1 spectralink  #[id number]  [facility]

## 2018-12-26 NOTE — SUBJECTIVE & OBJECTIVE
Past Medical History:   Diagnosis Date    Arthritis     BPH (benign prostatic hyperplasia)     Chemotherapy-induced neutropenia 2018    Chemotherapy-induced neutropenia 2018    Coronary artery disease     MI X 2    Gout, chronic     Heartburn     Hypertension     Myocardial infarction     PE (pulmonary embolism)     Presence of dental bridge     UPPER - NOT WEAR MUCH AS DOES NOT FIT WELL    Primary malignant neoplasm of left upper lobe of lung 2017    Staph infection        Past Surgical History:   Procedure Laterality Date    CARDIAC SURGERY      CABG X 4 9-11    CTR      BILAT    OSTEOTOMY, METACARPAL BONE Right 3/1/2013    Performed by Daron Jalloh Jr., MD at [facility] OR    ROTATOR CUFF REPAIR      left    TONSILLECTOMY      TRIGGER FINGER RELEASE      right and left hands.       Review of patient's allergies indicates:   Allergen Reactions    Shellfish containing products      soft shell crabs       Family History     Problem Relation (Age of Onset)    Cancer Sister        Tobacco Use    Smoking status: Former Smoker     Packs/day: 2.00     Years: 25.00     Pack years: 50.00     Last attempt to quit: 1990     Years since quittin.8    Smokeless tobacco: Never Used   Substance and Sexual Activity    Alcohol use: Yes     Comment: 2 PER DAY    Drug use: No    Sexual activity: Not on file      Review of Systems   Constitutional: Negative for chills, fatigue, fever and unexpected weight change.   Respiratory: Positive for cough and shortness of breath. Negative for wheezing.    Cardiovascular: Negative for chest pain, palpitations and leg swelling.   Gastrointestinal: Negative for abdominal distention, constipation, diarrhea, nausea and vomiting.        Hernia   Endocrine: Negative for cold intolerance and heat intolerance.   Genitourinary: Negative for decreased urine volume, difficulty urinating, flank pain, frequency, hematuria and urgency.   Musculoskeletal: Negative  for arthralgias, back pain and myalgias.   Skin: Negative for wound.   Neurological: Negative for facial asymmetry and numbness.   Psychiatric/Behavioral: Negative for agitation, behavioral problems, confusion and decreased concentration. The patient is not nervous/anxious.      Objective:     Vital Signs (Most Recent):  Temp: 97.6 °F (36.4 °C) (12/25/18 1334)  Pulse: 84 (12/25/18 1800)  Resp: (!) 26 (12/25/18 1800)  BP: (!) 152/89 (12/25/18 1800)  SpO2: (!) 94 % (12/25/18 1800) Vital Signs (24h Range):  Temp:  [97.6 °F (36.4 °C)] 97.6 °F (36.4 °C)  Pulse:  [] 84  Resp:  [17-33] 26  SpO2:  [88 %-98 %] 94 %  BP: (123-152)/(68-89) 152/89   Weight: 83.7 kg (184 lb 8.4 oz)  Body mass index is 27.25 kg/m².      Intake/Output Summary (Last 24 hours) at 12/25/2018 1924  Last data filed at 12/25/2018 1715  Gross per 24 hour   Intake 109.27 ml   Output 525 ml   Net -415.73 ml       Physical Exam   Constitutional: He is oriented to person, place, and time. He appears well-developed and well-nourished. No distress.   On 30L O2 comfort flow FiO2 70%   HENT:   Head: Normocephalic and atraumatic.   Eyes: EOM are normal. Pupils are equal, round, and reactive to light.   Neck: Normal range of motion. Neck supple. No JVD present.   Cardiovascular: Regular rhythm.   Murmur heard.  Tachycardia  Systolic murmur   Pulmonary/Chest: No respiratory distress.   Abdominal: Soft. Bowel sounds are normal.   Musculoskeletal: He exhibits no edema.   Chronic bruising on BUE   Neurological: He is alert and oriented to person, place, and time. No cranial nerve deficit. Coordination normal.   Skin: Skin is warm and dry.   Psychiatric: He has a normal mood and affect. His behavior is normal. Judgment and thought content normal.       Vents:  Oxygen Concentration (%): 70 (12/25/18 1800)  Lines/Drains/Airways     Peripheral Intravenous Line                 Peripheral IV - Single Lumen 12/25/18 1300 Right Antecubital less than 1 day          Peripheral IV - Single Lumen 12/25/18 1500 Left Antecubital less than 1 day              Significant Labs:    CBC/Anemia Profile:  Recent Labs   Lab 12/25/18  1504   WBC 8.74   HGB 9.3*   HCT 31.4*   *   *   RDW 17.6*        Chemistries:  Recent Labs   Lab 12/25/18  1504      K 4.3      CO2 28   BUN 30*   CREATININE 1.0   CALCIUM 8.4*   MG 2.3   PHOS 2.6*       All pertinent labs within the past 24 hours have been reviewed.    Significant Imaging: I have reviewed and interpreted all pertinent imaging results/findings within the past 24 hours.

## 2018-12-26 NOTE — SUBJECTIVE & OBJECTIVE
Past Medical History:   Diagnosis Date    Arthritis     BPH (benign prostatic hyperplasia)     Chemotherapy-induced neutropenia 9/6/2018    Chemotherapy-induced neutropenia 9/6/2018    Coronary artery disease     MI X 2    Gout, chronic     Heartburn     Hypertension     Myocardial infarction     PE (pulmonary embolism)     Presence of dental bridge     UPPER - NOT WEAR MUCH AS DOES NOT FIT WELL    Primary malignant neoplasm of left upper lobe of lung 5/19/2017    Staph infection        Past Surgical History:   Procedure Laterality Date    CARDIAC SURGERY      CABG X 4 9-11    CTR      BILAT    OSTEOTOMY, METACARPAL BONE Right 3/1/2013    Performed by Daron Jalloh Jr[facility] MD at [facility] OR    ROTATOR CUFF REPAIR      left    TONSILLECTOMY      TRIGGER FINGER RELEASE      right and left hands.       Review of patient's allergies indicates:   Allergen Reactions    Shellfish containing products      soft shell crabs       No current facility-administered medications on file prior to encounter.      Current Outpatient Medications on File Prior to Encounter   Medication Sig    allopurinol (ZYLOPRIM) 100 MG tablet Take 100 mg by mouth once daily.     apixaban (ELIQUIS ORAL) Take by mouth.    atenolol (TENORMIN) 25 MG tablet Take 25 mg by mouth daily as needed.    cefUROXime (CEFTIN) 500 MG tablet Take 250 mg by mouth every 12 (twelve) hours. Every third week    cilostazol (PLETAL) 100 MG Tab 100 mg 2 (two) times daily.     ciprofloxacin HCl (CIPRO) 250 MG tablet Take 250 mg by mouth 2 (two) times daily.    doxycycline (VIBRA-TABS) 100 MG tablet Take 100 mg by mouth. Every third week    multivitamin (MEN'S MULTI-VITAMIN) per tablet Take 1 tablet by mouth once daily.    nitroGLYCERIN (NITROSTAT) 0.3 MG SL tablet Place 0.3 mg under the tongue every 5 (five) minutes as needed for Chest pain.    pravastatin (PRAVACHOL) 40 MG tablet Take 40 mg by mouth nightly.    PREDNISOLONE ORAL Take by mouth.     ranitidine (ZANTAC 75) 75 MG tablet Take 75 mg by mouth nightly.     rivaroxaban (XARELTO) 20 mg Tab Xarelto 20 mg tablet   Take 1 tablet every day by oral route.    tamsulosin (FLOMAX) 0.4 mg Cap Take 0.4 mg by mouth once daily.    terazosin (HYTRIN) 2 MG capsule Take 4 mg by mouth every evening. 2 TABS    VENTOLIN HFA 90 mcg/actuation inhaler Inhale 1-2 puffs into the lungs every 6 (six) hours as needed.      Family History     Problem Relation (Age of Onset)    Cancer Sister        Tobacco Use    Smoking status: Former Smoker     Packs/day: 2.00     Years: 25.00     Pack years: 50.00     Last attempt to quit: 1990     Years since quittin.8    Smokeless tobacco: Never Used   Substance and Sexual Activity    Alcohol use: Yes     Comment: 2 PER DAY    Drug use: No    Sexual activity: Not on file     Review of Systems   Constitution: Negative.   HENT: Negative.    Eyes: Negative.    Cardiovascular: Positive for dyspnea on exertion. Negative for chest pain, claudication, cyanosis, irregular heartbeat, leg swelling, near-syncope, orthopnea, palpitations, paroxysmal nocturnal dyspnea and syncope.   Respiratory: Positive for cough, shortness of breath and sputum production. Negative for hemoptysis, sleep disturbances due to breathing, snoring and wheezing.    Endocrine: Negative.    Gastrointestinal: Negative.    Genitourinary: Negative.      Objective:     Vital Signs (Most Recent):  Temp: 97.7 °F (36.5 °C) (18 0300)  Pulse: (!) 57 (18 0700)  Resp: 16 (18 0700)  BP: 135/65 (18 0700)  SpO2: (!) 93 % (18 0700) Vital Signs (24h Range):  Temp:  [97.6 °F (36.4 °C)-98.2 °F (36.8 °C)] 97.7 °F (36.5 °C)  Pulse:  [] 57  Resp:  [8-35] 16  SpO2:  [86 %-100 %] 93 %  BP: (123-166)/(63-94) 135/65     Weight: 83.7 kg (184 lb 8.4 oz)  Body mass index is 27.25 kg/m².    SpO2: (!) 93 %  O2 Device (Oxygen Therapy): Comfort Flow      Intake/Output Summary (Last 24 hours) at 2018  0718  Last data filed at 12/26/2018 0200  Gross per 24 hour   Intake 319.27 ml   Output 975 ml   Net -655.73 ml       Lines/Drains/Airways     Peripheral Intravenous Line                 Peripheral IV - Single Lumen 12/25/18 1300 Right Antecubital less than 1 day         Peripheral IV - Single Lumen 12/25/18 1500 Left Antecubital less than 1 day                Physical Exam   Constitutional: He is oriented to person, place, and time. He appears well-developed and well-nourished.   HENT:   Head: Normocephalic and atraumatic.   Eyes: Conjunctivae are normal.   Neck: Neck supple. No JVD present. No thyromegaly present.   Cardiovascular: Normal rate, regular rhythm and normal heart sounds. Exam reveals no gallop and no friction rub.   No murmur heard.  Pulmonary/Chest: Effort normal and breath sounds normal. No respiratory distress. He has no wheezes. He has no rales.   Abdominal: Soft. Bowel sounds are normal. He exhibits no distension. There is no tenderness. There is no rebound.   Musculoskeletal: Normal range of motion.   Neurological: He is oriented to person, place, and time.   Skin: Skin is warm and dry.   Nursing note and vitals reviewed.      Significant Labs:   ABG:   Recent Labs   Lab 12/25/18  1502   PH 7.469*   PCO2 36.6   HCO3 26.5   POCSATURATED 93*   BE 3   , Blood Culture:   Recent Labs   Lab 12/25/18  1505 12/25/18  1624   LABBLOO No Growth to date No Growth to date   , BMP:   Recent Labs   Lab 12/25/18  1504 12/26/18  0317   * 144*    140   K 4.3 3.9    105   CO2 28 25   BUN 30* 30*   CREATININE 1.0 0.9   CALCIUM 8.4* 8.1*   MG 2.3 2.3   , CMP   Recent Labs   Lab 12/25/18  1504 12/26/18  0317    140   K 4.3 3.9    105   CO2 28 25   * 144*   BUN 30* 30*   CREATININE 1.0 0.9   CALCIUM 8.4* 8.1*   ANIONGAP 8 10   ESTGFRAFRICA >60.0 >60.0   EGFRNONAA >60.0 >60.0   , CBC   Recent Labs   Lab 12/25/18  1504 12/26/18  0321   WBC 8.74 6.35   HGB 9.3* 9.3*   HCT 31.4* 30.9*    * 127*   , Troponin No results for input(s): TROPONINI in the last 48 hours. and All pertinent lab results from the last 24 hours have been reviewed.    Significant Imaging: CT scan: CT ABDOMEN PELVIS WITH CONTRAST: No results found for this visit on 12/25/18. and CT ABDOMEN PELVIS WITHOUT CONTRAST: No results found for this visit on 12/25/18. and Echocardiogram: 2D echo with color flow doppler: No results found for this or any previous visit. and Transthoracic echo (TTE) complete (Cupid Only): No results found for this or any previous visit.

## 2018-12-26 NOTE — HPI
Mr. Shetty is a 72yo man with PMH of MTHFR mutation, history of PEs on Eliquis, CAD s/p CABGn with chronically elevated troponin, hypotension on midodrine, squamous cell lung carcinoma of NEIDA s/p 1 cycle of adjuvant CTX & radiation therapy (which was stopped due to recurrent pseudomonas PNA & PEs), COPD, 54 pack years (quit 1990), chronic severe pseudomonas pna (3 abx for over a year) who presents as a transfer from [facility] for IPE management.      HPI: 4 days ago he had acute SOB requring 10L of oxygen at home when he is at a baseline of 6L. At [facility] his CTPA showed known PE of RLL pulmonary artery & increased size of right sided pleural effusion. Doppler u/s of BLE was negative for DVT. OSH continued patient on home eliquis regimen of 5mg BID and per patient did not notice improvement in oxygen requirements so [place] [facility] [place]  [place] was transferred for possible thrombolysis.   Of note, he has history of chronic PE and currently managed with eliquis. His prior echos showed decreased RV function and increased pulmonary pressures. Bedside echo done showed LVEF 35% and increased RV size with decreased RV function. Moderate to severe TR. Patient has history os Pseudomonas pneumonia in the past and had his chemo radiation stopped secondary to clinical decompensation from pneumonia.    Since admission he is still SOB and with vital signs stable although on 25-20L comfort flow O2 FiO2 70%.

## 2018-12-26 NOTE — ASSESSMENT & PLAN NOTE
73 y.o. male with diagnosis of NSCLC (Squamous cell)  - T2A lesion with 3.5cm involving NEIDA  - s/p monotherapy with XRT by Dr Olvera; followed by chemotherapy with 3 cycles of carbo/taxol  - followed by Dr Ely with Pulm - seen him on Dec 5th and f/u on Feb 6th  - CTA on 5/18/17 showed decrease size in the tumor mass  - CT on 12/14/17 with stable lung findings  - Latest CT scans show slight increase in size of the NEIDA nodule  - he had repeat PET on 5/9/2018 with over stable except for the one spot in the right lung  - he saw Dr Ely again June 6th and saw Dr Olvera on June 19th  - he completed XRT x 5 on July 13th  -  discussed his case at the [facility] Tumor Board yesterday where Dr Az Castaneda and Dr Olvera, pathology, surgery, and radiology.   - the boards recommendation previously was to proceed with observation only with regular scans; they felt he was too high risk for surgery and immunologics  - however, repeat PET on 8/8/2018 is showing an enlarging nodule in NEIDA with increasing FDG activity and worrisome for progressive disease   - he received his first and only cycle of chemotherapy with carbo/gemzar the week before his prior hospitalization

## 2018-12-26 NOTE — CONSULTS
[facility] [address]  Palliative Medicine  Consult Note    Patient Name: Michael Shetty  MRN: [id number]  Admission Date: 12/25/2018  Hospital Length of Stay: 1 days  Code Status: Partial Code   Attending Provider: Rowdy Valdivia MD  Consulting Provider: Power Dunn MD  Primary Care Physician: Michael Ellsworth MD  Principal Problem:Acute on chronic respiratory failure with hypoxemia    Patient information was obtained from patient.      Consults  Assessment/Plan:     Palliative care encounter    Palliative Care Encounter / Goals of care discussion:     Narrative:   Michael Shetty is a 73 y.o. male patient with lung cancer, undergoing CTX, XRT (on hold for PE and PNA), clotting disorder s/p several PE, now severe hypoxema requiring increasing doses of O2 and likely superimposed PNA    1: Pain / Physical symptom Assessment:   - pain assesment: denies   - dyspnea assessment: moderate to severe   - anxiety assessment: mild   - depression assessment: denies    2: Social Background    - lives with daughter    3: Palliative care meeting participants:    Daughter and patient    4: Goals of care Discussion details:   Pt. Has good insight in his disease and is aware of his prognosis   His goal is to return home as independent as possible. He would like to receive home health after discharge.    He is hopeful that with ABX his O2 requirement will decline and he will be able to return home   He will consider continuing CTX once he sees his recovery.      Will need to assign POA, will do paperwork   We will discuss extent of care desired and complete LA Post on follow up   Will further clarify his wishes in case of code blue (partial code currently)    5: Goals of care Decisions / Recommendations / Plan:   Goals defined   Will assign POA on follow up   Will complete LA Post   Will discuss discharge options including hospice on follow up (currently opposed).     6: Follow up plans:    daily    Thank you for your  consult. I will follow along with you. Please call [phone] with questions.            Thank you for your consult. I will follow-up with patient. Please contact us if you have any additional questions.    Subjective:     HPI:   Michael Shetty is a nice 72 yo mald with a past history of MTHFR mutation and several past PE and DVT episodes. He had been on several OAC, most recently on Eliquis. He has SCLC and is currently undergoing CTX and XRT, treatment had been paused due to PE and recurrent pseudomonas PNA.   He developed acute worsening of his dyspnea (usually able to ambulate to BR and do his ADL on ~ 5L O2), severe exertional dyspnea and needing to increase his oxygen to 10l/min.   He presented to [facility] but was transferred to our facility for evaluation of catheter assisted thrombolysis.     He was thought not a candidate for thrombolysis due to poor risk benefit ratio, he is felt to have a pneumonic infiltrate and is currently on ABX. He is requiring high flow O2 to sustain O2 saturation of ~89%.     I am being asked to discuss goals of care with the pt.         Hospital Course:  No notes on file    Interval History:     Past Medical History:   Diagnosis Date    Arthritis     BPH (benign prostatic hyperplasia)     Chemotherapy-induced neutropenia 9/6/2018    Chemotherapy-induced neutropenia 9/6/2018    Coronary artery disease     MI X 2    Gout, chronic     Heartburn     Hypertension     Myocardial infarction     PE (pulmonary embolism)     Presence of dental bridge     UPPER - NOT WEAR MUCH AS DOES NOT FIT WELL    Primary malignant neoplasm of left upper lobe of lung 5/19/2017    Staph infection        Past Surgical History:   Procedure Laterality Date    CARDIAC SURGERY      CABG X 4 9-11    CTR      BILAT    OSTEOTOMY, METACARPAL BONE Right 3/1/2013    Performed by [facility]oh [facility] at [facility] OR    ROTATOR CUFF REPAIR      left    TONSILLECTOMY      TRIGGER FINGER RELEASE       right and left hands.       Review of patient's allergies indicates:   Allergen Reactions    Shellfish containing products      soft shell crabs       Medications:  Continuous Infusions:  Scheduled Meds:   albuterol-ipratropium  3 mL Nebulization Q6H    allopurinol  100 mg Oral Daily    apixaban  5 mg Oral BID    atenolol  12.5 mg Oral BID    budesonide  0.5 mg Nebulization Q12H    pantoprazole  40 mg Oral Daily    piperacillin-tazobactam (ZOSYN) IVPB  4.5 g Intravenous Q8H    pravastatin  40 mg Oral QHS    predniSONE  60 mg Oral Daily    tiotropium  1 capsule Inhalation Daily     PRN Meds:sodium chloride 0.9%    Family History     Problem Relation (Age of Onset)    Cancer Sister        Tobacco Use    Smoking status: Former Smoker     Packs/day: 2.00     Years: 25.00     Pack years: 50.00     Last attempt to quit: 1990     Years since quittin.8    Smokeless tobacco: Never Used   Substance and Sexual Activity    Alcohol use: Yes     Comment: 2 PER DAY    Drug use: No    Sexual activity: Not on file       Review of Systems   Constitutional: Positive for activity change and fatigue.   Respiratory: Positive for chest tightness and shortness of breath.    Cardiovascular: Positive for chest pain.   Gastrointestinal: Negative for abdominal pain.     Objective:     Vital Signs (Most Recent):  Temp: 97.8 °F (36.6 °C) (18 1500)  Pulse: 94 (18 1500)  Resp: (!) 32 (18 1500)  BP: 123/71 (18 1500)  SpO2: (!) 92 % (18 1500) Vital Signs (24h Range):  Temp:  [97.4 °F (36.3 °C)-98.2 °F (36.8 °C)] 97.8 °F (36.6 °C)  Pulse:  [] 94  Resp:  [8-35] 32  SpO2:  [86 %-100 %] 92 %  BP: (105-166)/(63-94) 123/71     Weight: 83.5 kg (184 lb)  Body mass index is 27.17 kg/m².    Review of Symptoms  Symptom Assessment (ESAS 0-10 scale)   ESAS 0 1 2 3 4 5 6 7 8 9 10   Pain              Dyspnea              Anxiety              Nausea              Depression               Anorexia               Fatigue              Insomnia              Restlessness               Agitation              CAM / Delirium __ --  ___+   Constipation     __ --  ___+   Diarrhea           __ --  ___+  Bowel Management Plan (BMP): No      Pain Assessment: denies    OME in 24 hours:     Performance Status: 70    ECOG Performance Status Grade: 2 - Ambulates, capable of self care only, currently unable to do self care.     Physical Exam   Constitutional: He appears well-developed. No distress.   Eyes: Right eye exhibits no discharge.   Neck: Normal range of motion.   Cardiovascular:   Irregular, tachycardia   Pulmonary/Chest: He is in respiratory distress.   Abdominal: Soft.   Neurological: He is alert.       Significant Labs: All pertinent labs within the past 24 hours have been reviewed.  CBC:   Recent Labs   Lab 12/26/18  0321   WBC 6.35   HGB 9.3*   HCT 30.9*   *   *     BMP:  Recent Labs   Lab 12/26/18  0317   *      K 3.9      CO2 25   BUN 30*   CREATININE 0.9   CALCIUM 8.1*   MG 2.3     LFT:  Lab Results   Component Value Date    AST 20 12/06/2018    ALKPHOS 53 12/06/2018    BILITOT 2.0 (H) 12/06/2018     Albumin:   Albumin   Date Value Ref Range Status   12/06/2018 3.6 3.6 - 5.1 g/dL Final   10/01/2018 3.1 3.1 - 4.7 g/dL      Protein:   Total Protein   Date Value Ref Range Status   12/06/2018 6.0 (L) 6.1 - 8.1 g/dL Final     Lactic acid:   No results found for: LACTATE    Significant Imaging: I have reviewed all pertinent imaging results/findings within the past 24 hours.    Advanced Directives::  Living Will: No  LaPOST: No  Do Not Resuscitate Status: Yes (no intubation)  Medical Power of : No    Decision-Making Capacity: Patient answered questions    Living Arrangements: Lives with family    Psychosocial/Cultural:  Patient's most important priorities:  Keeping independence    Patient's biggest concerns/fears:  Being dependent and unable to care for himself    Previous death/end of  life care history:  Wife and mother  in hospice    Patient's goals/hopes:  Getting home as soon as possible    Spiritual:     F- Donna and Belief: spiritual    I - Importance: not involved, but more prayors lately  .  C - Community: no    A - Address in Care: no      > 50% of 70 min visit spent in chart review, face to face discussion of goals of care,  symptom assessment, coordination of care and emotional support.    Power Dunn MD  Palliative Medicine  [facility]

## 2018-12-26 NOTE — SUBJECTIVE & OBJECTIVE
Interval History:     Past Medical History:   Diagnosis Date    Arthritis     BPH (benign prostatic hyperplasia)     Chemotherapy-induced neutropenia 2018    Chemotherapy-induced neutropenia 2018    Coronary artery disease     MI X 2    Gout, chronic     Heartburn     Hypertension     Myocardial infarction     PE (pulmonary embolism)     Presence of dental bridge     UPPER - NOT WEAR MUCH AS DOES NOT FIT WELL    Primary malignant neoplasm of left upper lobe of lung 2017    Staph infection        Past Surgical History:   Procedure Laterality Date    CARDIAC SURGERY      CABG X 4 9-11    CTR      BILAT    OSTEOTOMY, METACARPAL BONE Right 3/1/2013    Performed by [facility] at [facility] OR    ROTATOR CUFF REPAIR      left    TONSILLECTOMY      TRIGGER FINGER RELEASE      right and left hands.       Review of patient's allergies indicates:   Allergen Reactions    Shellfish containing products      soft shell crabs       Medications:  Continuous Infusions:  Scheduled Meds:   albuterol-ipratropium  3 mL Nebulization Q6H    allopurinol  100 mg Oral Daily    apixaban  5 mg Oral BID    atenolol  12.5 mg Oral BID    budesonide  0.5 mg Nebulization Q12H    pantoprazole  40 mg Oral Daily    piperacillin-tazobactam (ZOSYN) IVPB  4.5 g Intravenous Q8H    pravastatin  40 mg Oral QHS    predniSONE  60 mg Oral Daily    tiotropium  1 capsule Inhalation Daily     PRN Meds:sodium chloride 0.9%    Family History     Problem Relation (Age of Onset)    Cancer Sister        Tobacco Use    Smoking status: Former Smoker     Packs/day: 2.00     Years: 25.00     Pack years: 50.00     Last attempt to quit: 1990     Years since quittin.8    Smokeless tobacco: Never Used   Substance and Sexual Activity    Alcohol use: Yes     Comment: 2 PER DAY    Drug use: No    Sexual activity: Not on file       Review of Systems   Constitutional: Positive for activity change and fatigue.    Respiratory: Positive for chest tightness and shortness of breath.    Cardiovascular: Positive for chest pain.   Gastrointestinal: Negative for abdominal pain.     Objective:     Vital Signs (Most Recent):  Temp: 97.8 °F (36.6 °C) (12/26/18 1500)  Pulse: 94 (12/26/18 1500)  Resp: (!) 32 (12/26/18 1500)  BP: 123/71 (12/26/18 1500)  SpO2: (!) 92 % (12/26/18 1500) Vital Signs (24h Range):  Temp:  [97.4 °F (36.3 °C)-98.2 °F (36.8 °C)] 97.8 °F (36.6 °C)  Pulse:  [] 94  Resp:  [8-35] 32  SpO2:  [86 %-100 %] 92 %  BP: (105-166)/(63-94) 123/71     Weight: 83.5 kg (184 lb)  Body mass index is 27.17 kg/m².    Review of Symptoms  Symptom Assessment (ESAS 0-10 scale)   ESAS 0 1 2 3 4 5 6 7 8 9 10   Pain              Dyspnea              Anxiety              Nausea              Depression               Anorexia              Fatigue              Insomnia              Restlessness               Agitation              CAM / Delirium __ --  ___+   Constipation     __ --  ___+   Diarrhea           __ --  ___+  Bowel Management Plan (BMP): No      Pain Assessment: denies    OME in 24 hours:     Performance Status: 70    ECOG Performance Status Grade: 2 - Ambulates, capable of self care only, currently unable to do self care.     Physical Exam   Constitutional: He appears well-developed. No distress.   Eyes: Right eye exhibits no discharge.   Neck: Normal range of motion.   Cardiovascular:   Irregular, tachycardia   Pulmonary/Chest: He is in respiratory distress.   Abdominal: Soft.   Neurological: He is alert.       Significant Labs: All pertinent labs within the past 24 hours have been reviewed.  CBC:   Recent Labs   Lab 12/26/18  0321   WBC 6.35   HGB 9.3*   HCT 30.9*   *   *     BMP:  Recent Labs   Lab 12/26/18  0317   *      K 3.9      CO2 25   BUN 30*   CREATININE 0.9   CALCIUM 8.1*   MG 2.3     LFT:  Lab Results   Component Value Date    AST 20 12/06/2018    ALKPHOS 53 12/06/2018     BILITOT 2.0 (H) 2018     Albumin:   Albumin   Date Value Ref Range Status   2018 3.6 3.6 - 5.1 g/dL Final   10/01/2018 3.1 3.1 - 4.7 g/dL      Protein:   Total Protein   Date Value Ref Range Status   2018 6.0 (L) 6.1 - 8.1 g/dL Final     Lactic acid:   No results found for: LACTATE    Significant Imaging: I have reviewed all pertinent imaging results/findings within the past 24 hours.    Advanced Directives::  Living Will: No  LaPOST: No  Do Not Resuscitate Status: Yes (no intubation)  Medical Power of : No    Decision-Making Capacity: Patient answered questions    Living Arrangements: Lives with family    Psychosocial/Cultural:  Patient's most important priorities:  Keeping independence    Patient's biggest concerns/fears:  Being dependent and unable to care for himself    Previous death/end of life care history:  Wife and mother  in hospice    Patient's goals/hopes:  Getting home as soon as possible    Spiritual:     F- Donna and Belief: spiritual    I - Importance: not involved, but more prayors lately  .  C - Community: no    A - Address in Care: no

## 2018-12-26 NOTE — ASSESSMENT & PLAN NOTE
-Patient with clinical history of pulmonary embolism and managed by cardiologist at [facility] with eliquis. He has signs of RV failure and PAH secondary to PE  -Current presentation is a combination of advanced lung cancer and pneumonia on top of the chronic PE and less likely new development of acute severe PE. Even though, he is not candidate for PE given his clinical condition of active cancer, infection and poor functional status. The risks of the procedure which include severe bleeding and profound anemia certainly overweighs the benefits.    -Recommend medical therapy and to start patient on lovenox since the etiology of PE most likely due to related to cancer

## 2018-12-26 NOTE — CONSULTS
[facility]  Interventional Cardiology  Consult Note    Patient Name: Michael Shetty  MRN: [id number]  Admission Date: 12/25/2018  Hospital Length of Stay: 1 days  Code Status: Partial Code   Attending Provider: Rowdy Valdivia MD   Consulting Provider: Felipe Robledo MD  Primary Care Physician: Michael Ellsworth MD  Principal Problem:Acute on chronic respiratory failure with hypoxemia    Patient information was obtained from patient and ER records.     Inpatient consult to Interventional Cardiology  Consult performed by: Felipe Robledo MD  Consult ordered by: Elisa Pagan MD  Reason for consult: PE  Assessment/Recommendations: Medical therapy with lovenox         Subjective:     Chief Complaint:  SOB      HPI:   Mr. Shetty is a 74yo man with PMH of MTHFR mutation, history of PEs on Eliquis, CAD s/p CABGn with chronically elevated troponin, hypotension on midodrine, squamous cell lung carcinoma of NEIDA s/p 1 cycle of adjuvant CTX & radiation therapy (which was stopped due to recurrent pseudomonas PNA & PEs), COPD, 54 pack years (quit 1990), chronic severe pseudomonas pna (3 abx for over a year) who presents as a transfer from [facility] for IPE management.      HPI: 4 days ago he had acute SOB requring 10L of oxygen at home when he is at a baseline of 6L. At [facility] his CTPA showed known PE of RLL pulmonary artery & increased size of right sided pleural effusion. Doppler u/s of BLE was negative for DVT. OSH continued patient on home eliquis regimen of 5mg BID and per patient did not notice improvement in oxygen requirements so he was sent [place] [facility]   Patient was transferred for possible thrombolysis.   Of note, he has history of chronic PE and currently managed with eliquis. His prior echos showed decreased RV function and increased pulmonary pressures. Bedside echo done showed LVEF 35% and increased RV size with decreased RV function. Moderate to severe TR. Patient has history os Pseudomonas pneumonia in the  past and had his chemo radiation stopped secondary to clinical decompensation from pneumonia.    Since admission he is still SOB and with vital signs stable although on 25-20L comfort flow O2 FiO2 70%.        Past Medical History:   Diagnosis Date    Arthritis     BPH (benign prostatic hyperplasia)     Chemotherapy-induced neutropenia 9/6/2018    Chemotherapy-induced neutropenia 9/6/2018    Coronary artery disease     MI X 2    Gout, chronic     Heartburn     Hypertension     Myocardial infarction     PE (pulmonary embolism)     Presence of dental bridge     UPPER - NOT WEAR MUCH AS DOES NOT FIT WELL    Primary malignant neoplasm of left upper lobe of lung 5/19/2017    Staph infection        Past Surgical History:   Procedure Laterality Date    CARDIAC SURGERY      CABG X 4 9-11    CTR      BILAT    OSTEOTOMY, METACARPAL BONE Right 3/1/2013    Performed by D[facility]oh [facility] at [facility] OR    ROTATOR CUFF REPAIR      left    TONSILLECTOMY      TRIGGER FINGER RELEASE      right and left hands.       Review of patient's allergies indicates:   Allergen Reactions    Shellfish containing products      soft shell crabs       No current facility-administered medications on file prior to encounter.      Current Outpatient Medications on File Prior to Encounter   Medication Sig    allopurinol (ZYLOPRIM) 100 MG tablet Take 100 mg by mouth once daily.     apixaban (ELIQUIS ORAL) Take by mouth.    atenolol (TENORMIN) 25 MG tablet Take 25 mg by mouth daily as needed.    cefUROXime (CEFTIN) 500 MG tablet Take 250 mg by mouth every 12 (twelve) hours. Every third week    cilostazol (PLETAL) 100 MG Tab 100 mg 2 (two) times daily.     ciprofloxacin HCl (CIPRO) 250 MG tablet Take 250 mg by mouth 2 (two) times daily.    doxycycline (VIBRA-TABS) 100 MG tablet Take 100 mg by mouth. Every third week    multivitamin (MEN'S MULTI-VITAMIN) per tablet Take 1 tablet by mouth once daily.    nitroGLYCERIN  (NITROSTAT) 0.3 MG SL tablet Place 0.3 mg under the tongue every 5 (five) minutes as needed for Chest pain.    pravastatin (PRAVACHOL) 40 MG tablet Take 40 mg by mouth nightly.    PREDNISOLONE ORAL Take by mouth.    ranitidine (ZANTAC 75) 75 MG tablet Take 75 mg by mouth nightly.     rivaroxaban (XARELTO) 20 mg Tab Xarelto 20 mg tablet   Take 1 tablet every day by oral route.    tamsulosin (FLOMAX) 0.4 mg Cap Take 0.4 mg by mouth once daily.    terazosin (HYTRIN) 2 MG capsule Take 4 mg by mouth every evening. 2 TABS    VENTOLIN HFA 90 mcg/actuation inhaler Inhale 1-2 puffs into the lungs every 6 (six) hours as needed.      Family History     Problem Relation (Age of Onset)    Cancer Sister        Tobacco Use    Smoking status: Former Smoker     Packs/day: 2.00     Years: 25.00     Pack years: 50.00     Last attempt to quit: 1990     Years since quittin.8    Smokeless tobacco: Never Used   Substance and Sexual Activity    Alcohol use: Yes     Comment: 2 PER DAY    Drug use: No    Sexual activity: Not on file     Review of Systems   Constitution: Negative.   HENT: Negative.    Eyes: Negative.    Cardiovascular: Positive for dyspnea on exertion. Negative for chest pain, claudication, cyanosis, irregular heartbeat, leg swelling, near-syncope, orthopnea, palpitations, paroxysmal nocturnal dyspnea and syncope.   Respiratory: Positive for cough, shortness of breath and sputum production. Negative for hemoptysis, sleep disturbances due to breathing, snoring and wheezing.    Endocrine: Negative.    Gastrointestinal: Negative.    Genitourinary: Negative.      Objective:     Vital Signs (Most Recent):  Temp: 97.7 °F (36.5 °C) (18 0300)  Pulse: (!) 57 (18 0700)  Resp: 16 (18 0700)  BP: 135/65 (18 0700)  SpO2: (!) 93 % (18 07) Vital Signs (24h Range):  Temp:  [97.6 °F (36.4 °C)-98.2 °F (36.8 °C)] 97.7 °F (36.5 °C)  Pulse:  [] 57  Resp:  [8-35] 16  SpO2:  [86 %-100 %] 93  %  BP: (123-166)/(63-94) 135/65     Weight: 83.7 kg (184 lb 8.4 oz)  Body mass index is 27.25 kg/m².    SpO2: (!) 93 %  O2 Device (Oxygen Therapy): Comfort Flow      Intake/Output Summary (Last 24 hours) at 12/26/2018 0718  Last data filed at 12/26/2018 0200  Gross per 24 hour   Intake 319.27 ml   Output 975 ml   Net -655.73 ml       Lines/Drains/Airways     Peripheral Intravenous Line                 Peripheral IV - Single Lumen 12/25/18 1300 Right Antecubital less than 1 day         Peripheral IV - Single Lumen 12/25/18 1500 Left Antecubital less than 1 day                Physical Exam   Constitutional: He is oriented to person, place, and time. He appears well-developed and well-nourished.   HENT:   Head: Normocephalic and atraumatic.   Eyes: Conjunctivae are normal.   Neck: Neck supple. No JVD present. No thyromegaly present.   Cardiovascular: Normal rate, regular rhythm and normal heart sounds. Exam reveals no gallop and no friction rub.   No murmur heard.  Pulmonary/Chest: Effort normal and breath sounds normal. No respiratory distress. He has no wheezes. He has no rales.   Abdominal: Soft. Bowel sounds are normal. He exhibits no distension. There is no tenderness. There is no rebound.   Musculoskeletal: Normal range of motion.   Neurological: He is oriented to person, place, and time.   Skin: Skin is warm and dry.   Nursing note and vitals reviewed.      Significant Labs:   ABG:   Recent Labs   Lab 12/25/18  1502   PH 7.469*   PCO2 36.6   HCO3 26.5   POCSATURATED 93*   BE 3   , Blood Culture:   Recent Labs   Lab 12/25/18  1505 12/25/18  1624   LABBLOO No Growth to date No Growth to date   , BMP:   Recent Labs   Lab 12/25/18  1504 12/26/18  0317   * 144*    140   K 4.3 3.9    105   CO2 28 25   BUN 30* 30*   CREATININE 1.0 0.9   CALCIUM 8.4* 8.1*   MG 2.3 2.3   , CMP   Recent Labs   Lab 12/25/18  1504 12/26/18  0317    140   K 4.3 3.9    105   CO2 28 25   * 144*   BUN 30*  30*   CREATININE 1.0 0.9   CALCIUM 8.4* 8.1*   ANIONGAP 8 10   ESTGFRAFRICA >60.0 >60.0   EGFRNONAA >60.0 >60.0   , CBC   Recent Labs   Lab 12/25/18  1504 12/26/18  0321   WBC 8.74 6.35   HGB 9.3* 9.3*   HCT 31.4* 30.9*   * 127*   , Troponin No results for input(s): TROPONINI in the last 48 hours. and All pertinent lab results from the last 24 hours have been reviewed.    Significant Imaging: CT scan: CT ABDOMEN PELVIS WITH CONTRAST: No results found for this visit on 12/25/18. and CT ABDOMEN PELVIS WITHOUT CONTRAST: No results found for this visit on 12/25/18. and Echocardiogram: 2D echo with color flow doppler: No results found for this or any previous visit. and Transthoracic echo (TTE) complete (Cupid Only): No results found for this or any previous visit.    Assessment and Plan:     Chronic pulmonary embolism    -Patient with clinical history of pulmonary embolism and managed by cardiologist at [facility] with eliquis. He has signs of RV failure and PAH secondary to PE  -Current presentation is a combination of advanced lung cancer and pneumonia on top of the chronic PE and less likely new development of acute severe PE. Even though, he is not candidate for PE given his clinical condition of active cancer, infection and poor functional status. The risks of the procedure which include severe bleeding and profound anemia certainly overweighs the benefits.    -Recommend medical therapy and to start patient on lovenox since the etiology of PE most likely due to related to cancer           VTE Risk Mitigation (From admission, onward)        Ordered     apixaban tablet 5 mg  2 times daily      12/25/18 1648     IP VTE HIGH RISK PATIENT  Once      12/25/18 1413          Thank you for your consult. I will sign off. Please contact us if you have any additional questions.    Felipe Robledo MD  Cardiology   [facility]

## 2018-12-26 NOTE — PLAN OF CARE
Michael Ellsworth MD  [address] [place] [address] [address] / [place] [id number]    [facility] - ASTRID FRENCH - [address]  [address].  [place] [address]  Phone: [phone] Fax: [phone]    Payor: In [employer] MANAGED MEDICARE / Plan: [employer] HE[employer] / Product Type: Medicare Advantage /     Future Appointments   Date Time Provider Department Center   1/2/2019  2:15 PM Chris Cage MD [facility] HEM ONC [facility] Michael     Extended Emergency Contact Information  Primary Emergency Contact: Deja Kwan   [place]  Mobile Phone: [phone]  Relation: Sister  Secondary Emergency Contact: Kelly Shetty   [facility]  Home Phone: [phone]  Relation: None     12/26/18 1419   Discharge Assessment   Assessment Type Discharge Planning Assessment   Confirmed/corrected address and phone number on facesheet? Yes   Assessment information obtained from? Patient;Caregiver   Expected Length of Stay (days) 4   Communicated expected length of stay with patient/caregiver no   Prior to hospitilization cognitive status: Alert/Oriented   Prior to hospitalization functional status: Assistive Equipment;Needs Assistance   Current cognitive status: Alert/Oriented   Current Functional Status: Assistive Equipment;Needs Assistance   Facility Arrived From: [facility]   Lives With child(renu), adult   Able to Return to Prior Arrangements other (see comments)  (TBD)   Is patient able to care for self after discharge? Unable to determine at this time (comments)   Who are your caregiver(s) and their phone number(s)? Kelly Shetty (daughter) [phone]    Patient's perception of discharge disposition hospice/home;hospice/medical facility   Readmission Within the Last 30 Days no previous admission in last 30 days   Patient currently being followed by outpatient case management? No   Patient currently receives any other outside agency services? No   Equipment  Currently Used at Home oxygen   Do you have any problems affording any of your prescribed medications? No   Is the patient taking medications as prescribed? yes   Does the patient have transportation home? Yes   Transportation Anticipated family or friend will provide;car, drives self   Does the patient receive services at the [facility]? No   Discharge Plan A Inpatient Hospice   Discharge Plan B Hospice/home   Patient/Family in Agreement with Plan yes   Gwen Matamoros RN, BSN, CCM  Case Management  [facility]  Ext. [phone]

## 2018-12-26 NOTE — PLAN OF CARE
Problem: Adult Inpatient Plan of Care  Goal: Plan of Care Review  Outcome: Ongoing (interventions implemented as appropriate)  SR/ST on telemetry, 2+ pulses, MAP > 65  AAOx4, moves all extremities, up to commode  Comfort flow NC 35L 80% FiO2, NRB mask @ bedside if SOB  Regular diet tolerated  Voids per urinal, hernia belt on  DNI signed in chart    VS and assessment per flowsheet

## 2018-12-27 NOTE — ASSESSMENT & PLAN NOTE
72yo presents with acute SOB on a background of PEs & MTHFR mutation found to have recurrent PE on CTPA  -Interventional cardiology consulted  -bedside TTE performed 12/27/2018  -seems less likely to be a candidate for catheter assisted thrombolysis at this time due to his active cancer  -appreciate recs  -continuing eliquis 5mg BID for now    -still requiring 20L & FiO2 80% and DNI status

## 2018-12-27 NOTE — HOSPITAL COURSE
12/26/2018 NAEO; discussed he is not a candidate for interventional cardiology & goals of care with palliative care consult for today    12/27/2018 NAEO 25L, 80% FiO2    12/29/2018 NAEO    12/31/2018 NAEO, will continue meropenem for one week and monitor for improvement in respiratory status.     1/1/2018: NAEO, continues to require 25L 80% FiO2 and 20L NRB    1/2/2018-1/3/2018: No significant improvement in respiratory status. Continuing abx.  1/4/2018: No events overnight. On day 8 meropenem. States feeling better, but continues to require comfort flow 20L, Fio2 60-70% plus NRB. Pt desatted   1/5: Pt desatted when trying to transport to the medicine floor. Otherwise stable. Yesterday patient and family expressed not wanting to go home with hospice instead opting to remain in the hospital if necessary to complete 3 wk course of abx and to attempt to wean down O2.

## 2018-12-27 NOTE — PLAN OF CARE
Palliative Care Social Work   Assessment  Name: Michael Shetty  MRN: [id number]  Date of Birth/Age:  1945  Sex: male  Ethnicity: White    Primary Language:English   Needed: no    Attending Physician: Dr. Valdivia  Reason for Referral: assistance with clarification of goals of care  Consult Order Date: 12/26/18 0955  Primary CM/SW: Gwen Matamoros RN    Palliative Care Provider: Dr. DREAD Dunn    Present during Interview: Pt, pt's dtr Deidra, Pal Care MD and this SW    Past & Current Medical Situation:   Diagnosis: Acute on chronic respiratory failure with hypoxemia    PMH:   Past Medical History:   Diagnosis Date    Arthritis     BPH (benign prostatic hyperplasia)     Chemotherapy-induced neutropenia 9/6/2018    Chemotherapy-induced neutropenia 9/6/2018    Coronary artery disease     MI X 2    Gout, chronic     Heartburn     Hypertension     Myocardial infarction     PE (pulmonary embolism)     Presence of dental bridge     UPPER - NOT WEAR MUCH AS DOES NOT FIT WELL    Primary malignant neoplasm of left upper lobe of lung 5/19/2017    Staph infection      Mental Health/Substance Use History: n/a  Non-traditional Health practices:     Understanding of diagnosis and prognosis: Will benefit from continued support regarding dx and px.     Patients Mental Status: Alert and Oriented    Socio-Economic Factors/Resources:  Address: [place] [address]  Phone Number: [phone] (home)     Marital Status:  since 2010  Household Composition: Lives with dtr Deidra  Children: 7 children: 5 sons and 2 dtrs  Relationships with Family: Pt has been living with dtr Deidra for past few months. Dtr moved in to assist pt with his care.  Pt also has support from dtr Kelly who lives in [place], [place]. Per Records, Kelly is an RN in [place].    Pt is estranged from his 5 sons.    Pt has multiple grandchildren and great grandchildren.    Emergency Contacts:   Dtr: Deidra Little:  [phone]  Dtr: Kelly Shetty: [phone]    Activities of Daily Living: Independent prior  Support Systems-Family & Community (Home Health, HME etc):  n/a    Transportation:  no    Work/Education History: Pt is retired. Owned a heavy equipment company   History: no    Financial Resources:[employer]      Advanced Care Planning & Legal Concerns:   Advanced Directives/Living Will: no Dr. Dunn spoke with pt about [employer]. Per MD, he will speak with pt about completing.    Planning:  no    Power of : yes Completed MPOA. 1st Dtr Deidra Hwangkelsey; 2nd Kelly Shetty.  Surrogate Decision Maker:       Spirituality, Culture & Coping Mechanisms:  F- Donna and Belief: Bahai     I - Importance: Has Donna    C - Community/Culture Values:     A - Address in Care: Spiritual Care to follow      Strengths/Coping Strategies: Dtrs supportive  Self-Care Activities/Hobbies: Enjoys [employer]    Goals/Hopes/Expectations: Return home to be as independent as possible.  Fears/Anxiety/Concerns: TBD        Preferences about EOL Environment: (own bed, family nearby, pets, music, etc)  TBD    Complicated Bereavement Risk Assessment Tool (CBRAT)  Reference:  [facility] Clinical Practice Group (May 2016). Bereavement Risk Screening and Management Guidelines.  Retrieved from: http://www.[facility] Center[facility].com.au/wp-content/uploads//JSVVW-Hoyuohmnngv-Rdhxrlgsl-and-Management-Guideline-2016.pdf      Client Characteristics (Bereaved Client)  ? Under 18      no  ? Was a Twin   no  ? Young Spouse   no  ? Elderly Spouse    no  ? Isolated    no  ? Lacks Meaningful Social Support   no  ? Dissatisfied with help available during illness   no  ? New to Financial [place] no  ? New to Decision-Making   no   History of Loss (Bereaved Client)  ? Cumulative Multiple Losses   no  ? Previous Mental Health Illnesses   no  ? Current Mental Health Illness   no  ? Other Significant Health Issues    no   ? Migrant/Refugee   no    Illness  ? Inherited Disorder   no  ? Stigmatized Disease in the   no  ?  Family/Community   no  ? Lengthy/Burdensome   no Relationship with   ? Profound Lifelong Partner   no  ? Highly Dependent    no  ? Antagonistic   no  ? Ambivalent   no  ? Deeply Connected   yes  ? Culturally Defined   no   Death  ? Sudden or Unexpected   no  ? Traumatic Circumstances Associated with Death   no  ? Significant Cultural/Social Burdens as a result of Death   no   Risk Factors Scores  0-2  Low  3-5  Moderate  5+  High  All persons scoring moderate to high presume to be at risk**    (** It is acknowledged that protective factors and resilience may outweigh apparent risk factors.      Total Risk Factors Score:   Mild to Moderate    Will benefit from continued follow up for pt's dtrs. Pt estranged from sons per pt.       Discharge Planning Needs/Plan of Care:     MIGUEL and [facility] Care MD made visit to bedside to discuss pt's preference on Power of . Pt has 7 children and estranged from 5 of his sons. Completed MPOA for pt's dtr Deidra Little with dtr Kelly Shetty as secondary. Original with Copies given to Deidra at bedside. Copy placed in chart to be scanned in.    Began establishing rapport. Will continue to follow as appropriate.       Alyssa Nuñez, GEOVANNAW, ACHP-SW

## 2018-12-27 NOTE — ASSESSMENT & PLAN NOTE
Palliative Care Encounter / Goals of care discussion:     Narrative:   Michael Shetty is a 73 y.o. male patient with lung cancer, undergoing CTX, XRT (on hold for PE and PNA), clotting disorder s/p several PE, now severe hypoxema requiring increasing doses of O2 and likely superimposed PNA    1: Pain / Physical symptom Assessment:   - pain assesment: denies   - dyspnea assessment: severe   - anxiety assessment: mild   - depression assessment: denies    2: Social Background    - lives with daughter    3: Palliative care meeting participants:    Daughter and patient    4: Goals of care Discussion details:   He has desaturations when getting out of bed or even turning.    Requires alternating high flow and non rebreather mask   Quite symptomatic with very little tolerance      Discharge planning remains home with home health. He tells me he needs to make some changes in his living will and it is in his safe and he wants noone to mess with it.    He realizes he is on high flow and this will limit his ability to return home but is hopeful that with treatment of the PNA he will get better.      We completed a POA today, will plan on completing LA post tomorrow.     5: Goals of care Decisions / Recommendations / Plan:   Goals defined   POA completed   Will complete LA Post   Will continue discussion regarding viable discharge options. Currently pt. Remains hopeful to return home.     6: Follow up plans:    daily    Thank you for your consult. I will follow along with you. Please call [phone] with questions.

## 2018-12-27 NOTE — NURSING
Pt transferred to 60, on portable monitor and HF NC. Care of pt assumed; all belongings and chart with pt.

## 2018-12-27 NOTE — ASSESSMENT & PLAN NOTE
Chronic severe Pseudomonas PNA   -continue zosyn   -await sensitivities & will use 2 abx coverages once they result

## 2018-12-27 NOTE — ASSESSMENT & PLAN NOTE
-appreciate pall care assessment   -DNI    Per palliative care notes-  -patient aware of his disease & clinical prognosis   - lives with daughter  -Pt. Has good insight in his disease and is aware of his prognosis  -His goal is to return home as independent as possible. He would like to receive home health after discharge.   -He is hopeful that with ABX his O2 requirement will decline and he will be able to return home  -He will consider continuing CTX once he sees his recovery.   -Will need to assign POA, will do paperwork  - We will discuss extent of care desired and complete LA Post on follow up  -Will further clarify his wishes in case of code blue (partial code currently)   Goals defined              Will assign POA on follow up              Will complete LA Post              Will discuss discharge options including hospice on follow up (currently opposed).

## 2018-12-27 NOTE — SUBJECTIVE & OBJECTIVE
Past Medical History:   Diagnosis Date    Arthritis     BPH (benign prostatic hyperplasia)     Chemotherapy-induced neutropenia 2018    Chemotherapy-induced neutropenia 2018    Coronary artery disease     MI X 2    Gout, chronic     Heartburn     Hypertension     Myocardial infarction     PE (pulmonary embolism)     Presence of dental bridge     UPPER - NOT WEAR MUCH AS DOES NOT FIT WELL    Primary malignant neoplasm of left upper lobe of lung 2017    Staph infection        Past Surgical History:   Procedure Laterality Date    CARDIAC SURGERY      CABG X 4 9-11    CTR      BILAT    OSTEOTOMY, METACARPAL BONE Right 3/1/2013    Performed by Daron Jalloh Jr., [facility] at [facility]    ROTATOR CUFF REPAIR      left    TONSILLECTOMY      TRIGGER FINGER RELEASE      right and left hands.       Review of patient's allergies indicates:   Allergen Reactions    Shellfish containing products      soft shell crabs       Family History     Problem Relation (Age of Onset)    Cancer Sister        Tobacco Use    Smoking status: Former Smoker     Packs/day: 2.00     Years: 25.00     Pack years: 50.00     Last attempt to quit: 1990     Years since quittin.8    Smokeless tobacco: Never Used   Substance and Sexual Activity    Alcohol use: Yes     Comment: 2 PER DAY    Drug use: No    Sexual activity: Not on file      Review of Systems   Constitutional: Negative for chills, fatigue, fever and unexpected weight change.   Respiratory: Positive for cough and shortness of breath. Negative for wheezing.    Cardiovascular: Negative for chest pain, palpitations and leg swelling.   Gastrointestinal: Negative for abdominal distention, constipation, diarrhea, nausea and vomiting.        Hernia   Endocrine: Negative for cold intolerance and heat intolerance.   Genitourinary: Negative for decreased urine volume, difficulty urinating, flank pain, frequency, hematuria and urgency.   Musculoskeletal: Negative  for arthralgias, back pain and myalgias.   Skin: Negative for wound.   Neurological: Negative for facial asymmetry and numbness.   Psychiatric/Behavioral: Negative for agitation, behavioral problems, confusion and decreased concentration. The patient is not nervous/anxious.      Objective:     Vital Signs (Most Recent):  Temp: 97.9 °F (36.6 °C) (12/27/18 1100)  Pulse: 69 (12/27/18 1200)  Resp: (!) 22 (12/27/18 1200)  BP: 100/60 (12/27/18 1200)  SpO2: (!) 87 % (12/27/18 1200) Vital Signs (24h Range):  Temp:  [97.8 °F (36.6 °C)-98 °F (36.7 °C)] 97.9 °F (36.6 °C)  Pulse:  [] 69  Resp:  [14-32] 22  SpO2:  [78 %-95 %] 87 %  BP: (100-140)/(55-75) 100/60   Weight: 83.5 kg (184 lb)  Body mass index is 27.17 kg/m².      Intake/Output Summary (Last 24 hours) at 12/27/2018 1317  Last data filed at 12/27/2018 0746  Gross per 24 hour   Intake 300 ml   Output 1625 ml   Net -1325 ml       Physical Exam   Constitutional: He is oriented to person, place, and time. He appears well-developed and well-nourished. No distress.   On 30L O2 comfort flow FiO2 70%   HENT:   Head: Normocephalic and atraumatic.   Eyes: EOM are normal. Pupils are equal, round, and reactive to light.   Neck: Normal range of motion. Neck supple. No JVD present.   Cardiovascular: Regular rhythm.   Murmur heard.  Tachycardia  Systolic murmur   Pulmonary/Chest: No respiratory distress.   Abdominal: Soft. Bowel sounds are normal.   Musculoskeletal: He exhibits no edema.   Chronic bruising on BUE   Neurological: He is alert and oriented to person, place, and time. No cranial nerve deficit. Coordination normal.   Skin: Skin is warm and dry.   Psychiatric: He has a normal mood and affect. His behavior is normal. Judgment and thought content normal.       Vents:  Oxygen Concentration (%): 80 (12/27/18 1200)  Lines/Drains/Airways     Peripheral Intravenous Line                 Peripheral IV - Single Lumen 12/25/18 1500 Left Antecubital 1 day              Significant  Labs:    CBC/Anemia Profile:  Recent Labs   Lab 12/25/18  1504 12/26/18  0321 12/27/18  0434   WBC 8.74 6.35 5.59   HGB 9.3* 9.3* 9.8*   HCT 31.4* 30.9* 32.1*   * 127* 109*   * 104* 103*   RDW 17.6* 17.5* 17.8*        Chemistries:  Recent Labs   Lab 12/25/18  1504 12/26/18  0317 12/27/18  0434    140 142   K 4.3 3.9 3.1*    105 102   CO2 28 25 32*   BUN 30* 30* 30*   CREATININE 1.0 0.9 0.9   CALCIUM 8.4* 8.1* 7.9*   MG 2.3 2.3 2.3   PHOS 2.6* 3.7 2.8       All pertinent labs within the past 24 hours have been reviewed.    Significant Imaging: I have reviewed and interpreted all pertinent imaging results/findings within the past 24 hours.

## 2018-12-27 NOTE — ASSESSMENT & PLAN NOTE
Chronic severe Pseudomonas PNA   -sputum cx growing pseudomonas & few GPC   -continue zosyn   -await sensitivities & will use 2 abx coverages once they result ~3 weeks

## 2018-12-27 NOTE — PROGRESS NOTES
[facility]  Critical Care Medicine  Progress Note    Patient Name: Michael Shetty  MRN: [id number]  Admission Date: 12/25/2018  Hospital Length of Stay: 2 days  Code Status: Partial Code  Attending Provider: Rowdy Valdivia MD  Primary Care Provider: Michael Ellsworth MD   Principal Problem: Acute on chronic respiratory failure with hypoxemia    Subjective:     HPI:  Mr. Shetty is a 74yo man with PMH of MTHFR mutation, history of PEs on Eliquis, CAD s/p CABGn with chronically elevated troponin, hypotension on midodrine, squamous cell lung carcinoma of NEIDA s/p 1 cycle of adjuvant CTX & radiation therapy (which was stopped due to recurrent pseudomonas PNA & PEs), COPD, 54 pack years (quit 1990), chronic severe pseudomonas pna (3 abx for over a year) who presents as a transfer from [facility] for Interventional Cardiology evaluation for catheter assisted thrombolysis.     HPI: 4 days ago he had acute SOB requring 10L of oxygen at home when he is at a baseline of 6L. At [facility] his CTPA showed known PE of RLL pulmonary artery & increased size of right sided pleural effusion. Doppler u/s of BLE was negative for DVT. OSH continued patient on home eliquis regimen of 5mg BID and per patient did not notice improvement in oxygen requirements so he was sent to [facility].      Vitals during initial interview: 135/75, -104, 25-20L comfort flow O2 FiO2 70%.       Of note patient has chronic severe pseudomonas PNA for which he takes an alternating regimen of doxycycline, cipro, & cefuroxime. He quit smoking 30 years ago.     Hospital/ICU Course:  12/26/2018 NAEO; discussed he is not a candidate for interventional cardiology & goals of care with palliative care consult for today    12/27/2018 NAEO 25L, 80% FiO2     Past Medical History:   Diagnosis Date    Arthritis     BPH (benign prostatic hyperplasia)     Chemotherapy-induced neutropenia 9/6/2018    Chemotherapy-induced neutropenia 9/6/2018    Coronary artery disease      MI X 2    Gout, chronic     Heartburn     Hypertension     Myocardial infarction     PE (pulmonary embolism)     Presence of dental bridge     UPPER - NOT WEAR MUCH AS DOES NOT FIT WELL    Primary malignant neoplasm of left upper lobe of lung 2017    Staph infection        Past Surgical History:   Procedure Laterality Date    CARDIAC SURGERY      CABG X 4 9-11    CTR      BILAT    OSTEOTOMY, METACARPAL BONE Right 3/1/2013    Performed by Daron Jalloh Jr., [facility] at [facility] OR    ROTATOR CUFF REPAIR      left    TONSILLECTOMY      TRIGGER FINGER RELEASE      right and left hands.       Review of patient's allergies indicates:   Allergen Reactions    Shellfish containing products      soft shell crabs       Family History     Problem Relation (Age of Onset)    Cancer Sister        Tobacco Use    Smoking status: Former Smoker     Packs/day: 2.00     Years: 25.00     Pack years: 50.00     Last attempt to quit: 1990     Years since quittin.8    Smokeless tobacco: Never Used   Substance and Sexual Activity    Alcohol use: Yes     Comment: 2 PER DAY    Drug use: No    Sexual activity: Not on file      Review of Systems   Constitutional: Negative for chills, fatigue, fever and unexpected weight change.   Respiratory: Positive for cough and shortness of breath. Negative for wheezing.    Cardiovascular: Negative for chest pain, palpitations and leg swelling.   Gastrointestinal: Negative for abdominal distention, constipation, diarrhea, nausea and vomiting.        Hernia   Endocrine: Negative for cold intolerance and heat intolerance.   Genitourinary: Negative for decreased urine volume, difficulty urinating, flank pain, frequency, hematuria and urgency.   Musculoskeletal: Negative for arthralgias, back pain and myalgias.   Skin: Negative for wound.   Neurological: Negative for facial asymmetry and numbness.   Psychiatric/Behavioral: Negative for agitation, behavioral problems, confusion and  decreased concentration. The patient is not nervous/anxious.      Objective:     Vital Signs (Most Recent):  Temp: 97.9 °F (36.6 °C) (12/27/18 1100)  Pulse: 69 (12/27/18 1200)  Resp: (!) 22 (12/27/18 1200)  BP: 100/60 (12/27/18 1200)  SpO2: (!) 87 % (12/27/18 1200) Vital Signs (24h Range):  Temp:  [97.8 °F (36.6 °C)-98 °F (36.7 °C)] 97.9 °F (36.6 °C)  Pulse:  [] 69  Resp:  [14-32] 22  SpO2:  [78 %-95 %] 87 %  BP: (100-140)/(55-75) 100/60   Weight: 83.5 kg (184 lb)  Body mass index is 27.17 kg/m².      Intake/Output Summary (Last 24 hours) at 12/27/2018 1317  Last data filed at 12/27/2018 0746  Gross per 24 hour   Intake 300 ml   Output 1625 ml   Net -1325 ml       Physical Exam   Constitutional: He is oriented to person, place, and time. He appears well-developed and well-nourished. No distress.   On 30L O2 comfort flow FiO2 70%   HENT:   Head: Normocephalic and atraumatic.   Eyes: EOM are normal. Pupils are equal, round, and reactive to light.   Neck: Normal range of motion. Neck supple. No JVD present.   Cardiovascular: Regular rhythm.   Murmur heard.  Tachycardia  Systolic murmur   Pulmonary/Chest: No respiratory distress.   Abdominal: Soft. Bowel sounds are normal.   Musculoskeletal: He exhibits no edema.   Chronic bruising on BUE   Neurological: He is alert and oriented to person, place, and time. No cranial nerve deficit. Coordination normal.   Skin: Skin is warm and dry.   Psychiatric: He has a normal mood and affect. His behavior is normal. Judgment and thought content normal.       Vents:  Oxygen Concentration (%): 80 (12/27/18 1200)  Lines/Drains/Airways     Peripheral Intravenous Line                 Peripheral IV - Single Lumen 12/25/18 1500 Left Antecubital 1 day              Significant Labs:    CBC/Anemia Profile:  Recent Labs   Lab 12/25/18  1504 12/26/18  0321 12/27/18  0434   WBC 8.74 6.35 5.59   HGB 9.3* 9.3* 9.8*   HCT 31.4* 30.9* 32.1*   * 127* 109*   * 104* 103*   RDW 17.6*  17.5* 17.8*        Chemistries:  Recent Labs   Lab 12/25/18  1504 12/26/18  0317 12/27/18  0434    140 142   K 4.3 3.9 3.1*    105 102   CO2 28 25 32*   BUN 30* 30* 30*   CREATININE 1.0 0.9 0.9   CALCIUM 8.4* 8.1* 7.9*   MG 2.3 2.3 2.3   PHOS 2.6* 3.7 2.8       All pertinent labs within the past 24 hours have been reviewed.    Significant Imaging: I have reviewed and interpreted all pertinent imaging results/findings within the past 24 hours.      ABG  Recent Labs   Lab 12/25/18  1502   PH 7.469*   PO2 63*   PCO2 36.6   HCO3 26.5   BE 3     Assessment/Plan:     Pulmonary   * Acute on chronic respiratory failure with hypoxemia    72yo presents with acute SOB on a background of PEs & MTHFR mutation found to have recurrent PE on CTPA  -Interventional cardiology consulted  -bedside TTE performed 12/27/2018  -seems less likely to be a candidate for catheter assisted thrombolysis at this time due to his active cancer  -appreciate recs  -continuing eliquis 5mg BID for now    -still requiring 20L & FiO2 80% and DNI status      Chronic pneumonia    Chronic severe Pseudomonas PNA   -sputum cx growing pseudomonas & few GPC   -continue zosyn   -await sensitivities & will use 2 abx coverages once they result ~3 weeks     Pulmonary emphysema    -duo nebs q6hr   -ICS budesonide 0.5mg q12hr  -tiotroprium 18mcg   -prednisone 60mg PO      Cardiac/Vascular   Coronary artery disease involving coronary bypass graft    S/p CABG     Chronic hypotension    -on midodrine 5mg TID  -BP stable  -will hold if sBP >130     Hematology   Chronic pulmonary embolism    -per interventional cardiology consultation  -given active lung cancer he is not a candidate for thrombolysis  -CT chest reviewed, if clot in RLL pulmonary artery were to be lysed- it would end up perfusing his lung tissue that has had significant scarring & chronic severe pseudomonas pna     Oncology   Primary malignant neoplasm of left upper lobe of lung    73  y.o. male with diagnosis of NSCLC (Squamous cell)  - T2A lesion with 3.5cm involving NEIDA  - s/p monotherapy with XRT by Dr Olvera; followed by chemotherapy with 3 cycles of carbo/taxol  - followed by Dr Ely with Pulm - seen him on Dec 5th and f/u on Feb 6th  - CTA on 5/18/17 showed decrease size in the tumor mass  - CT on 12/14/17 with stable lung findings  - Latest CT scans show slight increase in size of the NEIDA nodule  - he had repeat PET on 5/9/2018 with over stable except for the one spot in the right lung  - he saw Dr Ely again June 6th and saw Dr Olvera on June 19th  - he completed XRT x 5 on July 13th  -  discussed his case at the [facility] Tumor Board yesterday where Dr Az Castaneda and Dr Olvera, pathology, surgery, and radiology.   - the boards recommendation previously was to proceed with observation only with regular scans; they felt he was too high risk for surgery and immunologics  - however, repeat PET on 8/8/2018 is showing an enlarging nodule in NEIDA with increasing FDG activity and worrisome for progressive disease   - he received his first and only cycle of chemotherapy with carbo/gemzar the week before his prior hospitalization         Other   Goals of care, counseling/discussion    -appreciate pall care assessment   -DNI    Per palliative care notes-  -patient aware of his disease & clinical prognosis   - lives with daughter  -Pt. Has good insight in his disease and is aware of his prognosis  -His goal is to return home as independent as possible. He would like to receive home health after discharge.   -He is hopeful that with ABX his O2 requirement will decline and he will be able to return home  -He will consider continuing CTX once he sees his recovery.   -Will need to assign POA, will do paperwork  - We will discuss extent of care desired and complete LA Post on follow up  -Will further clarify his wishes in case of code blue (partial code currently)   Goals defined              Will  assign POA on follow up              Will complete LA Post              Will discuss discharge options including hospice on follow up (currently opposed).         Critical Care Daily Checklist:    A: Awake: RASS Goal/Actual Goal: RASS Goal: 0-->alert and calm  Actual: Rob Agitation Sedation Scale (RASS): Alert and calm   B: Spontaneous Breathing Trial Performed?     C: SAT & SBT Coordinated?  n/a                      D: Delirium: CAM-ICU Overall CAM-ICU: Negative   E: Early Mobility Performed? Yes   F: Feeding Goal:    Status:     Current Diet Order   Procedures    Diet Adult Regular (IDDSI Level 7)      AS: Analgesia/Sedation n/a   T: Thromboembolic Prophylaxis eliquis    H: HOB > 300 Yes   U: Stress Ulcer Prophylaxis (if needed) yes   G: Glucose Control n/a   B: Bowel Function     I: Indwelling Catheter (Lines & Bassett) Necessity n/a   D: De-escalation of Antimicrobials/Pharmacotherapies Zosyn sensitivities pending    Plan for the day/ETD Pending    Code Status:  Family/Goals of Care: Partial Code         Critical secondary to Patient has a condition that poses threat to life and bodily function: Pulmonary Embolism and Severe Respiratory Distress      Critical care was time spent personally by me on the following activities: development of treatment plan with patient or surrogate and bedside caregivers, discussions with consultants, evaluation of patient's response to treatment, examination of patient, ordering and performing treatments and interventions, ordering and review of laboratory studies, ordering and review of radiographic studies, pulse oximetry, re-evaluation of patient's condition. This critical care time did not overlap with that of any other provider or involve time for any procedures.     Elisa Pagan MD  Internal Medicine, PGY-I  CCS1 spectralink #[id number]  [facility]

## 2018-12-27 NOTE — ASSESSMENT & PLAN NOTE
-per interventional cardiology consultation  -given active lung cancer he is not a candidate for thrombolysis  -CT chest reviewed, if clot in RLL pulmonary artery were to be lysed- it would end up perfusing his lung tissue that has had significant scarring & chronic severe pseudomonas pna

## 2018-12-27 NOTE — PROGRESS NOTES
[facility]  Critical Care Medicine  Progress Note    Patient Name: Michael Shetty  MRN: [id number]  Admission Date: 12/25/2018  Hospital Length of Stay: 1 days  Code Status: Partial Code  Attending Provider: Rowdy Valdivia MD  Primary Care Provider: Michael Ellsworth MD   Principal Problem: Acute on chronic respiratory failure with hypoxemia    Subjective:     HPI:  Mr. Shetty is a 74yo man with PMH of MTHFR mutation, history of PEs on Eliquis, CAD s/p CABGn with chronically elevated troponin, hypotension on midodrine, squamous cell lung carcinoma of NEIDA s/p 1 cycle of adjuvant CTX & radiation therapy (which was stopped due to recurrent pseudomonas PNA & PEs), COPD, 54 pack years (quit 1990), chronic severe pseudomonas pna (3 abx for over a year) who presents as a transfer from [facility] for Interventional Cardiology evaluation for catheter assisted thrombolysis.     HPI: 4 days ago he had acute SOB requring 10L of oxygen at home when he is at a baseline of 6L. At [facility] his CTPA showed known PE of RLL pulmonary artery & increased size of right sided pleural effusion. Doppler u/s of BLE was negative for DVT. OSH continued patient on home eliquis regimen of 5mg BID and per patient did not notice improvement in oxygen requirements so he was sent to [facility].      Vitals during initial interview: 135/75, -104, 25-20L comfort flow O2 FiO2 70%.       Of note patient has chronic severe pseudomonas PNA for which he takes an alternating regimen of doxycycline, cipro, & cefuroxime. He quit smoking 30 years ago.     Hospital/ICU Course:  12/26/2018 JED; discussed he is not a candidate for interventional cardiology & goals of care with palliative care consult for today    Past Medical History:   Diagnosis Date    Arthritis     BPH (benign prostatic hyperplasia)     Chemotherapy-induced neutropenia 9/6/2018    Chemotherapy-induced neutropenia 9/6/2018    Coronary artery disease     MI X 2    Gout, chronic      Heartburn     Hypertension     Myocardial infarction     PE (pulmonary embolism)     Presence of dental bridge     UPPER - NOT WEAR MUCH AS DOES NOT FIT WELL    Primary malignant neoplasm of left upper lobe of lung 2017    Staph infection        Past Surgical History:   Procedure Laterality Date    CARDIAC SURGERY      CABG X 4 9-11    CTR      BILAT    OSTEOTOMY, METACARPAL BONE Right 3/1/2013    Performed by D[facility] Jalloh Jr[facility] at [facility] OR    ROTATOR CUFF REPAIR      left    TONSILLECTOMY      TRIGGER FINGER RELEASE      right and left hands.       Review of patient's allergies indicates:   Allergen Reactions    Shellfish containing products      soft shell crabs       Family History     Problem Relation (Age of Onset)    Cancer Sister        Tobacco Use    Smoking status: Former Smoker     Packs/day: 2.00     Years: 25.00     Pack years: 50.00     Last attempt to quit: 1990     Years since quittin.8    Smokeless tobacco: Never Used   Substance and Sexual Activity    Alcohol use: Yes     Comment: 2 PER DAY    Drug use: No    Sexual activity: Not on file      Review of Systems   Constitutional: Negative for chills, fatigue, fever and unexpected weight change.   Respiratory: Positive for cough and shortness of breath. Negative for wheezing.    Cardiovascular: Negative for chest pain, palpitations and leg swelling.   Gastrointestinal: Negative for abdominal distention, constipation, diarrhea, nausea and vomiting.        Hernia   Endocrine: Negative for cold intolerance and heat intolerance.   Genitourinary: Negative for decreased urine volume, difficulty urinating, flank pain, frequency, hematuria and urgency.   Musculoskeletal: Negative for arthralgias, back pain and myalgias.   Skin: Negative for wound.   Neurological: Negative for facial asymmetry and numbness.   Psychiatric/Behavioral: Negative for agitation, behavioral problems, confusion and decreased concentration. The  patient is not nervous/anxious.      Objective:     Vital Signs (Most Recent):  Temp: 97.8 °F (36.6 °C) (12/26/18 1500)  Pulse: 96 (12/26/18 1800)  Resp: 19 (12/26/18 1800)  BP: 111/70 (12/26/18 1800)  SpO2: (!) 90 % (12/26/18 1800) Vital Signs (24h Range):  Temp:  [97.4 °F (36.3 °C)-98.2 °F (36.8 °C)] 97.8 °F (36.6 °C)  Pulse:  [] 96  Resp:  [8-35] 19  SpO2:  [86 %-100 %] 90 %  BP: (105-166)/(63-94) 111/70   Weight: 83.5 kg (184 lb)  Body mass index is 27.17 kg/m².      Intake/Output Summary (Last 24 hours) at 12/26/2018 1855  Last data filed at 12/26/2018 1546  Gross per 24 hour   Intake 810 ml   Output 4425 ml   Net -3615 ml       Physical Exam   Constitutional: He is oriented to person, place, and time. He appears well-developed and well-nourished. No distress.   On 30L O2 comfort flow FiO2 70%   HENT:   Head: Normocephalic and atraumatic.   Eyes: EOM are normal. Pupils are equal, round, and reactive to light.   Neck: Normal range of motion. Neck supple. No JVD present.   Cardiovascular: Regular rhythm.   Murmur heard.  Tachycardia  Systolic murmur   Pulmonary/Chest: No respiratory distress.   Abdominal: Soft. Bowel sounds are normal.   Musculoskeletal: He exhibits no edema.   Chronic bruising on BUE   Neurological: He is alert and oriented to person, place, and time. No cranial nerve deficit. Coordination normal.   Skin: Skin is warm and dry.   Psychiatric: He has a normal mood and affect. His behavior is normal. Judgment and thought content normal.       Vents:  Oxygen Concentration (%): 80 (12/26/18 1800)  Lines/Drains/Airways     Peripheral Intravenous Line                 Peripheral IV - Single Lumen 12/25/18 1300 Right Antecubital 1 day         Peripheral IV - Single Lumen 12/25/18 1500 Left Antecubital 1 day              Significant Labs:    CBC/Anemia Profile:  Recent Labs   Lab 12/25/18  1504 12/26/18  0321   WBC 8.74 6.35   HGB 9.3* 9.3*   HCT 31.4* 30.9*   * 127*   * 104*   RDW  17.6* 17.5*        Chemistries:  Recent Labs   Lab 12/25/18  1504 12/26/18  0317    140   K 4.3 3.9    105   CO2 28 25   BUN 30* 30*   CREATININE 1.0 0.9   CALCIUM 8.4* 8.1*   MG 2.3 2.3   PHOS 2.6* 3.7       All pertinent labs within the past 24 hours have been reviewed.    Significant Imaging: I have reviewed and interpreted all pertinent imaging results/findings within the past 24 hours.      ABG  Recent Labs   Lab 12/25/18  1502   PH 7.469*   PO2 63*   PCO2 36.6   HCO3 26.5   BE 3     Assessment/Plan:     Pulmonary   * Acute on chronic respiratory failure with hypoxemia    74yo presents with acute SOB on a background of PEs & MTHFR mutation found to have recurrent PE on CTPA  -Interventional cardiology consulted  -bedside TTE performed 12/25/2018  -seems less likely to be a candidate for catheter assisted thrombolysis at this time due to his active cancer  -appreciate recs  -continuing eliquis 5mg BID for now     Chronic pneumonia    Chronic severe Pseudomonas PNA   -continue zosyn   -await sensitivities & will use 2 abx coverages once they result     Pulmonary emphysema    -duo nebs q6hr   -ICS budesonide 0.5mg q12hr  -tiotroprium 18mcg   -prednisone 60mg PO      Cardiac/Vascular   Coronary artery disease involving coronary bypass graft    S/p CABG     Chronic hypotension    -on midodrine 5mg TID  -BP stable  -will hold if sBP >130     Hematology   Chronic pulmonary embolism    -per interventional cardiology consultation  -given active lung cancer he is not a candidate for thrombolysis  -CT chest reviewed, if clot in RLL pulmonary artery were to be lysed- it would end up perfusing his lung tissue that has had significant scarring & chronic severe pseudomonas pna     Oncology   Primary malignant neoplasm of left upper lobe of lung    73 y.o. male with diagnosis of NSCLC (Squamous cell)  - T2A lesion with 3.5cm involving NEIDA  - s/p monotherapy with XRT by Dr Olvera; followed by chemotherapy with 3  cycles of carbo/taxol  - followed by Dr Ely with Pulm - seen him on Dec 5th and f/u on Feb 6th  - CTA on 5/18/17 showed decrease size in the tumor mass  - CT on 12/14/17 with stable lung findings  - Latest CT scans show slight increase in size of the NEIDA nodule  - he had repeat PET on 5/9/2018 with over stable except for the one spot in the right lung  - he saw Dr Ely again June 6th and saw Dr Olvera on June 19th  - he completed XRT x 5 on July 13th  -  discussed his case at the [facility] Tumor Board yesterday where Dr Az Castaneda and Dr Olvera, pathology, surgery, and radiology.   - the boards recommendation previously was to proceed with observation only with regular scans; they felt he was too high risk for surgery and immunologics  - however, repeat PET on 8/8/2018 is showing an enlarging nodule in NEIDA with increasing FDG activity and worrisome for progressive disease   - he received his first and only cycle of chemotherapy with carbo/gemzar the week before his prior hospitalization         Other   Goals of care, counseling/discussion    -DNI  -pending pall care consult  -patient aware of his clinical prognosis   -appreciate recs        Critical Care Daily Checklist:    A: Awake: RASS Goal/Actual Goal: RASS Goal: 0-->alert and calm  Actual: Rob Agitation Sedation Scale (RASS): Alert and calm   B: Spontaneous Breathing Trial Performed?     C: SAT & SBT Coordinated?  DNI                      D: Delirium: CAM-ICU Overall CAM-ICU: Negative   E: Early Mobility Performed? Yes   F: Feeding Goal:    Status:     Current Diet Order   Procedures    Diet Adult Regular (IDDSI Level 7)      AS: Analgesia/Sedation n/a   T: Thromboembolic Prophylaxis eliquis   H: HOB > 300 Yes   U: Stress Ulcer Prophylaxis (if needed) ppi   G: Glucose Control n/a   B: Bowel Function     I: Indwelling Catheter (Lines & Bassett) Necessity none   D: De-escalation of Antimicrobials/Pharmacotherapies Zosyn pending sensitivities     Plan  for the day/ETD Pending     Code Status:  Family/Goals of Care: Partial Code         Critical secondary to Patient has a condition that poses threat to life and bodily function: Pulmonary Embolism and Severe Respiratory Distress      Critical care was time spent personally by me on the following activities: development of treatment plan with patient or surrogate and bedside caregivers, discussions with consultants, evaluation of patient's response to treatment, examination of patient, ordering and performing treatments and interventions, ordering and review of laboratory studies, ordering and review of radiographic studies, pulse oximetry, re-evaluation of patient's condition. This critical care time did not overlap with that of any other provider or involve time for any procedures.     Elisa aPgan MD  Internal Medicine, PGY-I  CCS1 [employer]ink #[id number]  [facility]

## 2018-12-27 NOTE — SUBJECTIVE & OBJECTIVE
Past Medical History:   Diagnosis Date    Arthritis     BPH (benign prostatic hyperplasia)     Chemotherapy-induced neutropenia 2018    Chemotherapy-induced neutropenia 2018    Coronary artery disease     MI X 2    Gout, chronic     Heartburn     Hypertension     Myocardial infarction     PE (pulmonary embolism)     Presence of dental bridge     UPPER - NOT WEAR MUCH AS DOES NOT FIT WELL    Primary malignant neoplasm of left upper lobe of lung 2017    Staph infection        Past Surgical History:   Procedure Laterality Date    CARDIAC SURGERY      CABG X 4 9-11    CTR      BILAT    OSTEOTOMY, METACARPAL BONE Right 3/1/2013    Performed by Daron Jalloh Jr., MD at [facility] OR    ROTATOR CUFF REPAIR      left    TONSILLECTOMY      TRIGGER FINGER RELEASE      right and left hands.       Review of patient's allergies indicates:   Allergen Reactions    Shellfish containing products      soft shell crabs       Family History     Problem Relation (Age of Onset)    Cancer Sister        Tobacco Use    Smoking status: Former Smoker     Packs/day: 2.00     Years: 25.00     Pack years: 50.00     Last attempt to quit: 1990     Years since quittin.8    Smokeless tobacco: Never Used   Substance and Sexual Activity    Alcohol use: Yes     Comment: 2 PER DAY    Drug use: No    Sexual activity: Not on file      Review of Systems   Constitutional: Negative for chills, fatigue, fever and unexpected weight change.   Respiratory: Positive for cough and shortness of breath. Negative for wheezing.    Cardiovascular: Negative for chest pain, palpitations and leg swelling.   Gastrointestinal: Negative for abdominal distention, constipation, diarrhea, nausea and vomiting.        Hernia   Endocrine: Negative for cold intolerance and heat intolerance.   Genitourinary: Negative for decreased urine volume, difficulty urinating, flank pain, frequency, hematuria and urgency.   Musculoskeletal: Negative  for arthralgias, back pain and myalgias.   Skin: Negative for wound.   Neurological: Negative for facial asymmetry and numbness.   Psychiatric/Behavioral: Negative for agitation, behavioral problems, confusion and decreased concentration. The patient is not nervous/anxious.      Objective:     Vital Signs (Most Recent):  Temp: 97.8 °F (36.6 °C) (12/26/18 1500)  Pulse: 96 (12/26/18 1800)  Resp: 19 (12/26/18 1800)  BP: 111/70 (12/26/18 1800)  SpO2: (!) 90 % (12/26/18 1800) Vital Signs (24h Range):  Temp:  [97.4 °F (36.3 °C)-98.2 °F (36.8 °C)] 97.8 °F (36.6 °C)  Pulse:  [] 96  Resp:  [8-35] 19  SpO2:  [86 %-100 %] 90 %  BP: (105-166)/(63-94) 111/70   Weight: 83.5 kg (184 lb)  Body mass index is 27.17 kg/m².      Intake/Output Summary (Last 24 hours) at 12/26/2018 1855  Last data filed at 12/26/2018 1546  Gross per 24 hour   Intake 810 ml   Output 4425 ml   Net -3615 ml       Physical Exam   Constitutional: He is oriented to person, place, and time. He appears well-developed and well-nourished. No distress.   On 30L O2 comfort flow FiO2 70%   HENT:   Head: Normocephalic and atraumatic.   Eyes: EOM are normal. Pupils are equal, round, and reactive to light.   Neck: Normal range of motion. Neck supple. No JVD present.   Cardiovascular: Regular rhythm.   Murmur heard.  Tachycardia  Systolic murmur   Pulmonary/Chest: No respiratory distress.   Abdominal: Soft. Bowel sounds are normal.   Musculoskeletal: He exhibits no edema.   Chronic bruising on BUE   Neurological: He is alert and oriented to person, place, and time. No cranial nerve deficit. Coordination normal.   Skin: Skin is warm and dry.   Psychiatric: He has a normal mood and affect. His behavior is normal. Judgment and thought content normal.       Vents:  Oxygen Concentration (%): 80 (12/26/18 1800)  Lines/Drains/Airways     Peripheral Intravenous Line                 Peripheral IV - Single Lumen 12/25/18 1300 Right Antecubital 1 day         Peripheral IV -  Single Lumen 12/25/18 1500 Left Antecubital 1 day              Significant Labs:    CBC/Anemia Profile:  Recent Labs   Lab 12/25/18  1504 12/26/18  0321   WBC 8.74 6.35   HGB 9.3* 9.3*   HCT 31.4* 30.9*   * 127*   * 104*   RDW 17.6* 17.5*        Chemistries:  Recent Labs   Lab 12/25/18  1504 12/26/18  0317    140   K 4.3 3.9    105   CO2 28 25   BUN 30* 30*   CREATININE 1.0 0.9   CALCIUM 8.4* 8.1*   MG 2.3 2.3   PHOS 2.6* 3.7       All pertinent labs within the past 24 hours have been reviewed.    Significant Imaging: I have reviewed and interpreted all pertinent imaging results/findings within the past 24 hours.

## 2018-12-27 NOTE — SUBJECTIVE & OBJECTIVE
Interval History: several episodes of desaturations during which the pt. Required NR Oxygen.   Alert and conversational today.     Medications:  Continuous Infusions:  Scheduled Meds:   albuterol-ipratropium  3 mL Nebulization Q6H    allopurinol  100 mg Oral Daily    apixaban  5 mg Oral BID    atenolol  12.5 mg Oral BID    budesonide  0.5 mg Nebulization Q12H    multivitamin  1 tablet Oral Daily    pantoprazole  40 mg Oral Daily    piperacillin-tazobactam (ZOSYN) IVPB  4.5 g Intravenous Q8H    pravastatin  40 mg Oral QHS    predniSONE  60 mg Oral Daily    tiotropium  1 capsule Inhalation Daily     PRN Meds:sodium chloride 0.9%    Objective:     Vital Signs (Most Recent):  Temp: 97.9 °F (36.6 °C) (12/27/18 1100)  Pulse: 81 (12/27/18 1321)  Resp: (!) 24 (12/27/18 1321)  BP: 105/64 (12/27/18 1300)  SpO2: (!) 89 % (12/27/18 1321) Vital Signs (24h Range):  Temp:  [97.8 °F (36.6 °C)-98 °F (36.7 °C)] 97.9 °F (36.6 °C)  Pulse:  [] 81  Resp:  [14-31] 24  SpO2:  [78 %-95 %] 89 %  BP: (100-140)/(55-75) 105/64     Weight: 83.5 kg (184 lb)  Body mass index is 27.17 kg/m².    Review of Symptoms  Symptom Assessment (ESAS 0-10 scale)  ESAS 0 1 2 3 4 5 6 7 8 9 10   Pain x             Dyspnea     x         Anxiety    x          Nausea x             Depression   x            Anorexia x             Fatigue    x          Insomnia   x           Restlessness   x            Agitation x             CAM / Delirium x__ --  ___+   Constipation     x__ --  ___+   Diarrhea           _x_ --  ___+      Bowel Management Plan (BMP): No    Comments:     Pain Assessment: denies    OME in 24 hours:     Performance Status: 60    ECOG Performance Status Grade: 4 - Completely disabled    Physical Exam   Constitutional: He is oriented to person, place, and time. He appears well-developed. He appears distressed.   Neck: Normal range of motion. No JVD present.   Pulmonary/Chest: He is in respiratory distress. He has rales.   Abdominal: Soft.    Neurological: He is alert and oriented to person, place, and time.   Skin: Skin is warm.       Significant Labs: All pertinent labs within the past 24 hours have been reviewed.  CBC:   Recent Labs   Lab 12/27/18 0434   WBC 5.59   HGB 9.8*   HCT 32.1*   *   *     BMP:  Recent Labs   Lab 12/27/18 0434   *      K 3.1*      CO2 32*   BUN 30*   CREATININE 0.9   CALCIUM 7.9*   MG 2.3     LFT:  Lab Results   Component Value Date    AST 20 12/06/2018    ALKPHOS 53 12/06/2018    BILITOT 2.0 (H) 12/06/2018     Albumin:   Albumin   Date Value Ref Range Status   12/06/2018 3.6 3.6 - 5.1 g/dL Final   10/01/2018 3.1 3.1 - 4.7 g/dL      Protein:   Total Protein   Date Value Ref Range Status   12/06/2018 6.0 (L) 6.1 - 8.1 g/dL Final     Lactic acid:   No results found for: LACTATE    Significant Imaging: I have reviewed all pertinent imaging results/findings within the past 24 hours.    Advanced Directives::  Living Will: No  LaPOST: No  Do Not Resuscitate Status: Yes partial  Medical Power of : No    Decision-Making Capacity: Patient answered questions    Living Arrangements: Lives with family, Lives in apartment    Psychosocial/Cultural:  Patient's most important priorities:  Returning home     Patient's biggest concerns/fears:  Not being able to return home    Previous death/end of life care history:  no    Patient's goals/hopes:  Return home    Spiritual:     F- Donna and Belief: yes but not Orthodox    I - Importance: some  .  C - Community: no    A - Address in Care: no

## 2018-12-27 NOTE — PROGRESS NOTES
[facility]  Palliative Medicine  Progress Note    Patient Name: Michael Shetty  MRN: [id number]  Admission Date: 12/25/2018  Hospital Length of Stay: 2 days  Code Status: Partial Code   Attending Provider: Rowdy Valdivia MD  Consulting Provider: Power Dunn MD  Primary Care Physician: Michael Ellsworth MD  Principal Problem:Acute on chronic respiratory failure with hypoxemia    Patient information was obtained from patient.      Assessment/Plan:     Palliative care encounter    Palliative Care Encounter / Goals of care discussion:     Narrative:   Michael Shetty is a 73 y.o. male patient with lung cancer, undergoing CTX, XRT (on hold for PE and PNA), clotting disorder s/p several PE, now severe hypoxema requiring increasing doses of O2 and likely superimposed PNA    1: Pain / Physical symptom Assessment:   - pain assesment: denies   - dyspnea assessment: severe   - anxiety assessment: mild   - depression assessment: denies    2: Social Background    - lives with daughter    3: Palliative care meeting participants:    Daughter and patient    4: Goals of care Discussion details:   He has desaturations when getting out of bed or even turning.    Requires alternating high flow and non rebreather mask   Quite symptomatic with very little tolerance      Discharge planning remains home with home health. He tells me he needs to make some changes in his living will and it is in his safe and he wants noone to mess with it.    He realizes he is on high flow and this will limit his ability to return home but is hopeful that with treatment of the PNA he will get better.      We completed a POA today, will plan on completing LA post tomorrow.     5: Goals of care Decisions / Recommendations / Plan:   Goals defined   POA completed   Will complete LA Post   Will continue discussion regarding viable discharge options. Currently pt. Remains hopeful to return home.     6: Follow up plans:    daily    Thank you  for your consult. I will follow along with you. Please call [phone] with questions.            I will follow-up with patient. Please contact us if you have any additional questions.    Subjective:     Chief Complaint: No chief complaint on file.      HPI:   Michael Shetty is a nice 72 yo mald with a past history of MTHFR mutation and several past PE and DVT episodes. He had been on several OAC, most recently on Eliquis. He has SCLC and is currently undergoing CTX and XRT, treatment had been paused due to PE and recurrent pseudomonas PNA.   He developed acute worsening of his dyspnea (usually able to ambulate to BR and do his ADL on ~ 5L O2), severe exertional dyspnea and needing to increase his oxygen to 10l/min.   He presented to [facility] but was transferred to our facility for evaluation of catheter assisted thrombolysis.     He was thought not a candidate for thrombolysis due to poor risk benefit ratio, he is felt to have a pneumonic infiltrate and is currently on ABX. He is requiring high flow O2 to sustain O2 saturation of ~89%.     I am being asked to discuss goals of care with the pt.         Hospital Course:  No notes on file    Interval History: several episodes of desaturations during which the pt. Required NR Oxygen.   Alert and conversational today.     Medications:  Continuous Infusions:  Scheduled Meds:   albuterol-ipratropium  3 mL Nebulization Q6H    allopurinol  100 mg Oral Daily    apixaban  5 mg Oral BID    atenolol  12.5 mg Oral BID    budesonide  0.5 mg Nebulization Q12H    multivitamin  1 tablet Oral Daily    pantoprazole  40 mg Oral Daily    piperacillin-tazobactam (ZOSYN) IVPB  4.5 g Intravenous Q8H    pravastatin  40 mg Oral QHS    predniSONE  60 mg Oral Daily    tiotropium  1 capsule Inhalation Daily     PRN Meds:sodium chloride 0.9%    Objective:     Vital Signs (Most Recent):  Temp: 97.9 °F (36.6 °C) (12/27/18 1100)  Pulse: 81 (12/27/18 1321)  Resp: (!) 24 (12/27/18  1321)  BP: 105/64 (12/27/18 1300)  SpO2: (!) 89 % (12/27/18 1321) Vital Signs (24h Range):  Temp:  [97.8 °F (36.6 °C)-98 °F (36.7 °C)] 97.9 °F (36.6 °C)  Pulse:  [] 81  Resp:  [14-31] 24  SpO2:  [78 %-95 %] 89 %  BP: (100-140)/(55-75) 105/64     Weight: 83.5 kg (184 lb)  Body mass index is 27.17 kg/m².    Review of Symptoms  Symptom Assessment (ESAS 0-10 scale)  ESAS 0 1 2 3 4 5 6 7 8 9 10   Pain x             Dyspnea     x         Anxiety    x          Nausea x             Depression   x            Anorexia x             Fatigue    x          Insomnia   x           Restlessness   x            Agitation x             CAM / Delirium x__ --  ___+   Constipation     x__ --  ___+   Diarrhea           _x_ --  ___+      Bowel Management Plan (BMP): No    Comments:     Pain Assessment: denies    OME in 24 hours:     Performance Status: 60    ECOG Performance Status Grade: 4 - Completely disabled    Physical Exam   Constitutional: He is oriented to person, place, and time. He appears well-developed. He appears distressed.   Neck: Normal range of motion. No JVD present.   Pulmonary/Chest: He is in respiratory distress. He has rales.   Abdominal: Soft.   Neurological: He is alert and oriented to person, place, and time.   Skin: Skin is warm.       Significant Labs: All pertinent labs within the past 24 hours have been reviewed.  CBC:   Recent Labs   Lab 12/27/18  0434   WBC 5.59   HGB 9.8*   HCT 32.1*   *   *     BMP:  Recent Labs   Lab 12/27/18  0434   *      K 3.1*      CO2 32*   BUN 30*   CREATININE 0.9   CALCIUM 7.9*   MG 2.3     LFT:  Lab Results   Component Value Date    AST 20 12/06/2018    ALKPHOS 53 12/06/2018    BILITOT 2.0 (H) 12/06/2018     Albumin:   Albumin   Date Value Ref Range Status   12/06/2018 3.6 3.6 - 5.1 g/dL Final   10/01/2018 3.1 3.1 - 4.7 g/dL      Protein:   Total Protein   Date Value Ref Range Status   12/06/2018 6.0 (L) 6.1 - 8.1 g/dL Final     Lactic acid:    No results found for: LACTATE    Significant Imaging: I have reviewed all pertinent imaging results/findings within the past 24 hours.    Advanced Directives::  Living Will: No  LaPOST: No  Do Not Resuscitate Status: Yes partial  Medical Power of : No    Decision-Making Capacity: Patient answered questions    Living Arrangements: Lives with family, Lives in apartment    Psychosocial/Cultural:  Patient's most important priorities:  Returning home     Patient's biggest concerns/fears:  Not being able to return home    Previous death/end of life care history:  no    Patient's goals/hopes:  Return home    Spiritual:     F- Donna and Belief: yes but not Holiness    I - Importance: some  .  C - Community: no    A - Address in Care: no      > 50% of 65 min visit spent in chart review, face to face discussion of goals of care,  symptom assessment, coordination of care and emotional support.    Power Dunn MD  Palliative Medicine  [facility]

## 2018-12-27 NOTE — PLAN OF CARE
Problem: Adult Inpatient Plan of Care  Goal: Plan of Care Review  Outcome: Ongoing (interventions implemented as appropriate)  Pt remains free from falls and injuries. On HF NC. Other than dyspnea with exertion, pt denying SOB, CP, pain, or discomfort. Pt repositioned independently. Plan of care discussed with pt. VSS, no acute issues overnight.

## 2018-12-28 NOTE — NURSING
See vital signs and assessments in flowsheets. See below for updates on today's progress.     25L O2 at 80% FiO2 comfort flow. Nonrebreather to use with shortness of breath. Pt desats with even minimal movement in bed to low 70s-60s.   ABX given as ordered    Patient progressing towards goals as tolerated, plan of care communicated and reviewed with Michael Shetty and family. All concerns addressed, questions answered. Will continue to monitor closely.

## 2018-12-28 NOTE — SUBJECTIVE & OBJECTIVE
Interval History: continues on 25 L high flow O2, requiring intermittent NR. Daughter at the bedside    Medications:  Continuous Infusions:  Scheduled Meds:   albuterol-ipratropium  3 mL Nebulization Q6H    allopurinol  100 mg Oral Daily    apixaban  5 mg Oral BID    atenolol  12.5 mg Oral BID    budesonide  0.5 mg Nebulization Q12H    meropenem (MERREM) IVPB  1 g Intravenous Q8H    multivitamin  1 tablet Oral Daily    pantoprazole  40 mg Oral Daily    pravastatin  40 mg Oral QHS    predniSONE  60 mg Oral Daily    tiotropium  1 capsule Inhalation Daily     PRN Meds:sodium chloride 0.9%    Objective:     Vital Signs (Most Recent):  Temp: 98.1 °F (36.7 °C) (12/28/18 1100)  Pulse: 84 (12/28/18 1400)  Resp: (!) 34 (12/28/18 1400)  BP: 107/64 (12/28/18 1400)  SpO2: (!) 92 % (12/28/18 1400) Vital Signs (24h Range):  Temp:  [97.8 °F (36.6 °C)-98.2 °F (36.8 °C)] 98.1 °F (36.7 °C)  Pulse:  [57-96] 84  Resp:  [16-34] 34  SpO2:  [72 %-96 %] 92 %  BP: ()/(52-82) 107/64     Weight: 83.5 kg (184 lb)  Body mass index is 27.17 kg/m².    Review of Symptoms  Symptom Assessment (ESAS 0-10 scale)  ESAS 0 1 2 3 4 5 6 7 8 9 10   Pain x             Dyspnea     x         Anxiety    x          Nausea x             Depression   x            Anorexia x             Fatigue    x          Insomnia   x           Restlessness   x            Agitation x             CAM / Delirium x__ --  ___+   Constipation     x__ --  ___+   Diarrhea           _x_ --  ___+      Bowel Management Plan (BMP): No    Comments:     Pain Assessment: denies    OME in 24 hours:     Performance Status: 60    ECOG Performance Status Grade: 4 - Completely disabled    Physical Exam   Constitutional: He is oriented to person, place, and time. He appears well-developed. No distress.   Neck: Normal range of motion. No JVD present.   Pulmonary/Chest: He is in respiratory distress.   Abdominal: Soft.   Neurological: He is alert and oriented to person, place, and  time.   Skin: Skin is warm.       Significant Labs: All pertinent labs within the past 24 hours have been reviewed.  CBC:   Recent Labs   Lab 12/27/18  0434   WBC 5.59   HGB 9.8*   HCT 32.1*   *   *     BMP:  Recent Labs   Lab 12/28/18  0417   *      K 3.8      CO2 31*   BUN 23   CREATININE 0.8   CALCIUM 7.6*   MG 2.2     LFT:  Lab Results   Component Value Date    AST 20 12/06/2018    ALKPHOS 53 12/06/2018    BILITOT 2.0 (H) 12/06/2018     Albumin:   Albumin   Date Value Ref Range Status   12/06/2018 3.6 3.6 - 5.1 g/dL Final   10/01/2018 3.1 3.1 - 4.7 g/dL      Protein:   Total Protein   Date Value Ref Range Status   12/06/2018 6.0 (L) 6.1 - 8.1 g/dL Final     Lactic acid:   No results found for: LACTATE    Significant Imaging: I have reviewed all pertinent imaging results/findings within the past 24 hours.    Advanced Directives::  Living Will: No  LaPOST: No  Do Not Resuscitate Status: Yes partial  Medical Power of : No    Decision-Making Capacity: Patient answered questions    Living Arrangements: Lives with family, Lives in apartment    Psychosocial/Cultural:  Patient's most important priorities:  Returning home     Patient's biggest concerns/fears:  Not being able to return home    Previous death/end of life care history:  no    Patient's goals/hopes:  Return home    Spiritual:     F- Donna and Belief: yes but not Gnosticist    I - Importance: some  .  C - Community: no    A - Address in Care: no

## 2018-12-28 NOTE — PROGRESS NOTES
[facility]  Palliative Medicine  Progress Note    Patient Name: Michael Shetty  MRN: [id number]  Admission Date: 12/25/2018  Hospital Length of Stay: 3 days  Code Status: Partial Code   Attending Provider: Rowdy Valdivia MD  Consulting Provider: Power Dunn MD  Primary Care Physician: Michael Ellsworth MD  Principal Problem:Acute on chronic respiratory failure with hypoxemia    Patient information was obtained from patient and relative(s).      Assessment/Plan:     Palliative care encounter    Palliative Care Encounter / Goals of care discussion:     Narrative:   Michael Shetty is a 73 y.o. male patient with lung cancer, undergoing CTX, XRT (on hold for PE and PNA), clotting disorder s/p several PE, now severe hypoxema requiring increasing doses of O2 and likely superimposed PNA    1: Pain / Physical symptom Assessment:   - pain assesment: denies   - dyspnea assessment: severe   - anxiety assessment: mild   - depression assessment: denies    2: Social Background    - lives with daughter    3: Palliative care meeting participants:    Daughter and patient    4: Goals of care Discussion details:   Completed power of  yesterday, daughters are POA.    Discussed LA post today   - we had a long discussion regarding his goals and therapies available to achieve this goal and interventions that may not be beneficial.    - He clearly states that his ultimate goal is to return to his home in [place]. He has some important papers he needs to change (living will).    - he is willing to do anything he can to go home.    - he realizes that his high O2 demands are a challenge and may prevent him from achieving this goal.    - agrees to continue current treatment over the weekend and re-evaluate on Monday     - we spoke about DNR and I have explained that to achieve best outcome all parts of resuscitation are needed including ventilation, chest compression and drugs. . He understands but would like  to remain partial code with no intubation. He wants us to try for a bit and let him go if it does not work.    - He makes it clear he did not want the ventilator, feeding tubes, dialysis      - we spoke about hospice and he is agreeable to consider this over home health if it will get him to go home.    - discussed with Dr. Valdivia.   - will follow up  5: Goals of care Decisions / Recommendations / Plan:   Goals defined   POA completed   Not interested in completing LA post, but outlines his wishes.    Try improving oxygenation over the weekend and revisit options to transition home on Monday.    Home hospice is the only option I can see to get him home if that is even possible.     6: Follow up plans:    daily    Thank you for your consult. I will follow along with you. Please call [phone] with questions.            I will follow-up with patient. Please contact us if you have any additional questions.    Subjective:     Chief Complaint: No chief complaint on file.      HPI:   Michael Shetty is a nice 74 yo mald with a past history of MTHFR mutation and several past PE and DVT episodes. He had been on several OAC, most recently on Eliquis. He has SCLC and is currently undergoing CTX and XRT, treatment had been paused due to PE and recurrent pseudomonas PNA.   He developed acute worsening of his dyspnea (usually able to ambulate to BR and do his ADL on ~ 5L O2), severe exertional dyspnea and needing to increase his oxygen to 10l/min.   He presented to [facility] but was transferred to our facility for evaluation of catheter assisted thrombolysis.     He was thought not a candidate for thrombolysis due to poor risk benefit ratio, he is felt to have a pneumonic infiltrate and is currently on ABX. He is requiring high flow O2 to sustain O2 saturation of ~89%.     I am being asked to discuss goals of care with the pt.         Hospital Course:  No notes on file    Interval History: continues on 25 L high flow O2,  requiring intermittent NR. Daughter at the bedside    Medications:  Continuous Infusions:  Scheduled Meds:   albuterol-ipratropium  3 mL Nebulization Q6H    allopurinol  100 mg Oral Daily    apixaban  5 mg Oral BID    atenolol  12.5 mg Oral BID    budesonide  0.5 mg Nebulization Q12H    meropenem (MERREM) IVPB  1 g Intravenous Q8H    multivitamin  1 tablet Oral Daily    pantoprazole  40 mg Oral Daily    pravastatin  40 mg Oral QHS    predniSONE  60 mg Oral Daily    tiotropium  1 capsule Inhalation Daily     PRN Meds:sodium chloride 0.9%    Objective:     Vital Signs (Most Recent):  Temp: 98.1 °F (36.7 °C) (12/28/18 1100)  Pulse: 84 (12/28/18 1400)  Resp: (!) 34 (12/28/18 1400)  BP: 107/64 (12/28/18 1400)  SpO2: (!) 92 % (12/28/18 1400) Vital Signs (24h Range):  Temp:  [97.8 °F (36.6 °C)-98.2 °F (36.8 °C)] 98.1 °F (36.7 °C)  Pulse:  [57-96] 84  Resp:  [16-34] 34  SpO2:  [72 %-96 %] 92 %  BP: ()/(52-82) 107/64     Weight: 83.5 kg (184 lb)  Body mass index is 27.17 kg/m².    Review of Symptoms  Symptom Assessment (ESAS 0-10 scale)  ESAS 0 1 2 3 4 5 6 7 8 9 10   Pain x             Dyspnea     x         Anxiety    x          Nausea x             Depression   x            Anorexia x             Fatigue    x          Insomnia   x           Restlessness   x            Agitation x             CAM / Delirium x__ --  ___+   Constipation     x__ --  ___+   Diarrhea           _x_ --  ___+      Bowel Management Plan (BMP): No    Comments:     Pain Assessment: denies    OME in 24 hours:     Performance Status: 60    ECOG Performance Status Grade: 4 - Completely disabled    Physical Exam   Constitutional: He is oriented to person, place, and time. He appears well-developed. No distress.   Neck: Normal range of motion. No JVD present.   Pulmonary/Chest: He is in respiratory distress.   Abdominal: Soft.   Neurological: He is alert and oriented to person, place, and time.   Skin: Skin is warm.       Significant Labs:  All pertinent labs within the past 24 hours have been reviewed.  CBC:   Recent Labs   Lab 12/27/18  0434   WBC 5.59   HGB 9.8*   HCT 32.1*   *   *     BMP:  Recent Labs   Lab 12/28/18  0417   *      K 3.8      CO2 31*   BUN 23   CREATININE 0.8   CALCIUM 7.6*   MG 2.2     LFT:  Lab Results   Component Value Date    AST 20 12/06/2018    ALKPHOS 53 12/06/2018    BILITOT 2.0 (H) 12/06/2018     Albumin:   Albumin   Date Value Ref Range Status   12/06/2018 3.6 3.6 - 5.1 g/dL Final   10/01/2018 3.1 3.1 - 4.7 g/dL      Protein:   Total Protein   Date Value Ref Range Status   12/06/2018 6.0 (L) 6.1 - 8.1 g/dL Final     Lactic acid:   No results found for: LACTATE    Significant Imaging: I have reviewed all pertinent imaging results/findings within the past 24 hours.    Advanced Directives::  Living Will: No  LaPOST: No  Do Not Resuscitate Status: Yes partial  Medical Power of : No    Decision-Making Capacity: Patient answered questions    Living Arrangements: Lives with family, Lives in apartment    Psychosocial/Cultural:  Patient's most important priorities:  Returning home     Patient's biggest concerns/fears:  Not being able to return home    Previous death/end of life care history:  no    Patient's goals/hopes:  Return home    Spiritual:     F- Donna and Belief: yes but not [employer]    I - Importance: some  .  C - Community: no    A - Address in Care: no      > 50% of 65 min visit spent in chart review, face to face discussion of goals of care,  symptom assessment, coordination of care and emotional support.    Power Dunn MD  Palliative Medicine  [facility]

## 2018-12-28 NOTE — ASSESSMENT & PLAN NOTE
Palliative Care Encounter / Goals of care discussion:     Narrative:   Michael Shetty is a 73 y.o. male patient with lung cancer, undergoing CTX, XRT (on hold for PE and PNA), clotting disorder s/p several PE, now severe hypoxema requiring increasing doses of O2 and likely superimposed PNA    1: Pain / Physical symptom Assessment:   - pain assesment: denies   - dyspnea assessment: severe   - anxiety assessment: mild   - depression assessment: denies    2: Social Background    - lives with daughter    3: Palliative care meeting participants:    Daughter and patient    4: Goals of care Discussion details:   Completed power of  yesterday, daughters are POA.    Discussed LA post today   - we had a long discussion regarding his goals and therapies available to achieve this goal and interventions that may not be beneficial.    - He clearly states that his ultimate goal is to return to his home in [place]. He has some important papers he needs to change (living will).    - he is willing to do anything he can to go home.    - he realizes that his high O2 demands are a challenge and may prevent him from achieving this goal.    - agrees to continue current treatment over the weekend and re-evaluate on Monday     - we spoke about DNR and I have explained that to achieve best outcome all parts of resuscitation are needed including ventilation, chest compression and drugs. . He understands but would like to remain partial code with no intubation. He wants us to try for a bit and let him go if it does not work.    - He makes it clear he did not want the ventilator, feeding tubes, dialysis      - we spoke about hospice and he is agreeable to consider this over home health if it will get him to go home.    - discussed with Dr. Valdivia.   - will follow up  5: Goals of care Decisions / Recommendations / Plan:   Goals defined   POA completed   Not interested in completing LA post, but outlines his wishes.    Try improving  oxygenation over the weekend and revisit options to transition home on Monday.    Home hospice is the only option I can see to get him home if that is even possible.     6: Follow up plans:    daily    Thank you for your consult. I will follow along with you. Please call [phone] with questions.

## 2018-12-28 NOTE — PLAN OF CARE
Problem: Adult Inpatient Plan of Care  Goal: Plan of Care Review  Outcome: Ongoing (interventions implemented as appropriate)  No acute events throughout day. See vital signs and assessments in flowsheets. See below for updates on today's progress.     Pulmonary: Lung sounds diminished throughout. Patient has very high O2 requirements - 25 liters with 80% O2 on comfort flow in addition to non-rebreather after any exertion or eating. SATs in the high 80s at rest with dips to the 60-70s with any activity.     Cardiovascular: Heart rate in the 70-80s. Normal sinus rhythm. Pulses intact.     Neurological: Intact, alert and oriented. PERRL. Follows commands.     Gastrointestinal: Large bowel movement today. No abdominal complaints.     Genitourinary: Voids per urinal. Increased urinary output today after 40 mg Lasix IV this afternoon.     Endocrine: NA.    Integumentary/Other: Skin intact, scattered bruising throughout, pale lower extremities. Regular diet, great appetite.     Infusions: NA.    Patient progressing towards goals as tolerated, plan of care communicated and reviewed with Michael Shetty and family. All concerns addressed. Will continue to monitor.

## 2018-12-28 NOTE — PLAN OF CARE
Problem: Adult Inpatient Plan of Care  Goal: Plan of Care Review  Outcome: Ongoing (interventions implemented as appropriate)  No acute events throughout day. See vital signs and assessments in flowsheets. See below for updates on today's progress.     Pulmonary: Comfort flow 25 L with non-rebreather to use as needed with movement and after eating. SATs in the high 80s low 90s at rest. With movement/eating he drops to low 70s high 60s. CCS aware.     Cardiovascular: Heart rates 80-90s. NSR on monitor.     Neurological: Intact, alert and oriented x 4. Follows commands. PERRL.     Gastrointestinal: No abdominal pain. Large bowel movement today per commode.     Genitourinary: Voids in urinal, approximately 500 cc out today, dark yellow.     Endocrine: NA     Integumentary/Other: Skin intact, scattered bruising throughout. Lower extremities pale.     Infusions: NA.     Patient progressing towards goals as tolerated, plan of care communicated and reviewed with Michael Shetty and family. All concerns addressed. Will continue to monitor.

## 2018-12-28 NOTE — RESIDENT HANDOFF
Handoff     Primary Team: [facility] CRITICAL CARE MEDICINE Room Number: 6087/6087 A     Patient Name: Michael Shetty MRN: [id number]     Date of Birth: [id number] Allergies: Shellfish containing products     Age: 73 y.o. Admit Date: 12/25/2018     Sex: male  BMI: Body mass index is 27.17 kg/m².     Code Status: Partial Code          Reason for Admission: Acute on chronic respiratory failure with hypoxemia    Brief HPI (pertinent PMH and diagnosis or differential diagnosis): Mr. Shetty is a 72yo man with PMH of MTHFR mutation, history of PEs on Eliquis, CAD s/p CABGn with chronically elevated troponin, hypotension on midodrine, squamous cell lung carcinoma of NEIDA s/p 1 cycle of adjuvant CTX & radiation therapy (which was stopped due to recurrent pseudomonas PNA & PEs), COPD, 54 pack years (quit 1990), chronic severe pseudomonas pna (3 abx for over a year) who presents as a transfer from [facility] for Interventional Cardiology evaluation for catheter assisted thrombolysis.      HPI: 4 days ago he had acute SOB requring 10L of oxygen at home when he is at a baseline of 6L. At [facility] his CTPA showed known PE of RLL pulmonary artery & increased size of right sided pleural effusion. Doppler u/s of BLE was negative for DVT. OSH continued patient on home eliquis regimen of 5mg BID and per patient did not notice improvement in oxygen requirements so he was sent to [facility].       Vitals during initial interview: 135/75, -104, 25-20L comfort flow O2 FiO2 70%.       Of note patient has chronic severe pseudomonas PNA for which he takes an alternating regimen of doxycycline, cipro, & cefuroxime. He quit smoking 30 years ago.     Hospital Course (updated, brief assessment by system or problem, significant events):   Patient transferred for consideration of embolectomy for chronic PTE and worsening hypoxemia.  We reviewed his CT angio  from 12/21/18 which reveals enlarged pulmonary artery consistent with pulmonary HTN with some peripheral filling  deficits but no large filling deficit.  Specifically in the right interlobar artery which is where the referring pulmonologist asked for intervention the lung parenchyma distal to this areas shows pneumonia and consolidated lung.  We discussed with Cardiology there is no amenable lesion to thrombectomy and in light of consolidation of RLL it would be contraindicated even if amendable.  I made the patient and his daughter aware.  They demonstrated understanding.  They do not want to go back to [place] and would like to get care here.       Consult Palliative care for consideration of options once discharged. He is DNI but doesn't want to entertain DNR at this time.      Stage II lung cancer, I would recommend against chemo in light of his end stage lung disease and chronic infections.   XRT may also worsent his respiratory failure.     Tasks (specific, using if-then statements):     1. Goals of Care  -Palliative care following  -Patient not ready for hospice at this time, he still lacks some insight into his disease and prognosis.  He is DNI but doesn't want to entertain DNR at this time. He has not had any improvement since arrival and desats with minimal movement       2.  Acute on chronic respiratory failure with hypoxemia  Very little lung reserve    Recurrent PE    Chronic Pseudomonas PNA    COPD    Squamous Cell Lung Ca   Significant Pulmonary Fibrosis       -continuing eliquis 5mg BID   -intermittently diurese with Lasix   -3 weeks with double IV antibiotic therapy for resistant pseudomonas, await cultures to determine senstivities.    -Perhaps with PNA treatment & intermittent diuresis he will be able to require less oxygen and able to go home on less O2 requirements     Pulmonary emphysema    -duo nebs q6hr   -ICS budesonide 0.5mg q12hr  -tiotroprium 18mcg   -prednisone 60mg PO      Discharge Disposition: Hospice/Home  Goals of care Decisions / Recommendations / Plan:              Goals defined               POA completed              Will complete LA Post              Will continue discussion regarding viable discharge options. Currently pt. Remains hopeful to return home.     Elisa Pagan MD  Internal Medicine, PGY-I  CCS1 spectralink #[id number]  [facility]

## 2018-12-29 PROBLEM — J15.1 PSEUDOMONAS PNEUMONIA: Status: ACTIVE | Noted: 2018-01-01

## 2018-12-29 NOTE — SUBJECTIVE & OBJECTIVE
Past Medical History:   Diagnosis Date    Arthritis     BPH (benign prostatic hyperplasia)     Chemotherapy-induced neutropenia 9/6/2018    Chemotherapy-induced neutropenia 9/6/2018    Coronary artery disease     MI X 2    Gout, chronic     Heartburn     Hypertension     Myocardial infarction     PE (pulmonary embolism)     Presence of dental bridge     UPPER - NOT WEAR MUCH AS DOES NOT FIT WELL    Primary malignant neoplasm of left upper lobe of lung 5/19/2017    Staph infection        Past Surgical History:   Procedure Laterality Date    CARDIAC SURGERY      CABG X 4 9-11    CTR      BILAT    OSTEOTOMY, METACARPAL BONE Right 3/1/2013    Performed by Daron [facility]oh Jr., [facility] at [facility]    ROTATOR CUFF REPAIR      left    TONSILLECTOMY      TRIGGER FINGER RELEASE      right and left hands.       Review of patient's allergies indicates:   Allergen Reactions    Shellfish containing products      soft shell crabs       Medications:  Medications Prior to Admission   Medication Sig    allopurinol (ZYLOPRIM) 100 MG tablet Take 100 mg by mouth once daily.     apixaban (ELIQUIS ORAL) Take by mouth.    atenolol (TENORMIN) 25 MG tablet Take 25 mg by mouth daily as needed.    cefUROXime (CEFTIN) 500 MG tablet Take 250 mg by mouth every 12 (twelve) hours. Every third week    cilostazol (PLETAL) 100 MG Tab 100 mg 2 (two) times daily.     ciprofloxacin HCl (CIPRO) 250 MG tablet Take 250 mg by mouth 2 (two) times daily.    doxycycline (VIBRA-TABS) 100 MG tablet Take 100 mg by mouth. Every third week    multivitamin (MEN'S MULTI-VITAMIN) per tablet Take 1 tablet by mouth once daily.    nitroGLYCERIN (NITROSTAT) 0.3 MG SL tablet Place 0.3 mg under the tongue every 5 (five) minutes as needed for Chest pain.    pravastatin (PRAVACHOL) 40 MG tablet Take 40 mg by mouth nightly.    PREDNISOLONE ORAL Take by mouth.    ranitidine (ZANTAC 75) 75 MG tablet Take 75 mg by mouth nightly.     rivaroxaban  (XARELTO) 20 mg Tab Xarelto 20 mg tablet   Take 1 tablet every day by oral route.    tamsulosin (FLOMAX) 0.4 mg Cap Take 0.4 mg by mouth once daily.    terazosin (HYTRIN) 2 MG capsule Take 4 mg by mouth every evening. 2 TABS    VENTOLIN HFA 90 mcg/actuation inhaler Inhale 1-2 puffs into the lungs every 6 (six) hours as needed.      Antibiotics (From admission, onward)    Start     Stop Route Frequency Ordered    18 1445  meropenem-0.9% sodium chloride 1 g/50 mL IVPB      -- IV Every 8 hours (non-standard times) 18 1343        Antifungals (From admission, onward)    None        Antivirals (From admission, onward)    None             There is no immunization history on file for this patient.    Family History     Problem Relation (Age of Onset)    Cancer Sister        Social History     Socioeconomic History    Marital status:      Spouse name: None    Number of children: None    Years of education: None    Highest education level: None   Social Needs    Financial resource strain: None    Food insecurity - worry: None    Food insecurity - inability: None    Transportation needs - medical: None    Transportation needs - non-medical: None   Occupational History    None   Tobacco Use    Smoking status: Former Smoker     Packs/day: 2.00     Years: 25.00     Pack years: 50.00     Last attempt to quit: 1990     Years since quittin.8    Smokeless tobacco: Never Used   Substance and Sexual Activity    Alcohol use: Yes     Comment: 2 PER DAY    Drug use: No    Sexual activity: None   Other Topics Concern    None   Social History Narrative    None     Review of Systems   Constitutional: Negative for activity change, appetite change, chills, fatigue and fever.   HENT: Negative for congestion, dental problem, mouth sores and sinus pressure.    Eyes: Negative for pain, redness and visual disturbance.   Respiratory: Positive for cough and shortness of breath. Negative for wheezing.     Cardiovascular: Negative for chest pain and leg swelling.   Gastrointestinal: Negative for abdominal distention, abdominal pain, diarrhea, nausea and vomiting.   Endocrine: Negative for polyuria.   Genitourinary: Negative for decreased urine volume, dysuria and frequency.   Musculoskeletal: Negative for joint swelling.   Skin: Negative for color change.   Allergic/Immunologic: Negative for food allergies.   Neurological: Negative for dizziness, weakness and headaches.   Hematological: Negative for adenopathy.   Psychiatric/Behavioral: Negative for agitation and confusion. The patient is not nervous/anxious.      Objective:     Vital Signs (Most Recent):  Temp: 97.8 °F (36.6 °C) (12/29/18 1105)  Pulse: 77 (12/29/18 1400)  Resp: 19 (12/29/18 1400)  BP: 108/67 (12/29/18 1400)  SpO2: (!) 94 % (12/29/18 1400) Vital Signs (24h Range):  Temp:  [97.5 °F (36.4 °C)-98 °F (36.7 °C)] 97.8 °F (36.6 °C)  Pulse:  [63-88] 77  Resp:  [13-33] 19  SpO2:  [78 %-95 %] 94 %  BP: (100-125)/(57-77) 108/67     Weight: 83.5 kg (184 lb)  Body mass index is 27.17 kg/m².    Estimated Creatinine Clearance: 74.2 mL/min (based on SCr of 0.9 mg/dL).    Physical Exam   Constitutional: He is oriented to person, place, and time. He appears well-developed and well-nourished.   HENT:   Head: Normocephalic and atraumatic.   Mouth/Throat: Oropharynx is clear and moist.   Eyes: Conjunctivae are normal.   Neck: Neck supple.   Cardiovascular: Normal rate, regular rhythm and normal heart sounds.   No murmur heard.  Pulmonary/Chest: Effort normal. No respiratory distress. He has wheezes.   Coarse breath sounds b/l   Abdominal: Soft. Bowel sounds are normal. He exhibits no distension. There is no tenderness.   Musculoskeletal: Normal range of motion. He exhibits no edema or tenderness.   Lymphadenopathy:     He has no cervical adenopathy.   Neurological: He is alert and oriented to person, place, and time. Coordination normal.   Skin: Skin is warm and dry. No  rash noted.   Psychiatric: He has a normal mood and affect. His behavior is normal.       Significant Labs:   C4 Count: No results for input(s): C4 in the last 48 hours.  CMP:   Recent Labs   Lab 12/28/18  0417 12/29/18  0325    139   K 3.8 3.9    99   CO2 31* 29   * 180*   BUN 23 28*   CREATININE 0.8 0.9   CALCIUM 7.6* 8.4*   ANIONGAP 6* 11   EGFRNONAA >60.0 >60.0       Significant Imaging: I have reviewed all pertinent imaging results/findings within the past 24 hours.

## 2018-12-29 NOTE — PLAN OF CARE
Problem: Adult Inpatient Plan of Care  Goal: Plan of Care Review  Outcome: Ongoing (interventions implemented as appropriate)  See vital signs and assessments in flowsheets. See below for updates on today's progress.      Pulmonary:  Comfort flow 25 liters, 60% Fio2, NRB in addition to comfort flow to maintain 02 saturation of 90-95%. Patient oxygen saturation drops to~70's-80's when patient moves around In the bed.      Cardiovascular: NSR, HR ~80'S, Systolic's ~100's,    Neurological: AAOX4, afebrile, moves all extremities, follows commands     Gastrointestinal: No BM today, regular diet     Genitourinary: voids via bed urinal     Integumentary/Other: intact, bruises present     Infusions:  KVO     POC: Continue to monitor oxygen saturations closely. Goals of care discussion took place today.

## 2018-12-29 NOTE — HPI
72 y/o M ex smoker, h/o MTHFR mutation h/o multiple Pes on Eliquis, CAD s/p CABG, hypotension on midodrine, SCC lung NEIDA s/p 3 cycles of carbo/taxol radiation 2016, most recently with POD s/p 1 cycle of carbo/gemzar which was stopped due to recurrent PsA pneumonia and PEs, COPD (pred 60 mg), chronic PsA pneumonia on 3 abx (alternates doxy, cipro, cefuroxime) for over one year presented to OSH with worsening SOB found to have known PE of RLL pulm artery and increased size of R sided Plueral effusion but no improvement in oxygen requirement so 12/25 transferred from OSH for interventional cardiology for evaluation of catheter assisted thrombolysis.  Here, per notes, appears to be no lesion amenable to thromectomy and sputum cultures found to be growing MDR PsA

## 2018-12-29 NOTE — ASSESSMENT & PLAN NOTE
-Long discussion with patient today about DNI options. He has insight to his disease & prognosis. He would like to get home to get his finances in order. He understands his high O2 requirements are keeping him from going home but is hopeful that his PNA will get better and help with breathing. He would like CPR with bag valve mask but he does NOT want to be on ventilator machine as he does not want his family to hold the responsibility of pulling him off respiratory support one day.     -appreciate pall care assessment   -DNI    Per palliative care notes-  -patient aware of his disease & clinical prognosis   - lives with daughter  -Pt. Has good insight in his disease and is aware of his prognosis  -His goal is to return home as independent as possible. He would like to receive home health after discharge.   -He is hopeful that with ABX his O2 requirement will decline and he will be able to return home  -He will consider continuing CTX once he sees his recovery.   -Will need to assign POA, will do paperwork  - We will discuss extent of care desired and complete LA Post on follow up  -Will further clarify his wishes in case of code blue (partial code currently)   Goals defined              Will assign POA on follow up              Will complete LA Post              Will discuss discharge options including hospice on follow up (currently opposed).

## 2018-12-29 NOTE — PLAN OF CARE
Problem: Adult Inpatient Plan of Care  Goal: Plan of Care Review  Outcome: Ongoing (interventions implemented as appropriate)  See vital signs and assessments in flowsheets. See below for updates on today's progress.     Pulmonary: Patient on 25L 80% FiO2 comfort flow. Supplements with 100% NRB mask after any exertion, d/t desatting to mid-60s. Takes ~5 min to regain saturation of 89-92% after exertion, even moving in bed.     Cardiovascular: NSR.     Neurological: AAOx4. Afebrile, denies pain.    Integumentary/Other: Scattered bruising, otherwise intact.    Infusions: IV abx.    Patient progressing towards goals as tolerated, plan of care communicated and reviewed with Michael CATHLEEN Shetty and daughter. All concerns addressed, questions answered. Will continue to monitor closely.

## 2018-12-29 NOTE — ASSESSMENT & PLAN NOTE
Plan: will ultrasound to find pleural fluid pocket for possible thoracentesis    72yo presents with acute SOB on a background of PEs & MTHFR mutation found to have recurrent PE on CTPA  -Interventional cardiology consulted  -bedside TTE performed 12/29/2018  -seems less likely to be a candidate for catheter assisted thrombolysis at this time due to his active cancer  -appreciate recs  -continuing eliquis 5mg BID for now    -still requiring 20L & FiO2 80% and DNI status

## 2018-12-29 NOTE — ASSESSMENT & PLAN NOTE
-appreciate ID recs  - would recommend at least 2 weeks of meropenem for MDR PsA pneumonia. Pt wants to go home but would like to try IV antimicrobials and understands no PO option and would need PICC line for this as well as lab draws.  He will discuss with family and team.

## 2018-12-29 NOTE — CONSULTS
[facility]  Infectious Disease  Consult Note    Patient Name: Michael Shetty  MRN: [id number]  Admission Date: 12/25/2018  Hospital Length of Stay: 4 days  Attending Physician: Terry Miles*  Primary Care Provider: Michael Ellsworth MD     Isolation Status: No active isolations    Patient information was obtained from patient and past medical records.      Consults  Assessment/Plan:     Pseudomonas pneumonia    74 y/o M ex smoker, h/o MTHFR mutation h/o multiple Pes on Eliquis, CAD s/p CABG, hypotension on midodrine, SCC lung NEIDA s/p 3 cycles of carbo/taxol radiation 2016, most recently with POD s/p 1 cycle of carbo/gemzar which was stopped due to recurrent PsA pneumonia and PEs, COPD (pred 60 mg), chronic PsA pneumonia on 3 abx (alternates doxy, cipro, cefuroxime) for over one year presented to OSH with worsening SOB found to have known PE of RLL pulm artery and increased size of R sided Plueral effusion but no improvement in oxygen requirement so 12/25 transferred from OSH for interventional cardiology for evaluation of catheter assisted thrombolysis.  Here, per notes, appears to be no lesion amenable to thromectomy and sputum cultures found to be growing MDR PsA  - would recommend at least 2 weeks of meropenem for MDR PsA pneumonia. Pt wants to go home but would like to try IV antimicrobials and understands no PO option and would need PICC line for this as well as lab draws.  He will discuss with family and team.  - discussed with team, will sign off please call back if questions         Thank you for your consult. I will sign off. Please contact us if you have any additional questions.    Reynaldo Layne MD  Infectious Disease  [facility]    Subjective:     Principal Problem: Acute on chronic respiratory failure with hypoxemia    HPI: 74 y/o M ex smoker, h/o MTHFR mutation h/o multiple Pes on Eliquis, CAD s/p CABG, hypotension on midodrine, SCC lung NEIDA s/p 3  cycles of carbo/taxol radiation 2016, most recently with POD s/p 1 cycle of carbo/gemzar which was stopped due to recurrent PsA pneumonia and PEs, COPD (pred 60 mg), chronic PsA pneumonia on 3 abx (alternates doxy, cipro, cefuroxime) for over one year presented to OSH with worsening SOB found to have known PE of RLL pulm artery and increased size of R sided Plueral effusion but no improvement in oxygen requirement so 12/25 transferred from OSH for interventional cardiology for evaluation of catheter assisted thrombolysis.  Here, per notes, appears to be no lesion amenable to thromectomy and sputum cultures found to be growing MDR PsA    Past Medical History:   Diagnosis Date    Arthritis     BPH (benign prostatic hyperplasia)     Chemotherapy-induced neutropenia 9/6/2018    Chemotherapy-induced neutropenia 9/6/2018    Coronary artery disease     MI X 2    Gout, chronic     Heartburn     Hypertension     Myocardial infarction     PE (pulmonary embolism)     Presence of dental bridge     UPPER - NOT WEAR MUCH AS DOES NOT FIT WELL    Primary malignant neoplasm of left upper lobe of lung 5/19/2017    Staph infection        Past Surgical History:   Procedure Laterality Date    CARDIAC SURGERY      CABG X 4 9-11    CTR      BILAT    OSTEOTOMY, METACARPAL BONE Right 3/1/2013    Performed by [facility]oh Jr[facility] OR    ROTATOR CUFF REPAIR      left    TONSILLECTOMY      TRIGGER FINGER RELEASE      right and left hands.       Review of patient's allergies indicates:   Allergen Reactions    Shellfish containing products      soft shell crabs       Medications:  Medications Prior to Admission   Medication Sig    allopurinol (ZYLOPRIM) 100 MG tablet Take 100 mg by mouth once daily.     apixaban (ELIQUIS ORAL) Take by mouth.    atenolol (TENORMIN) 25 MG tablet Take 25 mg by mouth daily as needed.    cefUROXime (CEFTIN) 500 MG tablet Take 250 mg by mouth every 12 (twelve) hours. Every third week     cilostazol (PLETAL) 100 MG Tab 100 mg 2 (two) times daily.     ciprofloxacin HCl (CIPRO) 250 MG tablet Take 250 mg by mouth 2 (two) times daily.    doxycycline (VIBRA-TABS) 100 MG tablet Take 100 mg by mouth. Every third week    multivitamin (MEN'S MULTI-VITAMIN) per tablet Take 1 tablet by mouth once daily.    nitroGLYCERIN (NITROSTAT) 0.3 MG SL tablet Place 0.3 mg under the tongue every 5 (five) minutes as needed for Chest pain.    pravastatin (PRAVACHOL) 40 MG tablet Take 40 mg by mouth nightly.    PREDNISOLONE ORAL Take by mouth.    ranitidine (ZANTAC 75) 75 MG tablet Take 75 mg by mouth nightly.     rivaroxaban (XARELTO) 20 mg Tab Xarelto 20 mg tablet   Take 1 tablet every day by oral route.    tamsulosin (FLOMAX) 0.4 mg Cap Take 0.4 mg by mouth once daily.    terazosin (HYTRIN) 2 MG capsule Take 4 mg by mouth every evening. 2 TABS    VENTOLIN HFA 90 mcg/actuation inhaler Inhale 1-2 puffs into the lungs every 6 (six) hours as needed.      Antibiotics (From admission, onward)    Start     Stop Route Frequency Ordered    12/28/18 1445  meropenem-0.9% sodium chloride 1 g/50 mL IVPB      -- IV Every 8 hours (non-standard times) 12/28/18 1343        Antifungals (From admission, onward)    None        Antivirals (From admission, onward)    None             There is no immunization history on file for this patient.    Family History     Problem Relation (Age of Onset)    Cancer Sister        Social History     Socioeconomic History    Marital status:      Spouse name: None    Number of children: None    Years of education: None    Highest education level: None   Social Needs    Financial resource strain: None    Food insecurity - worry: None    Food insecurity - inability: None    Transportation needs - medical: None    Transportation needs - non-medical: None   Occupational History    None   Tobacco Use    Smoking status: Former Smoker     Packs/day: 2.00     Years: 25.00     Pack  years: 50.00     Last attempt to quit: 1990     Years since quittin.8    Smokeless tobacco: Never Used   Substance and Sexual Activity    Alcohol use: Yes     Comment: 2 PER DAY    Drug use: No    Sexual activity: None   Other Topics Concern    None   Social History Narrative    None     Review of Systems   Constitutional: Negative for activity change, appetite change, chills, fatigue and fever.   HENT: Negative for congestion, dental problem, mouth sores and sinus pressure.    Eyes: Negative for pain, redness and visual disturbance.   Respiratory: Positive for cough and shortness of breath. Negative for wheezing.    Cardiovascular: Negative for chest pain and leg swelling.   Gastrointestinal: Negative for abdominal distention, abdominal pain, diarrhea, nausea and vomiting.   Endocrine: Negative for polyuria.   Genitourinary: Negative for decreased urine volume, dysuria and frequency.   Musculoskeletal: Negative for joint swelling.   Skin: Negative for color change.   Allergic/Immunologic: Negative for food allergies.   Neurological: Negative for dizziness, weakness and headaches.   Hematological: Negative for adenopathy.   Psychiatric/Behavioral: Negative for agitation and confusion. The patient is not nervous/anxious.      Objective:     Vital Signs (Most Recent):  Temp: 97.8 °F (36.6 °C) (18 1105)  Pulse: 77 (18 1400)  Resp: 19 (18 1400)  BP: 108/67 (18 1400)  SpO2: (!) 94 % (18 1400) Vital Signs (24h Range):  Temp:  [97.5 °F (36.4 °C)-98 °F (36.7 °C)] 97.8 °F (36.6 °C)  Pulse:  [63-88] 77  Resp:  [13-33] 19  SpO2:  [78 %-95 %] 94 %  BP: (100-125)/(57-77) 108/67     Weight: 83.5 kg (184 lb)  Body mass index is 27.17 kg/m².    Estimated Creatinine Clearance: 74.2 mL/min (based on SCr of 0.9 mg/dL).    Physical Exam   Constitutional: He is oriented to person, place, and time. He appears well-developed and well-nourished.   HENT:   Head: Normocephalic and atraumatic.    Mouth/Throat: Oropharynx is clear and moist.   Eyes: Conjunctivae are normal.   Neck: Neck supple.   Cardiovascular: Normal rate, regular rhythm and normal heart sounds.   No murmur heard.  Pulmonary/Chest: Effort normal. No respiratory distress. He has wheezes.   Coarse breath sounds b/l   Abdominal: Soft. Bowel sounds are normal. He exhibits no distension. There is no tenderness.   Musculoskeletal: Normal range of motion. He exhibits no edema or tenderness.   Lymphadenopathy:     He has no cervical adenopathy.   Neurological: He is alert and oriented to person, place, and time. Coordination normal.   Skin: Skin is warm and dry. No rash noted.   Psychiatric: He has a normal mood and affect. His behavior is normal.       Significant Labs:   C4 Count: No results for input(s): C4 in the last 48 hours.  CMP:   Recent Labs   Lab 12/28/18  0417 12/29/18  0325    139   K 3.8 3.9    99   CO2 31* 29   * 180*   BUN 23 28*   CREATININE 0.8 0.9   CALCIUM 7.6* 8.4*   ANIONGAP 6* 11   EGFRNONAA >60.0 >60.0       Significant Imaging: I have reviewed all pertinent imaging results/findings within the past 24 hours.

## 2018-12-29 NOTE — SUBJECTIVE & OBJECTIVE
Past Medical History:   Diagnosis Date    Arthritis     BPH (benign prostatic hyperplasia)     Chemotherapy-induced neutropenia 2018    Chemotherapy-induced neutropenia 2018    Coronary artery disease     MI X 2    Gout, chronic     Heartburn     Hypertension     Myocardial infarction     PE (pulmonary embolism)     Presence of dental bridge     UPPER - NOT WEAR MUCH AS DOES NOT FIT WELL    Primary malignant neoplasm of left upper lobe of lung 2017    Staph infection        Past Surgical History:   Procedure Laterality Date    CARDIAC SURGERY      CABG X 4 9-11    CTR      BILAT    OSTEOTOMY, METACARPAL BONE Right 3/1/2013    Performed by [facility]oh [facility] at [facility] OR    ROTATOR CUFF REPAIR      left    TONSILLECTOMY      TRIGGER FINGER RELEASE      right and left hands.       Review of patient's allergies indicates:   Allergen Reactions    Shellfish containing products      soft shell crabs       Family History     Problem Relation (Age of Onset)    Cancer Sister        Tobacco Use    Smoking status: Former Smoker     Packs/day: 2.00     Years: 25.00     Pack years: 50.00     Last attempt to quit: 1990     Years since quittin.8    Smokeless tobacco: Never Used   Substance and Sexual Activity    Alcohol use: Yes     Comment: 2 PER DAY    Drug use: No    Sexual activity: Not on file      Review of Systems   Constitutional: Negative for chills, fatigue, fever and unexpected weight change.   Respiratory: Positive for cough and shortness of breath. Negative for wheezing.    Cardiovascular: Negative for chest pain, palpitations and leg swelling.   Gastrointestinal: Negative for abdominal distention, constipation, diarrhea, nausea and vomiting.        Hernia   Endocrine: Negative for cold intolerance and heat intolerance.   Genitourinary: Negative for decreased urine volume, difficulty urinating, flank pain, frequency, hematuria and urgency.   Musculoskeletal: Negative  for arthralgias, back pain and myalgias.   Skin: Negative for wound.   Neurological: Negative for facial asymmetry and numbness.   Psychiatric/Behavioral: Negative for agitation, behavioral problems, confusion and decreased concentration. The patient is not nervous/anxious.      Objective:     Vital Signs (Most Recent):  Temp: 97.7 °F (36.5 °C) (12/28/18 1500)  Pulse: 80 (12/28/18 1800)  Resp: (!) 25 (12/28/18 1800)  BP: 110/62 (12/28/18 1800)  SpO2: (!) 91 % (12/28/18 1800) Vital Signs (24h Range):  Temp:  [97.7 °F (36.5 °C)-98.2 °F (36.8 °C)] 97.7 °F (36.5 °C)  Pulse:  [] 80  Resp:  [16-39] 25  SpO2:  [72 %-96 %] 91 %  BP: ()/(52-82) 110/62   Weight: 83.5 kg (184 lb)  Body mass index is 27.17 kg/m².      Intake/Output Summary (Last 24 hours) at 12/28/2018 1835  Last data filed at 12/28/2018 1800  Gross per 24 hour   Intake 1120 ml   Output 2340 ml   Net -1220 ml       Physical Exam   Constitutional: He is oriented to person, place, and time. He appears well-developed and well-nourished. No distress.   On 30L O2 comfort flow FiO2 70%   HENT:   Head: Normocephalic and atraumatic.   Eyes: EOM are normal. Pupils are equal, round, and reactive to light.   Neck: Normal range of motion. Neck supple. No JVD present.   Cardiovascular: Regular rhythm.   Murmur heard.  Tachycardia  Systolic murmur   Pulmonary/Chest: No respiratory distress.   Abdominal: Soft. Bowel sounds are normal.   Musculoskeletal: He exhibits no edema.   Chronic bruising on BUE   Neurological: He is alert and oriented to person, place, and time. No cranial nerve deficit. Coordination normal.   Skin: Skin is warm and dry.   Psychiatric: He has a normal mood and affect. His behavior is normal. Judgment and thought content normal.       Vents:  Oxygen Concentration (%): 80 (12/28/18 1800)  Lines/Drains/Airways     Peripheral Intravenous Line                 Peripheral IV - Single Lumen 12/25/18 1500 Left Antecubital 3 days               Significant Labs:    CBC/Anemia Profile:  Recent Labs   Lab 12/27/18  0434   WBC 5.59   HGB 9.8*   HCT 32.1*   *   *   RDW 17.8*        Chemistries:  Recent Labs   Lab 12/27/18  0434 12/28/18  0417    141   K 3.1* 3.8    104   CO2 32* 31*   BUN 30* 23   CREATININE 0.9 0.8   CALCIUM 7.9* 7.6*   MG 2.3 2.2   PHOS 2.8 2.5*       All pertinent labs within the past 24 hours have been reviewed.    Significant Imaging: I have reviewed and interpreted all pertinent imaging results/findings within the past 24 hours.

## 2018-12-29 NOTE — PROGRESS NOTES
[facility]  Critical Care Medicine  Progress Note    Patient Name: Michael Shetty  MRN: [id number]  Admission Date: 12/25/2018  Hospital Length of Stay: 4 days  Code Status: Partial Code  Attending Provider: Terry Miles*  Primary Care Provider: Michael Ellsworth MD   Principal Problem: Acute on chronic respiratory failure with hypoxemia    Subjective:     HPI:  Mr. Shetty is a 72yo man with PMH of MTHFR mutation, history of PEs on Eliquis, CAD s/p CABGn with chronically elevated troponin, hypotension on midodrine, squamous cell lung carcinoma of NEIDA s/p 1 cycle of adjuvant CTX & radiation therapy (which was stopped due to recurrent pseudomonas PNA & PEs), COPD, 54 pack years (quit 1990), chronic severe pseudomonas pna (3 abx for over a year) who presents as a transfer from [facility] for Interventional Cardiology evaluation for catheter assisted thrombolysis.     HPI: 4 days ago he had acute SOB requring 10L of oxygen at home when he is at a baseline of 6L. At [facility] his CTPA showed known PE of RLL pulmonary artery & increased size of right sided pleural effusion. Doppler u/s of BLE was negative for DVT. OSH continued patient on home eliquis regimen of 5mg BID and per patient did not notice improvement in oxygen requirements so he was sent to [facility].      Vitals during initial interview: 135/75, -104, 25-20L comfort flow O2 FiO2 70%.       Of note patient has chronic severe pseudomonas PNA for which he takes an alternating regimen of doxycycline, cipro, & cefuroxime. He quit smoking 30 years ago.     Hospital/ICU Course:  12/26/2018 NAEO; discussed he is not a candidate for interventional cardiology & goals of care with palliative care consult for today    12/27/2018 NAEO 25L, 80% FiO2    12/29/2018 NAEO    Past Medical History:   Diagnosis Date    Arthritis     BPH (benign prostatic hyperplasia)     Chemotherapy-induced neutropenia 9/6/2018    Chemotherapy-induced neutropenia 9/6/2018     Coronary artery disease     MI X 2    Gout, chronic     Heartburn     Hypertension     Myocardial infarction     PE (pulmonary embolism)     Presence of dental bridge     UPPER - NOT WEAR MUCH AS DOES NOT FIT WELL    Primary malignant neoplasm of left upper lobe of lung 2017    Staph infection        Past Surgical History:   Procedure Laterality Date    CARDIAC SURGERY      CABG X 4 9-11    CTR      BILAT    OSTEOTOMY, METACARPAL BONE Right 3/1/2013    Performed by Daron Jalloh Jr., MD at [facility] OR    ROTATOR CUFF REPAIR      left    TONSILLECTOMY      TRIGGER FINGER RELEASE      right and left hands.       Review of patient's allergies indicates:   Allergen Reactions    Shellfish containing products      soft shell crabs       Family History     Problem Relation (Age of Onset)    Cancer Sister        Tobacco Use    Smoking status: Former Smoker     Packs/day: 2.00     Years: 25.00     Pack years: 50.00     Last attempt to quit: 1990     Years since quittin.8    Smokeless tobacco: Never Used   Substance and Sexual Activity    Alcohol use: Yes     Comment: 2 PER DAY    Drug use: No    Sexual activity: Not on file      Review of Systems   Constitutional: Negative for chills, fatigue, fever and unexpected weight change.   Respiratory: Positive for cough and shortness of breath. Negative for wheezing.    Cardiovascular: Negative for chest pain, palpitations and leg swelling.   Gastrointestinal: Negative for abdominal distention, constipation, diarrhea, nausea and vomiting.        Hernia   Endocrine: Negative for cold intolerance and heat intolerance.   Genitourinary: Negative for decreased urine volume, difficulty urinating, flank pain, frequency, hematuria and urgency.   Musculoskeletal: Negative for arthralgias, back pain and myalgias.   Skin: Negative for wound.   Neurological: Negative for facial asymmetry and numbness.   Psychiatric/Behavioral: Negative for agitation, behavioral  problems, confusion and decreased concentration. The patient is not nervous/anxious.      Objective:     Vital Signs (Most Recent):  Temp: 97.9 °F (36.6 °C) (12/29/18 1505)  Pulse: 74 (12/29/18 1600)  Resp: (!) 22 (12/29/18 1600)  BP: (!) 111/59 (12/29/18 1600)  SpO2: (!) 93 % (12/29/18 1600) Vital Signs (24h Range):  Temp:  [97.5 °F (36.4 °C)-98 °F (36.7 °C)] 97.9 °F (36.6 °C)  Pulse:  [63-88] 74  Resp:  [13-33] 22  SpO2:  [78 %-95 %] 93 %  BP: (100-125)/(57-77) 111/59   Weight: 83.5 kg (184 lb)  Body mass index is 27.17 kg/m².      Intake/Output Summary (Last 24 hours) at 12/29/2018 1641  Last data filed at 12/29/2018 1600  Gross per 24 hour   Intake 300 ml   Output 2135 ml   Net -1835 ml       Physical Exam   Constitutional: He is oriented to person, place, and time. He appears well-developed and well-nourished. No distress.   On 30L O2 comfort flow FiO2 70%   HENT:   Head: Normocephalic and atraumatic.   Eyes: EOM are normal. Pupils are equal, round, and reactive to light.   Neck: Normal range of motion. Neck supple. No JVD present.   Cardiovascular: Regular rhythm.   Murmur heard.  Tachycardia  Systolic murmur   Pulmonary/Chest: No respiratory distress.   Abdominal: Soft. Bowel sounds are normal.   Musculoskeletal: He exhibits no edema.   Chronic bruising on BUE   Neurological: He is alert and oriented to person, place, and time. No cranial nerve deficit. Coordination normal.   Skin: Skin is warm and dry.   Psychiatric: He has a normal mood and affect. His behavior is normal. Judgment and thought content normal.       Vents:  Oxygen Concentration (%): 80 (12/29/18 1600)  Lines/Drains/Airways     Peripheral Intravenous Line                 Peripheral IV - Single Lumen 12/25/18 1500 Left Antecubital 4 days         Peripheral IV - Single Lumen 12/29/18 0615 Anterior;Right Forearm less than 1 day              Significant Labs:    CBC/Anemia Profile:  No results for input(s): WBC, HGB, HCT, PLT, MCV, RDW, IRON,  FERRITIN, RETIC, FOLATE, [id number], OCCULTBLOOD in the last 48 hours.     Chemistries:  Recent Labs   Lab 12/28/18  0417 12/29/18  0325    139   K 3.8 3.9    99   CO2 31* 29   BUN 23 28*   CREATININE 0.8 0.9   CALCIUM 7.6* 8.4*   MG 2.2 2.0   PHOS 2.5* 2.7       All pertinent labs within the past 24 hours have been reviewed.    Significant Imaging: I have reviewed and interpreted all pertinent imaging results/findings within the past 24 hours.      ABG  Recent Labs   Lab 12/25/18  1502   PH 7.469*   PO2 63*   PCO2 36.6   HCO3 26.5   BE 3     Assessment/Plan:     Pulmonary   * Acute on chronic respiratory failure with hypoxemia    Plan: will ultrasound to find pleural fluid pocket for possible thoracentesis    72yo presents with acute SOB on a background of PEs & MTHFR mutation found to have recurrent PE on CTPA  -Interventional cardiology consulted  -bedside TTE performed 12/29/2018  -seems less likely to be a candidate for catheter assisted thrombolysis at this time due to his active cancer  -appreciate recs  -continuing eliquis 5mg BID for now    -still requiring 20L & FiO2 80% and DNI status      Pseudomonas pneumonia    -appreciate ID recs  - would recommend at least 2 weeks of meropenem for MDR PsA pneumonia. Pt wants to go home but would like to try IV antimicrobials and understands no PO option and would need PICC line for this as well as lab draws.  He will discuss with family and team.        Chronic pneumonia    Chronic severe Pseudomonas PNA   -sputum cx growing pseudomonas & few GPC   -continue meropenem   -await sensitivities & will use 2 abx coverages once they result ~3 weeks  -will consult ID for better abx recs & coverage     Pulmonary emphysema    -duo nebs q6hr   -ICS budesonide 0.5mg q12hr  -tiotroprium 18mcg   -prednisone 60mg PO      Cardiac/Vascular   Coronary artery disease involving coronary bypass graft    S/p CABG     Chronic hypotension    -on midodrine 5mg TID  -BP  stable  -will hold if sBP >130     Hematology   Chronic pulmonary embolism    -per interventional cardiology consultation  -given active lung cancer he is not a candidate for thrombolysis  -CT chest reviewed, if clot in RLL pulmonary artery were to be lysed- it would end up perfusing his lung tissue that has had significant scarring & chronic severe pseudomonas pna     Oncology   Primary malignant neoplasm of left upper lobe of lung    73 y.o. male with diagnosis of NSCLC (Squamous cell)  - T2A lesion with 3.5cm involving NEIDA  - s/p monotherapy with XRT by Dr Olvera; followed by chemotherapy with 3 cycles of carbo/taxol  - followed by Dr Ely with Pulm - seen him on Dec 5th and f/u on Feb 6th  - CTA on 5/18/17 showed decrease size in the tumor mass  - CT on 12/14/17 with stable lung findings  - Latest CT scans show slight increase in size of the NEIDA nodule  - he had repeat PET on 5/9/2018 with over stable except for the one spot in the right lung  - he saw Dr Ely again June 6th and saw Dr Olvera on June 19th  - he completed XRT x 5 on July 13th  -  discussed his case at the [facility] yesterday where Dr Az Castaneda and Dr Olvera, pathology, surgery, and radiology.   - the boards recommendation previously was to proceed with observation only with regular scans; they felt he was too high risk for surgery and immunologics  - however, repeat PET on 8/8/2018 is showing an enlarging nodule in NEIDA with increasing FDG activity and worrisome for progressive disease   - he received his first and only cycle of chemotherapy with carbo/gemzar the week before his prior hospitalization         Other   Goals of care, counseling/discussion    -Long discussion with patient today about DNI options. He has insight to his disease & prognosis. He would like to get home to get his finances in order. He understands his high O2 requirements are keeping him from going home but is hopeful that his PNA will get better and  help with breathing. He would like CPR with bag valve mask but he does NOT want to be on ventilator machine as he does not want his family to hold the responsibility of pulling him off respiratory support one day.     -appreciate pall care assessment   -DNI    Per palliative care notes-  -patient aware of his disease & clinical prognosis   - lives with daughter  -Pt. Has good insight in his disease and is aware of his prognosis  -His goal is to return home as independent as possible. He would like to receive home health after discharge.   -He is hopeful that with ABX his O2 requirement will decline and he will be able to return home  -He will consider continuing CTX once he sees his recovery.   -Will need to assign POA, will do paperwork  - We will discuss extent of care desired and complete LA Post on follow up  -Will further clarify his wishes in case of code blue (partial code currently)   Goals defined              Will assign POA on follow up              Will complete LA Post              Will discuss discharge options including hospice on follow up (currently opposed).         Critical Care Daily Checklist:    A: Awake: RASS Goal/Actual Goal: RASS Goal: 0-->alert and calm  Actual: Rob Agitation Sedation Scale (RASS): Alert and calm   B: Spontaneous Breathing Trial Performed?     C: SAT & SBT Coordinated?  n/a                      D: Delirium: CAM-ICU Overall CAM-ICU: Negative   E: Early Mobility Performed? Yes   F: Feeding Goal:    Status:     Current Diet Order   Procedures    Diet Adult Regular (IDDSI Level 7)      AS: Analgesia/Sedation n/a   T: Thromboembolic Prophylaxis eliquis   H: HOB > 300 Yes   U: Stress Ulcer Prophylaxis (if needed) yes   G: Glucose Control n/a   B: Bowel Function     I: Indwelling Catheter (Lines & Bassett) Necessity n/a   D: De-escalation of Antimicrobials/Pharmacotherapies sea x3 weeks     Plan for the day/ETD pending    Code Status:  Family/Goals of Care: Partial Code          Critical secondary to Patient has a condition that poses threat to life and bodily function: Severe Respiratory Distress      Critical care was time spent personally by me on the following activities: development of treatment plan with patient or surrogate and bedside caregivers, discussions with consultants, evaluation of patient's response to treatment, examination of patient, ordering and performing treatments and interventions, ordering and review of laboratory studies, ordering and review of radiographic studies, pulse oximetry, re-evaluation of patient's condition. This critical care time did not overlap with that of any other provider or involve time for any procedures.     Elisa Pagan MD  Internal Medicine, PGY-I  CCS1 [employer]ink #[id number]  [facility]

## 2018-12-29 NOTE — ASSESSMENT & PLAN NOTE
74 y/o M ex smoker, h/o MTHFR mutation h/o multiple Pes on Eliquis, CAD s/p CABG, hypotension on midodrine, SCC lung NEIDA s/p 3 cycles of carbo/taxol radiation 2016, most recently with POD s/p 1 cycle of carbo/gemzar which was stopped due to recurrent PsA pneumonia and PEs, COPD (pred 60 mg), chronic PsA pneumonia on 3 abx (alternates doxy, cipro, cefuroxime) for over one year presented to OSH with worsening SOB found to have known PE of RLL pulm artery and increased size of R sided Plueral effusion but no improvement in oxygen requirement so 12/25 transferred from OSH for interventional cardiology for evaluation of catheter assisted thrombolysis.  Here, per notes, appears to be no lesion amenable to thromectomy and sputum cultures found to be growing MDR PsA  - would recommend at least 2 weeks of meropenem for MDR PsA pneumonia. Pt wants to go home but would like to try IV antimicrobials and understands no PO option and would need PICC line for this as well as lab draws.  He will discuss with family and team.  - discussed with team, will sign off please call back if questions

## 2018-12-29 NOTE — PROGRESS NOTES
[facility]  Critical Care Medicine  Progress Note    Patient Name: Michael Shetty  MRN: [id number]  Admission Date: 12/25/2018  Hospital Length of Stay: 3 days  Code Status: Partial Code  Attending Provider: Rowdy Valdivia MD  Primary Care Provider: Michael Ellsworth MD   Principal Problem: Acute on chronic respiratory failure with hypoxemia    Subjective:     HPI:  Mr. Shetty is a 74yo man with PMH of MTHFR mutation, history of PEs on Eliquis, CAD s/p CABGn with chronically elevated troponin, hypotension on midodrine, squamous cell lung carcinoma of NEIDA s/p 1 cycle of adjuvant CTX & radiation therapy (which was stopped due to recurrent pseudomonas PNA & PEs), COPD, 54 pack years (quit 1990), chronic severe pseudomonas pna (3 abx for over a year) who presents as a transfer from [facility] for Interventional Cardiology evaluation for catheter assisted thrombolysis.     HPI: 4 days ago he had acute SOB requring 10L of oxygen at home when he is at a baseline of 6L. At [facility] his CTPA showed known PE of RLL pulmonary artery & increased size of right sided pleural effusion. Doppler u/s of BLE was negative for DVT. OSH continued patient on home eliquis regimen of 5mg BID and per patient did not notice improvement in oxygen requirements so he was sent to [facility].      Vitals during initial interview: 135/75, -104, 25-20L comfort flow O2 FiO2 70%.       Of note patient has chronic severe pseudomonas PNA for which he takes an alternating regimen of doxycycline, cipro, & cefuroxime. He quit smoking 30 years ago.     Hospital/ICU Course:  12/26/2018 NAEO; discussed he is not a candidate for interventional cardiology & goals of care with palliative care consult for today    12/27/2018 NAEO 25L, 80% FiO2    Past Medical History:   Diagnosis Date    Arthritis     BPH (benign prostatic hyperplasia)     Chemotherapy-induced neutropenia 9/6/2018    Chemotherapy-induced neutropenia 9/6/2018    Coronary artery disease      MI X 2    Gout, chronic     Heartburn     Hypertension     Myocardial infarction     PE (pulmonary embolism)     Presence of dental bridge     UPPER - NOT WEAR MUCH AS DOES NOT FIT WELL    Primary malignant neoplasm of left upper lobe of lung 2017    Staph infection        Past Surgical History:   Procedure Laterality Date    CARDIAC SURGERY      CABG X 4 9-11    CTR      BILAT    OSTEOTOMY, METACARPAL BONE Right 3/1/2013    Performed by D[facility] Jalloh Jr[facility] at [facility] OR    ROTATOR CUFF REPAIR      left    TONSILLECTOMY      TRIGGER FINGER RELEASE      right and left hands.       Review of patient's allergies indicates:   Allergen Reactions    Shellfish containing products      soft shell crabs       Family History     Problem Relation (Age of Onset)    Cancer Sister        Tobacco Use    Smoking status: Former Smoker     Packs/day: 2.00     Years: 25.00     Pack years: 50.00     Last attempt to quit: 1990     Years since quittin.8    Smokeless tobacco: Never Used   Substance and Sexual Activity    Alcohol use: Yes     Comment: 2 PER DAY    Drug use: No    Sexual activity: Not on file      Review of Systems   Constitutional: Negative for chills, fatigue, fever and unexpected weight change.   Respiratory: Positive for cough and shortness of breath. Negative for wheezing.    Cardiovascular: Negative for chest pain, palpitations and leg swelling.   Gastrointestinal: Negative for abdominal distention, constipation, diarrhea, nausea and vomiting.        Hernia   Endocrine: Negative for cold intolerance and heat intolerance.   Genitourinary: Negative for decreased urine volume, difficulty urinating, flank pain, frequency, hematuria and urgency.   Musculoskeletal: Negative for arthralgias, back pain and myalgias.   Skin: Negative for wound.   Neurological: Negative for facial asymmetry and numbness.   Psychiatric/Behavioral: Negative for agitation, behavioral problems, confusion and  decreased concentration. The patient is not nervous/anxious.      Objective:     Vital Signs (Most Recent):  Temp: 97.7 °F (36.5 °C) (12/28/18 1500)  Pulse: 80 (12/28/18 1800)  Resp: (!) 25 (12/28/18 1800)  BP: 110/62 (12/28/18 1800)  SpO2: (!) 91 % (12/28/18 1800) Vital Signs (24h Range):  Temp:  [97.7 °F (36.5 °C)-98.2 °F (36.8 °C)] 97.7 °F (36.5 °C)  Pulse:  [] 80  Resp:  [16-39] 25  SpO2:  [72 %-96 %] 91 %  BP: ()/(52-82) 110/62   Weight: 83.5 kg (184 lb)  Body mass index is 27.17 kg/m².      Intake/Output Summary (Last 24 hours) at 12/28/2018 1835  Last data filed at 12/28/2018 1800  Gross per 24 hour   Intake 1120 ml   Output 2340 ml   Net -1220 ml       Physical Exam   Constitutional: He is oriented to person, place, and time. He appears well-developed and well-nourished. No distress.   On 30L O2 comfort flow FiO2 70%   HENT:   Head: Normocephalic and atraumatic.   Eyes: EOM are normal. Pupils are equal, round, and reactive to light.   Neck: Normal range of motion. Neck supple. No JVD present.   Cardiovascular: Regular rhythm.   Murmur heard.  Tachycardia  Systolic murmur   Pulmonary/Chest: No respiratory distress.   Abdominal: Soft. Bowel sounds are normal.   Musculoskeletal: He exhibits no edema.   Chronic bruising on BUE   Neurological: He is alert and oriented to person, place, and time. No cranial nerve deficit. Coordination normal.   Skin: Skin is warm and dry.   Psychiatric: He has a normal mood and affect. His behavior is normal. Judgment and thought content normal.       Vents:  Oxygen Concentration (%): 80 (12/28/18 1800)  Lines/Drains/Airways     Peripheral Intravenous Line                 Peripheral IV - Single Lumen 12/25/18 1500 Left Antecubital 3 days              Significant Labs:    CBC/Anemia Profile:  Recent Labs   Lab 12/27/18  0434   WBC 5.59   HGB 9.8*   HCT 32.1*   *   *   RDW 17.8*        Chemistries:  Recent Labs   Lab 12/27/18  0434 12/28/18  0417     141   K 3.1* 3.8    104   CO2 32* 31*   BUN 30* 23   CREATININE 0.9 0.8   CALCIUM 7.9* 7.6*   MG 2.3 2.2   PHOS 2.8 2.5*       All pertinent labs within the past 24 hours have been reviewed.    Significant Imaging: I have reviewed and interpreted all pertinent imaging results/findings within the past 24 hours.      ABG  Recent Labs   Lab 12/25/18  1502   PH 7.469*   PO2 63*   PCO2 36.6   HCO3 26.5   BE 3     Assessment/Plan:     Pulmonary   * Acute on chronic respiratory failure with hypoxemia    72yo presents with acute SOB on a background of PEs & MTHFR mutation found to have recurrent PE on CTPA  -Interventional cardiology consulted  -bedside TTE performed 12/27/2018  -seems less likely to be a candidate for catheter assisted thrombolysis at this time due to his active cancer  -appreciate recs  -continuing eliquis 5mg BID for now    -still requiring 20L & FiO2 80% and DNI status      Chronic pneumonia    Chronic severe Pseudomonas PNA   -sputum cx growing pseudomonas & few GPC   -continue meropenem   -await sensitivities & will use 2 abx coverages once they result ~3 weeks  -will consult ID for better abx recs & coverage     Pulmonary emphysema    -duo nebs q6hr   -ICS budesonide 0.5mg q12hr  -tiotroprium 18mcg   -prednisone 60mg PO      Cardiac/Vascular   Coronary artery disease involving coronary bypass graft    S/p CABG     Chronic hypotension    -on midodrine 5mg TID  -BP stable  -will hold if sBP >130     Hematology   Chronic pulmonary embolism    -per interventional cardiology consultation  -given active lung cancer he is not a candidate for thrombolysis  -CT chest reviewed, if clot in RLL pulmonary artery were to be lysed- it would end up perfusing his lung tissue that has had significant scarring & chronic severe pseudomonas pna     Oncology   Primary malignant neoplasm of left upper lobe of lung    73 y.o. male with diagnosis of NSCLC (Squamous cell)  - T2A lesion with 3.5cm involving NEIDA  - s/p  monotherapy with XRT by Dr Olvera; followed by chemotherapy with 3 cycles of carbo/taxol  - followed by Dr Ely with Pulm - seen him on Dec 5th and f/u on Feb 6th  - CTA on 5/18/17 showed decrease size in the tumor mass  - CT on 12/14/17 with stable lung findings  - Latest CT scans show slight increase in size of the NEIDA nodule  - he had repeat PET on 5/9/2018 with over stable except for the one spot in the right lung  - he saw Dr Ely again June 6th and saw Dr Olvera on June 19th  - he completed XRT x 5 on July 13th  -  discussed his case at the [facility] Tumor Board yesterday where Dr Az Castaneda and Dr Olvera, pathology, surgery, and radiology.   - the boards recommendation previously was to proceed with observation only with regular scans; they felt he was too high risk for surgery and immunologics  - however, repeat PET on 8/8/2018 is showing an enlarging nodule in NEIDA with increasing FDG activity and worrisome for progressive disease   - he received his first and only cycle of chemotherapy with carbo/gemzar the week before his prior hospitalization         Other   Goals of care, counseling/discussion    -appreciate pall care assessment   -DNI    Per palliative care notes-  -patient aware of his disease & clinical prognosis   - lives with daughter  -Pt. Has good insight in his disease and is aware of his prognosis  -His goal is to return home as independent as possible. He would like to receive home health after discharge.   -He is hopeful that with ABX his O2 requirement will decline and he will be able to return home  -He will consider continuing CTX once he sees his recovery.   -Will need to assign POA, will do paperwork  - We will discuss extent of care desired and complete LA Post on follow up  -Will further clarify his wishes in case of code blue (partial code currently)   Goals defined              Will assign POA on follow up              Will complete LA Post              Will discuss discharge  options including hospice on follow up (currently opposed).         Critical Care Daily Checklist:    A: Awake: RASS Goal/Actual Goal: RASS Goal: 0-->alert and calm  Actual: Rob Agitation Sedation Scale (RASS): Alert and calm   B: Spontaneous Breathing Trial Performed?     C: SAT & SBT Coordinated?  n/a                      D: Delirium: CAM-ICU Overall CAM-ICU: Negative   E: Early Mobility Performed? Yes   F: Feeding Goal:    Status:     Current Diet Order   Procedures    Diet Adult Regular (IDDSI Level 7)      AS: Analgesia/Sedation n/a   T: Thromboembolic Prophylaxis eliquis    H: HOB > 300 Yes   U: Stress Ulcer Prophylaxis (if needed) yes   G: Glucose Control n/a   B: Bowel Function     I: Indwelling Catheter (Lines & Bassett) Necessity n/a   D: De-escalation of Antimicrobials/Pharmacotherapies pending    Plan for the day/ETD pending    Code Status:  Family/Goals of Care: Partial Code  DNI       Critical secondary to Patient has a condition that poses threat to life and bodily function: Severe Respiratory Distress      Critical care was time spent personally by me on the following activities: development of treatment plan with patient or surrogate and bedside caregivers, discussions with consultants, evaluation of patient's response to treatment, examination of patient, ordering and performing treatments and interventions, ordering and review of laboratory studies, ordering and review of radiographic studies, pulse oximetry, re-evaluation of patient's condition. This critical care time did not overlap with that of any other provider or involve time for any procedures.     Elisa Pagan MD  Internal Medicine, PGY-I  [id number]  [facility]

## 2018-12-29 NOTE — ASSESSMENT & PLAN NOTE
Chronic severe Pseudomonas PNA   -sputum cx growing pseudomonas & few GPC   -continue meropenem   -await sensitivities & will use 2 abx coverages once they result ~3 weeks  -will consult ID for better abx recs & coverage

## 2018-12-29 NOTE — SUBJECTIVE & OBJECTIVE
Past Medical History:   Diagnosis Date    Arthritis     BPH (benign prostatic hyperplasia)     Chemotherapy-induced neutropenia 2018    Chemotherapy-induced neutropenia 2018    Coronary artery disease     MI X 2    Gout, chronic     Heartburn     Hypertension     Myocardial infarction     PE (pulmonary embolism)     Presence of dental bridge     UPPER - NOT WEAR MUCH AS DOES NOT FIT WELL    Primary malignant neoplasm of left upper lobe of lung 2017    Staph infection        Past Surgical History:   Procedure Laterality Date    CARDIAC SURGERY      CABG X 4 9-11    CTR      BILAT    OSTEOTOMY, METACARPAL BONE Right 3/1/2013    Performed by D[facility] Jalloh Jr[facility] at [facility] OR    ROTATOR CUFF REPAIR      left    TONSILLECTOMY      TRIGGER FINGER RELEASE      right and left hands.       Review of patient's allergies indicates:   Allergen Reactions    Shellfish containing products      soft shell crabs       Family History     Problem Relation (Age of Onset)    Cancer Sister        Tobacco Use    Smoking status: Former Smoker     Packs/day: 2.00     Years: 25.00     Pack years: 50.00     Last attempt to quit: 1990     Years since quittin.8    Smokeless tobacco: Never Used   Substance and Sexual Activity    Alcohol use: Yes     Comment: 2 PER DAY    Drug use: No    Sexual activity: Not on file      Review of Systems   Constitutional: Negative for chills, fatigue, fever and unexpected weight change.   Respiratory: Positive for cough and shortness of breath. Negative for wheezing.    Cardiovascular: Negative for chest pain, palpitations and leg swelling.   Gastrointestinal: Negative for abdominal distention, constipation, diarrhea, nausea and vomiting.        Hernia   Endocrine: Negative for cold intolerance and heat intolerance.   Genitourinary: Negative for decreased urine volume, difficulty urinating, flank pain, frequency, hematuria and urgency.   Musculoskeletal: Negative  for arthralgias, back pain and myalgias.   Skin: Negative for wound.   Neurological: Negative for facial asymmetry and numbness.   Psychiatric/Behavioral: Negative for agitation, behavioral problems, confusion and decreased concentration. The patient is not nervous/anxious.      Objective:     Vital Signs (Most Recent):  Temp: 97.9 °F (36.6 °C) (12/29/18 1505)  Pulse: 74 (12/29/18 1600)  Resp: (!) 22 (12/29/18 1600)  BP: (!) 111/59 (12/29/18 1600)  SpO2: (!) 93 % (12/29/18 1600) Vital Signs (24h Range):  Temp:  [97.5 °F (36.4 °C)-98 °F (36.7 °C)] 97.9 °F (36.6 °C)  Pulse:  [63-88] 74  Resp:  [13-33] 22  SpO2:  [78 %-95 %] 93 %  BP: (100-125)/(57-77) 111/59   Weight: 83.5 kg (184 lb)  Body mass index is 27.17 kg/m².      Intake/Output Summary (Last 24 hours) at 12/29/2018 1641  Last data filed at 12/29/2018 1600  Gross per 24 hour   Intake 300 ml   Output 2135 ml   Net -1835 ml       Physical Exam   Constitutional: He is oriented to person, place, and time. He appears well-developed and well-nourished. No distress.   On 30L O2 comfort flow FiO2 70%   HENT:   Head: Normocephalic and atraumatic.   Eyes: EOM are normal. Pupils are equal, round, and reactive to light.   Neck: Normal range of motion. Neck supple. No JVD present.   Cardiovascular: Regular rhythm.   Murmur heard.  Tachycardia  Systolic murmur   Pulmonary/Chest: No respiratory distress.   Abdominal: Soft. Bowel sounds are normal.   Musculoskeletal: He exhibits no edema.   Chronic bruising on BUE   Neurological: He is alert and oriented to person, place, and time. No cranial nerve deficit. Coordination normal.   Skin: Skin is warm and dry.   Psychiatric: He has a normal mood and affect. His behavior is normal. Judgment and thought content normal.       Vents:  Oxygen Concentration (%): 80 (12/29/18 1600)  Lines/Drains/Airways     Peripheral Intravenous Line                 Peripheral IV - Single Lumen 12/25/18 1500 Left Antecubital 4 days         Peripheral  IV - Single Lumen 12/29/18 0615 Anterior;Right Forearm less than 1 day              Significant Labs:    CBC/Anemia Profile:  No results for input(s): WBC, HGB, HCT, PLT, MCV, RDW, IRON, FERRITIN, RETIC, FOLATE, [id number], OCCULTBLOOD in the last 48 hours.     Chemistries:  Recent Labs   Lab 12/28/18  0417 12/29/18  0325    139   K 3.8 3.9    99   CO2 31* 29   BUN 23 28*   CREATININE 0.8 0.9   CALCIUM 7.6* 8.4*   MG 2.2 2.0   PHOS 2.5* 2.7       All pertinent labs within the past 24 hours have been reviewed.    Significant Imaging: I have reviewed and interpreted all pertinent imaging results/findings within the past 24 hours.

## 2018-12-29 NOTE — CONSULTS
[facility]  Infectious Disease  Consult Note    Patient Name: Michael Shetty  MRN: [id number]  Admission Date: 12/25/2018  Hospital Length of Stay: 4 days  Attending Physician: Terry Miles*  Primary Care Provider: Michael Ellsworth MD     Isolation Status: No active isolations        Inpatient consult to Infectious Diseases  Consult performed by: Reynaldo Layne MD  Consult ordered by: Elisa Pagan MD  Reason for consult: pneumonia  Assessment/Recommendations: Consult received full note and eval to follow

## 2018-12-30 NOTE — ASSESSMENT & PLAN NOTE
-We have suggested to try Bipap for possible atelectasis but he does not want it at this time  -consider bipap if he continues to decline     72yo presents with acute SOB on a background of PEs & MTHFR mutation found to have recurrent PE on CTPA & MDR Pseudomonas PNA   -Interventional cardiology consulted  -bedside TTE performed 12/30/2018  -seems less likely to be a candidate for catheter assisted thrombolysis at this time due to his active cancer  -appreciate recs  -continuing eliquis 5mg BID for now     -still requiring 20L & FiO2 80% and DNI status   -meropenem day 3 (previously on zosyn which pna was resistant to)   -hope that change in appropriate abx regimen will improve respiratory status

## 2018-12-30 NOTE — SUBJECTIVE & OBJECTIVE
Past Medical History:   Diagnosis Date    Arthritis     BPH (benign prostatic hyperplasia)     Chemotherapy-induced neutropenia 2018    Chemotherapy-induced neutropenia 2018    Coronary artery disease     MI X 2    Gout, chronic     Heartburn     Hypertension     Myocardial infarction     PE (pulmonary embolism)     Presence of dental bridge     UPPER - NOT WEAR MUCH AS DOES NOT FIT WELL    Primary malignant neoplasm of left upper lobe of lung 2017    Staph infection        Past Surgical History:   Procedure Laterality Date    CARDIAC SURGERY      CABG X 4 9-11    CTR      BILAT    OSTEOTOMY, METACARPAL BONE Right 3/1/2013    Performed by [facility] OR    ROTATOR CUFF REPAIR      left    TONSILLECTOMY      TRIGGER FINGER RELEASE      right and left hands.       Review of patient's allergies indicates:   Allergen Reactions    Shellfish containing products      soft shell crabs       Family History     Problem Relation (Age of Onset)    Cancer Sister        Tobacco Use    Smoking status: Former Smoker     Packs/day: 2.00     Years: 25.00     Pack years: 50.00     Last attempt to quit: 1990     Years since quittin.8    Smokeless tobacco: Never Used   Substance and Sexual Activity    Alcohol use: Yes     Comment: 2 PER DAY    Drug use: No    Sexual activity: Not on file      Review of Systems   Constitutional: Negative for chills, fatigue, fever and unexpected weight change.   Respiratory: Positive for cough and shortness of breath. Negative for wheezing.    Cardiovascular: Negative for chest pain, palpitations and leg swelling.   Gastrointestinal: Negative for abdominal distention, constipation, diarrhea, nausea and vomiting.        Hernia   Endocrine: Negative for cold intolerance and heat intolerance.   Genitourinary: Negative for decreased urine volume, difficulty urinating, flank pain, frequency, hematuria and urgency.   Musculoskeletal: Negative  for arthralgias, back pain and myalgias.   Skin: Negative for wound.   Neurological: Negative for facial asymmetry and numbness.   Psychiatric/Behavioral: Negative for agitation, behavioral problems, confusion and decreased concentration. The patient is not nervous/anxious.      Objective:     Vital Signs (Most Recent):  Temp: 97.6 °F (36.4 °C) (12/30/18 1105)  Pulse: 77 (12/30/18 1500)  Resp: (!) 27 (12/30/18 1500)  BP: 112/70 (12/30/18 1400)  SpO2: (!) 92 % (12/30/18 1500) Vital Signs (24h Range):  Temp:  [96.4 °F (35.8 °C)-97.8 °F (36.6 °C)] 97.6 °F (36.4 °C)  Pulse:  [60-87] 77  Resp:  [16-45] 27  SpO2:  [85 %-95 %] 92 %  BP: ()/(56-72) 112/70   Weight: 83.5 kg (184 lb)  Body mass index is 27.17 kg/m².      Intake/Output Summary (Last 24 hours) at 12/30/2018 1544  Last data filed at 12/30/2018 0800  Gross per 24 hour   Intake 135 ml   Output 1825 ml   Net -1690 ml       Physical Exam   Constitutional: He is oriented to person, place, and time. He appears well-developed and well-nourished. No distress.   On 30L O2 comfort flow FiO2 70%   HENT:   Head: Normocephalic and atraumatic.   Eyes: EOM are normal. Pupils are equal, round, and reactive to light.   Neck: Normal range of motion. Neck supple. No JVD present.   Cardiovascular: Regular rhythm.   Murmur heard.  Tachycardia  Systolic murmur   Pulmonary/Chest: No respiratory distress.   Abdominal: Soft. Bowel sounds are normal.   Musculoskeletal: He exhibits no edema.   Chronic bruising on BUE   Neurological: He is alert and oriented to person, place, and time. No cranial nerve deficit. Coordination normal.   Skin: Skin is warm and dry.   Psychiatric: He has a normal mood and affect. His behavior is normal. Judgment and thought content normal.       Vents:  Oxygen Concentration (%): 75 (12/30/18 1330)  Lines/Drains/Airways     Peripheral Intravenous Line                 Peripheral IV - Single Lumen 12/25/18 1500 Left Antecubital 5 days         Peripheral IV -  Single Lumen 12/29/18 0615 Anterior;Right Forearm 1 day              Significant Labs:    CBC/Anemia Profile:  Recent Labs   Lab 12/29/18  1646 12/30/18  0300   WBC 10.68 8.26   HGB 12.1* 12.0*   HCT 39.3* 39.6*   * 137*   * 105*   RDW 18.1* 18.0*        Chemistries:  Recent Labs   Lab 12/29/18  0325 12/30/18  0300    140   K 3.9 4.4   CL 99 99   CO2 29 30*   BUN 28* 31*   CREATININE 0.9 0.8   CALCIUM 8.4* 8.4*   MG 2.0 2.4   PHOS 2.7 3.8       All pertinent labs within the past 24 hours have been reviewed.    Significant Imaging: I have reviewed and interpreted all pertinent imaging results/findings within the past 24 hours.

## 2018-12-30 NOTE — ASSESSMENT & PLAN NOTE
73 y.o. male with diagnosis of NSCLC (Squamous cell)  - T2A lesion with 3.5cm involving NEIDA  - s/p monotherapy with XRT by Dr Olvera; followed by chemotherapy with 3 cycles of carbo/taxol  - followed by Dr Ely with Pulm - seen him on Dec 5th and f/u on Feb 6th  - CTA on 5/18/17 showed decrease size in the tumor mass  - CT on 12/14/17 with stable lung findings  - Latest CT scans show slight increase in size of the NEIDA nodule  - he had repeat PET on 5/9/2018 with over stable except for the one spot in the right lung  - he saw Dr Ely again June 6th and saw Dr Olvera on June 19th  - he completed XRT x 5 on July 13th  -  discussed his case at the [facility] Tumor Board yesterday where Dr Az Castaneda and Dr Olvera, pathology, surgery, and radiology.   - the boards recommendation previously was to proceed with observation only with regular scans; they felt he was too high risk for surgery and immunologics  - however, repeat PET on 8/8/2018 is showing an enlarging nodule in NEIDA with increasing FDG activity and worrisome for progressive disease   - he received his first and only cycle of chemotherapy with carbo/gemzar the week before his prior hospitalization

## 2018-12-30 NOTE — PLAN OF CARE
Problem: Adult Inpatient Plan of Care  Goal: Plan of Care Review  Outcome: Ongoing (interventions implemented as appropriate)    No acute events throughout day. See vital signs and assessments in flowsheets. See below for updates on today's progress.     Pulmonary: Sats in low 90s on NRB and comfort yuko 75% FiO2    Cardiovascular: HR 60's-80's; BP WNL; afebrile     Neurological: intact     Gastrointestinal: no BM this shift     Genitourinary: voids per urinal clear yellow     Endocrine: WNL    Integumentary/Other: bruising     Infusions: KVO    Patient progressing towards goals as tolerated, plan of care communicated and reviewed with Michael Shetty and family. All concerns addressed. Will continue to monitor.

## 2018-12-30 NOTE — PROGRESS NOTES
[facility]  Critical Care Medicine  Progress Note    Patient Name: Michael Shetty  MRN: [id number]  Admission Date: 12/25/2018  Hospital Length of Stay: 5 days  Code Status: Partial Code  Attending Provider: Terry Miles*  Primary Care Provider: Michael Ellsworth MD   Principal Problem: Acute on chronic respiratory failure with hypoxemia    Subjective:     HPI:  Mr. Shetty is a 72yo man with PMH of MTHFR mutation, history of PEs on Eliquis, CAD s/p CABGn with chronically elevated troponin, hypotension on midodrine, squamous cell lung carcinoma of NEIDA s/p 1 cycle of adjuvant CTX & radiation therapy (which was stopped due to recurrent pseudomonas PNA & PEs), COPD, 54 pack years (quit 1990), chronic severe pseudomonas pna (3 abx for over a year) who presents as a transfer from [facility] for Interventional Cardiology evaluation for catheter assisted thrombolysis.     HPI: 4 days ago he had acute SOB requring 10L of oxygen at home when he is at a baseline of 6L. At [facility] his CTPA showed known PE of RLL pulmonary artery & increased size of right sided pleural effusion. Doppler u/s of BLE was negative for DVT. OSH continued patient on home eliquis regimen of 5mg BID and per patient did not notice improvement in oxygen requirements so he was sent to [facility].      Vitals during initial interview: 135/75, -104, 25-20L comfort flow O2 FiO2 70%.       Of note patient has chronic severe pseudomonas PNA for which he takes an alternating regimen of doxycycline, cipro, & cefuroxime. He quit smoking 30 years ago.     Hospital/ICU Course:  12/26/2018 NAEO; discussed he is not a candidate for interventional cardiology & goals of care with palliative care consult for today    12/27/2018 NAEO 25L, 80% FiO2    12/29/2018 NAEO    Past Medical History:   Diagnosis Date    Arthritis     BPH (benign prostatic hyperplasia)     Chemotherapy-induced neutropenia 9/6/2018    Chemotherapy-induced neutropenia 9/6/2018     Coronary artery disease     MI X 2    Gout, chronic     Heartburn     Hypertension     Myocardial infarction     PE (pulmonary embolism)     Presence of dental bridge     UPPER - NOT WEAR MUCH AS DOES NOT FIT WELL    Primary malignant neoplasm of left upper lobe of lung 2017    Staph infection        Past Surgical History:   Procedure Laterality Date    CARDIAC SURGERY      CABG X 4 9-11    CTR      BILAT    OSTEOTOMY, METACARPAL BONE Right 3/1/2013    Performed by Daron Jalloh Jr., MD at [facility] OR    ROTATOR CUFF REPAIR      left    TONSILLECTOMY      TRIGGER FINGER RELEASE      right and left hands.       Review of patient's allergies indicates:   Allergen Reactions    Shellfish containing products      soft shell crabs       Family History     Problem Relation (Age of Onset)    Cancer Sister        Tobacco Use    Smoking status: Former Smoker     Packs/day: 2.00     Years: 25.00     Pack years: 50.00     Last attempt to quit: 1990     Years since quittin.8    Smokeless tobacco: Never Used   Substance and Sexual Activity    Alcohol use: Yes     Comment: 2 PER DAY    Drug use: No    Sexual activity: Not on file      Review of Systems   Constitutional: Negative for chills, fatigue, fever and unexpected weight change.   Respiratory: Positive for cough and shortness of breath. Negative for wheezing.    Cardiovascular: Negative for chest pain, palpitations and leg swelling.   Gastrointestinal: Negative for abdominal distention, constipation, diarrhea, nausea and vomiting.        Hernia   Endocrine: Negative for cold intolerance and heat intolerance.   Genitourinary: Negative for decreased urine volume, difficulty urinating, flank pain, frequency, hematuria and urgency.   Musculoskeletal: Negative for arthralgias, back pain and myalgias.   Skin: Negative for wound.   Neurological: Negative for facial asymmetry and numbness.   Psychiatric/Behavioral: Negative for agitation, behavioral  problems, confusion and decreased concentration. The patient is not nervous/anxious.      Objective:     Vital Signs (Most Recent):  Temp: 97.6 °F (36.4 °C) (12/30/18 1105)  Pulse: 77 (12/30/18 1500)  Resp: (!) 27 (12/30/18 1500)  BP: 112/70 (12/30/18 1400)  SpO2: (!) 92 % (12/30/18 1500) Vital Signs (24h Range):  Temp:  [96.4 °F (35.8 °C)-97.8 °F (36.6 °C)] 97.6 °F (36.4 °C)  Pulse:  [60-87] 77  Resp:  [16-45] 27  SpO2:  [85 %-95 %] 92 %  BP: ()/(56-72) 112/70   Weight: 83.5 kg (184 lb)  Body mass index is 27.17 kg/m².      Intake/Output Summary (Last 24 hours) at 12/30/2018 1544  Last data filed at 12/30/2018 0800  Gross per 24 hour   Intake 135 ml   Output 1825 ml   Net -1690 ml       Physical Exam   Constitutional: He is oriented to person, place, and time. He appears well-developed and well-nourished. No distress.   On 30L O2 comfort flow FiO2 70%   HENT:   Head: Normocephalic and atraumatic.   Eyes: EOM are normal. Pupils are equal, round, and reactive to light.   Neck: Normal range of motion. Neck supple. No JVD present.   Cardiovascular: Regular rhythm.   Murmur heard.  Tachycardia  Systolic murmur   Pulmonary/Chest: No respiratory distress.   Abdominal: Soft. Bowel sounds are normal.   Musculoskeletal: He exhibits no edema.   Chronic bruising on BUE   Neurological: He is alert and oriented to person, place, and time. No cranial nerve deficit. Coordination normal.   Skin: Skin is warm and dry.   Psychiatric: He has a normal mood and affect. His behavior is normal. Judgment and thought content normal.       Vents:  Oxygen Concentration (%): 75 (12/30/18 1330)  Lines/Drains/Airways     Peripheral Intravenous Line                 Peripheral IV - Single Lumen 12/25/18 1500 Left Antecubital 5 days         Peripheral IV - Single Lumen 12/29/18 0615 Anterior;Right Forearm 1 day              Significant Labs:    CBC/Anemia Profile:  Recent Labs   Lab 12/29/18  1646 12/30/18  0300   WBC 10.68 8.26   HGB 12.1*  12.0*   HCT 39.3* 39.6*   * 137*   * 105*   RDW 18.1* 18.0*        Chemistries:  Recent Labs   Lab 12/29/18  0325 12/30/18  0300    140   K 3.9 4.4   CL 99 99   CO2 29 30*   BUN 28* 31*   CREATININE 0.9 0.8   CALCIUM 8.4* 8.4*   MG 2.0 2.4   PHOS 2.7 3.8       All pertinent labs within the past 24 hours have been reviewed.    Significant Imaging: I have reviewed and interpreted all pertinent imaging results/findings within the past 24 hours.      ABG  Recent Labs   Lab 12/25/18  1502   PH 7.469*   PO2 63*   PCO2 36.6   HCO3 26.5   BE 3     Assessment/Plan:     Pulmonary   * Acute on chronic respiratory failure with hypoxemia    -We have suggested to try Bipap for possible atelectasis but he does not want it at this time  -consider bipap if he continues to decline     72yo presents with acute SOB on a background of PEs & MTHFR mutation found to have recurrent PE on CTPA & MDR Pseudomonas PNA   -Interventional cardiology consulted  -bedside TTE performed 12/30/2018  -seems less likely to be a candidate for catheter assisted thrombolysis at this time due to his active cancer  -appreciate recs  -continuing eliquis 5mg BID for now     -still requiring 20L & FiO2 80% and DNI status   -meropenem day 3 (previously on zosyn which pna was resistant to)   -hope that change in appropriate abx regimen will improve respiratory status     Pseudomonas pneumonia    -appreciate ID recs  - would recommend at least 2 weeks of meropenem for MDR PsA pneumonia. Pt wants to go home but would like to try IV antimicrobials and understands no PO option and would need PICC line for this as well as lab draws.  He will discuss with family and team.        Chronic pneumonia    Chronic severe Pseudomonas PNA   -sputum cx growing pseudomonas & few GPC   -continue meropenem   -await sensitivities & will use 2 abx coverages once they result ~3 weeks  -will consult ID for better abx recs & coverage     Pulmonary emphysema    -duo  nebs q6hr   -ICS budesonide 0.5mg q12hr  -tiotroprium 18mcg   -prednisone 60mg PO      Cardiac/Vascular   Coronary artery disease involving coronary bypass graft    S/p CABG     Chronic hypotension    -on midodrine 5mg TID  -BP stable  -will hold if sBP >130     Hematology   Chronic pulmonary embolism    -per interventional cardiology consultation  -given active lung cancer he is not a candidate for thrombolysis  -CT chest reviewed, if clot in RLL pulmonary artery were to be lysed- it would end up perfusing his lung tissue that has had significant scarring & chronic severe pseudomonas pna     Oncology   Primary malignant neoplasm of left upper lobe of lung    73 y.o. male with diagnosis of NSCLC (Squamous cell)  - T2A lesion with 3.5cm involving NEIDA  - s/p monotherapy with XRT by Dr Olvera; followed by chemotherapy with 3 cycles of carbo/taxol  - followed by Dr Ely with Pulm - seen him on Dec 5th and f/u on Feb 6th  - CTA on 5/18/17 showed decrease size in the tumor mass  - CT on 12/14/17 with stable lung findings  - Latest CT scans show slight increase in size of the NEIDA nodule  - he had repeat PET on 5/9/2018 with over stable except for the one spot in the right lung  - he saw Dr Ely again June 6th and saw Dr Olvera on June 19th  - he completed XRT x 5 on July 13th  -  discussed his case at the [facility] Tumor Board yesterday where Dr Az Castaneda and Dr Olvera, pathology, surgery, and radiology.   - the boards recommendation previously was to proceed with observation only with regular scans; they felt he was too high risk for surgery and immunologics  - however, repeat PET on 8/8/2018 is showing an enlarging nodule in NEIDA with increasing FDG activity and worrisome for progressive disease   - he received his first and only cycle of chemotherapy with carbo/gemzar the week before his prior hospitalization            Other   Goals of care, counseling/discussion    -Long discussion with patient today about DNI  options. He has insight to his disease & prognosis. He would like to get home to get his finances in order. He understands his high O2 requirements are keeping him from going home but is hopeful that his PNA will get better and help with breathing. He would like CPR with bag valve mask but he does NOT want to be on ventilator machine as he does not want his family to hold the responsibility of pulling him off respiratory support one day.     -appreciate pall care assessment   -DNI    Per palliative care notes-  -patient aware of his disease & clinical prognosis   - lives with daughter  -Pt. Has good insight in his disease and is aware of his prognosis  -His goal is to return home as independent as possible. He would like to receive home health after discharge.   -He is hopeful that with ABX his O2 requirement will decline and he will be able to return home  -He will consider continuing CTX once he sees his recovery.   -Will need to assign POA, will do paperwork  - We will discuss extent of care desired and complete LA Post on follow up  -Will further clarify his wishes in case of code blue (partial code currently)   Goals defined              Will assign POA on follow up              Will complete LA Post              Will discuss discharge options including hospice on follow up (currently opposed).         Critical Care Daily Checklist:    A: Awake: RASS Goal/Actual Goal: RASS Goal: 0-->alert and calm  Actual: Rob Agitation Sedation Scale (RASS): Alert and calm   B: Spontaneous Breathing Trial Performed?     C: SAT & SBT Coordinated?  n/a                      D: Delirium: CAM-ICU Overall CAM-ICU: Negative   E: Early Mobility Performed? Yes   F: Feeding Goal:    Status:     Current Diet Order   Procedures    Diet Adult Regular (IDDSI Level 7)      AS: Analgesia/Sedation none   T: Thromboembolic Prophylaxis eliquis    H: HOB > 300 Yes   U: Stress Ulcer Prophylaxis (if needed) yes   G: Glucose Control n/a   B:  Bowel Function     I: Indwelling Catheter (Lines & Bassett) Necessity n/a   D: De-escalation of Antimicrobials/Pharmacotherapies sea x 3 weeks    Plan for the day/ETD Pending improvement in respiratory status     Code Status:  Family/Goals of Care: Partial Code         Critical secondary to Patient has a condition that poses threat to life and bodily function: Severe Respiratory Distress      Critical care was time spent personally by me on the following activities: development of treatment plan with patient or surrogate and bedside caregivers, discussions with consultants, evaluation of patient's response to treatment, examination of patient, ordering and performing treatments and interventions, ordering and review of laboratory studies, ordering and review of radiographic studies, pulse oximetry, re-evaluation of patient's condition. This critical care time did not overlap with that of any other provider or involve time for any procedures.     Elisa Pagan MD  Internal Medicine, PGY-I  CCS1 spectralink #[id number]  [facility]

## 2018-12-31 NOTE — PROGRESS NOTES
"[facility]  Palliative Medicine  Progress Note    Patient Name: Michael Shetty  MRN: [id number]  Admission Date: 12/25/2018  Hospital Length of Stay: 6 days  Code Status: Partial Code   Attending Provider: Terry Miles*  Consulting Provider: Power Dunn MD  Primary Care Physician: Michael Ellsworth MD  Principal Problem:Acute on chronic respiratory failure with hypoxemia    Patient information was obtained from patient.      Assessment/Plan:     Palliative care encounter    Palliative Care Encounter / Goals of care discussion:     Narrative:   Michael Shetty is a 73 y.o. male patient with lung cancer, undergoing CTX, XRT (on hold for PE and PNA), clotting disorder s/p several PE, now severe hypoxema requiring increasing doses of O2 and likely superimposed PNA    1: Pain / Physical symptom Assessment:   - pain assesment: denies   - dyspnea assessment: severe   - anxiety assessment: mild   - depression assessment: denies    2: Social Background    - lives with daughter    3: Palliative care meeting participants:    Patient   Discussed with Dr. Miles    4: Goals of care Discussion details:  12/31/18   - pt. Profound respiratory failure fails to improve   - the pt. Remains hopeful that a longer course of ABX will help   - in d/w Dr. Miles, his ABX were tailored to sensitivity and essentially effective Rx. Has been started ~ 3 days ago.    - he may improve some within 7 days of therapy, but if after that no improvement he is not likely to get better     - The pt. Goals is unchanged to "get home". He asked me if I looked into options for home O2 and I explained that high flow is possible on hospice,  but not at this level.    - Will continue to monitor for improvement of oxygenation. If he does not improve the decision will be to remain hospitalized (and do his papers here in the hospital / hospice). Or take a chance, reduce the O2 to home hospice acceptable level and discharge. " I did make it clear before that he may not survive transport to the [facility].     12/28/18   Completed power of  yesterday, daughters are POA.    Discussed LA post today   - we had a long discussion regarding his goals and therapies available to achieve this goal and interventions that may not be beneficial.    - He clearly states that his ultimate goal is to return to his home in [place]. He has some important papers he needs to change (living will).    - he is willing to do anything he can to go home.    - he realizes that his high O2 demands are a challenge and may prevent him from achieving this goal.    - agrees to continue current treatment over the weekend and re-evaluate on Monday     - we spoke about DNR and I have explained that to achieve best outcome all parts of resuscitation are needed including ventilation, chest compression and drugs. . He understands but would like to remain partial code with no intubation. He wants us to try for a bit and let him go if it does not work.    - He makes it clear he did not want the ventilator, feeding tubes, dialysis      - we spoke about hospice and he is agreeable to consider this over home health if it will get him to go home.    - discussed with Dr. Valdivia.   - will follow up  5: Goals of care Decisions / Recommendations / Plan:   - Continue ABX for 7 days and re-assess Oxygen demand   - consider continuing high flow in a capable facility vs. Reducing FiO2 and discharge to home with hospice   - will continue to support with decision making as needed    6: Follow up plans:    daily    Thank you for your consult. I will follow along with you. Please call [phone] with questions.            I will follow-up with patient. Please contact us if you have any additional questions.    Subjective:     Chief Complaint: No chief complaint on file.      HPI:   Michael Shetty is a nice 72 yo mald with a past history of MTHFR mutation and several past PE and  DVT episodes. He had been on several OAC, most recently on Eliquis. He has SCLC and is currently undergoing CTX and XRT, treatment had been paused due to PE and recurrent pseudomonas PNA.   He developed acute worsening of his dyspnea (usually able to ambulate to BR and do his ADL on ~ 5L O2), severe exertional dyspnea and needing to increase his oxygen to 10l/min.   He [facility] to [facility] but was transferred to our facility for evaluation of catheter assisted thrombolysis.     He was thought not a candidate for thrombolysis due to poor risk benefit ratio, he is felt to have a pneumonic infiltrate and is currently on ABX. He is requiring high flow O2 to sustain O2 saturation of ~89%.     I am being asked to discuss goals of care with the pt.         Hospital Course:  No notes on file    Interval History: remains on high flow O2 at 25 L with as needed non rebreather. Gets severely SOB just trying to transition to the bedside commode.     Medications:  Continuous Infusions:  Scheduled Meds:   albuterol-ipratropium  3 mL Nebulization Q6H    allopurinol  100 mg Oral Daily    apixaban  5 mg Oral BID    atenolol  12.5 mg Oral BID    budesonide  0.5 mg Nebulization Q12H    furosemide  20 mg Oral Daily    meropenem (MERREM) IVPB  1 g Intravenous Q8H    multivitamin  1 tablet Oral Daily    pantoprazole  40 mg Oral Daily    pravastatin  40 mg Oral QHS    predniSONE  60 mg Oral Daily    tiotropium  1 capsule Inhalation Daily     PRN Meds:sodium chloride 0.9%    Objective:     Vital Signs (Most Recent):  Temp: 97.9 °F (36.6 °C) (12/31/18 1105)  Pulse: 86 (12/31/18 1400)  Resp: (!) 26 (12/31/18 1400)  BP: (!) 88/57 (12/31/18 1400)  SpO2: (!) 81 % (12/31/18 1400) Vital Signs (24h Range):  Temp:  [97.8 °F (36.6 °C)-98 °F (36.7 °C)] 97.9 °F (36.6 °C)  Pulse:  [59-90] 86  Resp:  [11-36] 26  SpO2:  [81 %-97 %] 81 %  BP: ()/(51-82) 88/57     Weight: 83.5 kg (184 lb)  Body mass index is 27.17 kg/m².    Review of  Symptoms  Symptom Assessment (ESAS 0-10 scale)  ESAS 0 1 2 3 4 5 6 7 8 9 10   Pain x             Dyspnea       x       Anxiety    x          Nausea x             Depression   x            Anorexia x             Fatigue    x          Insomnia   x           Restlessness   x            Agitation x             CAM / Delirium x__ --  ___+   Constipation     x__ --  ___+   Diarrhea           _x_ --  ___+      Bowel Management Plan (BMP): No    Comments:     Pain Assessment: denies    OME in 24 hours:     Performance Status: 40    ECOG Performance Status Grade: 4 - Completely disabled    Physical Exam   Constitutional: He is oriented to person, place, and time. He appears well-developed. No distress.   Neck: Normal range of motion. No JVD present.   Pulmonary/Chest: He is in respiratory distress.   Abdominal: Soft.   Neurological: He is alert and oriented to person, place, and time.   Skin: Skin is warm.       Significant Labs: All pertinent labs within the past 24 hours have been reviewed.  CBC:   Recent Labs   Lab 12/30/18  0300   WBC 8.26   HGB 12.0*   HCT 39.6*   *   *     BMP:  Recent Labs   Lab 12/31/18  0330         K 4.2      CO2 26   BUN 34*   CREATININE 0.8   CALCIUM 8.4*   MG 2.4     LFT:  Lab Results   Component Value Date    AST 20 12/06/2018    ALKPHOS 53 12/06/2018    BILITOT 2.0 (H) 12/06/2018     Albumin:   Albumin   Date Value Ref Range Status   12/06/2018 3.6 3.6 - 5.1 g/dL Final   10/01/2018 3.1 3.1 - 4.7 g/dL      Protein:   Total Protein   Date Value Ref Range Status   12/06/2018 6.0 (L) 6.1 - 8.1 g/dL Final     Lactic acid:   No results found for: LACTATE    Significant Imaging: I have reviewed all pertinent imaging results/findings within the past 24 hours.    Advanced Directives::  Living Will: No  LaPOST: No  Do Not Resuscitate Status: Yes partial  Medical Power of : No    Decision-Making Capacity: Patient answered questions    Living Arrangements: Lives  with family, Lives in apartment    Psychosocial/Cultural:  Patient's most important priorities:  Returning home     Patient's biggest concerns/fears:  Not being able to return home    Previous death/end of life care history:  no    Patient's goals/hopes:  Return home    Spiritual:     F- Donna and Belief: yes but not Denominational    I - Importance: some  .  C - Community: no    A - Address in Care: no      > 50% of 45 min visit spent in chart review, face to face discussion of goals of care,  symptom assessment, coordination of care and emotional support.    Power Dunn MD  Palliative Medicine  [facility]

## 2018-12-31 NOTE — ASSESSMENT & PLAN NOTE
-per interventional cardiology consultation  -given active lung cancer he is not a candidate for thrombolysis  -CT chest reviewed, if clot in RLL pulmonary artery were to be lysed- it would end up perfusing his lung tissue that has had significant scarring & chronic severe pseudomonas pna  -Eliquis 5 mg BID

## 2018-12-31 NOTE — ASSESSMENT & PLAN NOTE
"Palliative Care Encounter / Goals of care discussion:     Narrative:   Michael Shetty is a 73 y.o. male patient with lung cancer, undergoing CTX, XRT (on hold for PE and PNA), clotting disorder s/p several PE, now severe hypoxema requiring increasing doses of O2 and likely superimposed PNA    1: Pain / Physical symptom Assessment:   - pain assesment: denies   - dyspnea assessment: severe   - anxiety assessment: mild   - depression assessment: denies    2: Social Background    - lives with daughter    3: Palliative care meeting participants:    Patient   Discussed with Dr. Miles    4: Goals of care Discussion details:  12/31/18   - pt. Profound respiratory failure fails to improve   - the pt. Remains hopeful that a longer course of ABX will help   - in d/w Dr. Miles, his ABX were tailored to sensitivity and essentially effective Rx. Has been started ~ 3 days ago.    - he may improve some within 7 days of therapy, but if after that no improvement he is not likely to get better     - The pt. Goals is unchanged to "get home". He asked me if I looked into options for home O2 and I explained that high flow is possible on hospice,  but not at this level.    - Will continue to monitor for improvement of oxygenation. If he does not improve the decision will be to remain hospitalized (and do his papers here in the hospital / hospice). Or take a chance, reduce the O2 to home hospice acceptable level and discharge. I did make it clear before that he may not survive transport to the [facility].     12/28/18   Completed power of  yesterday, daughters are POA.    Discussed LA post today   - we had a long discussion regarding his goals and therapies available to achieve this goal and interventions that may not be beneficial.    - He clearly states that his ultimate goal is to return to his home in [place]. He has some important papers he needs to change (living will).    - he is willing to do anything he can " to go home.    - he realizes that his high O2 demands are a challenge and may prevent him from achieving this goal.    - agrees to continue current treatment over the weekend and re-evaluate on Monday     - we spoke about DNR and I have explained that to achieve best outcome all parts of resuscitation are needed including ventilation, chest compression and drugs. . He understands but would like to remain partial code with no intubation. He wants us to try for a bit and let him go if it does not work.    - He makes it clear he did not want the ventilator, feeding tubes, dialysis      - we spoke about hospice and he is agreeable to consider this over home health if it will get him to go home.    - discussed with Dr. Valdivia.   - will follow up  5: Goals of care Decisions / Recommendations / Plan:   - Continue ABX for 7 days and re-assess Oxygen demand   - consider continuing high flow in a capable facility vs. Reducing FiO2 and discharge to home with hospice   - will continue to support with decision making as needed    6: Follow up plans:    daily    Thank you for your consult. I will follow along with you. Please call [phone] with questions.

## 2018-12-31 NOTE — ASSESSMENT & PLAN NOTE
Chronic severe Pseudomonas PNA   -sputum cx growing pseudomonas & few GPC   -continue meropenem   -ID consulted, recommended treatment for 3 weeks.

## 2018-12-31 NOTE — PLAN OF CARE
Problem: Adult Inpatient Plan of Care  Goal: Plan of Care Review  Outcome: Ongoing (interventions implemented as appropriate)  See vital signs and assessments in flowsheets. See below for updates on today's progress.      Pulmonary:    Comfort flow 25 Liters, Fio2 80%, NRB 25L to maintain 95% oxygen saturations. Upon any exertions or movements patient oxygen saturation drops to low 80's and takes several minutes to return to ~90%.   Cardiovascular:   NSR, HR~70'S, Systolic's:100's   Neurological:    Afebrile, AAOX4, follows commands, moves all extremities   Gastrointestinal:    BM today, bowel sounds present x4 quadrants  Genitourinary:    Voids via urinal, total output:  Integumentary/Other:    Skin intact, bruising present  Infusions:  kvo     POC: Continue to monitor respiratory status. Plan of care discussed with Jess CaliShetty. He had no new questions at this time.

## 2018-12-31 NOTE — SUBJECTIVE & OBJECTIVE
Interval History/Significant Events:  NAEON. Patient still needs combination of high flow nasal cannula and rebreather mask. Even slight movement tends to make him de-saturate. Will continue meropenem for 1 week and monitor for improvement in resp status.      Review of Systems   Constitutional: Negative for chills, fatigue, fever and unexpected weight change.   Respiratory: Positive for cough and shortness of breath. Negative for wheezing.    Cardiovascular: Negative for chest pain, palpitations and leg swelling.   Gastrointestinal: Negative for abdominal distention, constipation, diarrhea, nausea and vomiting.        Hernia   Endocrine: Negative for cold intolerance and heat intolerance.   Genitourinary: Negative for decreased urine volume, difficulty urinating, flank pain, frequency, hematuria and urgency.   Musculoskeletal: Negative for arthralgias, back pain and myalgias.   Skin: Negative for wound.   Neurological: Negative for facial asymmetry and numbness.   Psychiatric/Behavioral: Negative for agitation, behavioral problems, confusion and decreased concentration. The patient is not nervous/anxious.      Objective:     Vital Signs (Most Recent):  Temp: 97.9 °F (36.6 °C) (12/31/18 1105)  Pulse: 79 (12/31/18 1300)  Resp: (!) 25 (12/31/18 1300)  BP: 100/61 (12/31/18 1300)  SpO2: (!) 89 % (12/31/18 1300) Vital Signs (24h Range):  Temp:  [97.8 °F (36.6 °C)-98 °F (36.7 °C)] 97.9 °F (36.6 °C)  Pulse:  [59-90] 79  Resp:  [11-36] 25  SpO2:  [82 %-97 %] 89 %  BP: ()/(51-82) 100/61   Weight: 83.5 kg (184 lb)  Body mass index is 27.17 kg/m².      Intake/Output Summary (Last 24 hours) at 12/31/2018 1315  Last data filed at 12/31/2018 0712  Gross per 24 hour   Intake 155 ml   Output 1175 ml   Net -1020 ml       Physical Exam  Constitutional: He is oriented to person, place, and time. He appears well-developed and well-nourished. No distress.   On 25L O2 comfort flow FiO2 80%   HENT:   Head: Normocephalic and  atraumatic.   Eyes: EOM are normal. Pupils are equal, round, and reactive to light.   Neck: Normal range of motion. Neck supple. No JVD present.   Cardiovascular: Regular rhythm.   Murmur heard.  Tachycardia  Systolic murmur   Pulmonary/Chest: No respiratory distress.   Abdominal: Soft. Bowel sounds are normal.   Musculoskeletal: He exhibits no edema.   Chronic bruising on BUE   Neurological: He is alert and oriented to person, place, and time. No cranial nerve deficit. Coordination normal.   Skin: Skin is warm and dry.   Psychiatric: He has a normal mood and affect. His behavior is normal. Judgment and thought content normal.     Vents:  Oxygen Concentration (%): 80 (12/31/18 1300)  Lines/Drains/Airways     Peripheral Intravenous Line                 Peripheral IV - Single Lumen 12/25/18 1500 Left Antecubital 5 days         Peripheral IV - Single Lumen 12/29/18 0615 Anterior;Right Forearm 2 days              Significant Labs:    CBC/Anemia Profile:  Recent Labs   Lab 12/29/18  1646 12/30/18  0300   WBC 10.68 8.26   HGB 12.1* 12.0*   HCT 39.3* 39.6*   * 137*   * 105*   RDW 18.1* 18.0*        Chemistries:  Recent Labs   Lab 12/30/18  0300 12/31/18  0330    141   K 4.4 4.2   CL 99 104   CO2 30* 26   BUN 31* 34*   CREATININE 0.8 0.8   CALCIUM 8.4* 8.4*   MG 2.4 2.4   PHOS 3.8 3.8       Significant Imaging:  I have reviewed all pertinent imaging results/findings within the past 24 hours.

## 2018-12-31 NOTE — ASSESSMENT & PLAN NOTE
-appreciate ID recs  - would recommend at least 2 weeks of meropenem for MDR PsA pneumonia. Pt wants to go home but would like to try IV antimicrobials and understands no PO option and would need PICC line for this as well as lab draws.

## 2018-12-31 NOTE — ASSESSMENT & PLAN NOTE
-We have suggested to try Bipap for possible atelectasis but he does not want it at this time  -consider bipap if he continues to decline     72yo presents with acute SOB on a background of PEs & MTHFR mutation found to have recurrent PE on CTPA & MDR Pseudomonas PNA   -Interventional cardiology consulted  -bedside TTE performed 12/31/2018  -seems less likely to be a candidate for catheter assisted thrombolysis at this time due to his active cancer  -appreciate recs  -continuing eliquis 5mg BID for now     -requiring 25L & FiO2 80% and DNI status   -meropenem day 4 (previously on zosyn which pna was resistant to)   -follow respiratory status for one week, reconsider discharge plan after this.

## 2018-12-31 NOTE — NURSING
Pt had ~25 beat run of vtach at 0359. Converted out of vtach without any intervention. Denies chest pain, lightheadedness, palpitations. BMP and Mag already drawn, awaiting results. VS otherwise stable. Strip in chart.  MD with critical care informed.  Will continue to monitor closely.

## 2018-12-31 NOTE — PLAN OF CARE
Problem: Adult Inpatient Plan of Care  Goal: Plan of Care Review  Outcome: Ongoing (interventions implemented as appropriate)  See vital signs and assessments in flowsheets. See below for updates on today's progress.     Patients oxygen demand increased throughout the shift. Requiring him to have both comfort flow and NRB to maintain oxygenation ~92% throughout the day. Continue plan of care. Plan discussed with Mr. Shetty and family. All questions and concerns were addressed.

## 2018-12-31 NOTE — ASSESSMENT & PLAN NOTE
73 y.o. male with diagnosis of NSCLC (Squamous cell)  - T2A lesion with 3.5cm involving NEIDA  - s/p monotherapy with XRT by Dr Olvera; followed by chemotherapy with 3 cycles of carbo/taxol  - followed by Dr Ely with Pulm - seen him on Dec 5th and f/u on Feb 6th  - CTA on 5/18/17 showed decrease size in the tumor mass  - CT on 12/14/17 with stable lung findings  - Latest CT scans show slight increase in size of the NEIDA nodule  - he had repeat PET on 5/9/2018 with over stable except for the one spot in the right lung  - he saw Dr Ely again June 6th and saw Dr Olvera on June 19th  - he completed XRT x 5 on July 13th  -  discussed his case at the [facility] Tumor Board yesterday where Dr Az Castaneda and Dr Olvera, pathology, surgery, and radiology.   - the boards recommendation previously was to proceed with observation only with regular scans; they felt he was too high risk for surgery and immunologics  - however, repeat PET on 8/8/2018 is showing an enlarging nodule in NEIDA with increasing FDG activity and worrisome for progressive disease   - he received his first and only cycle of chemotherapy with carbo/gemzar the week before his prior hospitalization

## 2018-12-31 NOTE — PROGRESS NOTES
[facility]  Critical Care Medicine  Progress Note    Patient Name: Michael Shetty  MRN: [id number]  Admission Date: 12/25/2018  Hospital Length of Stay: 6 days  Code Status: Partial Code  Attending Provider: Terry Miles*  Primary Care Provider: Michael Ellsworth MD   Principal Problem: Acute on chronic respiratory failure with hypoxemia    Subjective:     HPI:  Mr. Shetty is a 74yo man with PMH of MTHFR mutation, history of PEs on Eliquis, CAD s/p CABGn with chronically elevated troponin, hypotension on midodrine, squamous cell lung carcinoma of NEIDA s/p 1 cycle of adjuvant CTX & radiation therapy (which was stopped due to recurrent pseudomonas PNA & PEs), COPD, 54 pack years (quit 1990), chronic severe pseudomonas pna (3 abx for over a year) who presents as a transfer from [facility] for Interventional Cardiology evaluation for catheter assisted thrombolysis.     HPI: 4 days ago he had acute SOB requring 10L of oxygen at home when he is at a baseline of 6L. At [facility] his CTPA showed known PE of RLL pulmonary artery & increased size of right sided pleural effusion. Doppler u/s of BLE was negative for DVT. OSH continued patient on home eliquis regimen of 5mg BID and per patient did not notice improvement in oxygen requirements so he was [place] [facility]      Vitals during initial interview: 135/75, -104, 25-20L comfort flow O2 FiO2 70%.       Of note patient has chronic severe pseudomonas PNA for which he takes an alternating regimen of doxycycline, cipro, & cefuroxime. He quit smoking 30 years ago.     Hospital/ICU Course:  12/26/2018 NAEO; discussed he is not a candidate for interventional cardiology & goals of care with palliative care consult for today    12/27/2018 NAEO 25L, 80% FiO2    12/29/2018 NAEO    12/31/2018 NAEO, will continue meropenem for one week and monitor for improvement in respiratory status.     Interval History/Significant Events:  NAEON. Patient still needs combination of  high flow nasal cannula and rebreather mask. Even slight movement tends to make him de-saturate. Will continue meropenem for 1 week and monitor for improvement in resp status.      Review of Systems   Constitutional: Negative for chills, fatigue, fever and unexpected weight change.   Respiratory: Positive for cough and shortness of breath. Negative for wheezing.    Cardiovascular: Negative for chest pain, palpitations and leg swelling.   Gastrointestinal: Negative for abdominal distention, constipation, diarrhea, nausea and vomiting.        Hernia   Endocrine: Negative for cold intolerance and heat intolerance.   Genitourinary: Negative for decreased urine volume, difficulty urinating, flank pain, frequency, hematuria and urgency.   Musculoskeletal: Negative for arthralgias, back pain and myalgias.   Skin: Negative for wound.   Neurological: Negative for facial asymmetry and numbness.   Psychiatric/Behavioral: Negative for agitation, behavioral problems, confusion and decreased concentration. The patient is not nervous/anxious.      Objective:     Vital Signs (Most Recent):  Temp: 97.9 °F (36.6 °C) (12/31/18 1105)  Pulse: 79 (12/31/18 1300)  Resp: (!) 25 (12/31/18 1300)  BP: 100/61 (12/31/18 1300)  SpO2: (!) 89 % (12/31/18 1300) Vital Signs (24h Range):  Temp:  [97.8 °F (36.6 °C)-98 °F (36.7 °C)] 97.9 °F (36.6 °C)  Pulse:  [59-90] 79  Resp:  [11-36] 25  SpO2:  [82 %-97 %] 89 %  BP: ()/(51-82) 100/61   Weight: 83.5 kg (184 lb)  Body mass index is 27.17 kg/m².      Intake/Output Summary (Last 24 hours) at 12/31/2018 1315  Last data filed at 12/31/2018 0712  Gross per 24 hour   Intake 155 ml   Output 1175 ml   Net -1020 ml       Physical Exam  Constitutional: He is oriented to person, place, and time. He appears well-developed and well-nourished. No distress.   On 25L O2 comfort flow FiO2 80%   HENT:   Head: Normocephalic and atraumatic.   Eyes: EOM are normal. Pupils are equal, round, and reactive to light.    Neck: Normal range of motion. Neck supple. No JVD present.   Cardiovascular: Regular rhythm.   Murmur heard.  Tachycardia  Systolic murmur   Pulmonary/Chest: No respiratory distress.   Abdominal: Soft. Bowel sounds are normal.   Musculoskeletal: He exhibits no edema.   Chronic bruising on BUE   Neurological: He is alert and oriented to person, place, and time. No cranial nerve deficit. Coordination normal.   Skin: Skin is warm and dry.   Psychiatric: He has a normal mood and affect. His behavior is normal. Judgment and thought content normal.      Vents:  Oxygen Concentration (%): 80 (12/31/18 1300)  Lines/Drains/Airways     Peripheral Intravenous Line                 Peripheral IV - Single Lumen 12/25/18 1500 Left Antecubital 5 days         Peripheral IV - Single Lumen 12/29/18 0615 Anterior;Right Forearm 2 days              Significant Labs:    CBC/Anemia Profile:  Recent Labs   Lab 12/29/18  1646 12/30/18  0300   WBC 10.68 8.26   HGB 12.1* 12.0*   HCT 39.3* 39.6*   * 137*   * 105*   RDW 18.1* 18.0*        Chemistries:  Recent Labs   Lab 12/30/18  0300 12/31/18  0330    141   K 4.4 4.2   CL 99 104   CO2 30* 26   BUN 31* 34*   CREATININE 0.8 0.8   CALCIUM 8.4* 8.4*   MG 2.4 2.4   PHOS 3.8 3.8       Significant Imaging:  I have reviewed all pertinent imaging results/findings within the past 24 hours.      ABG  Recent Labs   Lab 12/25/18  1502   PH 7.469*   PO2 63*   PCO2 36.6   HCO3 26.5   BE 3     Assessment/Plan:     Pulmonary   * Acute on chronic respiratory failure with hypoxemia    -We have suggested to try Bipap for possible atelectasis but he does not want it at this time  -consider bipap if he continues to decline     72yo presents with acute SOB on a background of PEs & MTHFR mutation found to have recurrent PE on CTPA & MDR Pseudomonas PNA   -Interventional cardiology consulted  -bedside TTE performed 12/31/2018  -seems less likely to be a candidate for catheter assisted thrombolysis  at this time due to his active cancer  -appreciate recs  -continuing eliquis 5mg BID for now     -requiring 25L & FiO2 80% and DNI status   -meropenem day 4 (previously on zosyn which pna was resistant to)   -follow respiratory status for one week, reconsider discharge plan after this.     Pseudomonas pneumonia    -appreciate ID recs  - would recommend at least 2 weeks of meropenem for MDR PsA pneumonia. Pt wants to go home but would like to try IV antimicrobials and understands no PO option and would need PICC line for this as well as lab draws.          Chronic pneumonia    Chronic severe Pseudomonas PNA   -sputum cx growing pseudomonas & few GPC   -continue meropenem   -ID consulted, recommended treatment for 3 weeks.      Pulmonary emphysema    -duo nebs q6hr   -ICS budesonide 0.5mg q12hr  -tiotroprium 18mcg   -prednisone 60mg PO      Cardiac/Vascular   Coronary artery disease involving coronary bypass graft    S/p CABG     Chronic hypotension    -on midodrine 5mg TID at home  -BP stable  -will hold midodrine for now     Hematology   Chronic pulmonary embolism    -per interventional cardiology consultation  -given active lung cancer he is not a candidate for thrombolysis  -CT chest reviewed, if clot in RLL pulmonary artery were to be lysed- it would end up perfusing his lung tissue that has had significant scarring & chronic severe pseudomonas pna  -Eliquis 5 mg BID     Oncology   Primary malignant neoplasm of left upper lobe of lung    73 y.o. male with diagnosis of NSCLC (Squamous cell)  - T2A lesion with 3.5cm involving NEIDA  - s/p monotherapy with XRT by Dr Olvera; followed by chemotherapy with 3 cycles of carbo/taxol  - followed by Dr Ely with Pulm - seen him on Dec 5th and f/u on Feb 6th  - CTA on 5/18/17 showed decrease size in the tumor mass  - CT on 12/14/17 with stable lung findings  - Latest CT scans show slight increase in size of the NEIDA nodule  - he had repeat PET on 5/9/2018 with over stable  except for the one spot in the right lung  - he saw Dr Ely again June 6th and saw Dr Olvera on June 19th  - he completed XRT x 5 on July 13th  -  discussed his case at the [facility] Tumor Board yesterday where Dr Az Castaneda and Dr Olvera, pathology, surgery, and radiology.   - the boards recommendation previously was to proceed with observation only with regular scans; they felt he was too high risk for surgery and immunologics  - however, repeat PET on 8/8/2018 is showing an enlarging nodule in NEIDA with increasing FDG activity and worrisome for progressive disease   - he received his first and only cycle of chemotherapy with carbo/gemzar the week before his prior hospitalization            Other   Goals of care, counseling/discussion    -Long discussion with patient today about DNI options. He has insight to his disease & prognosis. He would like to get home to get his finances in order. He understands his high O2 requirements are keeping him from going home but is hopeful that his PNA will get better and help with breathing. He would like CPR with bag valve mask but he does NOT want to be on ventilator machine as he does not want his family to hold the responsibility of pulling him off respiratory support one day.     -appreciate pall care assessment   -DNI    Per palliative care notes-  -patient aware of his disease & clinical prognosis   - lives with daughter  -Pt. Has good insight in his disease and is aware of his prognosis  -His goal is to return home as independent as possible. He would like to receive home health after discharge.   -He is hopeful that with ABX his O2 requirement will decline and he will be able to return home  -He will consider continuing CTX once he sees his recovery.   -Will need to assign POA, will do paperwork  - We will discuss extent of care desired and complete LA Post on follow up  -Will further clarify his wishes in case of code blue (partial code currently)   Goals defined               Will assign POA on follow up              Will complete LA Post              Will discuss discharge options after 1 week treatment with meropenem         Critical Care Daily Checklist:    A: Awake: RASS Goal/Actual Goal: RASS Goal: 0-->alert and calm  Actual: Rob Agitation Sedation Scale (RASS): Alert and calm   B: Spontaneous Breathing Trial Performed?     C: SAT & SBT Coordinated?  N/A                  D: Delirium: CAM-ICU Overall CAM-ICU: Negative   E: Early Mobility Performed? Yes   F: Feeding Goal:    Status:     Current Diet Order   Procedures    Diet Adult Regular (IDDSI Level 7)      AS: Analgesia/Sedation none   T: Thromboembolic Prophylaxis Eliquis   H: HOB > 300 Yes   U: Stress Ulcer Prophylaxis (if needed) yes   G: Glucose Control N/a   B: Bowel Function Stool Occurrence: 1   I: Indwelling Catheter (Lines & Bassett) Necessity 2 peripheral IV   D: De-escalation of Antimicrobials/Pharmacotherapies Meropenem x 3 weeks     Plan for the day/ETD Pending improvement in respiratory status    Code Status:  Family/Goals of Care: Partial Code  1 round of CPR       Critical secondary to Patient has a condition that poses threat to life and bodily function: Severe Respiratory Distress     Critical care was time spent personally by me on the following activities: development of treatment plan with patient or surrogate and bedside caregivers, discussions with consultants, evaluation of patient's response to treatment, examination of patient, ordering and performing treatments and interventions, ordering and review of laboratory studies, ordering and review of radiographic studies, pulse oximetry, re-evaluation of patient's condition. This critical care time did not overlap with that of any other provider or involve time for any procedures.     Nirmal Hayward MD  Critical Care Medicine  [facility]

## 2018-12-31 NOTE — SUBJECTIVE & OBJECTIVE
Interval History: remains on high flow O2 at 25 L with as needed non rebreather. Gets severely SOB just trying to transition to the bedside commode.     Medications:  Continuous Infusions:  Scheduled Meds:   albuterol-ipratropium  3 mL Nebulization Q6H    allopurinol  100 mg Oral Daily    apixaban  5 mg Oral BID    atenolol  12.5 mg Oral BID    budesonide  0.5 mg Nebulization Q12H    furosemide  20 mg Oral Daily    meropenem (MERREM) IVPB  1 g Intravenous Q8H    multivitamin  1 tablet Oral Daily    pantoprazole  40 mg Oral Daily    pravastatin  40 mg Oral QHS    predniSONE  60 mg Oral Daily    tiotropium  1 capsule Inhalation Daily     PRN Meds:sodium chloride 0.9%    Objective:     Vital Signs (Most Recent):  Temp: 97.9 °F (36.6 °C) (12/31/18 1105)  Pulse: 86 (12/31/18 1400)  Resp: (!) 26 (12/31/18 1400)  BP: (!) 88/57 (12/31/18 1400)  SpO2: (!) 81 % (12/31/18 1400) Vital Signs (24h Range):  Temp:  [97.8 °F (36.6 °C)-98 °F (36.7 °C)] 97.9 °F (36.6 °C)  Pulse:  [59-90] 86  Resp:  [11-36] 26  SpO2:  [81 %-97 %] 81 %  BP: ()/(51-82) 88/57     Weight: 83.5 kg (184 lb)  Body mass index is 27.17 kg/m².    Review of Symptoms  Symptom Assessment (ESAS 0-10 scale)  ESAS 0 1 2 3 4 5 6 7 8 9 10   Pain x             Dyspnea       x       Anxiety    x          Nausea x             Depression   x            Anorexia x             Fatigue    x          Insomnia   x           Restlessness   x            Agitation x             CAM / Delirium x__ --  ___+   Constipation     x__ --  ___+   Diarrhea           _x_ --  ___+      Bowel Management Plan (BMP): No    Comments:     Pain Assessment: denies    OME in 24 hours:     Performance Status: 40    ECOG Performance Status Grade: 4 - Completely disabled    Physical Exam   Constitutional: He is oriented to person, place, and time. He appears well-developed. No distress.   Neck: Normal range of motion. No JVD present.   Pulmonary/Chest: He is in respiratory distress.    Abdominal: Soft.   Neurological: He is alert and oriented to person, place, and time.   Skin: Skin is warm.       Significant Labs: All pertinent labs within the past 24 hours have been reviewed.  CBC:   Recent Labs   Lab 12/30/18  0300   WBC 8.26   HGB 12.0*   HCT 39.6*   *   *     BMP:  Recent Labs   Lab 12/31/18  0330         K 4.2      CO2 26   BUN 34*   CREATININE 0.8   CALCIUM 8.4*   MG 2.4     LFT:  Lab Results   Component Value Date    AST 20 12/06/2018    ALKPHOS 53 12/06/2018    BILITOT 2.0 (H) 12/06/2018     Albumin:   Albumin   Date Value Ref Range Status   12/06/2018 3.6 3.6 - 5.1 g/dL Final   10/01/2018 3.1 3.1 - 4.7 g/dL      Protein:   Total Protein   Date Value Ref Range Status   12/06/2018 6.0 (L) 6.1 - 8.1 g/dL Final     Lactic acid:   No results found for: LACTATE    Significant Imaging: I have reviewed all pertinent imaging results/findings within the past 24 hours.    Advanced Directives::  Living Will: No  LaPOST: No  Do Not Resuscitate Status: Yes partial  Medical Power of : No    Decision-Making Capacity: Patient answered questions    Living Arrangements: Lives with family, Lives in apartment    Psychosocial/Cultural:  Patient's most important priorities:  Returning home     Patient's biggest concerns/fears:  Not being able to return home    Previous death/end of life care history:  no    Patient's goals/hopes:  Return home    Spiritual:     F- Donna and Belief: yes but not Adventism    I - Importance: some  .  C - Community: no    A - Address in Care: no

## 2018-12-31 NOTE — ASSESSMENT & PLAN NOTE
-Long discussion with patient today about DNI options. He has insight to his disease & prognosis. He would like to get home to get his finances in order. He understands his high O2 requirements are keeping him from going home but is hopeful that his PNA will get better and help with breathing. He would like CPR with bag valve mask but he does NOT want to be on ventilator machine as he does not want his family to hold the responsibility of pulling him off respiratory support one day.     -appreciate pall care assessment   -DNI    Per palliative care notes-  -patient aware of his disease & clinical prognosis   - lives with daughter  -Pt. Has good insight in his disease and is aware of his prognosis  -His goal is to return home as independent as possible. He would like to receive home health after discharge.   -He is hopeful that with ABX his O2 requirement will decline and he will be able to return home  -He will consider continuing CTX once he sees his recovery.   -Will need to assign POA, will do paperwork  - We will discuss extent of care desired and complete LA Post on follow up  -Will further clarify his wishes in case of code blue (partial code currently)   Goals defined              Will assign POA on follow up              Will complete LA Post              Will discuss discharge options after 1 week treatment with meropenem

## 2018-12-31 NOTE — PLAN OF CARE
Problem: Adult Inpatient Plan of Care  Goal: Plan of Care Review  Outcome: Ongoing (interventions implemented as appropriate)  See vital signs and assessments in flowsheets. See below for updates on today's progress.     Pulmonary: Lung sound diminished. Pt requiring 25L comfort yuko with 75% O2 in addition to 15L 100% O2 non rebreather in order to keep oxygen sats above 90%. Desats with minimal movement.    Cardiovascular: 25 beat run vtach at 0359. Otherwise HR normal sinus rhythm with occasional PVCs.     Integumentary/Other: Skin intact, scattered bruising.     Patient progressing towards goals as tolerated, plan of care communicated and reviewed with Michael Shetty and daughter. All concerns addressed, questions answered. Will continue to monitor closely.

## 2019-01-01 ENCOUNTER — HOSPITAL ENCOUNTER (INPATIENT)
Facility: HOSPITAL | Age: 74
LOS: 1 days | DRG: 189 | End: 2019-01-29
Attending: EMERGENCY MEDICINE | Admitting: INTERNAL MEDICINE
Payer: MEDICARE

## 2019-01-01 VITALS
TEMPERATURE: 98 F | HEIGHT: 69 IN | WEIGHT: 173.5 LBS | OXYGEN SATURATION: 84 % | RESPIRATION RATE: 18 BRPM | HEART RATE: 118 BPM | DIASTOLIC BLOOD PRESSURE: 68 MMHG | BODY MASS INDEX: 25.7 KG/M2 | SYSTOLIC BLOOD PRESSURE: 122 MMHG

## 2019-01-01 VITALS
SYSTOLIC BLOOD PRESSURE: 75 MMHG | TEMPERATURE: 97 F | HEART RATE: 12 BPM | BODY MASS INDEX: 27.11 KG/M2 | OXYGEN SATURATION: 47 % | DIASTOLIC BLOOD PRESSURE: 51 MMHG | WEIGHT: 183 LBS | HEIGHT: 69 IN

## 2019-01-01 DIAGNOSIS — Z51.5 END OF LIFE CARE: ICD-10-CM

## 2019-01-01 DIAGNOSIS — R57.9 SHOCK: ICD-10-CM

## 2019-01-01 DIAGNOSIS — N17.0 ATN (ACUTE TUBULAR NECROSIS): ICD-10-CM

## 2019-01-01 DIAGNOSIS — J96.20 ACUTE ON CHRONIC RESPIRATORY FAILURE, UNSPECIFIED WHETHER WITH HYPOXIA OR HYPERCAPNIA: Primary | ICD-10-CM

## 2019-01-01 DIAGNOSIS — C34.12 SQUAMOUS CELL CARCINOMA OF BRONCHUS IN LEFT UPPER LOBE: ICD-10-CM

## 2019-01-01 DIAGNOSIS — J96.21 ACUTE ON CHRONIC RESPIRATORY FAILURE WITH HYPOXEMIA: ICD-10-CM

## 2019-01-01 DIAGNOSIS — E87.20 LACTIC ACID ACIDOSIS: ICD-10-CM

## 2019-01-01 DIAGNOSIS — R06.02 SOB (SHORTNESS OF BREATH): ICD-10-CM

## 2019-01-01 DIAGNOSIS — R33.9 URINARY RETENTION: ICD-10-CM

## 2019-01-01 DIAGNOSIS — I50.813 ACUTE ON CHRONIC RIGHT-SIDED HEART FAILURE: ICD-10-CM

## 2019-01-01 LAB
ALBUMIN SERPL BCP-MCNC: 2.7 G/DL
ALLENS TEST: ABNORMAL
ALP SERPL-CCNC: 81 U/L
ALT SERPL W/O P-5'-P-CCNC: 37 U/L
ANION GAP SERPL CALC-SCNC: 11 MMOL/L
ANION GAP SERPL CALC-SCNC: 12 MMOL/L
ANION GAP SERPL CALC-SCNC: 15 MMOL/L
ANION GAP SERPL CALC-SCNC: 16 MMOL/L
ANION GAP SERPL CALC-SCNC: 18 MMOL/L
ANION GAP SERPL CALC-SCNC: 7 MMOL/L
ANION GAP SERPL CALC-SCNC: 8 MMOL/L
ANISOCYTOSIS BLD QL SMEAR: SLIGHT
APTT BLDCRRT: 59 SEC
AST SERPL-CCNC: 22 U/L
BASO STIPL BLD QL SMEAR: ABNORMAL
BASOPHILS # BLD AUTO: 0 K/UL
BASOPHILS # BLD AUTO: 0.01 K/UL
BASOPHILS # BLD AUTO: 0.02 K/UL
BASOPHILS # BLD AUTO: 0.03 K/UL
BASOPHILS # BLD AUTO: 0.04 K/UL
BASOPHILS # BLD AUTO: 0.04 K/UL
BASOPHILS NFR BLD: 0 %
BASOPHILS NFR BLD: 0 %
BASOPHILS NFR BLD: 0.1 %
BASOPHILS NFR BLD: 0.2 %
BASOPHILS NFR BLD: 0.3 %
BASOPHILS NFR BLD: 0.4 %
BILIRUB SERPL-MCNC: 4 MG/DL
BNP SERPL-MCNC: 359 PG/ML
BNP SERPL-MCNC: 498 PG/ML
BUN SERPL-MCNC: 28 MG/DL
BUN SERPL-MCNC: 28 MG/DL
BUN SERPL-MCNC: 29 MG/DL
BUN SERPL-MCNC: 30 MG/DL
BUN SERPL-MCNC: 31 MG/DL
BUN SERPL-MCNC: 32 MG/DL
BUN SERPL-MCNC: 32 MG/DL
BUN SERPL-MCNC: 33 MG/DL
BUN SERPL-MCNC: 33 MG/DL
BUN SERPL-MCNC: 38 MG/DL
BUN SERPL-MCNC: 40 MG/DL
BUN SERPL-MCNC: 40 MG/DL (ref 6–30)
BUN SERPL-MCNC: 45 MG/DL
BUN SERPL-MCNC: 46 MG/DL
BURR CELLS BLD QL SMEAR: ABNORMAL
CALCIUM SERPL-MCNC: 7.1 MG/DL
CALCIUM SERPL-MCNC: 8.1 MG/DL
CALCIUM SERPL-MCNC: 8.2 MG/DL
CALCIUM SERPL-MCNC: 8.4 MG/DL
CALCIUM SERPL-MCNC: 8.5 MG/DL
CALCIUM SERPL-MCNC: 8.6 MG/DL
CALCIUM SERPL-MCNC: 8.7 MG/DL
CALCIUM SERPL-MCNC: 8.7 MG/DL
CALCIUM SERPL-MCNC: 8.9 MG/DL
CALCIUM SERPL-MCNC: 9 MG/DL
CALCIUM SERPL-MCNC: 9.7 MG/DL
CHLORIDE SERPL-SCNC: 100 MMOL/L
CHLORIDE SERPL-SCNC: 100 MMOL/L
CHLORIDE SERPL-SCNC: 101 MMOL/L
CHLORIDE SERPL-SCNC: 101 MMOL/L
CHLORIDE SERPL-SCNC: 102 MMOL/L
CHLORIDE SERPL-SCNC: 102 MMOL/L
CHLORIDE SERPL-SCNC: 108 MMOL/L
CHLORIDE SERPL-SCNC: 95 MMOL/L
CHLORIDE SERPL-SCNC: 98 MMOL/L
CHLORIDE SERPL-SCNC: 98 MMOL/L
CHLORIDE SERPL-SCNC: 98 MMOL/L (ref 95–110)
CHLORIDE SERPL-SCNC: 99 MMOL/L
CO2 SERPL-SCNC: 25 MMOL/L
CO2 SERPL-SCNC: 25 MMOL/L
CO2 SERPL-SCNC: 26 MMOL/L
CO2 SERPL-SCNC: 26 MMOL/L
CO2 SERPL-SCNC: 27 MMOL/L
CO2 SERPL-SCNC: 27 MMOL/L
CO2 SERPL-SCNC: 28 MMOL/L
CO2 SERPL-SCNC: 29 MMOL/L
CO2 SERPL-SCNC: 29 MMOL/L
CO2 SERPL-SCNC: 31 MMOL/L
CO2 SERPL-SCNC: 31 MMOL/L
CO2 SERPL-SCNC: 32 MMOL/L
CO2 SERPL-SCNC: 33 MMOL/L
CREAT SERPL-MCNC: 0.7 MG/DL
CREAT SERPL-MCNC: 0.8 MG/DL
CREAT SERPL-MCNC: 0.9 MG/DL
CREAT SERPL-MCNC: 1.6 MG/DL
CREAT SERPL-MCNC: 1.6 MG/DL (ref 0.5–1.4)
DELSYS: ABNORMAL
DIFFERENTIAL METHOD: ABNORMAL
EOSINOPHIL # BLD AUTO: 0 K/UL
EOSINOPHIL # BLD AUTO: 0.1 K/UL
EOSINOPHIL NFR BLD: 0 %
EOSINOPHIL NFR BLD: 0 %
EOSINOPHIL NFR BLD: 0.1 %
EOSINOPHIL NFR BLD: 0.2 %
EOSINOPHIL NFR BLD: 0.2 %
EOSINOPHIL NFR BLD: 0.3 %
EOSINOPHIL NFR BLD: 0.4 %
EOSINOPHIL NFR BLD: 0.6 %
EOSINOPHIL NFR BLD: 1 %
EOSINOPHIL NFR BLD: 1 %
EP: 7
EP: 7
ERYTHROCYTE [DISTWIDTH] IN BLOOD BY AUTOMATED COUNT: 17.4 %
ERYTHROCYTE [DISTWIDTH] IN BLOOD BY AUTOMATED COUNT: 17.6 %
ERYTHROCYTE [DISTWIDTH] IN BLOOD BY AUTOMATED COUNT: 17.7 %
ERYTHROCYTE [DISTWIDTH] IN BLOOD BY AUTOMATED COUNT: 17.8 %
ERYTHROCYTE [DISTWIDTH] IN BLOOD BY AUTOMATED COUNT: 17.9 %
ERYTHROCYTE [DISTWIDTH] IN BLOOD BY AUTOMATED COUNT: 18.1 %
ERYTHROCYTE [DISTWIDTH] IN BLOOD BY AUTOMATED COUNT: 18.3 %
ERYTHROCYTE [DISTWIDTH] IN BLOOD BY AUTOMATED COUNT: 18.5 %
ERYTHROCYTE [SEDIMENTATION RATE] IN BLOOD BY WESTERGREN METHOD: 17 MM/H
EST. GFR  (AFRICAN AMERICAN): 48.7 ML/MIN/1.73 M^2
EST. GFR  (AFRICAN AMERICAN): >60 ML/MIN/1.73 M^2
EST. GFR  (NON AFRICAN AMERICAN): 42.1 ML/MIN/1.73 M^2
EST. GFR  (NON AFRICAN AMERICAN): >60 ML/MIN/1.73 M^2
ESTIMATED AVG GLUCOSE: 114 MG/DL
FIO2: 100
FLOW: 30
GLUCOSE SERPL-MCNC: 105 MG/DL
GLUCOSE SERPL-MCNC: 107 MG/DL
GLUCOSE SERPL-MCNC: 108 MG/DL
GLUCOSE SERPL-MCNC: 109 MG/DL
GLUCOSE SERPL-MCNC: 111 MG/DL
GLUCOSE SERPL-MCNC: 112 MG/DL
GLUCOSE SERPL-MCNC: 118 MG/DL
GLUCOSE SERPL-MCNC: 121 MG/DL
GLUCOSE SERPL-MCNC: 140 MG/DL
GLUCOSE SERPL-MCNC: 144 MG/DL
GLUCOSE SERPL-MCNC: 189 MG/DL (ref 70–110)
GLUCOSE SERPL-MCNC: 191 MG/DL
GLUCOSE SERPL-MCNC: 85 MG/DL
GLUCOSE SERPL-MCNC: 99 MG/DL
HBA1C MFR BLD HPLC: 5.6 %
HCO3 UR-SCNC: 21.1 MMOL/L (ref 24–28)
HCO3 UR-SCNC: 25.5 MMOL/L (ref 24–28)
HCO3 UR-SCNC: 26.1 MMOL/L (ref 24–28)
HCT VFR BLD AUTO: 34.2 %
HCT VFR BLD AUTO: 37.1 %
HCT VFR BLD AUTO: 37.2 %
HCT VFR BLD AUTO: 39.6 %
HCT VFR BLD AUTO: 41 %
HCT VFR BLD AUTO: 41.6 %
HCT VFR BLD AUTO: 42 %
HCT VFR BLD AUTO: 43.1 %
HCT VFR BLD AUTO: 43.5 %
HCT VFR BLD AUTO: 44.1 %
HCT VFR BLD AUTO: 44.4 %
HCT VFR BLD AUTO: 44.5 %
HCT VFR BLD AUTO: 46.1 %
HCT VFR BLD CALC: 34 %PCV (ref 36–54)
HGB BLD-MCNC: 10.6 G/DL
HGB BLD-MCNC: 11.2 G/DL
HGB BLD-MCNC: 11.2 G/DL
HGB BLD-MCNC: 12.2 G/DL
HGB BLD-MCNC: 12.4 G/DL
HGB BLD-MCNC: 12.4 G/DL
HGB BLD-MCNC: 13 G/DL
HGB BLD-MCNC: 13.1 G/DL
HGB BLD-MCNC: 13.1 G/DL
HGB BLD-MCNC: 13.4 G/DL
HGB BLD-MCNC: 13.7 G/DL
HGB BLD-MCNC: 13.9 G/DL
HGB BLD-MCNC: 14.2 G/DL
IMM GRANULOCYTES # BLD AUTO: 0.09 K/UL
IMM GRANULOCYTES # BLD AUTO: 0.09 K/UL
IMM GRANULOCYTES # BLD AUTO: 0.1 K/UL
IMM GRANULOCYTES # BLD AUTO: 0.14 K/UL
IMM GRANULOCYTES # BLD AUTO: 0.14 K/UL
IMM GRANULOCYTES # BLD AUTO: 0.15 K/UL
IMM GRANULOCYTES # BLD AUTO: 0.17 K/UL
IMM GRANULOCYTES # BLD AUTO: 0.19 K/UL
IMM GRANULOCYTES # BLD AUTO: 0.2 K/UL
IMM GRANULOCYTES # BLD AUTO: 0.26 K/UL
IMM GRANULOCYTES # BLD AUTO: 0.28 K/UL
IMM GRANULOCYTES # BLD AUTO: 0.33 K/UL
IMM GRANULOCYTES # BLD AUTO: ABNORMAL K/UL
IMM GRANULOCYTES NFR BLD AUTO: 0.9 %
IMM GRANULOCYTES NFR BLD AUTO: 1.3 %
IMM GRANULOCYTES NFR BLD AUTO: 1.5 %
IMM GRANULOCYTES NFR BLD AUTO: 1.5 %
IMM GRANULOCYTES NFR BLD AUTO: 1.6 %
IMM GRANULOCYTES NFR BLD AUTO: 1.6 %
IMM GRANULOCYTES NFR BLD AUTO: 1.8 %
IMM GRANULOCYTES NFR BLD AUTO: 2 %
IMM GRANULOCYTES NFR BLD AUTO: 2.2 %
IMM GRANULOCYTES NFR BLD AUTO: 2.2 %
IMM GRANULOCYTES NFR BLD AUTO: 2.9 %
IMM GRANULOCYTES NFR BLD AUTO: 3.1 %
IMM GRANULOCYTES NFR BLD AUTO: ABNORMAL %
IP: 15
IP: 15
LACTATE SERPL-SCNC: 6.7 MMOL/L
LACTATE SERPL-SCNC: 7 MMOL/L
LYMPHOCYTES # BLD AUTO: 0.7 K/UL
LYMPHOCYTES # BLD AUTO: 0.9 K/UL
LYMPHOCYTES # BLD AUTO: 1 K/UL
LYMPHOCYTES # BLD AUTO: 1.1 K/UL
LYMPHOCYTES # BLD AUTO: 1.2 K/UL
LYMPHOCYTES NFR BLD: 10.5 %
LYMPHOCYTES NFR BLD: 10.5 %
LYMPHOCYTES NFR BLD: 10.9 %
LYMPHOCYTES NFR BLD: 11 %
LYMPHOCYTES NFR BLD: 12.2 %
LYMPHOCYTES NFR BLD: 13.4 %
LYMPHOCYTES NFR BLD: 16.1 %
LYMPHOCYTES NFR BLD: 6.5 %
LYMPHOCYTES NFR BLD: 7 %
LYMPHOCYTES NFR BLD: 7.1 %
LYMPHOCYTES NFR BLD: 8.9 %
LYMPHOCYTES NFR BLD: 9.5 %
LYMPHOCYTES NFR BLD: 9.6 %
MCH RBC QN AUTO: 30.5 PG
MCH RBC QN AUTO: 30.6 PG
MCH RBC QN AUTO: 30.6 PG
MCH RBC QN AUTO: 30.7 PG
MCH RBC QN AUTO: 31.5 PG
MCH RBC QN AUTO: 31.6 PG
MCH RBC QN AUTO: 31.7 PG
MCH RBC QN AUTO: 31.9 PG
MCH RBC QN AUTO: 31.9 PG
MCH RBC QN AUTO: 32.2 PG
MCH RBC QN AUTO: 32.3 PG
MCHC RBC AUTO-ENTMCNC: 29.8 G/DL
MCHC RBC AUTO-ENTMCNC: 29.9 G/DL
MCHC RBC AUTO-ENTMCNC: 30.1 G/DL
MCHC RBC AUTO-ENTMCNC: 30.2 G/DL
MCHC RBC AUTO-ENTMCNC: 30.4 G/DL
MCHC RBC AUTO-ENTMCNC: 30.8 G/DL
MCHC RBC AUTO-ENTMCNC: 30.8 G/DL
MCHC RBC AUTO-ENTMCNC: 31 G/DL
MCHC RBC AUTO-ENTMCNC: 31.1 G/DL
MCHC RBC AUTO-ENTMCNC: 31.2 G/DL
MCHC RBC AUTO-ENTMCNC: 31.2 G/DL
MCV RBC AUTO: 101 FL
MCV RBC AUTO: 101 FL
MCV RBC AUTO: 102 FL
MCV RBC AUTO: 102 FL
MCV RBC AUTO: 103 FL
MCV RBC AUTO: 104 FL
MCV RBC AUTO: 104 FL
MCV RBC AUTO: 105 FL
MCV RBC AUTO: 105 FL
MIN VOL: 25.6
MODE: ABNORMAL
MONOCYTES # BLD AUTO: 0.3 K/UL
MONOCYTES # BLD AUTO: 0.4 K/UL
MONOCYTES # BLD AUTO: 0.5 K/UL
MONOCYTES # BLD AUTO: 0.6 K/UL
MONOCYTES # BLD AUTO: 0.6 K/UL
MONOCYTES NFR BLD: 2.9 %
MONOCYTES NFR BLD: 3 %
MONOCYTES NFR BLD: 3.4 %
MONOCYTES NFR BLD: 3.6 %
MONOCYTES NFR BLD: 3.7 %
MONOCYTES NFR BLD: 3.8 %
MONOCYTES NFR BLD: 4 %
MONOCYTES NFR BLD: 4.2 %
MONOCYTES NFR BLD: 4.5 %
MONOCYTES NFR BLD: 4.9 %
MONOCYTES NFR BLD: 5 %
MONOCYTES NFR BLD: 5.1 %
MONOCYTES NFR BLD: 6 %
NEUTROPHILS # BLD AUTO: 10.3 K/UL
NEUTROPHILS # BLD AUTO: 4.5 K/UL
NEUTROPHILS # BLD AUTO: 6.3 K/UL
NEUTROPHILS # BLD AUTO: 7.1 K/UL
NEUTROPHILS # BLD AUTO: 7.1 K/UL
NEUTROPHILS # BLD AUTO: 7.7 K/UL
NEUTROPHILS # BLD AUTO: 8 K/UL
NEUTROPHILS # BLD AUTO: 8.1 K/UL
NEUTROPHILS # BLD AUTO: 8.1 K/UL
NEUTROPHILS # BLD AUTO: 8.3 K/UL
NEUTROPHILS # BLD AUTO: 8.5 K/UL
NEUTROPHILS # BLD AUTO: 8.9 K/UL
NEUTROPHILS NFR BLD: 76.2 %
NEUTROPHILS NFR BLD: 80.6 %
NEUTROPHILS NFR BLD: 80.9 %
NEUTROPHILS NFR BLD: 81.3 %
NEUTROPHILS NFR BLD: 82.2 %
NEUTROPHILS NFR BLD: 82.5 %
NEUTROPHILS NFR BLD: 83.4 %
NEUTROPHILS NFR BLD: 83.8 %
NEUTROPHILS NFR BLD: 84.4 %
NEUTROPHILS NFR BLD: 84.5 %
NEUTROPHILS NFR BLD: 84.5 %
NEUTROPHILS NFR BLD: 85.3 %
NEUTROPHILS NFR BLD: 85.8 %
NEUTS BAND NFR BLD MANUAL: 8 %
NRBC BLD-RTO: 0 /100 WBC
NRBC BLD-RTO: 0 /100 WBC
NRBC BLD-RTO: 1 /100 WBC
NRBC BLD-RTO: 3 /100 WBC
PCO2 BLDA: 35.6 MMHG (ref 35–45)
PCO2 BLDA: 37.4 MMHG (ref 35–45)
PCO2 BLDA: 52.5 MMHG (ref 35–45)
PH SMN: 7.29 [PH] (ref 7.35–7.45)
PH SMN: 7.36 [PH] (ref 7.35–7.45)
PH SMN: 7.47 [PH] (ref 7.35–7.45)
PLATELET # BLD AUTO: 101 K/UL
PLATELET # BLD AUTO: 108 K/UL
PLATELET # BLD AUTO: 116 K/UL
PLATELET # BLD AUTO: 116 K/UL
PLATELET # BLD AUTO: 117 K/UL
PLATELET # BLD AUTO: 123 K/UL
PLATELET # BLD AUTO: 129 K/UL
PLATELET # BLD AUTO: 129 K/UL
PLATELET # BLD AUTO: 132 K/UL
PLATELET # BLD AUTO: 136 K/UL
PLATELET # BLD AUTO: 139 K/UL
PLATELET # BLD AUTO: 142 K/UL
PLATELET # BLD AUTO: 157 K/UL
PLATELET BLD QL SMEAR: ABNORMAL
PMV BLD AUTO: 10.1 FL
PMV BLD AUTO: 10.2 FL
PMV BLD AUTO: 9.3 FL
PMV BLD AUTO: 9.3 FL
PMV BLD AUTO: 9.4 FL
PMV BLD AUTO: 9.7 FL
PMV BLD AUTO: 9.8 FL
PMV BLD AUTO: 9.9 FL
PMV BLD AUTO: 9.9 FL
PO2 BLDA: 32 MMHG (ref 40–60)
PO2 BLDA: 54 MMHG (ref 80–100)
PO2 BLDA: 82 MMHG (ref 80–100)
POC BE: -1 MMOL/L
POC BE: -4 MMOL/L
POC BE: 2 MMOL/L
POC IONIZED CALCIUM: 1.12 MMOL/L (ref 1.06–1.42)
POC SATURATED O2: 54 % (ref 95–100)
POC SATURATED O2: 87 % (ref 95–100)
POC SATURATED O2: 97 % (ref 95–100)
POC TCO2 (MEASURED): 28 MMOL/L (ref 23–29)
POC TCO2: 22 MMOL/L (ref 23–27)
POC TCO2: 27 MMOL/L (ref 23–27)
POC TCO2: 27 MMOL/L (ref 24–29)
POIKILOCYTOSIS BLD QL SMEAR: SLIGHT
POLYCHROMASIA BLD QL SMEAR: ABNORMAL
POTASSIUM BLD-SCNC: 4.8 MMOL/L (ref 3.5–5.1)
POTASSIUM SERPL-SCNC: 3.3 MMOL/L
POTASSIUM SERPL-SCNC: 3.6 MMOL/L
POTASSIUM SERPL-SCNC: 3.7 MMOL/L
POTASSIUM SERPL-SCNC: 3.7 MMOL/L
POTASSIUM SERPL-SCNC: 3.8 MMOL/L
POTASSIUM SERPL-SCNC: 3.8 MMOL/L
POTASSIUM SERPL-SCNC: 3.9 MMOL/L
POTASSIUM SERPL-SCNC: 4 MMOL/L
POTASSIUM SERPL-SCNC: 4.1 MMOL/L
POTASSIUM SERPL-SCNC: 4.2 MMOL/L
POTASSIUM SERPL-SCNC: 4.7 MMOL/L
POTASSIUM SERPL-SCNC: 5 MMOL/L
POTASSIUM SERPL-SCNC: 5 MMOL/L
PROT SERPL-MCNC: 6.6 G/DL
RBC # BLD AUTO: 3.28 M/UL
RBC # BLD AUTO: 3.53 M/UL
RBC # BLD AUTO: 3.66 M/UL
RBC # BLD AUTO: 3.85 M/UL
RBC # BLD AUTO: 3.92 M/UL
RBC # BLD AUTO: 4.06 M/UL
RBC # BLD AUTO: 4.11 M/UL
RBC # BLD AUTO: 4.25 M/UL
RBC # BLD AUTO: 4.26 M/UL
RBC # BLD AUTO: 4.27 M/UL
RBC # BLD AUTO: 4.29 M/UL
RBC # BLD AUTO: 4.32 M/UL
RBC # BLD AUTO: 4.48 M/UL
SAMPLE: ABNORMAL
SITE: ABNORMAL
SODIUM BLD-SCNC: 139 MMOL/L (ref 136–145)
SODIUM SERPL-SCNC: 138 MMOL/L
SODIUM SERPL-SCNC: 139 MMOL/L
SODIUM SERPL-SCNC: 139 MMOL/L
SODIUM SERPL-SCNC: 140 MMOL/L
SODIUM SERPL-SCNC: 141 MMOL/L
SODIUM SERPL-SCNC: 142 MMOL/L
SODIUM SERPL-SCNC: 142 MMOL/L
SODIUM SERPL-SCNC: 143 MMOL/L
SP02: 78
SP02: 82
SP02: 97
SPONT RATE: 17
TROPONIN I SERPL DL<=0.01 NG/ML-MCNC: 0.2 NG/ML
WBC # BLD AUTO: 10.08 K/UL
WBC # BLD AUTO: 10.78 K/UL
WBC # BLD AUTO: 12.08 K/UL
WBC # BLD AUTO: 5.9 K/UL
WBC # BLD AUTO: 7.79 K/UL
WBC # BLD AUTO: 8.78 K/UL
WBC # BLD AUTO: 8.78 K/UL
WBC # BLD AUTO: 9.23 K/UL
WBC # BLD AUTO: 9.49 K/UL
WBC # BLD AUTO: 9.6 K/UL
WBC # BLD AUTO: 9.62 K/UL
WBC # BLD AUTO: 9.67 K/UL
WBC # BLD AUTO: 9.96 K/UL

## 2019-01-01 PROCEDURE — 99900035 HC TECH TIME PER 15 MIN (STAT)

## 2019-01-01 PROCEDURE — 94664 DEMO&/EVAL PT USE INHALER: CPT

## 2019-01-01 PROCEDURE — 84484 ASSAY OF TROPONIN QUANT: CPT

## 2019-01-01 PROCEDURE — 99232 SBSQ HOSP IP/OBS MODERATE 35: CPT | Mod: ,,, | Performed by: INTERNAL MEDICINE

## 2019-01-01 PROCEDURE — 94640 AIRWAY INHALATION TREATMENT: CPT

## 2019-01-01 PROCEDURE — 25000003 PHARM REV CODE 250: Performed by: STUDENT IN AN ORGANIZED HEALTH CARE EDUCATION/TRAINING PROGRAM

## 2019-01-01 PROCEDURE — 63600175 PHARM REV CODE 636 W HCPCS: Performed by: STUDENT IN AN ORGANIZED HEALTH CARE EDUCATION/TRAINING PROGRAM

## 2019-01-01 PROCEDURE — 27100092 HC HIGH FLOW DELIVERY CANNULA

## 2019-01-01 PROCEDURE — 99232 PR SUBSEQUENT HOSPITAL CARE,LEVL II: ICD-10-PCS | Mod: ,,, | Performed by: INTERNAL MEDICINE

## 2019-01-01 PROCEDURE — 99223 PR INITIAL HOSPITAL CARE,LEVL III: ICD-10-PCS | Mod: GC,,, | Performed by: INTERNAL MEDICINE

## 2019-01-01 PROCEDURE — 25000003 PHARM REV CODE 250: Performed by: INTERNAL MEDICINE

## 2019-01-01 PROCEDURE — 80048 BASIC METABOLIC PNL TOTAL CA: CPT

## 2019-01-01 PROCEDURE — 25000242 PHARM REV CODE 250 ALT 637 W/ HCPCS: Performed by: STUDENT IN AN ORGANIZED HEALTH CARE EDUCATION/TRAINING PROGRAM

## 2019-01-01 PROCEDURE — 94761 N-INVAS EAR/PLS OXIMETRY MLT: CPT

## 2019-01-01 PROCEDURE — 36600 WITHDRAWAL OF ARTERIAL BLOOD: CPT

## 2019-01-01 PROCEDURE — 99223 1ST HOSP IP/OBS HIGH 75: CPT | Mod: GC,,, | Performed by: INTERNAL MEDICINE

## 2019-01-01 PROCEDURE — 63600175 PHARM REV CODE 636 W HCPCS: Performed by: INTERNAL MEDICINE

## 2019-01-01 PROCEDURE — 27100171 HC OXYGEN HIGH FLOW UP TO 24 HOURS

## 2019-01-01 PROCEDURE — 11000001 HC ACUTE MED/SURG PRIVATE ROOM

## 2019-01-01 PROCEDURE — 87040 BLOOD CULTURE FOR BACTERIA: CPT | Mod: 59

## 2019-01-01 PROCEDURE — 99232 SBSQ HOSP IP/OBS MODERATE 35: CPT | Mod: ,,, | Performed by: HOSPITALIST

## 2019-01-01 PROCEDURE — 36415 COLL VENOUS BLD VENIPUNCTURE: CPT

## 2019-01-01 PROCEDURE — 99233 SBSQ HOSP IP/OBS HIGH 50: CPT | Mod: GC,,, | Performed by: INTERNAL MEDICINE

## 2019-01-01 PROCEDURE — 94668 MNPJ CHEST WALL SBSQ: CPT

## 2019-01-01 PROCEDURE — 99292 CRITICAL CARE ADDL 30 MIN: CPT | Mod: 25,,, | Performed by: INTERNAL MEDICINE

## 2019-01-01 PROCEDURE — 83036 HEMOGLOBIN GLYCOSYLATED A1C: CPT

## 2019-01-01 PROCEDURE — 96365 THER/PROPH/DIAG IV INF INIT: CPT

## 2019-01-01 PROCEDURE — 99233 PR SUBSEQUENT HOSPITAL CARE,LEVL III: ICD-10-PCS | Mod: ,,, | Performed by: INTERNAL MEDICINE

## 2019-01-01 PROCEDURE — 85025 COMPLETE CBC W/AUTO DIFF WBC: CPT

## 2019-01-01 PROCEDURE — 63600175 PHARM REV CODE 636 W HCPCS: Performed by: EMERGENCY MEDICINE

## 2019-01-01 PROCEDURE — 96366 THER/PROPH/DIAG IV INF ADDON: CPT

## 2019-01-01 PROCEDURE — 20000000 HC ICU ROOM

## 2019-01-01 PROCEDURE — 99233 PR SUBSEQUENT HOSPITAL CARE,LEVL III: ICD-10-PCS | Mod: GC,,, | Performed by: INTERNAL MEDICINE

## 2019-01-01 PROCEDURE — 93010 ELECTROCARDIOGRAM REPORT: CPT | Mod: ,,, | Performed by: INTERNAL MEDICINE

## 2019-01-01 PROCEDURE — 99232 PR SUBSEQUENT HOSPITAL CARE,LEVL II: ICD-10-PCS | Mod: ,,, | Performed by: HOSPITALIST

## 2019-01-01 PROCEDURE — 80053 COMPREHEN METABOLIC PANEL: CPT | Mod: 91

## 2019-01-01 PROCEDURE — 85730 THROMBOPLASTIN TIME PARTIAL: CPT

## 2019-01-01 PROCEDURE — 93010 EKG 12-LEAD: ICD-10-PCS | Mod: ,,, | Performed by: INTERNAL MEDICINE

## 2019-01-01 PROCEDURE — 99232 SBSQ HOSP IP/OBS MODERATE 35: CPT | Mod: GC,,, | Performed by: INTERNAL MEDICINE

## 2019-01-01 PROCEDURE — 99291 CRITICAL CARE FIRST HOUR: CPT | Mod: 25,,, | Performed by: INTERNAL MEDICINE

## 2019-01-01 PROCEDURE — 99233 SBSQ HOSP IP/OBS HIGH 50: CPT | Mod: ,,, | Performed by: INTERNAL MEDICINE

## 2019-01-01 PROCEDURE — 27000646 HC AEROBIKA DEVICE

## 2019-01-01 PROCEDURE — 82803 BLOOD GASES ANY COMBINATION: CPT

## 2019-01-01 PROCEDURE — 94660 CPAP INITIATION&MGMT: CPT

## 2019-01-01 PROCEDURE — 25000003 PHARM REV CODE 250

## 2019-01-01 PROCEDURE — 99239 PR HOSPITAL DISCHARGE DAY,>30 MIN: ICD-10-PCS | Mod: ,,, | Performed by: HOSPITALIST

## 2019-01-01 PROCEDURE — 36556 INSERT NON-TUNNEL CV CATH: CPT

## 2019-01-01 PROCEDURE — 27000221 HC OXYGEN, UP TO 24 HOURS

## 2019-01-01 PROCEDURE — 99291 CRITICAL CARE FIRST HOUR: CPT | Mod: ,,, | Performed by: EMERGENCY MEDICINE

## 2019-01-01 PROCEDURE — 96361 HYDRATE IV INFUSION ADD-ON: CPT

## 2019-01-01 PROCEDURE — C1751 CATH, INF, PER/CENT/MIDLINE: HCPCS

## 2019-01-01 PROCEDURE — 25000003 PHARM REV CODE 250: Performed by: EMERGENCY MEDICINE

## 2019-01-01 PROCEDURE — 99285 EMERGENCY DEPT VISIT HI MDM: CPT | Mod: 25

## 2019-01-01 PROCEDURE — 85007 BL SMEAR W/DIFF WBC COUNT: CPT

## 2019-01-01 PROCEDURE — 63600175 PHARM REV CODE 636 W HCPCS

## 2019-01-01 PROCEDURE — 96375 TX/PRO/DX INJ NEW DRUG ADDON: CPT

## 2019-01-01 PROCEDURE — 25000003 PHARM REV CODE 250: Performed by: PHYSICIAN ASSISTANT

## 2019-01-01 PROCEDURE — 83880 ASSAY OF NATRIURETIC PEPTIDE: CPT

## 2019-01-01 PROCEDURE — 27000190 HC CPAP FULL FACE MASK W/VALVE

## 2019-01-01 PROCEDURE — 99292 PR CRITICAL CARE, ADDL 30 MIN: ICD-10-PCS | Mod: 25,,, | Performed by: INTERNAL MEDICINE

## 2019-01-01 PROCEDURE — 99232 PR SUBSEQUENT HOSPITAL CARE,LEVL II: ICD-10-PCS | Mod: GC,,, | Performed by: INTERNAL MEDICINE

## 2019-01-01 PROCEDURE — 99239 HOSP IP/OBS DSCHRG MGMT >30: CPT | Mod: ,,, | Performed by: HOSPITALIST

## 2019-01-01 PROCEDURE — 93005 ELECTROCARDIOGRAM TRACING: CPT

## 2019-01-01 PROCEDURE — 36556 PR INSERT NON-TUNNEL CV CATH 5+ YRS OLD: ICD-10-PCS | Mod: ,,, | Performed by: INTERNAL MEDICINE

## 2019-01-01 PROCEDURE — 96367 TX/PROPH/DG ADDL SEQ IV INF: CPT

## 2019-01-01 PROCEDURE — 97165 OT EVAL LOW COMPLEX 30 MIN: CPT

## 2019-01-01 PROCEDURE — 97161 PT EVAL LOW COMPLEX 20 MIN: CPT

## 2019-01-01 PROCEDURE — 99291 PR CRITICAL CARE, E/M 30-74 MINUTES: ICD-10-PCS | Mod: 25,,, | Performed by: INTERNAL MEDICINE

## 2019-01-01 PROCEDURE — 36410 VNPNXR 3YR/> PHY/QHP DX/THER: CPT

## 2019-01-01 PROCEDURE — 85025 COMPLETE CBC W/AUTO DIFF WBC: CPT | Mod: 91

## 2019-01-01 PROCEDURE — 99291 PR CRITICAL CARE, E/M 30-74 MINUTES: ICD-10-PCS | Mod: ,,, | Performed by: EMERGENCY MEDICINE

## 2019-01-01 PROCEDURE — 36556 INSERT NON-TUNNEL CV CATH: CPT | Mod: ,,, | Performed by: INTERNAL MEDICINE

## 2019-01-01 PROCEDURE — 85027 COMPLETE CBC AUTOMATED: CPT

## 2019-01-01 PROCEDURE — 12000002 HC ACUTE/MED SURGE SEMI-PRIVATE ROOM

## 2019-01-01 PROCEDURE — 83605 ASSAY OF LACTIC ACID: CPT | Mod: 91

## 2019-01-01 PROCEDURE — 76937 US GUIDE VASCULAR ACCESS: CPT

## 2019-01-01 RX ORDER — NOREPINEPHRINE BITARTRATE/D5W 4MG/250ML
0.05 PLASTIC BAG, INJECTION (ML) INTRAVENOUS CONTINUOUS
Status: DISCONTINUED | OUTPATIENT
Start: 2019-01-01 | End: 2019-01-01

## 2019-01-01 RX ORDER — MORPHINE SULFATE 4 MG/ML
INJECTION, SOLUTION INTRAMUSCULAR; INTRAVENOUS
Status: COMPLETED
Start: 2019-01-01 | End: 2019-01-01

## 2019-01-01 RX ORDER — GLUCAGON 1 MG
1 KIT INJECTION
Status: DISCONTINUED | OUTPATIENT
Start: 2019-01-01 | End: 2019-01-01 | Stop reason: HOSPADM

## 2019-01-01 RX ORDER — NOREPINEPHRINE BITARTRATE/D5W 4MG/250ML
PLASTIC BAG, INJECTION (ML) INTRAVENOUS
Status: COMPLETED
Start: 2019-01-01 | End: 2019-01-01

## 2019-01-01 RX ORDER — FUROSEMIDE 20 MG/1
20 TABLET ORAL 2 TIMES DAILY
Status: DISCONTINUED | OUTPATIENT
Start: 2019-01-01 | End: 2019-01-01 | Stop reason: HOSPADM

## 2019-01-01 RX ORDER — BUDESONIDE 0.5 MG/2ML
0.5 INHALANT ORAL EVERY 12 HOURS
Refills: 0
Start: 2019-01-01 | End: 2020-01-14

## 2019-01-01 RX ORDER — MORPHINE SULFATE 2 MG/ML
2 INJECTION, SOLUTION INTRAMUSCULAR; INTRAVENOUS EVERY 4 HOURS PRN
Status: DISCONTINUED | OUTPATIENT
Start: 2019-01-01 | End: 2019-01-01

## 2019-01-01 RX ORDER — FUROSEMIDE 20 MG/1
20 TABLET ORAL 2 TIMES DAILY
Qty: 60 TABLET | Refills: 11
Start: 2019-01-01 | End: 2020-01-14

## 2019-01-01 RX ORDER — LEVALBUTEROL 1.25 MG/.5ML
1.25 SOLUTION, CONCENTRATE RESPIRATORY (INHALATION) EVERY 6 HOURS
Refills: 0
Start: 2019-01-01 | End: 2020-01-15

## 2019-01-01 RX ORDER — POTASSIUM CHLORIDE 20 MEQ/15ML
20 SOLUTION ORAL ONCE
Status: COMPLETED | OUTPATIENT
Start: 2019-01-01 | End: 2019-01-01

## 2019-01-01 RX ORDER — MORPHINE SULFATE 4 MG/ML
4 INJECTION, SOLUTION INTRAMUSCULAR; INTRAVENOUS ONCE
Status: COMPLETED | OUTPATIENT
Start: 2019-01-01 | End: 2019-01-01

## 2019-01-01 RX ORDER — PREDNISONE 10 MG/1
TABLET ORAL
Qty: 30 TABLET | Refills: 0 | Status: CANCELLED
Start: 2019-01-01 | End: 2019-01-01

## 2019-01-01 RX ORDER — MEROPENEM AND SODIUM CHLORIDE 1 G/50ML
1 INJECTION, SOLUTION INTRAVENOUS EVERY 8 HOURS
Start: 2019-01-01

## 2019-01-01 RX ORDER — IBUPROFEN 200 MG
24 TABLET ORAL
Status: DISCONTINUED | OUTPATIENT
Start: 2019-01-01 | End: 2019-01-01 | Stop reason: HOSPADM

## 2019-01-01 RX ORDER — LEVALBUTEROL 1.25 MG/.5ML
1.25 SOLUTION, CONCENTRATE RESPIRATORY (INHALATION) EVERY 6 HOURS
Status: DISCONTINUED | OUTPATIENT
Start: 2019-01-01 | End: 2019-01-01 | Stop reason: HOSPADM

## 2019-01-01 RX ORDER — SODIUM CHLORIDE 0.9 % (FLUSH) 0.9 %
5 SYRINGE (ML) INJECTION
Status: DISCONTINUED | OUTPATIENT
Start: 2019-01-01 | End: 2019-01-01 | Stop reason: HOSPADM

## 2019-01-01 RX ORDER — POTASSIUM CHLORIDE 20 MEQ/15ML
40 SOLUTION ORAL ONCE
Status: COMPLETED | OUTPATIENT
Start: 2019-01-01 | End: 2019-01-01

## 2019-01-01 RX ORDER — FUROSEMIDE 10 MG/ML
40 INJECTION INTRAMUSCULAR; INTRAVENOUS ONCE
Status: COMPLETED | OUTPATIENT
Start: 2019-01-01 | End: 2019-01-01

## 2019-01-01 RX ORDER — TIOTROPIUM BROMIDE 18 UG/1
1 CAPSULE ORAL; RESPIRATORY (INHALATION) DAILY
Refills: 0
Start: 2019-01-01 | End: 2020-01-15

## 2019-01-01 RX ORDER — MEROPENEM AND SODIUM CHLORIDE 1 G/50ML
1 INJECTION, SOLUTION INTRAVENOUS EVERY 8 HOURS
Start: 2019-01-01 | End: 2019-01-01

## 2019-01-01 RX ORDER — IPRATROPIUM BROMIDE AND ALBUTEROL SULFATE 2.5; .5 MG/3ML; MG/3ML
3 SOLUTION RESPIRATORY (INHALATION) EVERY 6 HOURS
Qty: 1 BOX | Refills: 0
Start: 2019-01-01 | End: 2019-01-01 | Stop reason: HOSPADM

## 2019-01-01 RX ORDER — PREDNISONE 10 MG/1
30 TABLET ORAL DAILY
Qty: 30 TABLET | Refills: 0
Start: 2019-01-01 | End: 2019-01-01

## 2019-01-01 RX ORDER — HEPARIN SODIUM,PORCINE/D5W 25000/250
17 INTRAVENOUS SOLUTION INTRAVENOUS CONTINUOUS
Status: DISCONTINUED | OUTPATIENT
Start: 2019-01-01 | End: 2019-01-01 | Stop reason: HOSPADM

## 2019-01-01 RX ORDER — PREDNISONE 20 MG/1
20 TABLET ORAL DAILY
Status: DISCONTINUED | OUTPATIENT
Start: 2019-01-01 | End: 2019-01-01 | Stop reason: HOSPADM

## 2019-01-01 RX ORDER — MORPHINE SULFATE 4 MG/ML
4 INJECTION, SOLUTION INTRAMUSCULAR; INTRAVENOUS
Status: COMPLETED | OUTPATIENT
Start: 2019-01-01 | End: 2019-01-01

## 2019-01-01 RX ORDER — PREDNISONE 10 MG/1
TABLET ORAL
Qty: 21 TABLET | Refills: 0
Start: 2019-01-01 | End: 2019-01-01

## 2019-01-01 RX ORDER — IBUPROFEN 200 MG
16 TABLET ORAL
Status: DISCONTINUED | OUTPATIENT
Start: 2019-01-01 | End: 2019-01-01 | Stop reason: HOSPADM

## 2019-01-01 RX ORDER — VANCOMYCIN 2 GRAM/500 ML IN 0.9 % SODIUM CHLORIDE INTRAVENOUS
2000
Status: COMPLETED | OUTPATIENT
Start: 2019-01-01 | End: 2019-01-01

## 2019-01-01 RX ORDER — FUROSEMIDE 20 MG/1
20 TABLET ORAL 2 TIMES DAILY
Status: DISCONTINUED | OUTPATIENT
Start: 2019-01-01 | End: 2019-01-01

## 2019-01-01 RX ORDER — PANTOPRAZOLE SODIUM 40 MG/1
40 TABLET, DELAYED RELEASE ORAL DAILY
Qty: 30 TABLET | Refills: 11
Start: 2019-01-01 | End: 2020-01-15

## 2019-01-01 RX ORDER — POTASSIUM CHLORIDE 20 MEQ/1
20 TABLET, EXTENDED RELEASE ORAL ONCE
Status: COMPLETED | OUTPATIENT
Start: 2019-01-01 | End: 2019-01-01

## 2019-01-01 RX ORDER — MORPHINE SULFATE 2 MG/ML
2 INJECTION, SOLUTION INTRAMUSCULAR; INTRAVENOUS
Status: DISCONTINUED | OUTPATIENT
Start: 2019-01-01 | End: 2019-01-01 | Stop reason: HOSPADM

## 2019-01-01 RX ADMIN — POTASSIUM CHLORIDE 20 MEQ: 20 SOLUTION ORAL at 08:01

## 2019-01-01 RX ADMIN — MEROPENEM AND SODIUM CHLORIDE 1 G: 1 INJECTION, SOLUTION INTRAVENOUS at 12:01

## 2019-01-01 RX ADMIN — BUDESONIDE 0.5 MG: 0.5 INHALANT RESPIRATORY (INHALATION) at 06:01

## 2019-01-01 RX ADMIN — IPRATROPIUM BROMIDE AND ALBUTEROL SULFATE 3 ML: .5; 3 SOLUTION RESPIRATORY (INHALATION) at 07:01

## 2019-01-01 RX ADMIN — MEROPENEM AND SODIUM CHLORIDE 1 G: 1 INJECTION, SOLUTION INTRAVENOUS at 06:01

## 2019-01-01 RX ADMIN — APIXABAN 5 MG: 5 TABLET, FILM COATED ORAL at 08:01

## 2019-01-01 RX ADMIN — TIOTROPIUM BROMIDE 18 MCG: 18 CAPSULE ORAL; RESPIRATORY (INHALATION) at 08:01

## 2019-01-01 RX ADMIN — IPRATROPIUM BROMIDE AND ALBUTEROL SULFATE 3 ML: .5; 3 SOLUTION RESPIRATORY (INHALATION) at 12:01

## 2019-01-01 RX ADMIN — MEROPENEM AND SODIUM CHLORIDE 1 G: 1 INJECTION, SOLUTION INTRAVENOUS at 01:01

## 2019-01-01 RX ADMIN — BUDESONIDE 0.5 MG: 0.5 INHALANT RESPIRATORY (INHALATION) at 08:01

## 2019-01-01 RX ADMIN — THERA TABS 1 TABLET: TAB at 08:01

## 2019-01-01 RX ADMIN — PRAVASTATIN SODIUM 40 MG: 40 TABLET ORAL at 10:01

## 2019-01-01 RX ADMIN — ATENOLOL 12.5 MG: 25 TABLET ORAL at 09:01

## 2019-01-01 RX ADMIN — IPRATROPIUM BROMIDE AND ALBUTEROL SULFATE 3 ML: .5; 3 SOLUTION RESPIRATORY (INHALATION) at 02:01

## 2019-01-01 RX ADMIN — BUDESONIDE 0.5 MG: 0.5 INHALANT RESPIRATORY (INHALATION) at 07:01

## 2019-01-01 RX ADMIN — FUROSEMIDE 40 MG: 10 INJECTION, SOLUTION INTRAMUSCULAR; INTRAVENOUS at 01:01

## 2019-01-01 RX ADMIN — TIOTROPIUM BROMIDE 18 MCG: 18 CAPSULE ORAL; RESPIRATORY (INHALATION) at 09:01

## 2019-01-01 RX ADMIN — PREDNISONE 30 MG: 20 TABLET ORAL at 09:01

## 2019-01-01 RX ADMIN — VANCOMYCIN HYDROCHLORIDE 2000 MG: 100 INJECTION, POWDER, LYOPHILIZED, FOR SOLUTION INTRAVENOUS at 01:01

## 2019-01-01 RX ADMIN — THERA TABS 1 TABLET: TAB at 09:01

## 2019-01-01 RX ADMIN — IPRATROPIUM BROMIDE AND ALBUTEROL SULFATE 3 ML: .5; 3 SOLUTION RESPIRATORY (INHALATION) at 01:01

## 2019-01-01 RX ADMIN — PREDNISONE 30 MG: 20 TABLET ORAL at 08:01

## 2019-01-01 RX ADMIN — MEROPENEM AND SODIUM CHLORIDE 1 G: 1 INJECTION, SOLUTION INTRAVENOUS at 09:01

## 2019-01-01 RX ADMIN — THERA TABS 1 TABLET: TAB at 10:01

## 2019-01-01 RX ADMIN — FUROSEMIDE 20 MG: 20 TABLET ORAL at 09:01

## 2019-01-01 RX ADMIN — MEROPENEM AND SODIUM CHLORIDE 1 G: 1 INJECTION, SOLUTION INTRAVENOUS at 11:01

## 2019-01-01 RX ADMIN — PANTOPRAZOLE SODIUM 40 MG: 40 TABLET, DELAYED RELEASE ORAL at 10:01

## 2019-01-01 RX ADMIN — ALLOPURINOL 100 MG: 100 TABLET ORAL at 08:01

## 2019-01-01 RX ADMIN — MEROPENEM AND SODIUM CHLORIDE 1 G: 1 INJECTION, SOLUTION INTRAVENOUS at 03:01

## 2019-01-01 RX ADMIN — PRAVASTATIN SODIUM 40 MG: 40 TABLET ORAL at 08:01

## 2019-01-01 RX ADMIN — ALLOPURINOL 100 MG: 100 TABLET ORAL at 09:01

## 2019-01-01 RX ADMIN — APIXABAN 5 MG: 5 TABLET, FILM COATED ORAL at 10:01

## 2019-01-01 RX ADMIN — MEROPENEM AND SODIUM CHLORIDE 1 G: 1 INJECTION, SOLUTION INTRAVENOUS at 05:01

## 2019-01-01 RX ADMIN — MEROPENEM AND SODIUM CHLORIDE 1 G: 1 INJECTION, SOLUTION INTRAVENOUS at 10:01

## 2019-01-01 RX ADMIN — POTASSIUM CHLORIDE 20 MEQ: 1500 TABLET, EXTENDED RELEASE ORAL at 06:01

## 2019-01-01 RX ADMIN — FUROSEMIDE 20 MG: 20 TABLET ORAL at 05:01

## 2019-01-01 RX ADMIN — IPRATROPIUM BROMIDE AND ALBUTEROL SULFATE 3 ML: .5; 3 SOLUTION RESPIRATORY (INHALATION) at 08:01

## 2019-01-01 RX ADMIN — PANTOPRAZOLE SODIUM 40 MG: 40 TABLET, DELAYED RELEASE ORAL at 08:01

## 2019-01-01 RX ADMIN — FUROSEMIDE 20 MG: 20 TABLET ORAL at 08:01

## 2019-01-01 RX ADMIN — FUROSEMIDE 20 MG: 20 TABLET ORAL at 06:01

## 2019-01-01 RX ADMIN — BUDESONIDE 0.5 MG: 0.5 INHALANT RESPIRATORY (INHALATION) at 09:01

## 2019-01-01 RX ADMIN — IPRATROPIUM BROMIDE AND ALBUTEROL SULFATE 3 ML: .5; 3 SOLUTION RESPIRATORY (INHALATION) at 03:01

## 2019-01-01 RX ADMIN — PRAVASTATIN SODIUM 40 MG: 40 TABLET ORAL at 09:01

## 2019-01-01 RX ADMIN — MEROPENEM AND SODIUM CHLORIDE 1 G: 1 INJECTION, SOLUTION INTRAVENOUS at 08:01

## 2019-01-01 RX ADMIN — APIXABAN 5 MG: 5 TABLET, FILM COATED ORAL at 09:01

## 2019-01-01 RX ADMIN — MEROPENEM AND SODIUM CHLORIDE 1 G: 1 INJECTION, SOLUTION INTRAVENOUS at 04:01

## 2019-01-01 RX ADMIN — BUDESONIDE 0.5 MG: 0.5 INHALANT RESPIRATORY (INHALATION) at 10:01

## 2019-01-01 RX ADMIN — SODIUM CHLORIDE 500 ML: 0.9 INJECTION, SOLUTION INTRAVENOUS at 04:01

## 2019-01-01 RX ADMIN — PREDNISONE 30 MG: 20 TABLET ORAL at 10:01

## 2019-01-01 RX ADMIN — NOREPINEPHRINE BITARTRATE 0.5 MCG/KG/MIN: 1 INJECTION, SOLUTION, CONCENTRATE INTRAVENOUS at 09:01

## 2019-01-01 RX ADMIN — ATENOLOL 12.5 MG: 25 TABLET ORAL at 08:01

## 2019-01-01 RX ADMIN — PANTOPRAZOLE SODIUM 40 MG: 40 TABLET, DELAYED RELEASE ORAL at 09:01

## 2019-01-01 RX ADMIN — POTASSIUM CHLORIDE 40 MEQ: 20 SOLUTION ORAL at 08:01

## 2019-01-01 RX ADMIN — MORPHINE SULFATE 4 MG: 4 INJECTION INTRAVENOUS at 12:01

## 2019-01-01 RX ADMIN — PREDNISONE 60 MG: 20 TABLET ORAL at 08:01

## 2019-01-01 RX ADMIN — Medication 0.08 MCG/KG/MIN: at 01:01

## 2019-01-01 RX ADMIN — IPRATROPIUM BROMIDE AND ALBUTEROL SULFATE 3 ML: .5; 3 SOLUTION RESPIRATORY (INHALATION) at 11:01

## 2019-01-01 RX ADMIN — IPRATROPIUM BROMIDE AND ALBUTEROL SULFATE 3 ML: .5; 3 SOLUTION RESPIRATORY (INHALATION) at 09:01

## 2019-01-01 RX ADMIN — FUROSEMIDE 40 MG: 10 INJECTION, SOLUTION INTRAMUSCULAR; INTRAVENOUS at 11:01

## 2019-01-01 RX ADMIN — TIOTROPIUM BROMIDE 18 MCG: 18 CAPSULE ORAL; RESPIRATORY (INHALATION) at 10:01

## 2019-01-01 RX ADMIN — Medication 4.5 G: at 04:01

## 2019-01-01 RX ADMIN — FUROSEMIDE 20 MG: 20 TABLET ORAL at 10:01

## 2019-01-01 RX ADMIN — HEPARIN SODIUM 18 UNITS/KG/HR: 10000 INJECTION, SOLUTION INTRAVENOUS at 05:01

## 2019-01-01 RX ADMIN — ALLOPURINOL 100 MG: 100 TABLET ORAL at 10:01

## 2019-01-01 RX ADMIN — IPRATROPIUM BROMIDE AND ALBUTEROL SULFATE 3 ML: .5; 3 SOLUTION RESPIRATORY (INHALATION) at 06:01

## 2019-01-01 RX ADMIN — PIPERACILLIN AND TAZOBACTAM 4.5 G: 4; .5 INJECTION, POWDER, LYOPHILIZED, FOR SOLUTION INTRAVENOUS; PARENTERAL at 10:01

## 2019-01-01 RX ADMIN — Medication 2 MG: at 09:01

## 2019-01-01 RX ADMIN — IPRATROPIUM BROMIDE AND ALBUTEROL SULFATE 3 ML: .5; 3 SOLUTION RESPIRATORY (INHALATION) at 10:01

## 2019-01-01 RX ADMIN — Medication 0.45 MCG/KG/MIN: at 04:01

## 2019-01-01 RX ADMIN — SODIUM CHLORIDE 500 ML: 0.9 INJECTION, SOLUTION INTRAVENOUS at 01:01

## 2019-01-01 RX ADMIN — MEROPENEM AND SODIUM CHLORIDE 1 G: 1 INJECTION, SOLUTION INTRAVENOUS at 02:01

## 2019-01-01 RX ADMIN — Medication 0.05 MCG/KG/MIN: at 01:01

## 2019-01-01 RX ADMIN — POTASSIUM CHLORIDE 20 MEQ: 20 SOLUTION ORAL at 12:01

## 2019-01-01 RX ADMIN — Medication 0.45 MCG/KG/MIN: at 06:01

## 2019-01-01 RX ADMIN — Medication 0.18 MCG/KG/MIN: at 01:01

## 2019-01-01 RX ADMIN — Medication 0.45 MCG/KG/MIN: at 08:01

## 2019-01-01 RX ADMIN — MORPHINE SULFATE 4 MG: 4 INJECTION, SOLUTION INTRAMUSCULAR; INTRAVENOUS at 12:01

## 2019-01-01 RX ADMIN — TIOTROPIUM BROMIDE 18 MCG: 18 CAPSULE ORAL; RESPIRATORY (INHALATION) at 03:01

## 2019-01-01 NOTE — ASSESSMENT & PLAN NOTE
74yo presents with acute SOB on a background of PEs & MTHFR mutation found to have recurrent PE on CTPA & MDR Pseudomonas PNA   -Interventional cardiology consulted  -bedside TTE performed 01/01/2019  -interventional cardiology does not think patient is a candidate for catheter directed thrombectomy  -continuing eliquis 5mg BID for now for chronic recurrent PEs     -still requiring 25L & FiO2 80% and DNI status however would like 1 round of CPR  -meropenem day 5 (previously on zosyn which PNA was resistant to)   -follow respiratory status until Friday 1/4, at that time will discuss with patient about continuing with meropenem and attempting to wean patient to necessary SpO2 to have patient not dyspneic

## 2019-01-01 NOTE — PLAN OF CARE
Problem: Adult Inpatient Plan of Care  Goal: Plan of Care Review  Outcome: Ongoing (interventions implemented as appropriate)  See vital signs and assessments in flowsheets. See below for updates on today's progress.     Pulmonary: Pt maintaining O2 saturation between 85-94% on 25L 80%FiO2 comfort flow and 20L 100%FiO2 non rebreather. Desats to low 60s when out of bed, desats to mid 70s with movement in bed. Lung sounds diminished throughout.    Neurological: Afebrile, no pain. Alert and oriented.    Gastrointestinal:  12/31    Plan of care communicated and reviewed with Michael Shetty. All concerns addressed, questions answered. Will continue to monitor closely.

## 2019-01-01 NOTE — ASSESSMENT & PLAN NOTE
73 y.o. male with diagnosis of NSCLC (Squamous cell)  - T2A lesion with 3.5cm involving NEIDA  - s/p monotherapy with XRT by Dr Olvera; followed by chemotherapy with 3 cycles of carbo/taxol  - followed by Dr Ely with Pulm - seen him on Dec 5th and f/u on Feb 6th  - CTA on 5/18/17 showed decrease size in the tumor mass  - CT on 12/14/17 with stable lung findings  - Latest CT scans show slight increase in size of the NEIDA nodule  - he had repeat PET on 5/9/2018 with over stable except for the one spot in the right lung  - he saw Dr Ely again June 6th and saw Dr Olvera on June 19th  - he completed XRT x 5 on July 13th  -  discussed his case at the [facility] Tumor Board yesterday where Dr Az Castaneda and Dr Olvera, pathology, surgery, and radiology.   - the boards recommendation previously was to proceed with observation only with regular scans; they felt he was too high risk for surgery and immunologics  - however, repeat PET on 8/8/2018 is showing an enlarging nodule in NEIDA with increasing FDG activity and worrisome for progressive disease   - he received his first and only cycle of chemotherapy with carbo/gemzar the week before his prior hospitalization

## 2019-01-01 NOTE — PROGRESS NOTES
[facility]  Critical Care Medicine  Progress Note    Patient Name: Michael Shetty  MRN: [id number]  Admission Date: 12/25/2018  Hospital Length of Stay: 7 days  Code Status: Partial Code  Attending Provider: Terry Miles*  Primary Care Provider: Michael Ellsworth MD   Principal Problem: Acute on chronic respiratory failure with hypoxemia    Subjective:     HPI:  Mr. Shetty is a 74yo man with PMH of MTHFR mutation, history of PEs on Eliquis, CAD s/p CABGn with chronically elevated troponin, hypotension on midodrine, squamous cell lung carcinoma of NEIDA s/p 1 cycle of adjuvant CTX & radiation therapy (which was stopped due to recurrent pseudomonas PNA & PEs), COPD, 54 pack years (quit 1990), chronic severe pseudomonas pna (3 abx for over a year) who presents as a transfer from [facility] for Interventional Cardiology evaluation for catheter assisted thrombolysis.     HPI: 4 days ago he had acute SOB requring 10L of oxygen at home when he is at a baseline of 6L. At [facility] his CTPA showed known PE of RLL pulmonary artery & increased size of right sided pleural effusion. Doppler u/s of BLE was negative for DVT. OSH continued patient on home eliquis regimen of 5mg BID and per patient did not notice improvement in oxygen requirements so he was sent to [facility].      Vitals during initial interview: 135/75, -104, 25-20L comfort flow O2 FiO2 70%.       Of note patient has chronic severe pseudomonas PNA for which he takes an alternating regimen of doxycycline, cipro, & cefuroxime. He quit smoking 30 years ago.     Hospital/ICU Course:  12/26/2018 COOPEREO; discussed he is not a candidate for interventional cardiology & goals of care with palliative care consult for today    12/27/2018 NAEO 25L, 80% FiO2    12/29/2018 NAEO    12/31/2018 NAEO, will continue meropenem for one week and monitor for improvement in respiratory status.     1/1/2018: NAEO, continues to require 25L 80% FiO2 and 20L NRB    Interval  History/Significant Events: No acute events overnight. Patient with no complaints this morning. States he is feeling that he can be off of his NRB for longer now however still requiring high flow nasal cannula.     Review of Systems   Constitutional: Negative for chills and fever.   HENT: Negative for congestion and nosebleeds.    Respiratory: Positive for cough and shortness of breath.    Cardiovascular: Negative for chest pain and leg swelling.   Gastrointestinal: Negative for abdominal pain, nausea and vomiting.   Genitourinary: Negative for dysuria and hematuria.   Musculoskeletal: Negative for back pain and neck pain.   Skin: Negative for rash.   Neurological: Negative for headaches.     Objective:     Vital Signs (Most Recent):  Temp: 97 °F (36.1 °C) (01/01/19 1505)  Pulse: 73 (01/01/19 1505)  Resp: 19 (01/01/19 1505)  BP: (!) 142/81 (01/01/19 1505)  SpO2: 98 % (01/01/19 1505) Vital Signs (24h Range):  Temp:  [97 °F (36.1 °C)-98 °F (36.7 °C)] 97 °F (36.1 °C)  Pulse:  [61-97] 73  Resp:  [15-31] 19  SpO2:  [78 %-98 %] 98 %  BP: ()/(7-90) 142/81   Weight: 83.5 kg (184 lb)  Body mass index is 27.17 kg/m².      Intake/Output Summary (Last 24 hours) at 1/1/2019 1649  Last data filed at 1/1/2019 1500  Gross per 24 hour   Intake 536 ml   Output 875 ml   Net -339 ml       Physical Exam   Constitutional: He is oriented to person, place, and time. He appears well-developed and well-nourished.   Elderly  male sleeping comfortably in stretcher, breathing comfortably on NRB and high flow nasal cannula    HENT:   Head: Normocephalic and atraumatic.   Nose: Nose normal.   Oropharynx clear with tachy mucous membranes   Neck: Normal range of motion. Neck supple.   Cardiovascular: Normal rate, regular rhythm, normal heart sounds and intact distal pulses.   Pulmonary/Chest: Effort normal. No respiratory distress.   Decreased breath sounds in left upper lobe, scattered coarse breath sounds bilaterally   Abdominal:  Soft. Bowel sounds are normal. He exhibits no distension. There is no tenderness.   Musculoskeletal: Normal range of motion. He exhibits no edema.   Neurological: He is alert and oriented to person, place, and time.   Moves all extremities   Skin: Skin is warm and dry.   Nursing note and vitals reviewed.      Vents:  Oxygen Concentration (%): 80 (01/01/19 1505)  Lines/Drains/Airways     Peripheral Intravenous Line                 Peripheral IV - Single Lumen 12/25/18 1500 Left Antecubital 7 days         Peripheral IV - Single Lumen 12/29/18 0615 Anterior;Right Forearm 3 days              Significant Labs:    CBC/Anemia Profile:  No results for input(s): WBC, HGB, HCT, PLT, MCV, RDW, IRON, FERRITIN, RETIC, FOLATE, BSOMHOHG50, OCCULTBLOOD in the last 48 hours.     Chemistries:  Recent Labs   Lab 12/31/18  0330 01/01/19  0332    141   K 4.2 4.7    108   CO2 26 26   BUN 34* 32*   CREATININE 0.8 0.7   CALCIUM 8.4* 7.1*   MG 2.4  --    PHOS 3.8  --        Blood Culture: No results for input(s): LABBLOO in the last 48 hours.  Respiratory Culture: No results for input(s): GSRESP, RESPIRATORYC in the last 48 hours.  All pertinent labs within the past 24 hours have been reviewed.    Significant Imaging:  I have reviewed all pertinent imaging results/findings within the past 24 hours.      ABG  No results for input(s): PH, PO2, PCO2, HCO3, BE in the last 168 hours.  Assessment/Plan:     Pulmonary   * Acute on chronic respiratory failure with hypoxemia    72yo presents with acute SOB on a background of PEs & MTHFR mutation found to have recurrent PE on CTPA & MDR Pseudomonas PNA   -Interventional cardiology consulted  -bedside TTE performed 01/01/2019  -interventional cardiology does not think patient is a candidate for catheter directed thrombectomy  -continuing eliquis 5mg BID for now for chronic recurrent PEs     -still requiring 25L & FiO2 80% and DNI status however would like 1 round of CPR  -meropenem day 5  (previously on zosyn which PNA was resistant to)   -follow respiratory status until Friday 1/4, at that time will discuss with patient about continuing with meropenem and attempting to wean patient to necessary SpO2 to have patient not dyspneic     Pseudomonas pneumonia    -appreciate ID recs  - would recommend at least 2 weeks of meropenem for MDR PsA pneumonia. Pt wants to go home but would like to try IV antimicrobials and understands no PO option and would need PICC line for this as well as lab draws.          Chronic pneumonia    Chronic severe Pseudomonas PNA   -sputum cx growing pseudomonas & few GPC   -continue meropenem   -ID consulted, recommended treatment for 3 weeks with meropenem      Pulmonary emphysema    -duo nebs q6hr   -ICS budesonide 0.5mg q12hr  -tiotroprium 18mcg   -prednisone 60mg PO      Cardiac/Vascular   Coronary artery disease involving coronary bypass graft    -status post CABG     Chronic hypotension    -on midodrine 5mg TID at home  -BP stable, not currently taking     Hematology   Chronic pulmonary embolism    -per interventional cardiology consultation  -given active lung cancer he is not a candidate for thrombolysis  -CT chest reviewed, if clot in RLL pulmonary artery were to be lysed- it would end up perfusing his lung tissue that has had significant scarring & chronic severe pseudomonas pna  -Eliquis 5 mg BID     Oncology   Primary malignant neoplasm of left upper lobe of lung    73 y.o. male with diagnosis of NSCLC (Squamous cell)  - T2A lesion with 3.5cm involving NEIDA  - s/p monotherapy with XRT by Dr Olvera; followed by chemotherapy with 3 cycles of carbo/taxol  - followed by Dr Ely with Pulm - seen him on Dec 5th and f/u on Feb 6th  - CTA on 5/18/17 showed decrease size in the tumor mass  - CT on 12/14/17 with stable lung findings  - Latest CT scans show slight increase in size of the NEIDA nodule  - he had repeat PET on 5/9/2018 with over stable except for the one spot in  the right lung  - he saw Dr Ely again June 6th and saw Dr Olvera on June 19th  - he completed XRT x 5 on July 13th  -  discussed his case at the [facility] Tumor Board yesterday where Dr Az Castaneda and Dr Olvera, pathology, surgery, and radiology.   - the boards recommendation previously was to proceed with observation only with regular scans; they felt he was too high risk for surgery and immunologics  - however, repeat PET on 8/8/2018 is showing an enlarging nodule in NEIDA with increasing FDG activity and worrisome for progressive disease   - he received his first and only cycle of chemotherapy with carbo/gemzar the week before his prior hospitalization            Other   Goals of care, counseling/discussion    -Long discussion with patient today about DNI options. He has insight to his disease & prognosis. He would like to get home to get his finances in order. He understands his high O2 requirements are keeping him from going home but is hopeful that his PNA will get better and help with breathing. He would like CPR with bag valve mask but he does NOT want to be on ventilator machine as he does not want his family to hold the responsibility of pulling him off respiratory support one day.     -appreciate pall care assessment   -DNI    Per palliative care notes-  -patient aware of his disease & clinical prognosis   - lives with daughter  -Pt. Has good insight in his disease and is aware of his prognosis  -His goal is to return home as independent as possible. He would like to receive home health after discharge.   -He is hopeful that with ABX his O2 requirement will decline and he will be able to return home  -He will consider continuing CTX once he sees his recovery.   -Will need to assign POA, will do paperwork  - We will discuss extent of care desired and complete LA Post on follow up  -Will further clarify his wishes in case of code blue (partial code currently)   Goals defined              Will assign POA  on follow up              Will complete LA Post              Will discuss discharge options after 1 week treatment with meropenem         Critical Care Daily Checklist:    A: Awake: RASS Goal/Actual Goal: RASS Goal: 0-->alert and calm  Actual: Rob Agitation Sedation Scale (RASS): Alert and calm   B: Spontaneous Breathing Trial Performed?  N/A   C: SAT & SBT Coordinated?  N/A                    D: Delirium: CAM-ICU Overall CAM-ICU: Negative   E: Early Mobility Performed? YES   F: Feeding Goal:    Status:     Current Diet Order   Procedures    Diet Adult Regular (IDDSI Level 7)      AS: Analgesia/Sedation none   T: Thromboembolic Prophylaxis apixaban   H: HOB > 300 YES   U: Stress Ulcer Prophylaxis (if needed) pantoprazole   G: Glucose Control none   B: Bowel Function Stool Occurrence: 1   I: Indwelling Catheter (Lines & Bassett) Necessity Peripheral IV   D: De-escalation of Antimicrobials/Pharmacotherapies meropenem    Plan for the day/ETD Continue supportive care with FiO2  Continue antibiotics    Code Status:  Family/Goals of Care: Partial Code         Critical secondary to Patient has a condition that poses threat to life and bodily function: Severe Respiratory Distress      Critical care was time spent personally by me on the following activities: development of treatment plan with patient or surrogate and bedside caregivers, discussions with consultants, evaluation of patient's response to treatment, examination of patient, ordering and performing treatments and interventions, ordering and review of laboratory studies, ordering and review of radiographic studies, pulse oximetry, re-evaluation of patient's condition. This critical care time did not overlap with that of any other provider or involve time for any procedures.     Isela Beverly MD  Critical Care Medicine  [facility]

## 2019-01-01 NOTE — SUBJECTIVE & OBJECTIVE
Interval History/Significant Events: No acute events overnight. Patient with no complaints this morning. States he is feeling that he can be off of his NRB for longer now however still requiring high flow nasal cannula.     Review of Systems   Constitutional: Negative for chills and fever.   HENT: Negative for congestion and nosebleeds.    Respiratory: Positive for cough and shortness of breath.    Cardiovascular: Negative for chest pain and leg swelling.   Gastrointestinal: Negative for abdominal pain, nausea and vomiting.   Genitourinary: Negative for dysuria and hematuria.   Musculoskeletal: Negative for back pain and neck pain.   Skin: Negative for rash.   Neurological: Negative for headaches.     Objective:     Vital Signs (Most Recent):  Temp: 97 °F (36.1 °C) (01/01/19 1505)  Pulse: 73 (01/01/19 1505)  Resp: 19 (01/01/19 1505)  BP: (!) 142/81 (01/01/19 1505)  SpO2: 98 % (01/01/19 1505) Vital Signs (24h Range):  Temp:  [97 °F (36.1 °C)-98 °F (36.7 °C)] 97 °F (36.1 °C)  Pulse:  [61-97] 73  Resp:  [15-31] 19  SpO2:  [78 %-98 %] 98 %  BP: ()/(7-90) 142/81   Weight: 83.5 kg (184 lb)  Body mass index is 27.17 kg/m².      Intake/Output Summary (Last 24 hours) at 1/1/2019 1649  Last data filed at 1/1/2019 1500  Gross per 24 hour   Intake 536 ml   Output 875 ml   Net -339 ml       Physical Exam   Constitutional: He is oriented to person, place, and time. He appears well-developed and well-nourished.   Elderly  male sleeping comfortably in stretcher, breathing comfortably on NRB and high flow nasal cannula    HENT:   Head: Normocephalic and atraumatic.   Nose: Nose normal.   Oropharynx clear with tachy mucous membranes   Neck: Normal range of motion. Neck supple.   Cardiovascular: Normal rate, regular rhythm, normal heart sounds and intact distal pulses.   Pulmonary/Chest: Effort normal. No respiratory distress.   Decreased breath sounds in left upper lobe, scattered coarse breath sounds bilaterally    Abdominal: Soft. Bowel sounds are normal. He exhibits no distension. There is no tenderness.   Musculoskeletal: Normal range of motion. He exhibits no edema.   Neurological: He is alert and oriented to person, place, and time.   Moves all extremities   Skin: Skin is warm and dry.   Nursing note and vitals reviewed.      Vents:  Oxygen Concentration (%): 80 (01/01/19 1505)  Lines/Drains/Airways     Peripheral Intravenous Line                 Peripheral IV - Single Lumen 12/25/18 1500 Left Antecubital 7 days         Peripheral IV - Single Lumen 12/29/18 0615 Anterior;Right Forearm 3 days              Significant Labs:    CBC/Anemia Profile:  No results for input(s): WBC, HGB, HCT, PLT, MCV, RDW, IRON, FERRITIN, RETIC, FOLATE, AIBCRKCG12, OCCULTBLOOD in the last 48 hours.     Chemistries:  Recent Labs   Lab 12/31/18  0330 01/01/19  0332    141   K 4.2 4.7    108   CO2 26 26   BUN 34* 32*   CREATININE 0.8 0.7   CALCIUM 8.4* 7.1*   MG 2.4  --    PHOS 3.8  --        Blood Culture: No results for input(s): LABBLOO in the last 48 hours.  Respiratory Culture: No results for input(s): GSRESP, RESPIRATORYC in the last 48 hours.  All pertinent labs within the past 24 hours have been reviewed.    Significant Imaging:  I have reviewed all pertinent imaging results/findings within the past 24 hours.

## 2019-01-01 NOTE — ASSESSMENT & PLAN NOTE
Chronic severe Pseudomonas PNA   -sputum cx growing pseudomonas & few GPC   -continue meropenem   -ID consulted, recommended treatment for 3 weeks with meropenem

## 2019-01-02 PROBLEM — I50.22 CHRONIC SYSTOLIC HEART FAILURE: Status: ACTIVE | Noted: 2019-01-01

## 2019-01-02 NOTE — PROGRESS NOTES
Per Dr. Miles CXR friday 1/4/19, Sp02 88-92% try to titrate oxygen down per Sp02. VVS, will continue to monitor.

## 2019-01-02 NOTE — SUBJECTIVE & OBJECTIVE
Interval History/Significant Events: Desatting while eating on 25L comfort flow. However, not complaining of dyspnea while at rest.     Review of Systems   Constitutional: Negative for chills and fever.   HENT: Negative for drooling and sore throat.    Respiratory: Positive for cough and shortness of breath.    Cardiovascular: Negative for chest pain and leg swelling.   Gastrointestinal: Negative for diarrhea and vomiting.   Genitourinary: Negative for difficulty urinating and dysuria.   Musculoskeletal: Negative for back pain and joint swelling.   Neurological: Negative for light-headedness and headaches.   Psychiatric/Behavioral: Negative for agitation and confusion.     Objective:     Vital Signs (Most Recent):  Temp: 97.8 °F (36.6 °C) (01/02/19 1100)  Pulse: 95 (01/02/19 1300)  Resp: 16 (01/02/19 0822)  BP: 114/86 (01/02/19 1300)  SpO2: (!) 92 % (01/02/19 1300) Vital Signs (24h Range):  Temp:  [97 °F (36.1 °C)-98 °F (36.7 °C)] 97.8 °F (36.6 °C)  Pulse:  [] 95  Resp:  [14-31] 16  SpO2:  [83 %-99 %] 92 %  BP: ()/(7-86) 114/86   Weight: 77.8 kg (171 lb 8.3 oz)  Body mass index is 25.33 kg/m².      Intake/Output Summary (Last 24 hours) at 1/2/2019 1313  Last data filed at 1/2/2019 1300  Gross per 24 hour   Intake 1228.25 ml   Output 925 ml   Net 303.25 ml       Physical Exam   Constitutional: He is oriented to person, place, and time. He appears well-developed and well-nourished.   Sitting up in bed in no distress.   HENT:   Head: Normocephalic and atraumatic.   Eyes: EOM are normal. Pupils are equal, round, and reactive to light.   Neck: Normal range of motion. Neck supple.   Cardiovascular: Normal rate, regular rhythm and normal heart sounds.   Pulmonary/Chest: He has no wheezes.   Coarse breath sounds at bases   Abdominal: Soft. Bowel sounds are normal. He exhibits no distension.   Neurological: He is alert and oriented to person, place, and time. No cranial nerve deficit.   Skin: Skin is warm and  dry.   Psychiatric: He has a normal mood and affect. His behavior is normal.       Vents:  Oxygen Concentration (%): 70 (01/02/19 1300)  Lines/Drains/Airways     Peripheral Intravenous Line                 Peripheral IV - Single Lumen 12/29/18 0615 Anterior;Right Forearm 4 days         Peripheral IV - Single Lumen 01/01/19 2304 Anterior;Right Forearm less than 1 day              Significant Labs:    CBC/Anemia Profile:  Recent Labs   Lab 01/02/19  0317   WBC 8.78   HGB 11.2*   HCT 37.2*   *   *   RDW 18.1*        Chemistries:  Recent Labs   Lab 01/01/19  0332 01/02/19  0417    139   K 4.7 3.7    102   CO2 26 26   BUN 32* 32*   CREATININE 0.7 0.7   CALCIUM 7.1* 8.1*       All pertinent labs within the past 24 hours have been reviewed.    Significant Imaging:  I have reviewed and interpreted all pertinent imaging results/findings within the past 24 hours.

## 2019-01-02 NOTE — PLAN OF CARE
Pt's desire is to d/c to home either w/ h/h or hospice. Pt currently on 22L/70% comfort flow. Pt will need to continue to wean o2 before a home d/c is an option. Home hospice can accommodate up to 10L at home.     01/02/19 1418   Discharge Reassessment   Assessment Type Discharge Planning Reassessment   Discharge Plan A Hospice/home   Discharge Plan B Home Health;Home   Anticipated Discharge Disposition HospiceHome   Can the patient answer the patient profile reliably? Yes, cognitively intact   How does the patient rate their overall health at the present time? Fair   Describe the patient's ability to walk at the present time. Major restrictions/daily assistance from another person   How often would a person be available to care for the patient? Infrequently

## 2019-01-02 NOTE — NURSING
No acute changes throughout shift. Patient still requiring large amounts of oxygen requirements. He is currently on comfort flow 25 liter, fio2 80% with 20 liters NRB. Patient has desaturations upon any exertions or movements. Continue plan of care. Plan of care discussed with Jess Sena; all questions and concerns were addressed.

## 2019-01-02 NOTE — ASSESSMENT & PLAN NOTE
74yo presents with acute SOB on a background of PEs & MTHFR mutation found to have recurrent PE on CTPA & MDR Pseudomonas PNA   -Interventional cardiology consulted  -bedside TTE performed 01/02/2019  -interventional cardiology does not think patient is a candidate for catheter directed thrombectomy  -continuing eliquis 5mg BID for now for chronic recurrent PEs     -still requiring NRB 22L & FiO2 80% and comfort flow 30L and DNI status however would like 1 round of CPR  -meropenem day 6 (previously on zosyn which PNA was resistant to)   -follow respiratory status until Friday 1/4, at that time will discuss with patient about continuing with meropenem and attempting to wean off O2 as tolerated

## 2019-01-02 NOTE — PLAN OF CARE
Problem: Adult Inpatient Plan of Care  Goal: Plan of Care Review  Outcome: Ongoing (interventions implemented as appropriate)  No acute events throughout shift. VSS, see flowsheet. Pt remains on comfort flow 22 L 80% and nonrebreather 20 L. Attempted to wean but pt unable to tolerate. Desats with movement to ~70's. Pt c/o SOB with exertion. Tolerating regular diet. UO good, voids per urinal. Plan to continue abx and wean comfort flow as tolerated. Plan of care reviewed with Michael Shetty at the bedside. All questions and concerns addressed. Will continue to monitor.

## 2019-01-02 NOTE — SUBJECTIVE & OBJECTIVE
"Interval History: states he feels "better" today. Still quite short of breath when transitioning.   O2 demands status quo.     Medications:  Continuous Infusions:  Scheduled Meds:   albuterol-ipratropium  3 mL Nebulization Q6H    allopurinol  100 mg Oral Daily    apixaban  5 mg Oral BID    atenolol  12.5 mg Oral BID    budesonide  0.5 mg Nebulization Q12H    furosemide  20 mg Oral BID    meropenem (MERREM) IVPB  1 g Intravenous Q8H    multivitamin  1 tablet Oral Daily    pantoprazole  40 mg Oral Daily    pravastatin  40 mg Oral QHS    predniSONE  60 mg Oral Daily    tiotropium  1 capsule Inhalation Daily     PRN Meds:sodium chloride 0.9%    Objective:     Vital Signs (Most Recent):  Temp: 97.8 °F (36.6 °C) (01/02/19 1100)  Pulse: 87 (01/02/19 1145)  Resp: 16 (01/02/19 0822)  BP: 122/76 (01/02/19 1100)  SpO2: (!) 88 % (01/02/19 1145) Vital Signs (24h Range):  Temp:  [97 °F (36.1 °C)-98 °F (36.7 °C)] 97.8 °F (36.6 °C)  Pulse:  [] 87  Resp:  [14-31] 16  SpO2:  [83 %-99 %] 88 %  BP: ()/(7-81) 122/76     Weight: 77.8 kg (171 lb 8.3 oz)  Body mass index is 25.33 kg/m².    Review of Symptoms  Symptom Assessment (ESAS 0-10 scale)  ESAS 0 1 2 3 4 5 6 7 8 9 10   Pain x             Dyspnea     x         Anxiety  x            Nausea x             Depression   x            Anorexia x             Fatigue    x          Insomnia   x           Restlessness   x            Agitation x             CAM / Delirium x__ --  ___+   Constipation     x__ --  ___+   Diarrhea           _x_ --  ___+      Bowel Management Plan (BMP): No    Comments:     Pain Assessment: denies    OME in 24 hours:     Performance Status: 40    ECOG Performance Status Grade: 4 - Completely disabled    Physical Exam   Constitutional: He is oriented to person, place, and time. He appears well-developed. No distress.   Neck: Normal range of motion. No JVD present.   Pulmonary/Chest: He is in respiratory distress.   Abdominal: Soft. "   Neurological: He is alert and oriented to person, place, and time.   Skin: Skin is warm.       Significant Labs: All pertinent labs within the past 24 hours have been reviewed.  CBC:   Recent Labs   Lab 01/02/19  0317   WBC 8.78   HGB 11.2*   HCT 37.2*   *   *     BMP:  Recent Labs   Lab 01/02/19  0417   *      K 3.7      CO2 26   BUN 32*   CREATININE 0.7   CALCIUM 8.1*     LFT:  Lab Results   Component Value Date    AST 20 12/06/2018    ALKPHOS 53 12/06/2018    BILITOT 2.0 (H) 12/06/2018     Albumin:   Albumin   Date Value Ref Range Status   12/06/2018 3.6 3.6 - 5.1 g/dL Final   10/01/2018 3.1 3.1 - 4.7 g/dL      Protein:   Total Protein   Date Value Ref Range Status   12/06/2018 6.0 (L) 6.1 - 8.1 g/dL Final     Lactic acid:   No results found for: LACTATE    Significant Imaging: I have reviewed all pertinent imaging results/findings within the past 24 hours.    Advanced Directives::  Living Will: No  LaPOST: No  Do Not Resuscitate Status: Yes partial  Medical Power of : No    Decision-Making Capacity: Patient answered questions    Living Arrangements: Lives with family, Lives in apartment    Psychosocial/Cultural:  Patient's most important priorities:  Returning home     Patient's biggest concerns/fears:  Not being able to return home    Previous death/end of life care history:  no    Patient's goals/hopes:  Return home    Spiritual:     F- Donna and Belief: yes but not Mormon    I - Importance: some  .  C - Community: no    A - Address in Care: no

## 2019-01-02 NOTE — PROGRESS NOTES
"[facility]  Palliative Medicine  Progress Note    Patient Name: Michael Shetty  MRN: [id number]  Admission Date: 12/25/2018  Hospital Length of Stay: 8 days  Code Status: Partial Code   Attending Provider: Terry Miles*  Consulting Provider: Power Dunn MD  Primary Care Physician: Michael Ellsworth MD  Principal Problem:Acute on chronic respiratory failure with hypoxemia    Patient information was obtained from patient and ER records.      Assessment/Plan:     Palliative care encounter    Palliative Care Encounter / Goals of care discussion:     Narrative:   Michael Shetty is a 73 y.o. male patient with lung cancer, undergoing CTX, XRT (on hold for PE and PNA), clotting disorder s/p several PE, now severe hypoxema requiring increasing doses of O2 and likely superimposed PNA    1: Pain / Physical symptom Assessment:   - pain assesment: denies   - dyspnea assessment: severe   - anxiety assessment: mild   - depression assessment: denies    2: Social Background    - lives with daughter    3: Palliative care meeting participants:    patient    4: Goals of care Discussion details:  1/2/19   - tells me he is feeling better. O2 demand remains unchanged   - understands that we will assess his condition throughout the week for improvement   - hopeful he can return home   - no other questions or concerns at this time   - code status remains partial (a shock or a trial of compression but not more). He is aware that this is not optimal care.     12/31/18   - pt. Profound respiratory failure fails to improve   - the pt. Remains hopeful that a longer course of ABX will help   - in d/w Dr. Miles, his ABX were tailored to sensitivity and essentially effective Rx. Has been started ~ 3 days ago.    - he may improve some within 7 days of therapy, but if after that no improvement he is not likely to get better     - The pt. Goals is unchanged to "get home". He asked me if I looked into options for " home O2 and I explained that high flow is possible on hospice,  but not at this level.    - Will continue to monitor for improvement of oxygenation. If he does not improve the decision will be to remain hospitalized (and do his papers here in the hospital / hospice). Or take a chance, reduce the O2 to home hospice acceptable level and discharge. I did make it clear before that he may not survive transport to the [facility].     12/28/18   Completed power of  yesterday, daughters are POA.    Discussed [place] post today   - we had a long discussion regarding his goals and therapies available to achieve this goal and interventions that may not be beneficial.    - He clearly states that his ultimate goal is to return to his home in [place]. He has some important papers he needs to change (living will).    - he is willing to do anything he can to go home.    - he realizes that his high O2 demands are a challenge and may prevent him from achieving this goal.    - agrees to continue current treatment over the weekend and re-evaluate on Monday     - we spoke about DNR and I have explained that to achieve best outcome all parts of resuscitation are needed including ventilation, chest compression and drugs. . He understands but would like to remain partial code with no intubation. He wants us to try for a bit and let him go if it does not work.    - He makes it clear he did not want the ventilator, feeding tubes, dialysis      - we spoke about hospice and he is agreeable to consider this over home health if it will get him to go home.    - discussed with Dr. Valdivia.   - will follow up  5: Goals of care Decisions / Recommendations / Plan:   - Continue ABX for 7 days and re-assess Oxygen demand    6: Follow up plans:    daily    Thank you for your consult. I will follow along with you. Please call [phone] with questions.            I will follow-up with patient. Please contact us if you have any additional  "questions.    Subjective:     Chief Complaint: No chief complaint on file.      HPI:   Michael Shetty is a nice 74 yo mald with a past history of MTHFR mutation and several past PE and DVT episodes. He had been on several OAC, most recently on Eliquis. He has SCLC and is currently undergoing CTX and XRT, treatment had been paused due to PE and recurrent pseudomonas PNA.   He developed acute worsening of his dyspnea (usually able to ambulate to BR and do his ADL on ~ 5L O2), severe exertional dyspnea and needing to increase his oxygen to 10l/min.   He presented to [facility] but was transferred to our facility for evaluation of catheter assisted thrombolysis.     He was thought not a candidate for thrombolysis due to poor risk benefit ratio, he is felt to have a pneumonic infiltrate and is currently on ABX. He is requiring high flow O2 to sustain O2 saturation of ~89%.     I am being asked to discuss goals of care with the pt.         Hospital Course:  No notes on file    Interval History: states he feels "better" today. Still quite short of breath when transitioning.   O2 demands status quo.     Medications:  Continuous Infusions:  Scheduled Meds:   albuterol-ipratropium  3 mL Nebulization Q6H    allopurinol  100 mg Oral Daily    apixaban  5 mg Oral BID    atenolol  12.5 mg Oral BID    budesonide  0.5 mg Nebulization Q12H    furosemide  20 mg Oral BID    meropenem (MERREM) IVPB  1 g Intravenous Q8H    multivitamin  1 tablet Oral Daily    pantoprazole  40 mg Oral Daily    pravastatin  40 mg Oral QHS    predniSONE  60 mg Oral Daily    tiotropium  1 capsule Inhalation Daily     PRN Meds:sodium chloride 0.9%    Objective:     Vital Signs (Most Recent):  Temp: 97.8 °F (36.6 °C) (01/02/19 1100)  Pulse: 87 (01/02/19 1145)  Resp: 16 (01/02/19 0822)  BP: 122/76 (01/02/19 1100)  SpO2: (!) 88 % (01/02/19 1145) Vital Signs (24h Range):  Temp:  [97 °F (36.1 °C)-98 °F (36.7 °C)] 97.8 °F (36.6 °C)  Pulse:  [] 87  Resp: "  [14-31] 16  SpO2:  [83 %-99 %] 88 %  BP: ()/(7-81) 122/76     Weight: 77.8 kg (171 lb 8.3 oz)  Body mass index is 25.33 kg/m².    Review of Symptoms  Symptom Assessment (ESAS 0-10 scale)  ESAS 0 1 2 3 4 5 6 7 8 9 10   Pain x             Dyspnea     x         Anxiety  x            Nausea x             Depression   x            Anorexia x             Fatigue    x          Insomnia   x           Restlessness   x            Agitation x             CAM / Delirium x__ --  ___+   Constipation     x__ --  ___+   Diarrhea           _x_ --  ___+      Bowel Management Plan (BMP): No    Comments:     Pain Assessment: denies    OME in 24 hours:     Performance Status: 40    ECOG Performance Status Grade: 4 - Completely disabled    Physical Exam   Constitutional: He is oriented to person, place, and time. He appears well-developed. No distress.   Neck: Normal range of motion. No JVD present.   Pulmonary/Chest: He is in respiratory distress.   Abdominal: Soft.   Neurological: He is alert and oriented to person, place, and time.   Skin: Skin is warm.       Significant Labs: All pertinent labs within the past 24 hours have been reviewed.  CBC:   Recent Labs   Lab 01/02/19  0317   WBC 8.78   HGB 11.2*   HCT 37.2*   *   *     BMP:  Recent Labs   Lab 01/02/19  0417   *      K 3.7      CO2 26   BUN 32*   CREATININE 0.7   CALCIUM 8.1*     LFT:  Lab Results   Component Value Date    AST 20 12/06/2018    ALKPHOS 53 12/06/2018    BILITOT 2.0 (H) 12/06/2018     Albumin:   Albumin   Date Value Ref Range Status   12/06/2018 3.6 3.6 - 5.1 g/dL Final   10/01/2018 3.1 3.1 - 4.7 g/dL      Protein:   Total Protein   Date Value Ref Range Status   12/06/2018 6.0 (L) 6.1 - 8.1 g/dL Final     Lactic acid:   No results found for: LACTATE    Significant Imaging: I have reviewed all pertinent imaging results/findings within the past 24 hours.    Advanced Directives::  Living Will: No  LaPOST: No  Do Not Resuscitate  Status: Yes partial  Medical Power of : No    Decision-Making Capacity: Patient answered questions    Living Arrangements: Lives with family, Lives in apartment    Psychosocial/Cultural:  Patient's most important priorities:  Returning home     Patient's biggest concerns/fears:  Not being able to return home    Previous death/end of life care history:  no    Patient's goals/hopes:  Return home    Spiritual:     F- Donna and Belief: yes but not Buddhism    I - Importance: some  .  C - Community: no    A - Address in Care: no      > 50% of 35 min visit spent in chart review, face to face discussion of goals of care,  symptom assessment, coordination of care and emotional support.    Power Dunn MD  Palliative Medicine  [facility]

## 2019-01-02 NOTE — ASSESSMENT & PLAN NOTE
73 y.o. male with diagnosis of NSCLC (Squamous cell)  - T2A lesion with 3.5cm involving NEIDA  - s/p monotherapy with XRT by Dr Olvera; followed by chemotherapy with 3 cycles of carbo/taxol  - followed by Dr Ely with Pulm - seen him on Dec 5th and f/u on Feb 6th  - CTA on 5/18/17 showed decrease size in the tumor mass  - CT on 12/14/17 with stable lung findings  - Latest CT scans show slight increase in size of the NEIDA nodule  - he had repeat PET on 5/9/2018 with over stable except for the one spot in the right lung  - he saw Dr Ely again June 6th and saw Dr Olvera on June 19th  - he completed XRT x 5 on July 13th  -  discussed his case at the [facility] Tumor Board yesterday where Dr Az Castaneda and Dr Olvera, pathology, surgery, and radiology.   - the boards recommendation previously was to proceed with observation only with regular scans; they felt he was too high risk for surgery and immunologics  - however, repeat PET on 8/8/2018 is showing an enlarging nodule in NEIDA with increasing FDG activity and worrisome for progressive disease   - he received his first and only cycle of chemotherapy with carbo/gemzar the week before his prior hospitalization

## 2019-01-02 NOTE — ASSESSMENT & PLAN NOTE
"Palliative Care Encounter / Goals of care discussion:     Narrative:   Michael Shetty is a 73 y.o. male patient with lung cancer, undergoing CTX, XRT (on hold for PE and PNA), clotting disorder s/p several PE, now severe hypoxema requiring increasing doses of O2 and likely superimposed PNA    1: Pain / Physical symptom Assessment:   - pain assesment: denies   - dyspnea assessment: severe   - anxiety assessment: mild   - depression assessment: denies    2: Social Background    - lives with daughter    3: Palliative care meeting participants:    patient    4: Goals of care Discussion details:  1/2/19   - tells me he is feeling better. O2 demand remains unchanged   - understands that we will assess his condition throughout the week for improvement   - hopeful he can return home   - no other questions or concerns at this time   - code status remains partial (a shock or a trial of compression but not more). He is aware that this is not optimal care.     12/31/18   - pt. Profound respiratory failure fails to improve   - the pt. Remains hopeful that a longer course of ABX will help   - in d/w Dr. Miles, his ABX were tailored to sensitivity and essentially effective Rx. Has been started ~ 3 days ago.    - he may improve some within 7 days of therapy, but if after that no improvement he is not likely to get better     - The pt. Goals is unchanged to "get home". He asked me if I looked into options for home O2 and I explained that high flow is possible on hospice,  but not at this level.    - Will continue to monitor for improvement of oxygenation. If he does not improve the decision will be to remain hospitalized (and do his papers here in the hospital / hospice). Or take a chance, reduce the O2 to home hospice acceptable level and discharge. I did make it clear before that he may not survive transport to the [facility].     12/28/18   Completed power of  yesterday, daughters are POA.    Discussed [place] post " today   - we had a long discussion regarding his goals and therapies available to achieve this goal and interventions that may not be beneficial.    - He clearly states that his ultimate goal is to return to his home in [place]. He has some important papers he needs to change (living will).    - he is willing to do anything he can to go home.    - he realizes that his high O2 demands are a challenge and may prevent him from achieving this goal.    - agrees to continue current treatment over the weekend and re-evaluate on Monday     - we spoke about DNR and I have explained that to achieve best outcome all parts of resuscitation are needed including ventilation, chest compression and drugs. . He understands but would like to remain partial code with no intubation. He wants us to try for a bit and let him go if it does not work.    - He makes it clear he did not want the ventilator, feeding tubes, dialysis      - we spoke about hospice and he is agreeable to consider this over home health if it will get him to go home.    - discussed with Dr. Valdiiva.   - will follow up  5: Goals of care Decisions / Recommendations / Plan:   - Continue ABX for 7 days and re-assess Oxygen demand    6: Follow up plans:    daily    Thank you for your consult. I will follow along with you. Please call [phone] with questions.

## 2019-01-02 NOTE — PROGRESS NOTES
[facility]  Critical Care Medicine  Progress Note    Patient Name: Michael Shetty  MRN: [id number]  Admission Date: 12/25/2018  Hospital Length of Stay: 8 days  Code Status: Partial Code  Attending Provider: Terry Miles*  Primary Care Provider: Michael Ellsworth MD   Principal Problem: Acute on chronic respiratory failure with hypoxemia    Subjective:     HPI:  Mr. Shetty is a 74yo man with PMH of MTHFR mutation, history of PEs on Eliquis, CAD s/p CABGn with chronically elevated troponin, hypotension on midodrine, squamous cell lung carcinoma of NEIDA s/p 1 cycle of adjuvant CTX & radiation therapy (which was stopped due to recurrent pseudomonas PNA & PEs), COPD, 54 pack years (quit 1990), chronic severe pseudomonas pna (3 abx for over a year) who presents as a transfer from [facility] for Interventional Cardiology evaluation for catheter assisted thrombolysis.     HPI: 4 days ago he had acute SOB requring 10L of oxygen at home when he is at a baseline of 6L. At [facility] his CTPA showed known PE of RLL pulmonary artery & increased size of right sided pleural effusion. Doppler u/s of BLE was negative for DVT. OSH continued patient on home eliquis regimen of 5mg BID and per patient did not notice improvement in oxygen requirements so he was [place] [facility] [place]      Vitals during initial interview: 135/75, -104, 25-20L comfort flow O2 FiO2 70%.       Of note patient has chronic severe pseudomonas PNA for which he takes an alternating regimen of doxycycline, cipro, & cefuroxime. He quit smoking 30 years ago.     Hospital/ICU Course:  12/26/2018 TIMURO; discussed he is not a candidate for interventional cardiology & goals of care with palliative care consult for today    12/27/2018 NAEO 25L, 80% FiO2    12/29/2018 NAEO    12/31/2018 NAEO, will continue meropenem for one week and monitor for improvement in respiratory status.     1/1/2018: NAEO, continues to require 25L 80% FiO2 and 20L NRB    1/2/2018: No  significant improvement in respiratory status    Interval History/Significant Events: Desatting while eating on 25L comfort flow. However, not complaining of dyspnea while at rest.     Review of Systems   Constitutional: Negative for chills and fever.   HENT: Negative for drooling and sore throat.    Respiratory: Positive for cough and shortness of breath.    Cardiovascular: Negative for chest pain and leg swelling.   Gastrointestinal: Negative for diarrhea and vomiting.   Genitourinary: Negative for difficulty urinating and dysuria.   Musculoskeletal: Negative for back pain and joint swelling.   Neurological: Negative for light-headedness and headaches.   Psychiatric/Behavioral: Negative for agitation and confusion.     Objective:     Vital Signs (Most Recent):  Temp: 97.8 °F (36.6 °C) (01/02/19 1100)  Pulse: 95 (01/02/19 1300)  Resp: 16 (01/02/19 0822)  BP: 114/86 (01/02/19 1300)  SpO2: (!) 92 % (01/02/19 1300) Vital Signs (24h Range):  Temp:  [97 °F (36.1 °C)-98 °F (36.7 °C)] 97.8 °F (36.6 °C)  Pulse:  [] 95  Resp:  [14-31] 16  SpO2:  [83 %-99 %] 92 %  BP: ()/(7-86) 114/86   Weight: 77.8 kg (171 lb 8.3 oz)  Body mass index is 25.33 kg/m².      Intake/Output Summary (Last 24 hours) at 1/2/2019 1313  Last data filed at 1/2/2019 1300  Gross per 24 hour   Intake 1228.25 ml   Output 925 ml   Net 303.25 ml       Physical Exam   Constitutional: He is oriented to person, place, and time. He appears well-developed and well-nourished.   Sitting up in bed in no distress.   HENT:   Head: Normocephalic and atraumatic.   Eyes: EOM are normal. Pupils are equal, round, and reactive to light.   Neck: Normal range of motion. Neck supple.   Cardiovascular: Normal rate, regular rhythm and normal heart sounds.   Pulmonary/Chest: He has no wheezes.   Coarse breath sounds at bases   Abdominal: Soft. Bowel sounds are normal. He exhibits no distension.   Neurological: He is alert and oriented to person, place, and time. No  cranial nerve deficit.   Skin: Skin is warm and dry.   Psychiatric: He has a normal mood and affect. His behavior is normal.       Vents:  Oxygen Concentration (%): 70 (01/02/19 1300)  Lines/Drains/Airways     Peripheral Intravenous Line                 Peripheral IV - Single Lumen 12/29/18 0615 Anterior;Right Forearm 4 days         Peripheral IV - Single Lumen 01/01/19 2304 Anterior;Right Forearm less than 1 day              Significant Labs:    CBC/Anemia Profile:  Recent Labs   Lab 01/02/19  0317   WBC 8.78   HGB 11.2*   HCT 37.2*   *   *   RDW 18.1*        Chemistries:  Recent Labs   Lab 01/01/19  0332 01/02/19  0417    139   K 4.7 3.7    102   CO2 26 26   BUN 32* 32*   CREATININE 0.7 0.7   CALCIUM 7.1* 8.1*       All pertinent labs within the past 24 hours have been reviewed.    Significant Imaging:  I have reviewed and interpreted all pertinent imaging results/findings within the past 24 hours.      ABG  No results for input(s): PH, PO2, PCO2, HCO3, BE in the last 168 hours.  Assessment/Plan:     Pulmonary   * Acute on chronic respiratory failure with hypoxemia    74yo presents with acute SOB on a background of PEs & MTHFR mutation found to have recurrent PE on CTPA & MDR Pseudomonas PNA   -Interventional cardiology consulted  -bedside TTE performed 01/02/2019  -interventional cardiology does not think patient is a candidate for catheter directed thrombectomy  -continuing eliquis 5mg BID for now for chronic recurrent PEs     -still requiring NRB 22L & FiO2 80% and comfort flow 30L and DNI status however would like 1 round of CPR  -meropenem day 6 (previously on zosyn which PNA was resistant to)   -follow respiratory status until Friday 1/4, at that time will discuss with patient about continuing with meropenem and attempting to wean off O2 as tolerated     Pseudomonas pneumonia    -appreciate ID recs  -recommend at least 2 weeks of meropenem for MDR PsA pneumonia. Pt wants to go home  but would like to try IV antimicrobials and understands no PO option and would need PICC line for this as well as lab draws.          Chronic pneumonia    Chronic severe Pseudomonas PNA   -sputum cx growing pseudomonas & few GPC   -continue meropenem   -ID consulted, recommended treatment for 3 weeks with meropenem      Pulmonary emphysema    -duo nebs q6hr   -ICS budesonide 0.5mg q12hr  -tiotroprium 18mcg   -prednisone 60mg PO      Cardiac/Vascular   Chronic systolic heart failure    EF 25%  Increase lasix 20mg PO daily to BID  Strict I/O  Plan to start GDMT as tolerated     Coronary artery disease involving coronary bypass graft    -status post CABG     Chronic hypotension    -BP stable off midodrine  -continue atenolol 15mg PO BID     Hematology   Chronic pulmonary embolism    -per interventional cardiology consultation  -given active lung cancer he is not a candidate for thrombolysis  -CT chest reviewed, if clot in RLL pulmonary artery were to be lysed- it would end up perfusing his lung tissue that has had significant scarring & chronic severe pseudomonas pna  -Eliquis 5 mg BID     Oncology   Primary malignant neoplasm of left upper lobe of lung    73 y.o. male with diagnosis of NSCLC (Squamous cell)  - T2A lesion with 3.5cm involving NEIDA  - s/p monotherapy with XRT by Dr Olvera; followed by chemotherapy with 3 cycles of carbo/taxol  - followed by Dr Ely with Pulm - seen him on Dec 5th and f/u on Feb 6th  - CTA on 5/18/17 showed decrease size in the tumor mass  - CT on 12/14/17 with stable lung findings  - Latest CT scans show slight increase in size of the NEIDA nodule  - he had repeat PET on 5/9/2018 with over stable except for the one spot in the right lung  - he saw Dr Ely again June 6th and saw Dr Olvera on June 19th  - he completed XRT x 5 on July 13th  -  discussed his case at the [facility] Board yesterday where Dr Az Castaneda and Dr Olvera, pathology, surgery, and radiology.   - the boards  recommendation previously was to proceed with observation only with regular scans; they felt he was too high risk for surgery and immunologics  - however, repeat PET on 8/8/2018 is showing an enlarging nodule in NEIDA with increasing FDG activity and worrisome for progressive disease   - he received his first and only cycle of chemotherapy with carbo/gemzar the week before his prior hospitalization            Other   Goals of care, counseling/discussion    -Long discussion with patient today about DNI options. He has insight to his disease & prognosis. He would like to get home to get his finances in order. He understands his high O2 requirements are keeping him from going home but is hopeful that his PNA will get better and help with breathing. He would like CPR with bag valve mask but he does NOT want to be on ventilator machine as he does not want his family to hold the responsibility of pulling him off respiratory support one day.     -appreciate pall care assessment   -DNI    Per palliative care notes-  -patient aware of his disease & clinical prognosis   - lives with daughter  -Pt. Has good insight in his disease and is aware of his prognosis  -His goal is to return home as independent as possible. He would like to receive home health after discharge.   -He is hopeful that with ABX his O2 requirement will decline and he will be able to return home  -He will consider continuing CTX once he sees his recovery.   -Will need to assign POA, will do paperwork  - We will discuss extent of care desired and complete LA Post on follow up  -Will further clarify his wishes in case of code blue (partial code currently)   Goals defined              Will assign POA on follow up              Will complete LA Post              Will discuss discharge options after 1 week treatment with meropenem         Critical Care Daily Checklist:    A: Awake: RASS Goal/Actual Goal: RASS Goal: 0-->alert and calm  Actual: Rob Agitation  Sedation Scale (RASS): Alert and calm   B: Spontaneous Breathing Trial Performed?     C: SAT & SBT Coordinated?       NA                 D: Delirium: CAM-ICU Overall CAM-ICU: Negative   E: Early Mobility Performed? Yes   F: Feeding Goal:    Status:     Current Diet Order   Procedures    Diet Adult Regular (IDDSI Level 7)      AS: Analgesia/Sedation NA   T: Thromboembolic Prophylaxis apixaban   H: HOB > 300 Yes   U: Stress Ulcer Prophylaxis (if needed) NA   G: Glucose Control At goal   B: Bowel Function Stool Occurrence: 1   I: Indwelling Catheter (Lines & Bassett) Necessity PIV x 2   D: De-escalation of Antimicrobials/Pharmacotherapies Continue sea    Plan for the day/ETD Wean off O2 as tolerated    Code Status:  Family/Goals of Care: Partial Code         Critical secondary to Patient has a condition that poses threat to life and bodily function: Severe Respiratory Distress      Critical care was time spent personally by me on the following activities: development of treatment plan with patient or surrogate and bedside caregivers, discussions with consultants, evaluation of patient's response to treatment, examination of patient, ordering and performing treatments and interventions, ordering and review of laboratory studies, ordering and review of radiographic studies, pulse oximetry, re-evaluation of patient's condition. This critical care time did not overlap with that of any other provider or involve time for any procedures.     Analia Morales MD  Critical Care Medicine  [facility]

## 2019-01-02 NOTE — ASSESSMENT & PLAN NOTE
-appreciate ID recs  -recommend at least 2 weeks of meropenem for MDR PsA pneumonia. Pt wants to go home but would like to try IV antimicrobials and understands no PO option and would need PICC line for this as well as lab draws.

## 2019-01-03 NOTE — SUBJECTIVE & OBJECTIVE
"Interval History: On NR and O2 sat at 97% currently. About to eat lunch. RN reports good appetite.   Pt. States he is feeling "fine" and better.   Continues with thick secretions. Severe hypoxemia when transitioning to bedside commode.     Medications:  Continuous Infusions:  Scheduled Meds:   albuterol-ipratropium  3 mL Nebulization Q6H    allopurinol  100 mg Oral Daily    apixaban  5 mg Oral BID    atenolol  12.5 mg Oral BID    budesonide  0.5 mg Nebulization Q12H    furosemide  20 mg Oral BID    meropenem (MERREM) IVPB  1 g Intravenous Q8H    multivitamin  1 tablet Oral Daily    pantoprazole  40 mg Oral Daily    pravastatin  40 mg Oral QHS    predniSONE  60 mg Oral Daily    tiotropium  1 capsule Inhalation Daily     PRN Meds:sodium chloride 0.9%    Objective:     Vital Signs (Most Recent):  Temp: 97.8 °F (36.6 °C) (01/03/19 0800)  Pulse: 86 (01/03/19 1448)  Resp: (!) 22 (01/03/19 1448)  BP: 118/75 (01/03/19 0800)  SpO2: (!) 88 % (01/03/19 1448) Vital Signs (24h Range):  Temp:  [97.5 °F (36.4 °C)-97.8 °F (36.6 °C)] 97.8 °F (36.6 °C)  Pulse:  [] 86  Resp:  [19-28] 22  SpO2:  [83 %-98 %] 88 %  BP: ()/(55-79) 118/75     Weight: 78 kg (171 lb 15.3 oz)  Body mass index is 25.39 kg/m².    Review of Symptoms  Symptom Assessment (ESAS 0-10 scale)  ESAS 0 1 2 3 4 5 6 7 8 9 10   Pain x             Dyspnea     x         Anxiety  x            Nausea x             Depression   x            Anorexia x             Fatigue    x          Insomnia   x           Restlessness   x            Agitation x             CAM / Delirium x__ --  ___+   Constipation     x__ --  ___+   Diarrhea           _x_ --  ___+      Bowel Management Plan (BMP): No    Comments:     Pain Assessment: denies    OME in 24 hours:     Performance Status: 40    ECOG Performance Status Grade: 4 - Completely disabled    Physical Exam   Constitutional: He is oriented to person, place, and time. He appears well-developed. No distress.   Neck: " Normal range of motion. No JVD present.   Pulmonary/Chest: No respiratory distress.   Abdominal: Soft.   Neurological: He is alert and oriented to person, place, and time.   Skin: Skin is warm.       Significant Labs: All pertinent labs within the past 24 hours have been reviewed.  CBC:   Recent Labs   Lab 01/03/19  0304   WBC 10.78   HGB 12.4*   HCT 41.0   *   *     BMP:  Recent Labs   Lab 01/03/19  0304   *      K 4.2      CO2 27   BUN 29*   CREATININE 0.8   CALCIUM 8.2*     LFT:  Lab Results   Component Value Date    AST 20 12/06/2018    ALKPHOS 53 12/06/2018    BILITOT 2.0 (H) 12/06/2018     Albumin:   Albumin   Date Value Ref Range Status   12/06/2018 3.6 3.6 - 5.1 g/dL Final   10/01/2018 3.1 3.1 - 4.7 g/dL      Protein:   Total Protein   Date Value Ref Range Status   12/06/2018 6.0 (L) 6.1 - 8.1 g/dL Final     Lactic acid:   No results found for: LACTATE    Significant Imaging: I have reviewed all pertinent imaging results/findings within the past 24 hours.    Advanced Directives::  Living Will: No  LaPOST: No  Do Not Resuscitate Status: Yes partial  Medical Power of : No    Decision-Making Capacity: Patient answered questions    Living Arrangements: Lives with family, Lives in apartment    Psychosocial/Cultural:  Patient's most important priorities:  Returning home     Patient's biggest concerns/fears:  Not being able to return home    Previous death/end of life care history:  no    Patient's goals/hopes:  Return home    Spiritual:     F- Donna and Belief: yes but not Gnosticist    I - Importance: some  .  C - Community: no    A - Address in Care: no

## 2019-01-03 NOTE — ASSESSMENT & PLAN NOTE
"Palliative Care Encounter / Goals of care discussion:     Narrative:   Michael Shetty is a 73 y.o. male patient with lung cancer, undergoing CTX, XRT (on hold for PE and PNA), clotting disorder s/p several PE, now severe hypoxema requiring increasing doses of O2 and likely superimposed PNA    1: Pain / Physical symptom Assessment:   - pain assesment: denies   - dyspnea assessment:improved   - anxiety assessment: mild   - depression assessment: denies    2: Social Background    - lives with daughter    3: Palliative care meeting participants:    patient    4: Goals of care Discussion details:  1/3/19   - feels better. Oxygenation appears improved, but remains on High flow (20L) and NR oxygen currently.    - slow process to wean FiO2.    - producing thick sputum   - encouraged to get out of bed if possible   - continue with ABX and re-assess at end of the week/ after weekend.    - goals remain to return home.   1/2/19   - tells me he is feeling better. O2 demand remains unchanged   - understands that we will assess his condition throughout the week for improvement   - hopeful he can return home   - no other questions or concerns at this time   - code status remains partial (a shock or a trial of compression but not more). He is aware that this is not optimal care.     12/31/18   - pt. Profound respiratory failure fails to improve   - the pt. Remains hopeful that a longer course of ABX will help   - in d/w Dr. Miles, his ABX were tailored to sensitivity and essentially effective Rx. Has been started ~ 3 days ago.    - he may improve some within 7 days of therapy, but if after that no improvement he is not likely to get better     - The pt. Goals is unchanged to "get home". He asked me if I looked into options for home O2 and I explained that high flow is possible on hospice,  but not at this level.    - Will continue to monitor for improvement of oxygenation. If he does not improve the decision will be to remain " hospitalized (and do his papers here in the hospital / hospice). Or take a chance, reduce the O2 to home hospice acceptable level and discharge. I did make it clear before that he may not survive transport to the [facility].     12/28/18   Completed power of  yesterday, daughters are POA.    Discussed LA post today   - we had a long discussion regarding his goals and therapies available to achieve this goal and interventions that may not be beneficial.    - He clearly states that his ultimate goal is to return to his home in [place]. He has some important papers he needs to change (living will).    - he is willing to do anything he can to go home.    - he realizes that his high O2 demands are a challenge and may prevent him from achieving this goal.    - agrees to continue current treatment over the weekend and re-evaluate on Monday     - we spoke about DNR and I have explained that to achieve best outcome all parts of resuscitation are needed including ventilation, chest compression and drugs. . He understands but would like to remain partial code with no intubation. He wants us to try for a bit and let him go if it does not work.    - He makes it clear he did not want the ventilator, feeding tubes, dialysis      - we spoke about hospice and he is agreeable to consider this over home health if it will get him to go home.    - discussed with Dr. Valdivia.   - will follow up  5: Goals of care Decisions / Recommendations / Plan:   - continue current care.    - maybe some improvement?     6: Follow up plans:    daily    Thank you for your consult. I will follow along with you. Please call [phone] with questions.

## 2019-01-03 NOTE — PLAN OF CARE
Problem: Adult Inpatient Plan of Care  Goal: Plan of Care Review  Outcome: Ongoing (interventions implemented as appropriate)  Pt VSS at this time. HR , -120s, Sp02>88% titrated hiflow NC and rebreather for sp02 >88% per Dr. Miles on 15L 60%,afibrile. AAOx4, following commands and moving all extremities well. Pt updated on current condition and POC for am shift. All questions answered and emotional support provided. Will destat to high 70s if non-rebrether off while eating, when non-rebreather back on pt Sp02 increases to 89-96%. Pt assisted with weight shift, and encouraged to cough/deep breathe. Remains free of falls and injury for am shift. MD updated on current condition, vitals, labs, and gtts. No new orders received, will continue to monitor.

## 2019-01-03 NOTE — ASSESSMENT & PLAN NOTE
72yo presents with acute SOB on a background of PEs & MTHFR mutation found to have recurrent PE on CTPA & MDR Pseudomonas PNA   -Interventional cardiology consulted  -bedside TTE performed 01/03/2019  -interventional cardiology does not think patient is a candidate for catheter directed thrombectomy  -continuing eliquis 5mg BID for now for chronic recurrent PEs     -still requiring NRB 20L & FiO2 70% and comfort flow 20L   -DNI status however would like 1 round of CPR  -meropenem day 7 (previously on zosyn which PNA was resistant to)   -follow respiratory status until Friday 1/4, at that time will discuss with patient about continuing with meropenem and attempting to wean off O2 as tolerated

## 2019-01-03 NOTE — PLAN OF CARE
Problem: Adult Inpatient Plan of Care  Goal: Plan of Care Review  Outcome: Ongoing (interventions implemented as appropriate)  No acute events throughout shift. VSS, afebrile, able to follow commands, see flowsheet. Pt remains on comfort flow, had to increase to 20 L 60% overnight to maintain sats >88%. NRB in use. Pt tolerating regular diet. UO good. Plan to continue abx and wean comfort flow as tolerated. Plan of care reviewed with Michael Shetty at the bedside. All questions and concerns addressed. Will continue to monitor.

## 2019-01-03 NOTE — ASSESSMENT & PLAN NOTE
Chronic severe Pseudomonas PNA   -sputum cx growing pseudomonas & few GPC   -continue meropenem; will re-evaluate tomorrow  -ID consulted, recommended treatment for 3 weeks with meropenem

## 2019-01-03 NOTE — PROGRESS NOTES
[facility]  Palliative Medicine  Progress Note    Patient Name: Michael Shetty  MRN: [id number]  Admission Date: 12/25/2018  Hospital Length of Stay: 9 days  Code Status: Partial Code   Attending Provider: Terry Miles*  Consulting Provider: Power Dunn MD  Primary Care Physician: Michael Ellsworth MD  Principal Problem:Acute on chronic respiratory failure with hypoxemia    Patient information was obtained from past medical records.      Assessment/Plan:     Palliative care encounter    Palliative Care Encounter / Goals of care discussion:     Narrative:   Michael Shetty is a 73 y.o. male patient with lung cancer, undergoing CTX, XRT (on hold for PE and PNA), clotting disorder s/p several PE, now severe hypoxema requiring increasing doses of O2 and likely superimposed PNA    1: Pain / Physical symptom Assessment:   - pain assesment: denies   - dyspnea assessment:improved   - anxiety assessment: mild   - depression assessment: denies    2: Social Background    - lives with daughter    3: Palliative care meeting participants:    patient    4: Goals of care Discussion details:  1/3/19   - feels better. Oxygenation appears improved, but remains on High flow (20L) and NR oxygen currently.    - slow process to wean FiO2.    - producing thick sputum   - encouraged to get out of bed if possible   - continue with ABX and re-assess at end of the week/ after weekend.    - goals remain to return home.   1/2/19   - tells me he is feeling better. O2 demand remains unchanged   - understands that we will assess his condition throughout the week for improvement   - hopeful he can return home   - no other questions or concerns at this time   - code status remains partial (a shock or a trial of compression but not more). He is aware that this is not optimal care.     12/31/18   - pt. Profound respiratory failure fails to improve   - the pt. Remains hopeful that a longer course of ABX will help   - in  "d/w Dr. Miles, his ABX were tailored to sensitivity and essentially effective Rx. Has been started ~ 3 days ago.    - he may improve some within 7 days of therapy, but if after that no improvement he is not likely to get better     - The pt. Goals is unchanged to "get home". He asked me if I looked into options for home O2 and I explained that high flow is possible on hospice,  but not at this level.    - Will continue to monitor for improvement of oxygenation. If he does not improve the decision will be to remain hospitalized (and do his papers here in the hospital / hospice). Or take a chance, reduce the O2 to home hospice acceptable level and discharge. I did make it clear before that he may not survive transport to the [facility].     12/28/18   Completed power of  yesterday, daughters are POA.    Discussed LA post today   - we had a long discussion regarding his goals and therapies available to achieve this goal and interventions that may not be beneficial.    - He clearly states that his ultimate goal is to return to his home in [place]. He has some important papers he needs to change (living will).    - he is willing to do anything he can to go home.    - he realizes that his high O2 demands are a challenge and may prevent him from achieving this goal.    - agrees to continue current treatment over the weekend and re-evaluate on Monday     - we spoke about DNR and I have explained that to achieve best outcome all parts of resuscitation are needed including ventilation, chest compression and drugs. . He understands but would like to remain partial code with no intubation. He wants us to try for a bit and let him go if it does not work.    - He makes it clear he did not want the ventilator, feeding tubes, dialysis      - we spoke about hospice and he is agreeable to consider this over home health if it will get him to go home.    - discussed with Dr. Valdivia.   - will follow up  5: Goals of care " "Decisions / Recommendations / Plan:   - continue current care.    - maybe some improvement?     6: Follow up plans:    daily    Thank you for your consult. I will follow along with you. Please call [phone] with questions.            I will follow-up with patient. Please contact us if you have any additional questions.    Subjective:     Chief Complaint: No chief complaint on file.      HPI:   Michael Shetty is a nice 74 yo mald with a past history of MTHFR mutation and several past PE and DVT episodes. He had been on several OAC, most recently on Eliquis. He has SCLC and is currently undergoing CTX and XRT, treatment had been paused due to PE and recurrent pseudomonas PNA.   He developed acute worsening of his dyspnea (usually able to ambulate to BR and do his ADL on ~ 5L O2), severe exertional dyspnea and needing to increase his oxygen to 10l/min.   He presented to [facility] but was transferred to our facility for evaluation of catheter assisted thrombolysis.     He was thought not a candidate for thrombolysis due to poor risk benefit ratio, he is felt to have a pneumonic infiltrate and is currently on ABX. He is requiring high flow O2 to sustain O2 saturation of ~89%.     I am being asked to discuss goals of care with the pt.         Hospital Course:  No notes on file    Interval History: On NR and O2 sat at 97% currently. About to eat lunch. RN reports good appetite.   Pt. States he is feeling "fine" and better.   Continues with thick secretions. Severe hypoxemia when transitioning to bedside commode.     Medications:  Continuous Infusions:  Scheduled Meds:   albuterol-ipratropium  3 mL Nebulization Q6H    allopurinol  100 mg Oral Daily    apixaban  5 mg Oral BID    atenolol  12.5 mg Oral BID    budesonide  0.5 mg Nebulization Q12H    furosemide  20 mg Oral BID    meropenem (MERREM) IVPB  1 g Intravenous Q8H    multivitamin  1 tablet Oral Daily    pantoprazole  40 mg Oral Daily    pravastatin  40 mg " Oral QHS    predniSONE  60 mg Oral Daily    tiotropium  1 capsule Inhalation Daily     PRN Meds:sodium chloride 0.9%    Objective:     Vital Signs (Most Recent):  Temp: 97.8 °F (36.6 °C) (01/03/19 0800)  Pulse: 86 (01/03/19 1448)  Resp: (!) 22 (01/03/19 1448)  BP: 118/75 (01/03/19 0800)  SpO2: (!) 88 % (01/03/19 1448) Vital Signs (24h Range):  Temp:  [97.5 °F (36.4 °C)-97.8 °F (36.6 °C)] 97.8 °F (36.6 °C)  Pulse:  [] 86  Resp:  [19-28] 22  SpO2:  [83 %-98 %] 88 %  BP: ()/(55-79) 118/75     Weight: 78 kg (171 lb 15.3 oz)  Body mass index is 25.39 kg/m².    Review of Symptoms  Symptom Assessment (ESAS 0-10 scale)  ESAS 0 1 2 3 4 5 6 7 8 9 10   Pain x             Dyspnea     x         Anxiety  x            Nausea x             Depression   x            Anorexia x             Fatigue    x          Insomnia   x           Restlessness   x            Agitation x             CAM / Delirium x__ --  ___+   Constipation     x__ --  ___+   Diarrhea           _x_ --  ___+      Bowel Management Plan (BMP): No    Comments:     Pain Assessment: denies    OME in 24 hours:     Performance Status: 40    ECOG Performance Status Grade: 4 - Completely disabled    Physical Exam   Constitutional: He is oriented to person, place, and time. He appears well-developed. No distress.   Neck: Normal range of motion. No JVD present.   Pulmonary/Chest: No respiratory distress.   Abdominal: Soft.   Neurological: He is alert and oriented to person, place, and time.   Skin: Skin is warm.       Significant Labs: All pertinent labs within the past 24 hours have been reviewed.  CBC:   Recent Labs   Lab 01/03/19  0304   WBC 10.78   HGB 12.4*   HCT 41.0   *   *     BMP:  Recent Labs   Lab 01/03/19  0304   *      K 4.2      CO2 27   BUN 29*   CREATININE 0.8   CALCIUM 8.2*     LFT:  Lab Results   Component Value Date    AST 20 12/06/2018    ALKPHOS 53 12/06/2018    BILITOT 2.0 (H) 12/06/2018     Albumin:    Albumin   Date Value Ref Range Status   12/06/2018 3.6 3.6 - 5.1 g/dL Final   10/01/2018 3.1 3.1 - 4.7 g/dL      Protein:   Total Protein   Date Value Ref Range Status   12/06/2018 6.0 (L) 6.1 - 8.1 g/dL Final     Lactic acid:   No results found for: LACTATE    Significant Imaging: I have reviewed all pertinent imaging results/findings within the past 24 hours.    Advanced Directives::  Living Will: No  LaPOST: No  Do Not Resuscitate Status: Yes partial  Medical Power of : No    Decision-Making Capacity: Patient answered questions    Living Arrangements: Lives with family, Lives in apartment    Psychosocial/Cultural:  Patient's most important priorities:  Returning home     Patient's biggest concerns/fears:  Not being able to return home    Previous death/end of life care history:  no    Patient's goals/hopes:  Return home    Spiritual:     F- Donna and Belief: yes but not Voodoo    I - Importance: some  .  C - Community: no    A - Address in Care: no      > 50% of 35 min visit spent in chart review, face to face discussion of goals of care,  symptom assessment, coordination of care and emotional support.    Power Dunn MD  Palliative Medicine  [facility]

## 2019-01-03 NOTE — ASSESSMENT & PLAN NOTE
73 y.o. male with diagnosis of NSCLC (Squamous cell)  - T2A lesion with 3.5cm involving NEIDA  - s/p monotherapy with XRT by Dr Olvera; followed by chemotherapy with 3 cycles of carbo/taxol  - followed by Dr Ely with Pulm - seen him on Dec 5th and f/u on Feb 6th  - CTA on 5/18/17 showed decrease size in the tumor mass  - CT on 12/14/17 with stable lung findings  - Latest CT scans show slight increase in size of the NEIDA nodule  - he had repeat PET on 5/9/2018 with over stable except for the one spot in the right lung  - he saw Dr Ely again June 6th and saw Dr Olvera on June 19th  - he completed XRT x 5 on July 13th  -  discussed his case at the [facility] Tumor Board yesterday where Dr Az Castaneda and Dr Olvera, pathology, surgery, and radiology.   - the boards recommendation previously was to proceed with observation only with regular scans; they felt he was too high risk for surgery and immunologics  - however, repeat PET on 8/8/2018 is showing an enlarging nodule in NIEDA with increasing FDG activity and worrisome for progressive disease   - he received his first and only cycle of chemotherapy with carbo/gemzar the week before his prior hospitalization

## 2019-01-03 NOTE — SUBJECTIVE & OBJECTIVE
Interval History/Significant Events: No acute events overnight.     Review of Systems   Constitutional: Negative for chills and fever.   HENT: Negative for drooling and sore throat.    Respiratory: Positive for cough and shortness of breath.    Cardiovascular: Negative for chest pain and leg swelling.   Gastrointestinal: Negative for diarrhea and vomiting.   Genitourinary: Negative for difficulty urinating and dysuria.   Musculoskeletal: Negative for back pain and joint swelling.   Neurological: Negative for light-headedness and headaches.   Psychiatric/Behavioral: Negative for agitation and confusion.     Objective:     Vital Signs (Most Recent):  Temp: 97.8 °F (36.6 °C) (01/03/19 0800)  Pulse: 88 (01/03/19 1144)  Resp: 20 (01/03/19 0800)  BP: 118/75 (01/03/19 0800)  SpO2: (!) 84 % (01/03/19 1144) Vital Signs (24h Range):  Temp:  [97.5 °F (36.4 °C)-97.8 °F (36.6 °C)] 97.8 °F (36.6 °C)  Pulse:  [] 88  Resp:  [19-28] 20  SpO2:  [83 %-98 %] 84 %  BP: ()/(55-79) 118/75   Weight: 78 kg (171 lb 15.3 oz)  Body mass index is 25.39 kg/m².      Intake/Output Summary (Last 24 hours) at 1/3/2019 1439  Last data filed at 1/3/2019 1000  Gross per 24 hour   Intake 725.99 ml   Output 2285 ml   Net -1559.01 ml       Physical Exam   Constitutional: He is oriented to person, place, and time. He appears well-developed and well-nourished.   Sitting up in bed in no distress.   HENT:   Head: Normocephalic and atraumatic.   Eyes: EOM are normal. Pupils are equal, round, and reactive to light.   Neck: Normal range of motion. Neck supple.   Cardiovascular: Normal rate, regular rhythm and normal heart sounds.   Pulmonary/Chest: He has no wheezes.   Coarse breath sounds at bases   Abdominal: Soft. Bowel sounds are normal. He exhibits no distension.   Neurological: He is alert and oriented to person, place, and time. No cranial nerve deficit.   Skin: Skin is warm and dry.   Psychiatric: He has a normal mood and affect. His behavior  is normal.       Vents:  Oxygen Concentration (%): 100(while eating) (01/03/19 1144)  Lines/Drains/Airways     Peripheral Intravenous Line                 Peripheral IV - Single Lumen 12/29/18 0615 Anterior;Right Forearm 5 days         Peripheral IV - Single Lumen 01/01/19 2304 Anterior;Right Forearm 1 day              Significant Labs:    CBC/Anemia Profile:  Recent Labs   Lab 01/02/19  0317 01/03/19  0304   WBC 8.78 10.78   HGB 11.2* 12.4*   HCT 37.2* 41.0   * 129*   * 105*   RDW 18.1* 17.9*        Chemistries:  Recent Labs   Lab 01/02/19  0417 01/03/19  0304    140   K 3.7 4.2    101   CO2 26 27   BUN 32* 29*   CREATININE 0.7 0.8   CALCIUM 8.1* 8.2*       All pertinent labs within the past 24 hours have been reviewed.    Significant Imaging:  I have reviewed and interpreted all pertinent imaging results/findings within the past 24 hours.

## 2019-01-03 NOTE — PROGRESS NOTES
[facility]  Critical Care Medicine  Progress Note    Patient Name: Michael Shetty  MRN: [id number]  Admission Date: 12/25/2018  Hospital Length of Stay: 9 days  Code Status: Partial Code  Attending Provider: Terry Miles*  Primary Care Provider: Michael Ellsworth MD   Principal Problem: Acute on chronic respiratory failure with hypoxemia    Subjective:     HPI:  Mr. Shetty is a 72yo man with PMH of MTHFR mutation, history of PEs on Eliquis, CAD s/p CABGn with chronically elevated troponin, hypotension on midodrine, squamous cell lung carcinoma of NEIDA s/p 1 cycle of adjuvant CTX & radiation therapy (which was stopped due to recurrent pseudomonas PNA & PEs), COPD, 54 pack years (quit 1990), chronic severe pseudomonas pna (3 abx for over a year) who presents as a transfer from [facility] for Interventional Cardiology evaluation for catheter assisted thrombolysis.     HPI: 4 days ago he had acute SOB requring 10L of oxygen at home when he is at a baseline of 6L. At [facility] his CTPA showed known PE of RLL pulmonary artery & increased size of right sided pleural effusion. Doppler u/s of BLE was negative for DVT. OSH continued patient on home eliquis regimen of 5mg BID and per patient did not notice improvement in oxygen requirements so he was sent to [facility].      Vitals during initial interview: 135/75, -104, 25-20L comfort flow O2 FiO2 70%.       Of note patient has chronic severe pseudomonas PNA for which he takes an alternating regimen of doxycycline, cipro, & cefuroxime. He quit smoking 30 years ago.     Hospital/ICU Course:  12/26/2018 JED; discussed he is not a candidate for interventional cardiology & goals of care with palliative care consult for today    12/27/2018 NAEO 25L, 80% FiO2    12/29/2018 NAEO    12/31/2018 NAEO, will continue meropenem for one week and monitor for improvement in respiratory status.     1/1/2018: NAEO, continues to require 25L 80% FiO2 and 20L  NRB    1/2/2018-1/3/2018: No significant improvement in respiratory status. Continuing abx.        Interval History/Significant Events: No acute events overnight.     Review of Systems   Constitutional: Negative for chills and fever.   HENT: Negative for drooling and sore throat.    Respiratory: Positive for cough and shortness of breath.    Cardiovascular: Negative for chest pain and leg swelling.   Gastrointestinal: Negative for diarrhea and vomiting.   Genitourinary: Negative for difficulty urinating and dysuria.   Musculoskeletal: Negative for back pain and joint swelling.   Neurological: Negative for light-headedness and headaches.   Psychiatric/Behavioral: Negative for agitation and confusion.     Objective:     Vital Signs (Most Recent):  Temp: 97.8 °F (36.6 °C) (01/03/19 0800)  Pulse: 88 (01/03/19 1144)  Resp: 20 (01/03/19 0800)  BP: 118/75 (01/03/19 0800)  SpO2: (!) 84 % (01/03/19 1144) Vital Signs (24h Range):  Temp:  [97.5 °F (36.4 °C)-97.8 °F (36.6 °C)] 97.8 °F (36.6 °C)  Pulse:  [] 88  Resp:  [19-28] 20  SpO2:  [83 %-98 %] 84 %  BP: ()/(55-79) 118/75   Weight: 78 kg (171 lb 15.3 oz)  Body mass index is 25.39 kg/m².      Intake/Output Summary (Last 24 hours) at 1/3/2019 1439  Last data filed at 1/3/2019 1000  Gross per 24 hour   Intake 725.99 ml   Output 2285 ml   Net -1559.01 ml       Physical Exam   Constitutional: He is oriented to person, place, and time. He appears well-developed and well-nourished.   Sitting up in bed in no distress.   HENT:   Head: Normocephalic and atraumatic.   Eyes: EOM are normal. Pupils are equal, round, and reactive to light.   Neck: Normal range of motion. Neck supple.   Cardiovascular: Normal rate, regular rhythm and normal heart sounds.   Pulmonary/Chest: He has no wheezes.   Coarse breath sounds at bases   Abdominal: Soft. Bowel sounds are normal. He exhibits no distension.   Neurological: He is alert and oriented to person, place, and time. No cranial nerve  deficit.   Skin: Skin is warm and dry.   Psychiatric: He has a normal mood and affect. His behavior is normal.       Vents:  Oxygen Concentration (%): 100(while eating) (01/03/19 1144)  Lines/Drains/Airways     Peripheral Intravenous Line                 Peripheral IV - Single Lumen 12/29/18 0615 Anterior;Right Forearm 5 days         Peripheral IV - Single Lumen 01/01/19 2304 Anterior;Right Forearm 1 day              Significant Labs:    CBC/Anemia Profile:  Recent Labs   Lab 01/02/19  0317 01/03/19  0304   WBC 8.78 10.78   HGB 11.2* 12.4*   HCT 37.2* 41.0   * 129*   * 105*   RDW 18.1* 17.9*        Chemistries:  Recent Labs   Lab 01/02/19  0417 01/03/19  0304    140   K 3.7 4.2    101   CO2 26 27   BUN 32* 29*   CREATININE 0.7 0.8   CALCIUM 8.1* 8.2*       All pertinent labs within the past 24 hours have been reviewed.    Significant Imaging:  I have reviewed and interpreted all pertinent imaging results/findings within the past 24 hours.      ABG  No results for input(s): PH, PO2, PCO2, HCO3, BE in the last 168 hours.  Assessment/Plan:     Pulmonary   * Acute on chronic respiratory failure with hypoxemia    74yo presents with acute SOB on a background of PEs & MTHFR mutation found to have recurrent PE on CTPA & MDR Pseudomonas PNA   -Interventional cardiology consulted  -bedside TTE performed 01/03/2019  -interventional cardiology does not think patient is a candidate for catheter directed thrombectomy  -continuing eliquis 5mg BID for now for chronic recurrent PEs     -still requiring NRB 20L & FiO2 70% and comfort flow 20L   -DNI status however would like 1 round of CPR  -meropenem day 7 (previously on zosyn which PNA was resistant to)   -follow respiratory status until Friday 1/4, at that time will discuss with patient about continuing with meropenem and attempting to wean off O2 as tolerated     Pseudomonas pneumonia    -appreciate ID recs  -recommend at least 2 weeks of meropenem for  MDR PsA pneumonia. Pt wants to go home but would like to try IV antimicrobials and understands no PO option and would need PICC line for this as well as lab draws.          Chronic pneumonia    Chronic severe Pseudomonas PNA   -sputum cx growing pseudomonas & few GPC   -continue meropenem; will re-evaluate tomorrow  -ID consulted, recommended treatment for 3 weeks with meropenem      Pulmonary emphysema    -duo nebs q6hr   -ICS budesonide 0.5mg q12hr  -tiotroprium 18mcg   -prednisone 60mg PO      Cardiac/Vascular   Chronic systolic heart failure    EF 25%  Increase lasix 20mg PO daily to BID  Strict I/O  Plan to start GDMT as tolerated     Coronary artery disease involving coronary bypass graft    -status post CABG     Chronic hypotension    -BP stable off midodrine  -continue atenolol 15mg PO BID     Hematology   Chronic pulmonary embolism    -per interventional cardiology consultation  -given active lung cancer he is not a candidate for thrombolysis  -CT chest reviewed, if clot in RLL pulmonary artery were to be lysed- it would end up perfusing his lung tissue that has had significant scarring & chronic severe pseudomonas pna  -Eliquis 5 mg BID     Oncology   Primary malignant neoplasm of left upper lobe of lung    73 y.o. male with diagnosis of NSCLC (Squamous cell)  - T2A lesion with 3.5cm involving NEIDA  - s/p monotherapy with XRT by Dr Olvera; followed by chemotherapy with 3 cycles of carbo/taxol  - followed by Dr Ely with Pulm - seen him on Dec 5th and f/u on Feb 6th  - CTA on 5/18/17 showed decrease size in the tumor mass  - CT on 12/14/17 with stable lung findings  - Latest CT scans show slight increase in size of the NEIDA nodule  - he had repeat PET on 5/9/2018 with over stable except for the one spot in the right lung  - he saw Dr Ely again June 6th and saw Dr Olvera on June 19th  - he completed XRT x 5 on July 13th  -  discussed his case at the [facility] Tumor Board yesterday where Dr Az Castaneda  and Dr Olvera, pathology, surgery, and radiology.   - the boards recommendation previously was to proceed with observation only with regular scans; they felt he was too high risk for surgery and immunologics  - however, repeat PET on 8/8/2018 is showing an enlarging nodule in NEIDA with increasing FDG activity and worrisome for progressive disease   - he received his first and only cycle of chemotherapy with carbo/gemzar the week before his prior hospitalization            Other   Goals of care, counseling/discussion    -Long discussion with patient today about DNI options. He has insight to his disease & prognosis. He would like to get home to get his finances in order. He understands his high O2 requirements are keeping him from going home but is hopeful that his PNA will get better and help with breathing. He would like CPR with bag valve mask but he does NOT want to be on ventilator machine as he does not want his family to hold the responsibility of pulling him off respiratory support one day.     -appreciate pall care assessment   -DNI    Per palliative care notes-  -patient aware of his disease & clinical prognosis   - lives with daughter  -Pt. Has good insight in his disease and is aware of his prognosis  -His goal is to return home as independent as possible. He would like to receive home health after discharge.   -He is hopeful that with ABX his O2 requirement will decline and he will be able to return home  -He will consider continuing CTX once he sees his recovery.   -Will need to assign POA, will do paperwork  - We will discuss extent of care desired and complete LA Post on follow up  -Will further clarify his wishes in case of code blue (partial code currently)   Goals defined              Will assign POA on follow up              Will complete LA Post              Will discuss discharge options after 1 week treatment with meropenem          Critical secondary to Patient has a condition that poses  threat to life and bodily function: Severe Respiratory Distress, MDR pseudomonas PNA      Critical care was time spent personally by me on the following activities: development of treatment plan with patient or surrogate and bedside caregivers, discussions with consultants, evaluation of patient's response to treatment, examination of patient, ordering and performing treatments and interventions, ordering and review of laboratory studies, ordering and review of radiographic studies, pulse oximetry, re-evaluation of patient's condition. This critical care time did not overlap with that of any other provider or involve time for any procedures.     Analia Morales MD  Critical Care Medicine  [facility]

## 2019-01-04 NOTE — PROGRESS NOTES
[facility]  Critical Care Medicine  Progress Note    Patient Name: Michael Shetty  MRN: [id number]  Admission Date: 12/25/2018  Hospital Length of Stay: 10 days  Code Status: Partial Code  Attending Provider: Terry Miles*  Primary Care Provider: Michael Ellsworth MD   Principal Problem: Acute on chronic respiratory failure with hypoxemia    Subjective:     HPI:  Mr. Shetty is a 74yo man with PMH of MTHFR mutation, history of PEs on Eliquis, CAD s/p CABGn with chronically elevated troponin, hypotension on midodrine, squamous cell lung carcinoma of NEIDA s/p 1 cycle of adjuvant CTX & radiation therapy (which was stopped due to recurrent pseudomonas PNA & PEs), COPD, 54 pack years (quit 1990), chronic severe pseudomonas pna (3 abx for over a year) who presents as a transfer from [facility] for Interventional Cardiology evaluation for catheter assisted thrombolysis.     HPI: 4 days ago he had acute SOB requring 10L of oxygen at home when he is at a baseline of 6L. At [facility] his CTPA showed known PE of RLL pulmonary artery & increased size of right sided pleural effusion. Doppler u/s of BLE was negative for DVT. OSH continued patient on home eliquis regimen of 5mg BID and per patient did not notice improvement in oxygen requirements so he was sent to [facility].      Vitals during initial interview: 135/75, -104, 25-20L comfort flow O2 FiO2 70%.       Of note patient has chronic severe pseudomonas PNA for which he takes an alternating regimen of doxycycline, cipro, & cefuroxime. He quit smoking 30 years ago.     Hospital/ICU Course:  12/26/2018 JED; discussed he is not a candidate for interventional cardiology & goals of care with palliative care consult for today    12/27/2018 NAEO 25L, 80% FiO2    12/29/2018 NAEO    12/31/2018 NAEO, will continue meropenem for one week and monitor for improvement in respiratory status.     1/1/2018: NAEO, continues to require 25L 80% FiO2 and 20L  NRB    1/2/2018-1/3/2018: No significant improvement in respiratory status. Continuing abx.  1/3/2018: No events overnight. On day 8 meropenem. States feeling better, but continues to require comfort flow 20L, Fio2 60-70% plus NRB.     Interval History/Significant Events: afebrile and HD stable overnight. Reports feeling better and stronger, but still with omar oxygen requirements.     Review of Systems   Constitutional: Negative for chills and fever.   HENT: Negative for drooling and sore throat.    Respiratory: Positive for cough and shortness of breath.    Cardiovascular: Negative for chest pain and leg swelling.   Gastrointestinal: Negative for diarrhea and vomiting.   Genitourinary: Negative for difficulty urinating and dysuria.   Musculoskeletal: Negative for back pain and joint swelling.   Neurological: Negative for light-headedness and headaches.   Psychiatric/Behavioral: Negative for agitation and confusion.     Objective:     Vital Signs (Most Recent):  Temp: 97.8 °F (36.6 °C) (01/04/19 0701)  Pulse: 69 (01/04/19 0900)  Resp: (!) 28 (01/04/19 0900)  BP: 116/72 (01/04/19 0900)  SpO2: 99 % (01/04/19 0900) Vital Signs (24h Range):  Temp:  [97.5 °F (36.4 °C)-98.5 °F (36.9 °C)] 97.8 °F (36.6 °C)  Pulse:  [60-99] 69  Resp:  [15-28] 28  SpO2:  [74 %-99 %] 99 %  BP: ()/(62-87) 116/72   Weight: 78 kg (171 lb 15.3 oz)  Body mass index is 25.39 kg/m².      Intake/Output Summary (Last 24 hours) at 1/4/2019 0918  Last data filed at 1/4/2019 0900  Gross per 24 hour   Intake 425 ml   Output 1410 ml   Net -985 ml       Physical Exam   Constitutional: He is oriented to person, place, and time. He appears well-developed and well-nourished. No distress.   Sitting up in bed in no distress.   HENT:   Head: Normocephalic and atraumatic.   Eyes: EOM are normal. Pupils are equal, round, and reactive to light.   Neck: Normal range of motion. Neck supple.   Cardiovascular: Normal rate, regular rhythm and normal heart sounds.    Pulmonary/Chest: He has no wheezes.   Coarse breath sounds at bases   Abdominal: Soft. Bowel sounds are normal. He exhibits no distension.   Musculoskeletal: Normal range of motion. He exhibits no edema.   Neurological: He is alert and oriented to person, place, and time. No cranial nerve deficit.   Skin: Skin is warm and dry.   Psychiatric: He has a normal mood and affect. His behavior is normal.       Vents:  Oxygen Concentration (%): 70 (01/04/19 0702)  Lines/Drains/Airways     Peripheral Intravenous Line                 Peripheral IV - Single Lumen 12/29/18 0615 Anterior;Right Forearm 6 days         Peripheral IV - Single Lumen 01/01/19 2304 Anterior;Right Forearm 2 days              Significant Labs:    CBC/Anemia Profile:  Recent Labs   Lab 01/03/19  0304 01/04/19  0325   WBC 10.78 9.67   HGB 12.4* 12.2*   HCT 41.0 39.6*   * 129*   * 103*   RDW 17.9* 17.6*        Chemistries:  Recent Labs   Lab 01/03/19  0304 01/04/19  0325    141   K 4.2 3.9    101   CO2 27 29   BUN 29* 30*   CREATININE 0.8 0.7   CALCIUM 8.2* 8.1*       All pertinent labs within the past 24 hours have been reviewed.    Significant Imaging:  I have reviewed and interpreted all pertinent imaging results/findings within the past 24 hours.      ABG  No results for input(s): PH, PO2, PCO2, HCO3, BE in the last 168 hours.  Assessment/Plan:     Pulmonary   * Acute on chronic respiratory failure with hypoxemia    74yo presents with acute SOB on a background of PEs & MTHFR mutation found to have recurrent PE on CTPA & MDR Pseudomonas PNA   -Interventional cardiology consulted  -bedside TTE performed 01/04/2019  -interventional cardiology does not think patient is a candidate for catheter directed thrombectomy  -continuing eliquis 5mg BID for now for chronic recurrent PEs     -still requiring Comfort flow 20L & FiO2 70% and NRB 20L   -DNI status however would like 1 round of CPR  -meropenem day 8 (previously on zosyn which  PNA was resistant to)   -patient looking forward to being discharged home with PICC line to continue IV abx, however will need to continue to work on weaning down oxygen requirements     Pseudomonas pneumonia    -appreciate ID recs  -recommend at least 2 weeks of meropenem for MDR PsA pneumonia. Pt wants to go home but would like to try IV antimicrobials and understands no PO option and would need PICC line for this as well as lab draws.          Chronic pneumonia    Chronic severe Pseudomonas PNA   -sputum cx growing pseudomonas & few GPC   -continue meropenem; will re-evaluate tomorrow  -ID consulted, recommended treatment for 3 weeks with meropenem      Pulmonary emphysema    -duo nebs q6hr   -ICS budesonide 0.5mg q12hr  -tiotroprium 18mcg   -prednisone 60mg PO      Cardiac/Vascular   Chronic systolic heart failure    EF 25%  Increase lasix 20mg PO daily to BID  Strict I/O  Plan to start GDMT as tolerated     Coronary artery disease involving coronary bypass graft    -status post CABG     Chronic hypotension    -BP stable off midodrine  -continue atenolol 15mg PO BID     Hematology   Chronic pulmonary embolism    -per interventional cardiology consultation  -given active lung cancer he is not a candidate for thrombolysis  -CT chest reviewed, if clot in RLL pulmonary artery were to be lysed- it would end up perfusing his lung tissue that has had significant scarring & chronic severe pseudomonas pna  -Eliquis 5 mg BID     Oncology   Primary malignant neoplasm of left upper lobe of lung    73 y.o. male with diagnosis of NSCLC (Squamous cell)  - T2A lesion with 3.5cm involving NEIDA  - s/p monotherapy with XRT by Dr Olvera; followed by chemotherapy with 3 cycles of carbo/taxol  - followed by Dr Ely with Pulm - seen him on Dec 5th and f/u on Feb 6th  - CTA on 5/18/17 showed decrease size in the tumor mass  - CT on 12/14/17 with stable lung findings  - Latest CT scans show slight increase in size of the NEIDA  nodule  - he had repeat PET on 5/9/2018 with over stable except for the one spot in the right lung  - he saw Dr Ely again June 6th and saw Dr Olvera on June 19th  - he completed XRT x 5 on July 13th  -  discussed his case at the [facility] Tumor Board yesterday where Dr Az Castaneda and Dr Olvera, pathology, surgery, and radiology.   - the boards recommendation previously was to proceed with observation only with regular scans; they felt he was too high risk for surgery and immunologics  - however, repeat PET on 8/8/2018 is showing an enlarging nodule in NEIDA with increasing FDG activity and worrisome for progressive disease   - he received his first and only cycle of chemotherapy with carbo/gemzar the week before his prior hospitalization            Other   Goals of care, counseling/discussion    -Long discussion with patient today about DNI options. He has insight to his disease & prognosis. He would like to get home to get his finances in order. He understands his high O2 requirements are keeping him from going home but is hopeful that his PNA will get better and help with breathing. He would like CPR with bag valve mask but he does NOT want to be on ventilator machine as he does not want his family to hold the responsibility of pulling him off respiratory support one day.     -appreciate pall care assessment   -DNI    Per palliative care notes-  -patient aware of his disease & clinical prognosis   - lives with daughter              Daughters are POA              Will complete [place] Post              Will discuss discharge options after 1 week treatment with meropenem (pt opting to go home, but aware he may not survive transport given high O2 requirements)              Agreeable to hospice if it will help him get home (can accommodate 10L at home)     Critical secondary to Patient has a condition that poses threat to life and bodily function: Severe Respiratory Distress      Critical care was time spent personally  by me on the following activities: development of treatment plan with patient or surrogate and bedside caregivers, discussions with consultants, evaluation of patient's response to treatment, examination of patient, ordering and performing treatments and interventions, ordering and review of laboratory studies, ordering and review of radiographic studies, pulse oximetry, re-evaluation of patient's condition. This critical care time did not overlap with that of any other provider or involve time for any procedures.     Analia Morales MD  Critical Care Medicine  [facility]

## 2019-01-04 NOTE — PLAN OF CARE
[facility] Medicine ICU Acceptance Note    Date of Admit: 12/25/2018  Date of Transfer / Stepdown: 1/4/2019  Abad, C/J, L, Onc (IV chemo w/in 1 month), Gyn/Onc, or other special case?: no   ICU team stepping patient down:Micu  ICU team member giving verbal handoff: micu  Accepting HM team: karli SIEGEL-MARCOS    Brief History of Present Illness:      Michael Shetty is a 73 year old man transferred here from OSH for acute on chronic resp fx from stage 2 cancer with PE and new resistant pseudomonas pneumonia, to see if hed be candidate for catheter directed tpa for pna. Was not candidate and requiring high flow o2 for oxygen requirements, was son 6L at home, now on 20 L and unable to wean further after a week. Per ICU is asymptomatic,  o2 sats go to low 80s with movement and he has no symptoms, on 60% Fio2 normally, put o2 to 100% when he eats to main sats, he has no symptoms when it goes low.  ID consulted- recommend  Weeks of meropenem for treatment to see if this improves his o2 sats to come off high flow o2 to give him best chance of improvement  His goal is to go home eventually. Palliative involved heavily with care.  If he cannot wean off high flow he can go home with home hospice on high flow eventually.  He doesn't have picc yet for iv antibiotics as it was being decided if he wanted to try 3 weeks antibiotics, he is day 7 of antibotics now for treatment.  He is asymptomatic and looks better in person than on paper  Major issues     -on floor- when on floor- parameters to let MD know about O2 sats: dont call MD unless patient o2 sats are under 80 and sustained sats under 80 AND patienti s symptomatic which will be continued from ICU orders which are in place now.  Change O2 flow to 100% when eating, stay at 60% and 20L when not eating.    CODE STATUS: patient is very adament he wants 1 round of CPR and compressions and after that he would like chest compresions stopped. Do not intubate.       Consultants and  Procedures:     Consultants:  Palliative, ,ID, ICU    re    Transfer Information:     Diet:  regular    Physical Activity:  As tolerated    To Do / Pending Studies / Follow ups:    Patient has been accepted by Hospital Medicine Team A who will assume care of the patient upon arrival to the floor from the ICU.     Sary SAUNDERS

## 2019-01-04 NOTE — PROGRESS NOTES
"  [facility]  Adult Nutrition  Consult Note    SUMMARY     Recommendations    1. Continue current Regular diet. If PO intake declines, add Boost Plus ONS to aid in caloric intake.   2. RD to monitor & follow-up.    Goals: PO intake >50%  Nutrition Goal Status: new  Communication of RD Recs: reviewed with RN    Reason for Assessment    Reason For Assessment: length of stay  Diagnosis: other (see comments)(Resp. fx, Lung ca)  Interdisciplinary Rounds: attended    General Information Comments: Pt reports good appetite & stable wt PTA, tolerating current diet w/ adequate PO intake. NFPE complete, pt w/ no physical signs of malnutrition.  Nutrition Discharge Planning: Adequate PO intake    Nutrition/Diet History    Patient Reported Diet/Restrictions/Preferences: general  Spiritual, Cultural Beliefs, Zoroastrian Practices, Values that Affect Care: no  Factors Affecting Nutritional Intake: None identified at this time    Anthropometrics    Temp: 97.8 °F (36.6 °C)  Height Method: Stated  Height: 5' 9" (175.3 cm)  Height (inches): 69 in  Weight Method: Bed Scale  Weight: 78 kg (171 lb 15.3 oz)  Weight (lb): 171.96 lb  Ideal Body Weight (IBW), Male: 160 lb  % Ideal Body Weight, Male (lb): 107.48 lb  BMI (Calculated): 25.4  BMI Grade: 25 - 29.9 - overweight    Lab/Procedures/Meds    Pertinent Labs Reviewed: reviewed  Pertinent Labs Comments: BUN 30  Pertinent Medications Reviewed: reviewed    Estimated/Assessed Needs    Weight Used For Calorie Calculations: 78 kg (171 lb 15.3 oz)     Energy Calorie Requirements (kcal): 2340 kcal/d  Energy Need Method: Kcal/kg(30 kcal/kg)     Protein Requirements: 94 g/d (1.2 g/kg)  Weight Used For Protein Calculations: 78 kg (171 lb 15.3 oz)     Estimated Fluid Requirement Method: other (see comments)(Per MD or 1 mL/kcal)     Nutrition Prescription Ordered    Current Diet Order: Regular    Evaluation of Received Nutrient/Fluid Intake    Comments: LBM: 1/3    Tolerance: " tolerating    Nutrition Risk    Level of Risk/Frequency of Follow-up: (1x/week)     Assessment and Plan    No nutritional dx at this time.      Monitor and Evaluation    Food and Nutrient Intake: energy intake, food and beverage intake  Food and Nutrient Adminstration: diet order  Physical Activity and Function: nutrition-related ADLs and IADLs  Anthropometric Measurements: weight, weight change  Biochemical Data, Medical Tests and Procedures: lipid profile, inflammatory profile, glucose/endocrine profile, gastrointestinal profile, electrolyte and renal panel  Nutrition-Focused Physical Findings: overall appearance     Nutrition Follow-Up    RD Follow-up?: Yes

## 2019-01-04 NOTE — NURSING
Please note when referencing patient's vital signs flowsheet that he has been on the recorded Comfort Flow settings with an additional Non-Rebreather Mask the entire shift.

## 2019-01-04 NOTE — SUBJECTIVE & OBJECTIVE
Interval History/Significant Events: afebrile and HD stable overnight. Reports feeling better and stronger, but still with omar oxygen requirements.     Review of Systems   Constitutional: Negative for chills and fever.   HENT: Negative for drooling and sore throat.    Respiratory: Positive for cough and shortness of breath.    Cardiovascular: Negative for chest pain and leg swelling.   Gastrointestinal: Negative for diarrhea and vomiting.   Genitourinary: Negative for difficulty urinating and dysuria.   Musculoskeletal: Negative for back pain and joint swelling.   Neurological: Negative for light-headedness and headaches.   Psychiatric/Behavioral: Negative for agitation and confusion.     Objective:     Vital Signs (Most Recent):  Temp: 97.8 °F (36.6 °C) (01/04/19 0701)  Pulse: 69 (01/04/19 0900)  Resp: (!) 28 (01/04/19 0900)  BP: 116/72 (01/04/19 0900)  SpO2: 99 % (01/04/19 0900) Vital Signs (24h Range):  Temp:  [97.5 °F (36.4 °C)-98.5 °F (36.9 °C)] 97.8 °F (36.6 °C)  Pulse:  [60-99] 69  Resp:  [15-28] 28  SpO2:  [74 %-99 %] 99 %  BP: ()/(62-87) 116/72   Weight: 78 kg (171 lb 15.3 oz)  Body mass index is 25.39 kg/m².      Intake/Output Summary (Last 24 hours) at 1/4/2019 0918  Last data filed at 1/4/2019 0900  Gross per 24 hour   Intake 425 ml   Output 1410 ml   Net -985 ml       Physical Exam   Constitutional: He is oriented to person, place, and time. He appears well-developed and well-nourished. No distress.   Sitting up in bed in no distress.   HENT:   Head: Normocephalic and atraumatic.   Eyes: EOM are normal. Pupils are equal, round, and reactive to light.   Neck: Normal range of motion. Neck supple.   Cardiovascular: Normal rate, regular rhythm and normal heart sounds.   Pulmonary/Chest: He has no wheezes.   Coarse breath sounds at bases   Abdominal: Soft. Bowel sounds are normal. He exhibits no distension.   Musculoskeletal: Normal range of motion. He exhibits no edema.   Neurological: He is alert  and oriented to person, place, and time. No cranial nerve deficit.   Skin: Skin is warm and dry.   Psychiatric: He has a normal mood and affect. His behavior is normal.       Vents:  Oxygen Concentration (%): 70 (01/04/19 0702)  Lines/Drains/Airways     Peripheral Intravenous Line                 Peripheral IV - Single Lumen 12/29/18 0615 Anterior;Right Forearm 6 days         Peripheral IV - Single Lumen 01/01/19 2304 Anterior;Right Forearm 2 days              Significant Labs:    CBC/Anemia Profile:  Recent Labs   Lab 01/03/19  0304 01/04/19  0325   WBC 10.78 9.67   HGB 12.4* 12.2*   HCT 41.0 39.6*   * 129*   * 103*   RDW 17.9* 17.6*        Chemistries:  Recent Labs   Lab 01/03/19  0304 01/04/19  0325    141   K 4.2 3.9    101   CO2 27 29   BUN 29* 30*   CREATININE 0.8 0.7   CALCIUM 8.2* 8.1*       All pertinent labs within the past 24 hours have been reviewed.    Significant Imaging:  I have reviewed and interpreted all pertinent imaging results/findings within the past 24 hours.

## 2019-01-04 NOTE — PROGRESS NOTES
[facility]  Palliative Medicine  Progress Note    Patient Name: Michael Shetty  MRN: [id number]  Admission Date: 12/25/2018  Hospital Length of Stay: 10 days  Code Status: Partial Code   Attending Provider: Terry Miles*  Consulting Provider: Power Dunn MD  Primary Care Physician: Michael Ellsworth MD  Principal Problem:Acute on chronic respiratory failure with hypoxemia    Patient information was obtained from patient and past medical records.      Assessment/Plan:     Palliative care encounter    Palliative Care Encounter / Goals of care discussion:     Narrative:   Michael Shetty is a 73 y.o. male patient with lung cancer, undergoing CTX, XRT (on hold for PE and PNA), clotting disorder s/p several PE, now severe hypoxema requiring increasing doses of O2 and likely superimposed PNA    1: Pain / Physical symptom Assessment:   - pain assesment: denies   - dyspnea assessment: controlled with high flow O2   - anxiety assessment: mild   - depression assessment: denies    2: Social Background    - lives with daughter    3: Palliative care meeting participants:    patient    4: Goals of care Discussion details:  1/4/19   - status quo with oxygen demands.    - discussed possibility that his O2 demands will not improve   - he is aware and is leaning towards making an attempt to go home very soon.    - we have discussed the possibility that he will have a marked decline should we have to drastically lower his FiO2 to allow for home hospice.    - will look into options with different companies to provide high flow at the home.      1/3/19   - feels better. Oxygenation appears improved, but remains on High flow (20L) and NR oxygen currently.    - slow process to wean FiO2.    - producing thick sputum   - encouraged to get out of bed if possible   - continue with ABX and re-assess at end of the week/ after weekend.    - goals remain to return home.   1/2/19   - tells me he is feeling better.  "O2 demand remains unchanged   - understands that we will assess his condition throughout the week for improvement   - hopeful he can return home   - no other questions or concerns at this time   - code status remains partial (a shock or a trial of compression but not more). He is aware that this is not optimal care.     12/31/18   - pt. Profound respiratory failure fails to improve   - the pt. Remains hopeful that a longer course of ABX will help   - in d/w Dr. Miles, his ABX were tailored to sensitivity and essentially effective Rx. Has been started ~ 3 days ago.    - he may improve some within 7 days of therapy, but if after that no improvement he is not likely to get better     - The pt. Goals is unchanged to "get home". He asked me if I looked into options for home O2 and I explained that high flow is possible on hospice,  but not at this level.    - Will continue to monitor for improvement of oxygenation. If he does not improve the decision will be to remain hospitalized (and do his papers here in the hospital / hospice). Or take a chance, reduce the O2 to home hospice acceptable level and discharge. I did make it clear before that he may not survive transport to the [facility].     12/28/18   Completed power of  yesterday, daughters are POA.    Discussed [place] post today   - we had a long discussion regarding his goals and therapies available to achieve this goal and interventions that may not be beneficial.    - He clearly states that his ultimate goal is to return to his home in [place]. He has some important papers he needs to change (living will).    - he is willing to do anything he can to go home.    - he realizes that his high O2 demands are a challenge and may prevent him from achieving this goal.    - agrees to continue current treatment over the weekend and re-evaluate on Monday     - we spoke about DNR and I have explained that to achieve best outcome all parts of resuscitation are " "needed including ventilation, chest compression and drugs. . He understands but would like to remain partial code with no intubation. He wants us to try for a bit and let him go if it does not work.    - He makes it clear he did not want the ventilator, feeding tubes, dialysis      - we spoke about hospice and he is agreeable to consider this over home health if it will get him to go home.    - discussed with Dr. Valdivia.   - will follow up  5: Goals of care Decisions / Recommendations / Plan:   - continue current care.    - maybe some improvement?     6: Follow up plans:    daily    Thank you for your consult. I will follow along with you. Please call [phone] with questions.            I will follow-up with patient. Please contact us if you have any additional questions.    Subjective:     Chief Complaint: No chief complaint on file.      HPI:   Michael Shetty is a nice 72 yo mald with a past history of MTHFR mutation and several past PE and DVT episodes. He had been on several OAC, most recently on Eliquis. He has SCLC and is currently undergoing CTX and XRT, treatment had been paused due to PE and recurrent pseudomonas PNA.   He developed acute worsening of his dyspnea (usually able to ambulate to BR and do his ADL on ~ 5L O2), severe exertional dyspnea and needing to increase his oxygen to 10l/min.   He presented to [facility] but was transferred to our facility for evaluation of catheter assisted thrombolysis.     He was thought not a candidate for thrombolysis due to poor risk benefit ratio, he is felt to have a pneumonic infiltrate and is currently on ABX. He is requiring high flow O2 to sustain O2 saturation of ~89%.     I am being asked to discuss goals of care with the pt.         Hospital Course:  No notes on file    Interval History: failed attempts to wean FiO2. Pt. Feels "ok" today.       Medications:  Continuous Infusions:  Scheduled Meds:   albuterol-ipratropium  3 mL Nebulization Q6H    " allopurinol  100 mg Oral Daily    apixaban  5 mg Oral BID    atenolol  12.5 mg Oral BID    budesonide  0.5 mg Nebulization Q12H    furosemide  20 mg Oral BID    meropenem (MERREM) IVPB  1 g Intravenous Q8H    multivitamin  1 tablet Oral Daily    pantoprazole  40 mg Oral Daily    pravastatin  40 mg Oral QHS    predniSONE  60 mg Oral Daily    tiotropium  1 capsule Inhalation Daily     PRN Meds:sodium chloride 0.9%    Objective:     Vital Signs (Most Recent):  Temp: 97.8 °F (36.6 °C) (01/04/19 0701)  Pulse: 66 (01/04/19 1101)  Resp: 17 (01/04/19 1101)  BP: 107/71 (01/04/19 1101)  SpO2: 95 % (01/04/19 1101) Vital Signs (24h Range):  Temp:  [97.5 °F (36.4 °C)-98.5 °F (36.9 °C)] 97.8 °F (36.6 °C)  Pulse:  [60-99] 66  Resp:  [15-28] 17  SpO2:  [74 %-99 %] 95 %  BP: ()/(62-87) 107/71     Weight: 78 kg (171 lb 15.3 oz)  Body mass index is 25.39 kg/m².    Review of Symptoms  Symptom Assessment (ESAS 0-10 scale)  ESAS 0 1 2 3 4 5 6 7 8 9 10   Pain x             Dyspnea     x         Anxiety  x            Nausea x             Depression   x            Anorexia x             Fatigue    x          Insomnia   x           Restlessness   x            Agitation x             CAM / Delirium x__ --  ___+   Constipation     x__ --  ___+   Diarrhea           _x_ --  ___+      Bowel Management Plan (BMP): No    Comments:     Pain Assessment: denies    OME in 24 hours:     Performance Status: 40    ECOG Performance Status Grade: 4 - Completely disabled    Physical Exam   Constitutional: He is oriented to person, place, and time. He appears well-developed. No distress.   Neck: Normal range of motion. No JVD present.   Pulmonary/Chest: No respiratory distress.   Abdominal: Soft.   Neurological: He is alert and oriented to person, place, and time.   Skin: Skin is warm.       Significant Labs: All pertinent labs within the past 24 hours have been reviewed.  CBC:   Recent Labs   Lab 01/04/19  0325   WBC 9.67   HGB 12.2*   HCT  39.6*   *   *     BMP:  Recent Labs   Lab 01/04/19  0325         K 3.9      CO2 29   BUN 30*   CREATININE 0.7   CALCIUM 8.1*     LFT:  Lab Results   Component Value Date    AST 20 12/06/2018    ALKPHOS 53 12/06/2018    BILITOT 2.0 (H) 12/06/2018     Albumin:   Albumin   Date Value Ref Range Status   12/06/2018 3.6 3.6 - 5.1 g/dL Final   10/01/2018 3.1 3.1 - 4.7 g/dL      Protein:   Total Protein   Date Value Ref Range Status   12/06/2018 6.0 (L) 6.1 - 8.1 g/dL Final     Lactic acid:   No results found for: LACTATE    Significant Imaging: I have reviewed all pertinent imaging results/findings within the past 24 hours.    Advanced Directives::  Living Will: No  LaPOST: No  Do Not Resuscitate Status: Yes partial  Medical Power of : No    Decision-Making Capacity: Patient answered questions    Living Arrangements: Lives with family, Lives in apartment    Psychosocial/Cultural:  Patient's most important priorities:  Returning home     Patient's biggest concerns/fears:  Not being able to return home    Previous death/end of life care history:  no    Patient's goals/hopes:  Return home    Spiritual:     F- Donna and Belief: yes but not Druze    I - Importance: some  .  C - Community: no    A - Address in Care: no      > 50% of 45 min visit spent in chart review, face to face discussion of goals of care,  symptom assessment, coordination of care and emotional support.    Power Dunn MD  Palliative Medicine  [facility]

## 2019-01-04 NOTE — PLAN OF CARE
Problem: Adult Inpatient Plan of Care  Goal: Plan of Care Review  Outcome: Ongoing (interventions implemented as appropriate)    No acute events throughout day. See vital signs and assessments in flowsheets. See below for updates on today's progress.     Pulmonary: comfort flow at 20L, 60-70% titrated to maintain O2 sats >88%. In addition, nonrebreather used to help increase saturation. Clubbing noted    Cardiovascular: NSR, HR 60-80s, -120/70s, MAP >65. Pulses 2+/2+    Neurological: AAOx4, afebrile. PERRL 3mm, pt follows commands and moves all extremities spontaneously    Gastrointestinal: BS active in all quads, regular diet. No BM throughout shift    Genitourinary: Pt voids spontaneously, /shift, yellow    Integumentary/Other: skin intact, foam dressings in place    Patient progressing towards goals as tolerated, plan of care communicated and reviewed with Michael Shetty and family. All concerns addressed. Will continue to monitor.

## 2019-01-04 NOTE — PLAN OF CARE
4:04 pm Notified by CM, RN that team requested home hospice.  Pt is on 20Liters of oxygen @ 60%Fio2.    4:20 pm - SW met w/pt and Sister (Deja Kwan) and Spouse (Steve) to discuss dc plan.  Deja informed SW that pt is not interested in hospice @ this time.  Pt would like to be treated w/abx for his pneumonia and continue w/chemo therapy once he is released from the hospital.  Deja reports that she has explained this to the medical team.    Discharge plan for hospice has been placed on hold.  CM, RN, has been notified of above.    Juventino Castaneda LMSW  Ext. [phone]

## 2019-01-04 NOTE — PLAN OF CARE
"CM received call from [facility] resident, Dr. Morales, inquiring how the patient could discharge with home hospice care for tomorrow as medicine team felt patient could transition to home hospice from ICU and not step down.  CM communicated that per discussion in IDT rounds today patient was still requiring high O2 and the plan was to transfer out of ICU after speaking with patient re [facility].  CM also communicated that LMSW visits patient/family to provide a list of home hospice agencies and then referrals are initiated after patient/family make a choice re agency.  CM verbalized concern for home hospice disposition due to agencies not being being able to accommodate high amount of O2 as patient is currently maintained on 20L and 60% FiO2 comfort flow ([facility] has had ability to meet higher O2 needs than other agencies in the past), EMS being able to provide O2 support for transport home, as well as patient residence being in [place], [place] and this CM is unaware if [facility] or other agencies that may have offices in that area.      CM relayed that home hospice disposition should be started with DREAD Castaneda MIGUEL who will initiate referrals.  On call social work can be paged for further discharge needs over the weekend in the event that patient is not discharged this evening.      CM spoke with dispatch at [facility] to inquire what O2 requirements could be accommodated for transport home.  Dispatch  states there is no limit to O2 delivery for ground transport via ambulance.    UPDATE 1641- CM phoned Dr. Morales to communicate that patient stated to [facility] he doesn't want home hospice; He wants a "machine to accommodate his oxygen needs" at home so he can continue his chemotherapy.  Dr. Morales stated Dr. Miles visited patient at bedside and patient wishes to continue IV antibiotic therapy in hopes that O2 demand will decrease.    Gwen Matamoros, RN, BSN, CCM  Case " Management  [facility]  Ext. [phone]

## 2019-01-04 NOTE — ASSESSMENT & PLAN NOTE
Palliative Care Encounter / Goals of care discussion:     Narrative:   Michael Shetty is a 73 y.o. male patient with lung cancer, undergoing CTX, XRT (on hold for PE and PNA), clotting disorder s/p several PE, now severe hypoxema requiring increasing doses of O2 and likely superimposed PNA    1: Pain / Physical symptom Assessment:   - pain assesment: denies   - dyspnea assessment: controlled with high flow O2   - anxiety assessment: mild   - depression assessment: denies    2: Social Background    - lives with daughter    3: Palliative care meeting participants:    patient    4: Goals of care Discussion details:  1/4/19   - status quo with oxygen demands.    - discussed possibility that his O2 demands will not improve   - he is aware and is leaning towards making an attempt to go home very soon.    - we have discussed the possibility that he will have a marked decline should we have to drastically lower his FiO2 to allow for home hospice.    - will look into options with different companies to provide high flow at the home.      1/3/19   - feels better. Oxygenation appears improved, but remains on High flow (20L) and NR oxygen currently.    - slow process to wean FiO2.    - producing thick sputum   - encouraged to get out of bed if possible   - continue with ABX and re-assess at end of the week/ after weekend.    - goals remain to return home.   1/2/19   - tells me he is feeling better. O2 demand remains unchanged   - understands that we will assess his condition throughout the week for improvement   - hopeful he can return home   - no other questions or concerns at this time   - code status remains partial (a shock or a trial of compression but not more). He is aware that this is not optimal care.     12/31/18   - pt. Profound respiratory failure fails to improve   - the pt. Remains hopeful that a longer course of ABX will help   - in d/w Dr. Miles, his ABX were tailored to sensitivity and essentially  "effective Rx. Has been started ~ 3 days ago.    - he may improve some within 7 days of therapy, but if after that no improvement he is not likely to get better     - The pt. Goals is unchanged to "get home". He asked me if I looked into options for home O2 and I explained that high flow is possible on hospice,  but not at this level.    - Will continue to monitor for improvement of oxygenation. If he does not improve the decision will be to remain hospitalized (and do his papers here in the hospital / hospice). Or take a chance, reduce the O2 to home hospice acceptable level and discharge. I did make it clear before that he may not survive transport to the [facility].     12/28/18   Completed power of  yesterday, daughters are POA.    Discussed [place] post today   - we had a long discussion regarding his goals and therapies available to achieve this goal and interventions that may not be beneficial.    - He clearly states that his ultimate goal is to return to his home in [place]. He has some important papers he needs to change (living will).    - he is willing to do anything he can to go home.    - he realizes that his high O2 demands are a challenge and may prevent him from achieving this goal.    - agrees to continue current treatment over the weekend and re-evaluate on Monday     - we spoke about DNR and I have explained that to achieve best outcome all parts of resuscitation are needed including ventilation, chest compression and drugs. . He understands but would like to remain partial code with no intubation. He wants us to try for a bit and let him go if it does not work.    - He makes it clear he did not want the ventilator, feeding tubes, dialysis      - we spoke about hospice and he is agreeable to consider this over home health if it will get him to go home.    - discussed with Dr. Valdivia.   - will follow up  5: Goals of care Decisions / Recommendations / Plan:   - continue current care.    - " maybe some improvement?     6: Follow up plans:    daily    Thank you for your consult. I will follow along with you. Please call [phone] with questions.

## 2019-01-04 NOTE — RESIDENT HANDOFF
Handoff     Primary Team: [facility] A Room Number: 6087/6087 A     Patient Name: Michael Shetty MRN: [id number]     Date of Birth: [id number] Allergies: Shellfish containing products     Age: 73 y.o. Admit Date: 12/25/2018     Sex: male  BMI: Body mass index is 25.39 kg/m².     Code Status: Partial Code        Illness Level (current clinical status): Watcher - Yes -     Reason for Admission: Acute on chronic respiratory failure with hypoxemia    Brief HPI (pertinent PMH and diagnosis or differential diagnosis):   Mr. Shetty is a 72yo man with PMH of MTHFR mutation, history of PEs on Eliquis, CAD s/p CABGn with chronically elevated troponin, hypotension on midodrine, squamous cell lung carcinoma of NEIDA s/p 1 cycle of adjuvant CTX & radiation therapy (which was stopped due to recurrent pseudomonas PNA & PEs), COPD, 54 pack years (quit 1990), chronic severe pseudomonas pna (3 abx for over a year) who presentes as a transfer from [facility] for Interventional Cardiology evaluation for catheter assisted thrombolysis.          Procedure Date: NA    Hospital Course (updated, brief assessment by system or problem, significant events): Evaluated by cardiology, but lesion not amenable for thrombectomy. Pt did not want to return to [place] so remained at [facility]. Eliquis continued for PE. Pt also has a hx of recurrent pseudomonas PNA. Cx here growing MDR resistant pseudomonas. Started on a 2-3 week course of meropenem per ID recs. Currently on day 8. Has remained with high oxygen requirements despite antibiotics, diuresis and AC. Currently on comfort flow 20L, FiO2 60% and NRB. Palliative is on board. Pt is DNI, but insists he wants 1 round of CPR. He is also interested in returning home where he lives with his daughters, but can not currently be discharged with such high oxygen requirements. He has agreed to home hospice, but they can only supply 10L. 1/4/18: Pt given 40IV lasix. Prednisone 60-->30mg in attempt to taper off. He remains  awake and alert x 4 in no acute distress.      Tasks (specific, using if-then statements):   1- PE- continue eliquis    2-MDR pseudomonas PNA-   continue meropenem on day 9 (ID rec'd 2-3 wks; f/up final recs)  Will need PICC line if able to wean down O2 in order to continue abx at home    3-acute on chronic hypoxic respi fx in the setting of SCC Lung, PE, PNA, pulm edema?- (home 6L NC, currently on comfort flow 20L/60% and NRB)  Continue to diurese as tolerated  Continue abx per ID recs  Continue to taper off steroids  Continue to wean off oxygen    4- SCC of lung  Outpatient follow-up with heme-onc    5-chronic hypotension  BP at goal on atenolol. Not requiring midodrine.  Atenolol held today for SBP <110 while diuresing with IV lasix. Resume if needed.    6-Goals of care- palliative care following  Partial code, DNI and requesting 1 round of CPR in event of cardiac arrest  No longer interested in hospice. Wants to receive full abx tx in hopes of being able to continue chemo outpatient.               Contingency Plan (special circumstances anticipated and plan): call ICU if pt decompensates         Discharge Disposition: Hospice/Home    Mentored By: shikha

## 2019-01-04 NOTE — SUBJECTIVE & OBJECTIVE
"Interval History: failed attempts to wean FiO2. Pt. Feels "ok" today.       Medications:  Continuous Infusions:  Scheduled Meds:   albuterol-ipratropium  3 mL Nebulization Q6H    allopurinol  100 mg Oral Daily    apixaban  5 mg Oral BID    atenolol  12.5 mg Oral BID    budesonide  0.5 mg Nebulization Q12H    furosemide  20 mg Oral BID    meropenem (MERREM) IVPB  1 g Intravenous Q8H    multivitamin  1 tablet Oral Daily    pantoprazole  40 mg Oral Daily    pravastatin  40 mg Oral QHS    predniSONE  60 mg Oral Daily    tiotropium  1 capsule Inhalation Daily     PRN Meds:sodium chloride 0.9%    Objective:     Vital Signs (Most Recent):  Temp: 97.8 °F (36.6 °C) (01/04/19 0701)  Pulse: 66 (01/04/19 1101)  Resp: 17 (01/04/19 1101)  BP: 107/71 (01/04/19 1101)  SpO2: 95 % (01/04/19 1101) Vital Signs (24h Range):  Temp:  [97.5 °F (36.4 °C)-98.5 °F (36.9 °C)] 97.8 °F (36.6 °C)  Pulse:  [60-99] 66  Resp:  [15-28] 17  SpO2:  [74 %-99 %] 95 %  BP: ()/(62-87) 107/71     Weight: 78 kg (171 lb 15.3 oz)  Body mass index is 25.39 kg/m².    Review of Symptoms  Symptom Assessment (ESAS 0-10 scale)  ESAS 0 1 2 3 4 5 6 7 8 9 10   Pain x             Dyspnea     x         Anxiety  x            Nausea x             Depression   x            Anorexia x             Fatigue    x          Insomnia   x           Restlessness   x            Agitation x             CAM / Delirium x__ --  ___+   Constipation     x__ --  ___+   Diarrhea           _x_ --  ___+      Bowel Management Plan (BMP): No    Comments:     Pain Assessment: denies    OME in 24 hours:     Performance Status: 40    ECOG Performance Status Grade: 4 - Completely disabled    Physical Exam   Constitutional: He is oriented to person, place, and time. He appears well-developed. No distress.   Neck: Normal range of motion. No JVD present.   Pulmonary/Chest: No respiratory distress.   Abdominal: Soft.   Neurological: He is alert and oriented to person, place, and time. "   Skin: Skin is warm.       Significant Labs: All pertinent labs within the past 24 hours have been reviewed.  CBC:   Recent Labs   Lab 01/04/19  0325   WBC 9.67   HGB 12.2*   HCT 39.6*   *   *     BMP:  Recent Labs   Lab 01/04/19  0325         K 3.9      CO2 29   BUN 30*   CREATININE 0.7   CALCIUM 8.1*     LFT:  Lab Results   Component Value Date    AST 20 12/06/2018    ALKPHOS 53 12/06/2018    BILITOT 2.0 (H) 12/06/2018     Albumin:   Albumin   Date Value Ref Range Status   12/06/2018 3.6 3.6 - 5.1 g/dL Final   10/01/2018 3.1 3.1 - 4.7 g/dL      Protein:   Total Protein   Date Value Ref Range Status   12/06/2018 6.0 (L) 6.1 - 8.1 g/dL Final     Lactic acid:   No results found for: LACTATE    Significant Imaging: I have reviewed all pertinent imaging results/findings within the past 24 hours.    Advanced Directives::  Living Will: No  LaPOST: No  Do Not Resuscitate Status: Yes partial  Medical Power of : No    Decision-Making Capacity: Patient answered questions    Living Arrangements: Lives with family, Lives in apartment    Psychosocial/Cultural:  Patient's most important priorities:  Returning home     Patient's biggest concerns/fears:  Not being able to return home    Previous death/end of life care history:  no    Patient's goals/hopes:  Return home    Spiritual:     F- Donna and Belief: yes but not Protestant    I - Importance: some  .  C - Community: no    A - Address in Care: no

## 2019-01-05 NOTE — ASSESSMENT & PLAN NOTE
72yo presents with acute SOB on a background of PEs & MTHFR mutation found to have recurrent PE on CTPA & MDR Pseudomonas PNA   -Interventional cardiology consulted  -bedside TTE performed 01/05/2019  -interventional cardiology does not think patient is a candidate for catheter directed thrombectomy  -continuing eliquis 5mg BID for now for chronic recurrent PEs     -still requiring Comfort flow 20L & FiO2 70% and NRB 20L   -DNI status however would like 1 round of CPR  -meropenem day 9 (previously on zosyn which PNA was resistant to)   -patient and family not interested in hospice at this time. Opting to continue IV antibiotics to complete 2-3 wk course in hospital if necessary.

## 2019-01-05 NOTE — ASSESSMENT & PLAN NOTE
-duo nebs q6hr   -ICS budesonide 0.5mg q12hr  -tiotroprium 18mcg   -prednisone 60mg PO-->30mg PO (1/4)   Statement Selected

## 2019-01-05 NOTE — NURSING
2152 dose of meropenem not delivered due to faulty IVPB bag. Issue discovered when RN was hanging 0645 dose. Reported to Dr. Silva with [facility], no new orders given. JING.

## 2019-01-05 NOTE — ASSESSMENT & PLAN NOTE
-BP stable off midodrine  -BP borderline low this morning, so atenolol 15mg PO BID held  -will resume in afternoon if tolerated

## 2019-01-05 NOTE — PROGRESS NOTES
[facility]  Critical Care Medicine  Progress Note    Patient Name: Michael Shetty  MRN: [id number]  Admission Date: 12/25/2018  Hospital Length of Stay: 11 days  Code Status: Partial Code  Attending Provider: Sary Nichole MD  Primary Care Provider: Michael Ellsworth MD   Principal Problem: Acute on chronic respiratory failure with hypoxemia    Subjective:     HPI:  Mr. Shetty is a 72yo man with PMH of MTHFR mutation, history of PEs on Eliquis, CAD s/p CABGn with chronically elevated troponin, hypotension on midodrine, squamous cell lung carcinoma of NEIDA s/p 1 cycle of adjuvant CTX & radiation therapy (which was stopped due to recurrent pseudomonas PNA & PEs), COPD, 54 pack years (quit 1990), chronic severe pseudomonas pna (3 abx for over a year) who presents as a transfer from [facility] for Interventional Cardiology evaluation for catheter assisted thrombolysis.     HPI: 4 days ago he had acute SOB requring 10L of oxygen at home when he is at a baseline of 6L. At [facility] his CTPA showed known PE of RLL pulmonary artery & increased size of right sided pleural effusion. Doppler u/s of BLE was negative for DVT. OSH continued patient on home eliquis regimen of 5mg BID and per patient did not notice improvement in oxygen requirements so he was sent to [facility].      Vitals during initial interview: 135/75, -104, 25-20L comfort flow O2 FiO2 70%.       Of note patient has chronic severe pseudomonas PNA for which he takes an alternating regimen of doxycycline, cipro, & cefuroxime. He quit smoking 30 years ago.     Hospital/ICU Course:  12/26/2018 COOPEREO; discussed he is not a candidate for interventional cardiology & goals of care with palliative care consult for today    12/27/2018 NAEO 25L, 80% FiO2    12/29/2018 NAEO    12/31/2018 NAEO, will continue meropenem for one week and monitor for improvement in respiratory status.     1/1/2018: NAEO, continues to require 25L 80% FiO2 and 20L NRB    1/2/2018-1/3/2018:  No significant improvement in respiratory status. Continuing abx.  1/4/2018: No events overnight. On day 8 meropenem. States feeling better, but continues to require comfort flow 20L, Fio2 60-70% plus NRB. Pt desatted   1/5: Pt desatted when trying to transport to the medicine floor. Otherwise stable. Yesterday patient and family expressed not wanting to go home with hospice instead opting to remain in the hospital if necessary to complete 3 wk course of abx and to attempt to wean down O2.    Interval History/Significant Events: afebrile and HD stable overnight. desatted when trying to transport to medicine floor. No complaints.    Review of Systems   Constitutional: Negative for chills and fever.   HENT: Negative for drooling and sore throat.    Respiratory: Positive for cough. Negative for shortness of breath.    Cardiovascular: Negative for chest pain and leg swelling.   Gastrointestinal: Negative for diarrhea and vomiting.   Genitourinary: Negative for difficulty urinating and dysuria.   Musculoskeletal: Negative for back pain and joint swelling.   Neurological: Negative for light-headedness and headaches.   Psychiatric/Behavioral: Negative for agitation and confusion.     Objective:     Vital Signs (Most Recent):  Temp: 98 °F (36.7 °C) (01/05/19 0700)  Pulse: 75 (01/05/19 0803)  Resp: (!) 22 (01/05/19 0803)  BP: 91/60 (01/05/19 0700)  SpO2: (!) 88 % (01/05/19 0700) Vital Signs (24h Range):  Temp:  [97.1 °F (36.2 °C)-98.1 °F (36.7 °C)] 98 °F (36.7 °C)  Pulse:  [66-95] 75  Resp:  [14-30] 22  SpO2:  [86 %-99 %] 88 %  BP: ()/(50-79) 91/60   Weight: 78 kg (171 lb 15.3 oz)  Body mass index is 25.39 kg/m².      Intake/Output Summary (Last 24 hours) at 1/5/2019 0840  Last data filed at 1/5/2019 0700  Gross per 24 hour   Intake 1095 ml   Output 3020 ml   Net -1925 ml       Physical Exam   Constitutional: He is oriented to person, place, and time. He appears well-developed and well-nourished. No distress.   Sitting  up in bed in no distress.   HENT:   Head: Normocephalic and atraumatic.   Eyes: EOM are normal. Pupils are equal, round, and reactive to light.   Neck: Normal range of motion. Neck supple.   Cardiovascular: Normal rate, regular rhythm and normal heart sounds.   Pulmonary/Chest: He has no wheezes.   Crackles at Rt lung mid zone and base.   Abdominal: Soft. Bowel sounds are normal. He exhibits no distension.   Musculoskeletal: Normal range of motion. He exhibits no edema or tenderness.   Neurological: He is alert and oriented to person, place, and time. No cranial nerve deficit.   Skin: Skin is warm and dry.   Psychiatric: He has a normal mood and affect. His behavior is normal.       Vents:  Oxygen Concentration (%): 60 (01/05/19 0700)  Lines/Drains/Airways     Peripheral Intravenous Line                 Peripheral IV - Single Lumen 12/29/18 0615 Anterior;Right Forearm 7 days         Peripheral IV - Single Lumen 01/01/19 2304 Anterior;Right Forearm 3 days              Significant Labs:    CBC/Anemia Profile:  Recent Labs   Lab 01/04/19  0325 01/05/19  0330   WBC 9.67 12.08   HGB 12.2* 14.2   HCT 39.6* 46.1   * 142*   * 103*   RDW 17.6* 17.8*        Chemistries:  Recent Labs   Lab 01/04/19  0325 01/05/19  0330    139   K 3.9 3.8    95   CO2 29 33*   BUN 30* 33*   CREATININE 0.7 0.8   CALCIUM 8.1* 9.0       All pertinent labs within the past 24 hours have been reviewed.    Significant Imaging:  I have reviewed and interpreted all pertinent imaging results/findings within the past 24 hours.      ABG  No results for input(s): PH, PO2, PCO2, HCO3, BE in the last 168 hours.  Assessment/Plan:     Pulmonary   * Acute on chronic respiratory failure with hypoxemia    74yo presents with acute SOB on a background of PEs & MTHFR mutation found to have recurrent PE on CTPA & MDR Pseudomonas PNA   -Interventional cardiology consulted  -bedside TTE performed 01/05/2019  -interventional cardiology does not  think patient is a candidate for catheter directed thrombectomy  -continuing eliquis 5mg BID for now for chronic recurrent PEs     -still requiring Comfort flow 20L & FiO2 70% and NRB 20L   -DNI status however would like 1 round of CPR  -meropenem day 9 (previously on zosyn which PNA was resistant to)   -patient and family not interested in hospice at this time. Opting to continue IV antibiotics to complete 2-3 wk course in hospital if necessary.      Pseudomonas pneumonia    -appreciate ID recs  -recommend at least 2 weeks of meropenem for MDR PsA pneumonia. Pt wants to go home but would like to try IV antimicrobials and understands no PO option and would need PICC line for this as well as lab draws.          Chronic pneumonia    Chronic severe Pseudomonas PNA   -sputum cx growing pseudomonas & few GPC   -continue meropenem; will re-evaluate tomorrow  -ID consulted, recommended treatment for 3 weeks with meropenem      Pulmonary emphysema    -duo nebs q6hr   -ICS budesonide 0.5mg q12hr  -tiotroprium 18mcg   -prednisone 60mg PO-->30mg PO (1/4)     Cardiac/Vascular   Chronic systolic heart failure    EF 25%  Increase lasix 20mg PO daily to BID  Strict I/O  Plan to start GDMT as tolerated     Coronary artery disease involving coronary bypass graft    -status post CABG     Chronic hypotension    -BP stable off midodrine  -BP borderline low this morning, so atenolol 15mg PO BID held  -will resume in afternoon if tolerated     Hematology   Chronic pulmonary embolism    -per interventional cardiology consultation  -given active lung cancer he is not a candidate for thrombolysis  -CT chest reviewed, if clot in RLL pulmonary artery were to be lysed- it would end up perfusing his lung tissue that has had significant scarring & chronic severe pseudomonas pna  -Eliquis 5 mg BID     Oncology   Primary malignant neoplasm of left upper lobe of lung    73 y.o. male with diagnosis of NSCLC (Squamous cell)  - T2A lesion with 3.5cm  involving NEIDA  - s/p monotherapy with XRT by Dr Olvera; followed by chemotherapy with 3 cycles of carbo/taxol  - followed by Dr Ely with Pulm - seen him on Dec 5th and f/u on Feb 6th  - CTA on 5/18/17 showed decrease size in the tumor mass  - CT on 12/14/17 with stable lung findings  - Latest CT scans show slight increase in size of the NEIDA nodule  - he had repeat PET on 5/9/2018 with over stable except for the one spot in the right lung  - he saw Dr Ely again June 6th and saw Dr Olvera on June 19th  - he completed XRT x 5 on July 13th  -  discussed his case at the [facility] Tumor Board yesterday where Dr Az Castaneda and Dr Olvera, pathology, surgery, and radiology.   - the boards recommendation previously was to proceed with observation only with regular scans; they felt he was too high risk for surgery and immunologics  - however, repeat PET on 8/8/2018 is showing an enlarging nodule in NEIDA with increasing FDG activity and worrisome for progressive disease   - he received his first and only cycle of chemotherapy with carbo/gemzar the week before his prior hospitalization            Other   Goals of care, counseling/discussion    -Long discussion with patient today about DNI options. He has insight to his disease & prognosis. He would like to get home to get his finances in order. He understands his high O2 requirements are keeping him from going home but is hopeful that his PNA will get better and help with breathing. He would like CPR with bag valve mask but he does NOT want to be on ventilator machine as he does not want his family to hold the responsibility of pulling him off respiratory support one day.     -appreciate pall care assessment   -DNI    Per palliative care notes-  -patient aware of his disease & clinical prognosis   - lives with daughter  -Pt. Has good insight in his disease and is aware of his prognosis  -His goal is to return home as independent as possible. He would like to receive  home health after discharge.   -He is hopeful that with ABX his O2 requirement will decline and he will be able to return home     Goals defined              POA daughters              Will complete [place] [facility]              Discharge planning pending completion of antibiotics and weaning down O2 for safe transport home         Critical secondary to Patient has a condition that poses threat to life and bodily function: Severe Respiratory Distress, PNA      Critical care was time spent personally by me on the following activities: development of treatment plan with patient or surrogate and bedside caregivers, discussions with consultants, evaluation of patient's response to treatment, examination of patient, ordering and performing treatments and interventions, ordering and review of laboratory studies, ordering and review of radiographic studies, pulse oximetry, re-evaluation of patient's condition. This critical care time did not overlap with that of any other provider or involve time for any procedures.     Analia Morales MD  Critical Care Medicine  [facility]

## 2019-01-05 NOTE — ASSESSMENT & PLAN NOTE
73 y.o. male with diagnosis of NSCLC (Squamous cell)  - T2A lesion with 3.5cm involving NEIDA  - s/p monotherapy with XRT by Dr Olvera; followed by chemotherapy with 3 cycles of carbo/taxol  - followed by Dr Ely with Pulm - seen him on Dec 5th and f/u on Feb 6th  - CTA on 5/18/17 showed decrease size in the tumor mass  - CT on 12/14/17 with stable lung findings  - Latest CT scans show slight increase in size of the NEIDA nodule  - he had repeat PET on 5/9/2018 with over stable except for the one spot in the right lung  - he saw Dr Ely again June 6th and saw Dr Olvera on June 19th  - he completed XRT x 5 on July 13th  -  discussed his case at the [facility] Tumor Board yesterday where Dr Az Castaneda and Dr Olvera, pathology, surgery, and radiology.   - the boards recommendation previously was to proceed with observation only with regular scans; they felt he was too high risk for surgery and immunologics  - however, repeat PET on 8/8/2018 is showing an enlarging nodule in NEIDA with increasing FDG activity and worrisome for progressive disease   - he received his first and only cycle of chemotherapy with carbo/gemzar the week before his prior hospitalization

## 2019-01-05 NOTE — ASSESSMENT & PLAN NOTE
-Long discussion with patient today about DNI options. He has insight to his disease & prognosis. He would like to get home to get his finances in order. He understands his high O2 requirements are keeping him from going home but is hopeful that his PNA will get better and help with breathing. He would like CPR with bag valve mask but he does NOT want to be on ventilator machine as he does not want his family to hold the responsibility of pulling him off respiratory support one day.     -appreciate pall care assessment   -DNI    Per palliative care notes-  -patient aware of his disease & clinical prognosis   - lives with daughter  -Pt. Has good insight in his disease and is aware of his prognosis  -His goal is to return home as independent as possible. He would like to receive home health after discharge.   -He is hopeful that with ABX his O2 requirement will decline and he will be able to return home     Goals defined              POA daughters              Will complete [place] [facility]              Discharge planning pending completion of antibiotics and weaning down O2 for safe transport home

## 2019-01-05 NOTE — PLAN OF CARE
Problem: Adult Inpatient Plan of Care  Goal: Plan of Care Review  Outcome: Ongoing (interventions implemented as appropriate)    No acute events throughout day. See vital signs and assessments in flowsheets. See below for updates on today's progress.     Pulmonary: comfort flow at 10-15L, 50-60% titrated to maintain O2 sats >88%. In addition, nonrebreather used to help increase saturation. Clubbing noted    Cardiovascular: NSR, HR 70-90s, BP /50-70s, MAP >64. BP drops when pt is asleep. Pulses 2+/2+    Neurological: AAOx4, afebrile. PERRL 3mm, pt follows commands and moves all extremities spontaneously    Gastrointestinal: BS active in all quads, regular diet. No BM throughout shift    Genitourinary: Pt voids spontaneously, /shift, yellow    Integumentary/Other: skin intact, foam dressing in place    Patient progressing towards goals as tolerated, plan of care communicated and reviewed with Michael Shetty and family. All concerns addressed. Will continue to monitor.

## 2019-01-05 NOTE — SUBJECTIVE & OBJECTIVE
Interval History/Significant Events: afebrile and HD stable overnight. desatted when trying to transport to medicine floor. No complaints.    Review of Systems   Constitutional: Negative for chills and fever.   HENT: Negative for drooling and sore throat.    Respiratory: Positive for cough. Negative for shortness of breath.    Cardiovascular: Negative for chest pain and leg swelling.   Gastrointestinal: Negative for diarrhea and vomiting.   Genitourinary: Negative for difficulty urinating and dysuria.   Musculoskeletal: Negative for back pain and joint swelling.   Neurological: Negative for light-headedness and headaches.   Psychiatric/Behavioral: Negative for agitation and confusion.     Objective:     Vital Signs (Most Recent):  Temp: 98 °F (36.7 °C) (01/05/19 0700)  Pulse: 75 (01/05/19 0803)  Resp: (!) 22 (01/05/19 0803)  BP: 91/60 (01/05/19 0700)  SpO2: (!) 88 % (01/05/19 0700) Vital Signs (24h Range):  Temp:  [97.1 °F (36.2 °C)-98.1 °F (36.7 °C)] 98 °F (36.7 °C)  Pulse:  [66-95] 75  Resp:  [14-30] 22  SpO2:  [86 %-99 %] 88 %  BP: ()/(50-79) 91/60   Weight: 78 kg (171 lb 15.3 oz)  Body mass index is 25.39 kg/m².      Intake/Output Summary (Last 24 hours) at 1/5/2019 0840  Last data filed at 1/5/2019 0700  Gross per 24 hour   Intake 1095 ml   Output 3020 ml   Net -1925 ml       Physical Exam   Constitutional: He is oriented to person, place, and time. He appears well-developed and well-nourished. No distress.   Sitting up in bed in no distress.   HENT:   Head: Normocephalic and atraumatic.   Eyes: EOM are normal. Pupils are equal, round, and reactive to light.   Neck: Normal range of motion. Neck supple.   Cardiovascular: Normal rate, regular rhythm and normal heart sounds.   Pulmonary/Chest: He has no wheezes.   Crackles at Rt lung mid zone and base.   Abdominal: Soft. Bowel sounds are normal. He exhibits no distension.   Musculoskeletal: Normal range of motion. He exhibits no edema or tenderness.    Neurological: He is alert and oriented to person, place, and time. No cranial nerve deficit.   Skin: Skin is warm and dry.   Psychiatric: He has a normal mood and affect. His behavior is normal.       Vents:  Oxygen Concentration (%): 60 (01/05/19 0700)  Lines/Drains/Airways     Peripheral Intravenous Line                 Peripheral IV - Single Lumen 12/29/18 0615 Anterior;Right Forearm 7 days         Peripheral IV - Single Lumen 01/01/19 2304 Anterior;Right Forearm 3 days              Significant Labs:    CBC/Anemia Profile:  Recent Labs   Lab 01/04/19  0325 01/05/19  0330   WBC 9.67 12.08   HGB 12.2* 14.2   HCT 39.6* 46.1   * 142*   * 103*   RDW 17.6* 17.8*        Chemistries:  Recent Labs   Lab 01/04/19 0325 01/05/19  0330    139   K 3.9 3.8    95   CO2 29 33*   BUN 30* 33*   CREATININE 0.7 0.8   CALCIUM 8.1* 9.0       All pertinent labs within the past 24 hours have been reviewed.    Significant Imaging:  I have reviewed and interpreted all pertinent imaging results/findings within the past 24 hours.

## 2019-01-06 NOTE — PLAN OF CARE
Notified by Artificial Intelligence early warning system of deterioration risk.  Immediately responded to bedside upon pulling up vital signs to assess patient and called ICU while on the way there.  However, ran into other members of ICU team including ICU fellow in the [facility] who overhead phone call and were familiar with patient who explained the situation regarding no change in clinical condition over the past week and the unlikelihood of improvement, and to base clinical decisions based on how the patient is feeling and not by numbers.  Patient seen at bedside, who is sleeping comfortably.

## 2019-01-06 NOTE — PLAN OF CARE
Problem: Adult Inpatient Plan of Care  Goal: Plan of Care Review    Report given to floor RN Samia(sp?), currently waiting for room to be cleaned    Pulmonary: Pt initially on comfort-flow at 30lpm, 60% and NRB at 25lpm. Pt weaned to comfort-flow only at 25lpm 70%. SpO2 88-90%. Lungs diminished in lower lobes. RR 20's    Cardiovascular: -110, MAP 70's-80's, pulses 2+ upper extremities, 1+ lower    Neurological: Alert and oriented to person, place, time and event. No focal neuro deficit.    Gastrointestinal: Regular diet tolerated well. 1 BM    Genitourinary: Given IVP lasix, voiding via urinal.      Integumentary/Other: Skin intact, one IV removed as infiltrated.    Infusions: None    Patient progressing towards goals as tolerated, plan of care communicated and reviewed with Michael Shetty and family. All concerns addressed. Will continue to monitor.

## 2019-01-06 NOTE — SUBJECTIVE & OBJECTIVE
"Interval History/Significant Events: no issues per patient overnight. No symptoms from chronic SOB, says he initially had SOB when he went to OSH, once he transferred here and started treatment with penem for pneumonia is when he finally started feeling better and was no longer symptomatic anymore (from chronic SOB issues from lung cancer history). Speaking in full sentences and knows that he "Desats" and everyone gets nervous but he doesn't feel bad. Goal is to get home on as much oxygen as home company will let himi go home on. hes calling that company tomorrow to see what that is. Discussed current plan: continue iv antibiotics now for pneumonia, see if we can titrate oxygen in a few days after more treatment for pneumonia.   He confirmed he wants 1-2 rounds of CPR (compressions) only if a code situation were to arise and then to stop if it didn't work. No intubation.  No other issues- requested bedside commode, sponge bath today and resuming PT/OT on the floor to improve strength.      Review of Systems   Constitutional: Negative for chills and fever.   HENT: Negative for drooling and sore throat.    Respiratory: Positive for cough. Negative for shortness of breath.    Cardiovascular: Negative for chest pain and leg swelling.   Gastrointestinal: Negative for diarrhea and vomiting.   Genitourinary: Negative for difficulty urinating and dysuria.   Musculoskeletal: Negative for back pain and joint swelling.   Neurological: Negative for light-headedness and headaches.   Psychiatric/Behavioral: Negative for agitation and confusion.     Objective:     Vital Signs (Most Recent):  Temp: 97.3 °F (36.3 °C) (01/06/19 1533)  Pulse: (!) 113 (01/06/19 1533)  Resp: (!) 21 (01/06/19 1533)  BP: (!) 90/53 (01/06/19 1533)  SpO2: (!) 89 % (01/06/19 1533) Vital Signs (24h Range):  Temp:  [96 °F (35.6 °C)-98 °F (36.7 °C)] 97.3 °F (36.3 °C)  Pulse:  [] 113  Resp:  [18-32] 21  SpO2:  [81 %-95 %] 89 %  BP: ()/(53-79) 90/53 "   Weight: 78 kg (171 lb 15.3 oz)  Body mass index is 25.39 kg/m².      Intake/Output Summary (Last 24 hours) at 1/6/2019 1759  Last data filed at 1/6/2019 1500  Gross per 24 hour   Intake 365 ml   Output 1225 ml   Net -860 ml       Physical Exam   Constitutional: He is oriented to person, place, and time. He appears well-developed and well-nourished. No distress.   Sitting up in bed in no distress.   HENT:   Head: Normocephalic and atraumatic.   Eyes: EOM are normal. Pupils are equal, round, and reactive to light.   Neck: Normal range of motion. Neck supple.   Cardiovascular: Normal rate, regular rhythm and normal heart sounds.   Pulmonary/Chest: He has no wheezes.   Crackles at Rt lung mid zone and base.   Abdominal: Soft. Bowel sounds are normal. He exhibits no distension.   Musculoskeletal: Normal range of motion. He exhibits no edema or tenderness.   Neurological: He is alert and oriented to person, place, and time. No cranial nerve deficit.   Skin: Skin is warm and dry.   Psychiatric: He has a normal mood and affect. His behavior is normal.       Vents:  Oxygen Concentration (%): 60 (01/06/19 1533)  Lines/Drains/Airways     Peripheral Intravenous Line                 Peripheral IV - Single Lumen 01/01/19 2304 Anterior;Right Forearm 4 days              Significant Labs:    CBC/Anemia Profile:  Recent Labs   Lab 01/05/19  0330 01/06/19  0329   WBC 12.08 9.49   HGB 14.2 13.9*   HCT 46.1 44.5   * 139*   * 103*   RDW 17.8* 17.7*        Chemistries:  Recent Labs   Lab 01/05/19  0330 01/06/19  0329    143   K 3.8 3.8   CL 95 100   CO2 33* 31*   BUN 33* 45*   CREATININE 0.8 0.9   CALCIUM 9.0 8.4*       All pertinent labs within the past 24 hours have been reviewed.    Significant Imaging:  I have reviewed and interpreted all pertinent imaging results/findings within the past 24 hours.

## 2019-01-06 NOTE — SIGNIFICANT EVENT
[facility]      Admit Date: 2018  LOS: 12  Code Status: Partial Code   Date of Consult: 2019  : 1945  Age: 73 y.o.  Weight:   Wt Readings from Last 1 Encounters:   19 78 kg (171 lb 15.3 oz)     Sex: male  Bed: [address] [place] [address] A:   MRN: [id number]  Attending Physician: Sary Nichole MD  Primary Service: [facility] [place] [place] [facility] MED A  Time AI Alert Received: 1:00 pm  Time at Bedside: 1;30 pm           Patient found sitting up in bed with his family at bedside reading the newspaper. Patient in no respiratory distress and states he actually feels like he is improving. Patient on high flow oxygen at 15 liters but not in any respiratory distress. Patient just stepped down from ICU overnight and being treated for MDR Pseudomass pneumonia. Patient is DNI. Notified primary team who are aware of patient. No need for return to ICU at this time and patient receiving appropriate care on the unit he is located on.     Vital Signs (Most Recent):  Temp: 96.1 °F (35.6 °C) (19 1251)  Pulse: 109 (19 1251)  Resp: (!) 21 (19 1251)  BP: 110/70 (19 1251)  SpO2: (!) 81 % (19 1251) Vital Signs (24h Range):  Temp:  [96 °F (35.6 °C)-98 °F (36.7 °C)] 96.1 °F (35.6 °C)  Pulse:  [] 109  Resp:  [18-38] 21  SpO2:  [81 %-95 %] 81 %  BP: ()/(61-79) 110/70         This is an  Artificial Intelligence Notification.     Artificial Intelligence alert discussed with Primary team:  Dr. Sary Nichole    Please place a Critical Care consult if evaluation/consultation is needed  The Critical Care Fellow can be reached at x [phone]    YOUNG HENDERSON MD  Attending Staff Physician   Department of Hospital Medicine, [facility] on [facility]  Pager: [phone]  Spectralink: [phone]

## 2019-01-06 NOTE — HOSPITAL COURSE
1/6: stepped down to hospital med to continue meropnem for pseudomonas pneumonia and continue high flow nasal cannula for acute on chronic respiratory failure. No complaints on exam today, reviewed ICU course and goals of care and in agreement with current plan. Requested continue PT/OT evals to move patient out of bed as tolerated to help regain strength.   1/7: doing well overall, only mild SOB when going to bedside commode. Day 10 of planned 21 day course of penem. Needs PICC- can consult tomorrow for placement. Discussed plan further- he wants to go home with IV antibiotics on high flow nasal cannula and doesn't want to go to LTAC and wants to go home as soon as possible with IV antibiotics, discussed that when stepped down ICU team wanted to at least do half the course (15 days) in house to see if could titrate down a little bit before transport as the concern was the hour long drive to [facility] and cant do HFNC in ambulance and could risk arrythmia if O2 sats dip very low even if patient asymptomatic and could have cardiac event in ambulance, he understands that, discussed further inpatient antibiotics this week to see if titrating a possibility at all before attempteing to set up home infusions/high flow NC  1/8: Marginally improved dyspnea, awaiting improvement in oxygen requirements. Midline ordered for IV antibiotic administration   1/9: Stable oxygen requirements, stable, adamant about only returning home  1/10: Stable oxygen requirements, decreasing frequency of lab draws that have been stable for days. Acute event following choking episode later in the afternoon, recovered  1/11: Trying to slowly wean FiO2  1/12: Stable examination, FiO2 weaned with quick desaturation and dyspnea  1/13: Stable exam, tolerating FiO2 of 70%  1/14: Stable exam, stable FiO2 requirements, had an lengthy discussion about his current limited options, now open to LTAC with referrals in place

## 2019-01-06 NOTE — PLAN OF CARE
Problem: Adult Inpatient Plan of Care  Goal: Plan of Care Review  Outcome: Ongoing (interventions implemented as appropriate)   01/06/19 4255   Plan of Care Review   Plan of Care Reviewed With patient   Pt remains free of falls. Care plan reviewed with pt. Understanding voiced, will cont to monitor.

## 2019-01-06 NOTE — PROGRESS NOTES
Pt transported to room 656 on nonrebreather on 15 lpm .tolerated well placed on comfort flow on documented setting upon arrival to room

## 2019-01-06 NOTE — NURSING TRANSFER
Nursing Transfer Note      1/5/2019     Transfer To: 656    Transfer via bed    Transfer with  to O2, cardiac monitoring    Transported by RN and RT    Medicines sent: Atenolol    Chart send with patient: Yes    Notified: spouse    Patient reassessed at: 1945, 1/5/2019     Upon arrival to floor: patient oriented to room, call bell in reach and bed in lowest position

## 2019-01-07 NOTE — PLAN OF CARE
Problem: Physical Therapy Goal  Goal: Physical Therapy Goal  Pt does not require additional acute PT services at this time d/t baseline c functional mobility    Pt educated on asking medical staff for PT consult if changes in functional status occurs. - v/u        Outcome: Outcome(s) achieved Date Met: 01/07/19  Artem and D/C from acute PT services    Felipe Chavis DPT  1/7/2019

## 2019-01-07 NOTE — PLAN OF CARE
Problem: Adult Inpatient Plan of Care  Goal: Plan of Care Review  Outcome: Ongoing (interventions implemented as appropriate)  POC reviewed, high flow O2 per orders, I/O Q shift, continue PIV and antibiotics, encouraged frequent repositioning, call for assist. To get up OOB.

## 2019-01-07 NOTE — PROGRESS NOTES
"[facility] Hospital Medicine  Progress Note    Patient Name: Michael Shetty  MRN: [id number]  Patient Class: IP- Inpatient   Admission Date: 12/25/2018  Length of Stay: 12 days  Attending Physician: Sary Nichole MD  Primary Care Provider: Michael Ellsworth MD    [facility] Medicine Team: [facility] [place] [facility] MED A Sary Nichole MD    Subjective:     Principal Problem:Acute on chronic respiratory failure with hypoxemia    HPI:  No notes on file    Hospital Course:  1/6: stepped down to hospital med to continue meropnem for pseudomonas pneumonia and continue high flow nasal cannula for acute on chronic respiratory failure. No complaints on exam today, reviewed ICU course and goals of care and in agreement with current plan. Requested continue PT/OT evals to move patient out of bed as tolerated to help regain strength.     Interval History/Significant Events: no issues per patient overnight. No symptoms from chronic SOB, says he initially had SOB when he went to OSH, once he transferred here and started treatment with penem for pneumonia is when he finally started feeling better and was no longer symptomatic anymore (from chronic SOB issues from lung cancer history). Speaking in full sentences and knows that he "Desats" and everyone gets nervous but he doesn't feel bad. Goal is to get home on as much oxygen as home company will let himi go home on. hes calling that company tomorrow to see what that is. Discussed current plan: continue iv antibiotics now for pneumonia, see if we can titrate oxygen in a few days after more treatment for pneumonia.   He confirmed he wants 1-2 rounds of CPR (compressions) only if a code situation were to arise and then to stop if it didn't work. No intubation.  No other issues- requested bedside commode, sponge bath today and resuming PT/OT on the floor to improve strength.      Review of Systems   Constitutional: Negative for chills and fever.   HENT: Negative for drooling " and sore throat.    Respiratory: Positive for cough. Negative for shortness of breath.    Cardiovascular: Negative for chest pain and leg swelling.   Gastrointestinal: Negative for diarrhea and vomiting.   Genitourinary: Negative for difficulty urinating and dysuria.   Musculoskeletal: Negative for back pain and joint swelling.   Neurological: Negative for light-headedness and headaches.   Psychiatric/Behavioral: Negative for agitation and confusion.     Objective:     Vital Signs (Most Recent):  Temp: 97.3 °F (36.3 °C) (01/06/19 1533)  Pulse: (!) 113 (01/06/19 1533)  Resp: (!) 21 (01/06/19 1533)  BP: (!) 90/53 (01/06/19 1533)  SpO2: (!) 89 % (01/06/19 1533) Vital Signs (24h Range):  Temp:  [96 °F (35.6 °C)-98 °F (36.7 °C)] 97.3 °F (36.3 °C)  Pulse:  [] 113  Resp:  [18-32] 21  SpO2:  [81 %-95 %] 89 %  BP: ()/(53-79) 90/53   Weight: 78 kg (171 lb 15.3 oz)  Body mass index is 25.39 kg/m².      Intake/Output Summary (Last 24 hours) at 1/6/2019 1759  Last data filed at 1/6/2019 1500  Gross per 24 hour   Intake 365 ml   Output 1225 ml   Net -860 ml       Physical Exam   Constitutional: He is oriented to person, place, and time. He appears well-developed and well-nourished. No distress.   Sitting up in bed in no distress.   HENT:   Head: Normocephalic and atraumatic.   Eyes: EOM are normal. Pupils are equal, round, and reactive to light.   Neck: Normal range of motion. Neck supple.   Cardiovascular: Normal rate, regular rhythm and normal heart sounds.   Pulmonary/Chest: He has no wheezes.   Crackles at Rt lung mid zone and base.   Abdominal: Soft. Bowel sounds are normal. He exhibits no distension.   Musculoskeletal: Normal range of motion. He exhibits no edema or tenderness.   Neurological: He is alert and oriented to person, place, and time. No cranial nerve deficit.   Skin: Skin is warm and dry.   Psychiatric: He has a normal mood and affect. His behavior is normal.       Vents:  Oxygen Concentration (%):  60 (01/06/19 1533)  Lines/Drains/Airways     Peripheral Intravenous Line                 Peripheral IV - Single Lumen 01/01/19 2304 Anterior;Right Forearm 4 days              Significant Labs:    CBC/Anemia Profile:  Recent Labs   Lab 01/05/19  0330 01/06/19  0329   WBC 12.08 9.49   HGB 14.2 13.9*   HCT 46.1 44.5   * 139*   * 103*   RDW 17.8* 17.7*        Chemistries:  Recent Labs   Lab 01/05/19  0330 01/06/19  0329    143   K 3.8 3.8   CL 95 100   CO2 33* 31*   BUN 33* 45*   CREATININE 0.8 0.9   CALCIUM 9.0 8.4*       All pertinent labs within the past 24 hours have been reviewed.    Significant Imaging:  I have reviewed and interpreted all pertinent imaging results/findings within the past 24 hours.    Assessment/Plan:      * Acute on chronic respiratory failure with hypoxemia    -hx of PES and lung cancer with recurrent PE now- on 6L O2 at home prior to admit- sent here from OSH to see if a tpa candidate to the PE- cards said not a candidate, also with multidrug resistant pneumonia causing worsening of chronic respiratory failure  -on high flow NC at 20 L now and 60% FiO2- was unable to wean and sig symptoms- once meropenem was started patient started feeling better however and symptoms improved, however cant wean from high flow in the ICU from 20L recently, even though looks much better symptom wise, have discussed many options/palliative etc.  -plan now: 3 weeks of meropenem treatment per ID recs for pneumonia to give best shot at improving resp status, continue to treat here now in hopes that he can come down off high flow 20 L ( per palliative hospice companies could do 18-20L on home hospice, but if on home hospice couldn't do penem antibiotics it doesn't seem). Patient amenable to home hospice and his goal is to get home on oxygen and to be at home but wants to give best shot at treating pneumonia to see if this improves resp failure at this time, which is reasonable   -plan: continue  meropenem, continue to attempt to see if he can wean off 20L, if can wean to an amount his home o2 company can do, then could go home to finish course of IV meropenem. If does not improve on meropenem after 3 week course, then will go home with home hospice on high flow as home hospice company states they can do that (per palliative patient would then be agreeable, is aware ambulance cant do that high of flow, is aware he could pass in ambulance, etc)  -nursing aware not to call MD unless patient sustains sats under 80 AND Is symptomatic, which was ICU order as well. Change o2 to 100% when eating for desats currently. Would ignore AI alerts as he will set this off frequently and is stable       Chronic systolic heart failure    -EF of 25%, home lasix increased to BID in ICU.   -watch I/O closely. Not on ACE, BB held, given overall situation, not inclined to start currently       Pseudomonas pneumonia    -see resp fx for plans, 3 weeks of penem planned.         Goals of care, counseling/discussion    -see resp fx for current plans       Coronary artery disease involving coronary bypass graft    -s/p CABG. Home BB currently held. Pravastatin on now, no asa on, would hold off on adding back given overall situation       Pulmonary emphysema    -duonebs, budesonide, spiriva, prednisone       Chronic hypotension    -stable off home midodrine.        Chronic pulmonary embolism    - see resp fx       Primary malignant neoplasm of left upper lobe of lung    -NEIDA SCC s/p carbo/taxol and XRT, follows with onc as outpatient, stage 2       VTE Risk Mitigation (From admission, onward)        Ordered     apixaban tablet 5 mg  2 times daily      12/25/18 1648     IP VTE HIGH RISK PATIENT  Once      12/25/18 1413          Dispo:  See resp fx above for most of big details regarding plan. Also sticky notes    icu attestations from staff and palliative notes are good resources if needed    Code status: 1-2 rounds of chest compressions  then stop. Do not intubate.           Sary Nichole MD  Department of Hospital Medicine   [facility]

## 2019-01-07 NOTE — ASSESSMENT & PLAN NOTE
"Palliative Care Encounter / Goals of care discussion:     Narrative:   Michael Shetty is a 73 y.o. male patient with lung cancer, undergoing CTX, XRT (on hold for PE and PNA), clotting disorder s/p several PE, now severe hypoxema requiring increasing doses of O2 and likely superimposed PNA    1: Pain / Physical symptom Assessment:   - pain assesment: denies   - dyspnea assessment: little sx. At rest but severely short of breath when transitioning.    - anxiety assessment: mild, especially when oxygen drops.    - depression assessment: denies    2: Social Background    - lives with daughter    3: Palliative care meeting participants:    Patient and daughter    4: Goals of care Discussion details:  1/7/19   - pt. Overall feels improved. Using less non rebreather O2 as needed.    - on my last visit the pt. Was wanting to go home with hospice as soon as possible.    - I had discussed with ICU team and accepting hospitalist possibilities of discharge to home with hospice over the weekend.    - hospice able to provide as much as 18 L high flow at home, transport likely able to provide 18-20 L as well, but will need to be arranged (need extra O2 tanks on the van).    - given pt. Lives in [place] the long transport time must be considered.      - on my visit today the pt. Wishes have changed. He is wanting to complete 3 wks of IV meropenem and continue with PT in hopes that he can get back on chemotherapy   - when asked about his previous plan and our past discussions he states "I am not going home with hospice, I am going home with home health".    - acknowledged wishes and not discussed further.    - I am concerned that the pt. Remains hopeful for recovery, however he has not improved when therapy so far and home health will only be able to provide regular flow O2 and will likely not accept a patient with profound hypoxemia with transitioning or ambulation.    - will continue to follow and provide support. We will " "keep discussing his options and goals   - His "swinging" wishes may be part of normal coping.     1/4/19   - status quo with oxygen demands.    - discussed possibility that his O2 demands will not improve   - he is aware and is leaning towards making an attempt to go home very soon.    - we have discussed the possibility that he will have a marked decline should we have to drastically lower his FiO2 to allow for home hospice.    - will look into options with different companies to provide high flow at the home.      1/3/19   - feels better. Oxygenation appears improved, but remains on High flow (20L) and NR oxygen currently.    - slow process to wean FiO2.    - producing thick sputum   - encouraged to get out of bed if possible   - continue with ABX and re-assess at end of the week/ after weekend.    - goals remain to return home.   1/2/19   - tells me he is feeling better. O2 demand remains unchanged   - understands that we will assess his condition throughout the week for improvement   - hopeful he can return home   - no other questions or concerns at this time   - code status remains partial (a shock or a trial of compression but not more). He is aware that this is not optimal care.     12/31/18   - pt. Profound respiratory failure fails to improve   - the pt. Remains hopeful that a longer course of ABX will help   - in d/w Dr. Miles, his ABX were tailored to sensitivity and essentially effective Rx. Has been started ~ 3 days ago.    - he may improve some within 7 days of therapy, but if after that no improvement he is not likely to get better     - The pt. Goals is unchanged to "get home". He asked me if I looked into options for home O2 and I explained that high flow is possible on hospice,  but not at this level.    - Will continue to monitor for improvement of oxygenation. If he does not improve the decision will be to remain hospitalized (and do his papers here in the hospital / hospice). Or take a " chance, reduce the O2 to home hospice acceptable level and discharge. I did make it clear before that he may not survive transport to the [facility].     12/28/18   Completed power of  yesterday, daughters are POA.    Discussed [place] post today   - we had a long discussion regarding his goals and therapies available to achieve this goal and interventions that may not be beneficial.    - He clearly states that his ultimate goal is to return to his home in [place]. He has some important papers he needs to change (living will).    - he is willing to do anything he can to go home.    - he realizes that his high O2 demands are a challenge and may prevent him from achieving this goal.    - agrees to continue current treatment over the weekend and re-evaluate on Monday     - we spoke about DNR and I have explained that to achieve best outcome all parts of resuscitation are needed including ventilation, chest compression and drugs. . He understands but would like to remain partial code with no intubation. He wants us to try for a bit and let him go if it does not work.    - He makes it clear he did not want the ventilator, feeding tubes, dialysis      - we spoke about hospice and he is agreeable to consider this over home health if it will get him to go home.    - discussed with Dr. Valdivia.   - will follow up  5: Goals of care Decisions / Recommendations / Plan:   - continue current care.    - maybe some improvement?     6: Follow up plans:    daily    Thank you for your consult. I will follow along with you. Please call [phone] with questions.

## 2019-01-07 NOTE — PROGRESS NOTES
Pt was short of breath and desatted after being cleaned up increased Fio2 to documented setting to bring pt back up will continue to monitor pt and wean as popssible

## 2019-01-07 NOTE — CARE UPDATE
RN Proactive Rounding Note  Time of Visit:     Admit Date: 2018  LOS: 12  Code Status: Partial Code   Date of Visit: 2019  : 1945  Age: 73 y.o.  Sex: male  Race: White  Bed: [address] [place] [address] A:   MRN: [id number]  Was the patient discharged from an ICU this admission? yes   Was the patient discharged from a PACU within last 24 hours?  no  Did the patient receive conscious sedation/general anesthesia in last 24 hours?  no  Was the patient in the ED within the past 24 hours?  no  Was the patient started on NIPPV within the past 24 hours?  no  Attending Physician: Sary Nichole MD  Primary Service: [facility] MED A      ASSESSMENT:     Abnormal Vital Signs: SpO2 in the 80s  Clinical Issues: Respiratory     INTERVENTIONS/ RECOMMENDATIONS:     Patient on comfort flow. Denies SOB and has no complaints at this time. SpO2 96% with HR 98 at time of assessment.    Discussed plan of care with Eli LAGOS.    PHYSICIAN ESCALATION:     Yes/No  no    Disposition: Remain in room 656A.    FOLLOW-UP/CONTINGENCY:     Call back the Rapid Response Nurse at [phone] for additional questions or concerns.

## 2019-01-07 NOTE — ASSESSMENT & PLAN NOTE
-EF of 25%, home lasix increased to BID in ICU.   -watch I/O closely. Not on ACE, BB held, given overall situation, not inclined to start currently

## 2019-01-07 NOTE — SUBJECTIVE & OBJECTIVE
Interval History: Stepped down to hospital medicine. Continues on Morepenem. Overall pt. Feels he is improving.   Remains on high flow at 25L, using less NR mask PRN.   Still very symptomatic just transferring from bed to commode.     Medications:  Continuous Infusions:  Scheduled Meds:   albuterol-ipratropium  3 mL Nebulization Q6H    allopurinol  100 mg Oral Daily    apixaban  5 mg Oral BID    budesonide  0.5 mg Nebulization Q12H    furosemide  20 mg Oral BID    meropenem (MERREM) IVPB  1 g Intravenous Q8H    multivitamin  1 tablet Oral Daily    pantoprazole  40 mg Oral Daily    pravastatin  40 mg Oral QHS    predniSONE  30 mg Oral Daily    tiotropium  1 capsule Inhalation Daily     PRN Meds:sodium chloride 0.9%    Objective:     Vital Signs (Most Recent):  Temp: 97.8 °F (36.6 °C) (01/07/19 1207)  Pulse: (!) 127 (01/07/19 1207)  Resp: (!) 24 (01/07/19 1207)  BP: 108/73 (01/07/19 1207)  SpO2: (!) 81 % (01/07/19 1207) Vital Signs (24h Range):  Temp:  [96.1 °F (35.6 °C)-97.8 °F (36.6 °C)] 97.8 °F (36.6 °C)  Pulse:  [] 127  Resp:  [18-26] 24  SpO2:  [71 %-92 %] 81 %  BP: ()/(53-73) 108/73     Weight: 78 kg (171 lb 15.3 oz)  Body mass index is 25.39 kg/m².    Review of Symptoms  Symptom Assessment (ESAS 0-10 scale)  ESAS 0 1 2 3 4 5 6 7 8 9 10   Pain x             Dyspnea  x            Anxiety  x            Nausea x             Depression   x            Anorexia x             Fatigue  x            Insomnia   x           Restlessness   x            Agitation x             CAM / Delirium x__ --  ___+   Constipation     x__ --  ___+   Diarrhea           _x_ --  ___+      Bowel Management Plan (BMP): No    Comments:     Pain Assessment: denies    OME in 24 hours:     Performance Status: 40    ECOG Performance Status Grade: 4 - Completely disabled    Physical Exam   Constitutional: He is oriented to person, place, and time. He appears well-developed. No distress.   Neck: Normal range of motion. No JVD  present.   Pulmonary/Chest: No respiratory distress.   Abdominal: Soft.   Neurological: He is alert and oriented to person, place, and time.   Skin: Skin is warm.       Significant Labs: All pertinent labs within the past 24 hours have been reviewed.  CBC:   Recent Labs   Lab 01/07/19  0352   WBC 10.08   HGB 13.0*   HCT 43.5   *   *     BMP:  Recent Labs   Lab 01/07/19  0352   *      K 4.1      CO2 31*   BUN 40*   CREATININE 0.9   CALCIUM 8.6*     LFT:  Lab Results   Component Value Date    AST 20 12/06/2018    ALKPHOS 53 12/06/2018    BILITOT 2.0 (H) 12/06/2018     Albumin:   Albumin   Date Value Ref Range Status   12/06/2018 3.6 3.6 - 5.1 g/dL Final   10/01/2018 3.1 3.1 - 4.7 g/dL      Protein:   Total Protein   Date Value Ref Range Status   12/06/2018 6.0 (L) 6.1 - 8.1 g/dL Final     Lactic acid:   No results found for: LACTATE    Significant Imaging: I have reviewed all pertinent imaging results/findings within the past 24 hours.    Advanced Directives::  Living Will: No  LaPOST: No  Do Not Resuscitate Status: Yes partial  Medical Power of : yes, daughter    Decision-Making Capacity: Patient answered questions    Living Arrangements: Lives with family, Lives in apartment    Psychosocial/Cultural:  Patient's most important priorities:  Returning home     Patient's biggest concerns/fears:  Not being able to return home    Previous death/end of life care history:  no    Patient's goals/hopes:  Return home    Spiritual:     F- Donna and Belief: yes but not [employer]    I - Importance: some  .  C - Community: no    A - Address in Care: no

## 2019-01-07 NOTE — ASSESSMENT & PLAN NOTE
-s/p CABG. Home BB currently held. Pravastatin on now, no asa on, would hold off on adding back given overall situation

## 2019-01-07 NOTE — SUBJECTIVE & OBJECTIVE
Interval History/Significant Events: see hospital course. Only increased SOb when doing bedside commode. PT hasnt come by yet today to do some LE exercises which he requested. He doesn't mind the frequent visitors for AI alerts. Told him it would likely happen frequently. He likes crosswords in his spare time. Discussed overall goals, still planning for home when able with HFNC and IV antibiotics when safest. He knows he may never titrate down HFNC at all from where he is now. He knows ambulance ride will be unsafe no matter what, but to give it a few more days to hopefully make it safer with more antibiotic treatment.      Review of Systems   Constitutional: Negative for chills and fever.   HENT: Negative for drooling and sore throat.    Respiratory: Positive for cough. Negative for shortness of breath.    Cardiovascular: Negative for chest pain and leg swelling.   Gastrointestinal: Negative for diarrhea and vomiting.   Genitourinary: Negative for difficulty urinating and dysuria.   Musculoskeletal: Negative for back pain and joint swelling.   Neurological: Negative for light-headedness and headaches.   Psychiatric/Behavioral: Negative for agitation and confusion.     Objective:     Vital Signs (Most Recent):  Temp: 97.8 °F (36.6 °C) (01/07/19 1207)  Pulse: 76 (01/07/19 1313)  Resp: (!) 22 (01/07/19 1313)  BP: 108/73 (01/07/19 1207)  SpO2: (!) 88 % (01/07/19 1313) Vital Signs (24h Range):  Temp:  [96.2 °F (35.7 °C)-97.8 °F (36.6 °C)] 97.8 °F (36.6 °C)  Pulse:  [] 76  Resp:  [18-26] 22  SpO2:  [71 %-92 %] 88 %  BP: ()/(53-73) 108/73   Weight: 78 kg (171 lb 15.3 oz)  Body mass index is 25.39 kg/m².      Intake/Output Summary (Last 24 hours) at 1/7/2019 1528  Last data filed at 1/7/2019 0400  Gross per 24 hour   Intake 290 ml   Output 875 ml   Net -585 ml       Physical Exam   Constitutional: He is oriented to person, place, and time. He appears well-developed and well-nourished. No distress.   Sitting up  in bed in no distress.   HENT:   Head: Normocephalic and atraumatic.   Eyes: EOM are normal. Pupils are equal, round, and reactive to light.   Neck: Normal range of motion. Neck supple.   Cardiovascular: Normal rate, regular rhythm and normal heart sounds.   Pulmonary/Chest: He has no wheezes.   Crackles at Rt lung mid zone and base.   Abdominal: Soft. Bowel sounds are normal. He exhibits no distension.   Musculoskeletal: Normal range of motion. He exhibits no edema or tenderness.   Neurological: He is alert and oriented to person, place, and time. No cranial nerve deficit.   Skin: Skin is warm and dry.   Psychiatric: He has a normal mood and affect. His behavior is normal.       Vents:  Oxygen Concentration (%): 70 (01/07/19 1313)  Lines/Drains/Airways     Peripheral Intravenous Line                 Peripheral IV - Single Lumen 01/01/19 2304 Anterior;Right Forearm 5 days              Significant Labs:    CBC/Anemia Profile:  Recent Labs   Lab 01/06/19  0329 01/07/19  0352   WBC 9.49 10.08   HGB 13.9* 13.0*   HCT 44.5 43.5   * 136*   * 102*   RDW 17.7* 17.7*        Chemistries:  Recent Labs   Lab 01/06/19  0329 01/07/19  0352    141   K 3.8 4.1    102   CO2 31* 31*   BUN 45* 40*   CREATININE 0.9 0.9   CALCIUM 8.4* 8.6*       All pertinent labs within the past 24 hours have been reviewed.    Significant Imaging:  I have reviewed and interpreted all pertinent imaging results/findings within the past 24 hours.

## 2019-01-07 NOTE — PROGRESS NOTES
[facility] Hospital Medicine  Progress Note    Patient Name: Michael Shetty  MRN: [id number]  Patient Class: IP- Inpatient   Admission Date: 12/25/2018  Length of Stay: 13 days  Attending Physician: Sary Nichole MD  Primary Care Provider: Michael Ellsworth MD    [facility] Medicine Team: [facility] MED A Sary Nichole MD    Subjective:     Principal Problem:Acute on chronic respiratory failure with hypoxemia    HPI:  No notes on file    Hospital Course:  1/6: stepped down to hospital med to continue meropnem for pseudomonas pneumonia and continue high flow nasal cannula for acute on chronic respiratory failure. No complaints on exam today, reviewed ICU course and goals of care and in agreement with current plan. Requested continue PT/OT evals to move patient out of bed as tolerated to help regain strength.   1/7: doing well overall, only mild SOB when going to bedside commode. Day 10 of planned 21 day course of penem. Needs PICC- can consult tomorrow for placement. Discussed plan further- he wants to go home with IV antibiotics on high flow nasal cannula and doesn't want to go to LTAC and wants to go home as soon as possible with IV antibiotics, discussed that when stepped down ICU team wanted to at least do half the course (15 days) in house to see if could titrate down a little bit before transport as the concern was the hour long drive to [facility] and cant do HFNC in ambulance and could risk arrythmia if O2 sats dip very low even if patient asymptomatic and could have cardiac event in ambulance, he understands that, discussed further inpatient antibiotics this week to see if titrating a possibility at all before attempteing to set up home infusions/high flow NC    Interval History/Significant Events: see hospital course. Only increased SOb when doing bedside commode. PT hasnt come by yet today to do some LE exercises which he requested. He doesn't mind the frequent visitors for AI alerts.  Told him it would likely happen frequently. He likes crosswLumavita in his spare time. Discussed overall goals, still planning for home when able with HFNC and IV antibiotics when safest. He knows he may never titrate down HFNC at all from where he is now. He knows ambulance ride will be unsafe no matter what, but to give it a few more days to hopefully make it safer with more antibiotic treatment.      Review of Systems   Constitutional: Negative for chills and fever.   HENT: Negative for drooling and sore throat.    Respiratory: Positive for cough. Negative for shortness of breath.    Cardiovascular: Negative for chest pain and leg swelling.   Gastrointestinal: Negative for diarrhea and vomiting.   Genitourinary: Negative for difficulty urinating and dysuria.   Musculoskeletal: Negative for back pain and joint swelling.   Neurological: Negative for light-headedness and headaches.   Psychiatric/Behavioral: Negative for agitation and confusion.     Objective:     Vital Signs (Most Recent):  Temp: 97.8 °F (36.6 °C) (01/07/19 1207)  Pulse: 76 (01/07/19 1313)  Resp: (!) 22 (01/07/19 1313)  BP: 108/73 (01/07/19 1207)  SpO2: (!) 88 % (01/07/19 1313) Vital Signs (24h Range):  Temp:  [96.2 °F (35.7 °C)-97.8 °F (36.6 °C)] 97.8 °F (36.6 °C)  Pulse:  [] 76  Resp:  [18-26] 22  SpO2:  [71 %-92 %] 88 %  BP: ()/(53-73) 108/73   Weight: 78 kg (171 lb 15.3 oz)  Body mass index is 25.39 kg/m².      Intake/Output Summary (Last 24 hours) at 1/7/2019 1528  Last data filed at 1/7/2019 0400  Gross per 24 hour   Intake 290 ml   Output 875 ml   Net -585 ml       Physical Exam   Constitutional: He is oriented to person, place, and time. He appears well-developed and well-nourished. No distress.   Sitting up in bed in no distress.   HENT:   Head: Normocephalic and atraumatic.   Eyes: EOM are normal. Pupils are equal, round, and reactive to light.   Neck: Normal range of motion. Neck supple.   Cardiovascular: Normal rate, regular  rhythm and normal heart sounds.   Pulmonary/Chest: He has no wheezes.   Crackles at Rt lung mid zone and base.   Abdominal: Soft. Bowel sounds are normal. He exhibits no distension.   Musculoskeletal: Normal range of motion. He exhibits no edema or tenderness.   Neurological: He is alert and oriented to person, place, and time. No cranial nerve deficit.   Skin: Skin is warm and dry.   Psychiatric: He has a normal mood and affect. His behavior is normal.       Vents:  Oxygen Concentration (%): 70 (01/07/19 1313)  Lines/Drains/Airways     Peripheral Intravenous Line                 Peripheral IV - Single Lumen 01/01/19 2304 Anterior;Right Forearm 5 days              Significant Labs:    CBC/Anemia Profile:  Recent Labs   Lab 01/06/19 0329 01/07/19  0352   WBC 9.49 10.08   HGB 13.9* 13.0*   HCT 44.5 43.5   * 136*   * 102*   RDW 17.7* 17.7*        Chemistries:  Recent Labs   Lab 01/06/19 0329 01/07/19  0352    141   K 3.8 4.1    102   CO2 31* 31*   BUN 45* 40*   CREATININE 0.9 0.9   CALCIUM 8.4* 8.6*       All pertinent labs within the past 24 hours have been reviewed.    Significant Imaging:  I have reviewed and interpreted all pertinent imaging results/findings within the past 24 hours.    Assessment/Plan:      * Acute on chronic respiratory failure with hypoxemia    -hx of PES and lung cancer with recurrent PE now- on 6L O2 at home prior to admit- sent here from OSH to see if a tpa candidate to the PE- cards said not a candidate, also with multidrug resistant pneumonia causing worsening of chronic respiratory failure  -on high flow NC at 20 L now and 60% FiO2- was unable to wean and sig symptoms- once meropenem was started patient started feeling better however and symptoms improved, however cant wean from high flow in the ICU from 20L recently, even though looks much better symptom wise, have discussed many options/palliative etc.  -plan now: 3 weeks of meropenem treatment per ID recs for  pneumonia to give best shot at improving resp status, continue to treat here now in hopes that he can come down off high flow 20 L ( per palliative hospice companies could do 18-20L on home hospice, but if on home hospice couldn't do penem antibiotics it doesn't seem). Patient amenable to home hospice and his goal is to get home on oxygen and to be at home but wants to give best shot at treating pneumonia to see if this improves resp failure at this time, which is reasonable   -plan: continue meropenem, continue to attempt to see if he can wean off 20L, if can wean to an amount his home o2 company can do, then could go home to finish course of IV meropenem. If does not improve on meropenem after 3 week course, then will go home with home hospice on high flow as home hospice company states they can do that (per palliative patient would then be agreeable, is aware ambulance cant do that high of flow, is aware he could pass in ambulance, etc)  -nursing aware not to call MD unless patient sustains sats under 80 AND Is symptomatic, which was ICU order as well. Change o2 to 100% when eating for desats currently. Would ignore AI alerts as he will set this off frequently and is stable       Chronic systolic heart failure    -EF of 25%, home lasix increased to BID in ICU.   -watch I/O closely. Not on ACE, BB held, given overall situation, not inclined to start currently       Pseudomonas pneumonia    -see resp fx for plans, 3 weeks of penem planned.         Goals of care, counseling/discussion    -see resp fx for current plans       Coronary artery disease involving coronary bypass graft    -s/p CABG. Home BB currently held. Pravastatin on now, no asa on, would hold off on adding back given overall situation       Pulmonary emphysema    -duonebs, budesonide, spiriva, prednisone       Chronic hypotension    -stable off home midodrine.        Chronic pulmonary embolism    - see resp fx       Primary malignant neoplasm of left  upper lobe of lung    -NEIDA SCC s/p carbo/taxol and XRT, follows with onc as outpatient, stage 2       VTE Risk Mitigation (From admission, onward)        Ordered     apixaban tablet 5 mg  2 times daily      12/25/18 1648     IP VTE HIGH RISK PATIENT  Once      12/25/18 1413          Dispo: see hospital course, IV antibiotics in house x a few more days at least, see if can titrate o2 at all, plan to get home with HFNC and complete penem course for 3 weeks total (today day 10 of at least 3 weeks of treatment).    Need to get PICC - can consult tomorrow    Sary Nichole MD  Department of Hospital Medicine   [facility]

## 2019-01-07 NOTE — ASSESSMENT & PLAN NOTE
-hx of PES and lung cancer with recurrent PE now- on 6L O2 at home prior to admit- sent here from OSH to see if a tpa candidate to the PE- cards said not a candidate, also with multidrug resistant pneumonia causing worsening of chronic respiratory failure  -on high flow NC at 20 L now and 60% FiO2- was unable to wean and sig symptoms- once meropenem was started patient started feeling better however and symptoms improved, however cant wean from high flow in the ICU from 20L recently, even though looks much better symptom wise, have discussed many options/palliative etc.  -plan now: 3 weeks of meropenem treatment per ID recs for pneumonia to give best shot at improving resp status, continue to treat here now in hopes that he can come down off high flow 20 L ( per palliative hospice companies could do 18-20L on home hospice, but if on home hospice couldn't do penem antibiotics it doesn't seem). Patient amenable to home hospice and his goal is to get home on oxygen and to be at home but wants to give best shot at treating pneumonia to see if this improves resp failure at this time, which is reasonable   -plan: continue meropenem, continue to attempt to see if he can wean off 20L, if can wean to an amount his home o2 company can do, then could go home to finish course of IV meropenem. If does not improve on meropenem after 3 week course, then will go home with home hospice on high flow as home hospice company states they can do that (per palliative patient would then be agreeable, is aware ambulance cant do that high of flow, is aware he could pass in ambulance, etc)  -nursing aware not to call MD unless patient sustains sats under 80 AND Is symptomatic, which was ICU order as well. Change o2 to 100% when eating for desats currently. Would ignore AI alerts as he will set this off frequently and is stable

## 2019-01-07 NOTE — PROGRESS NOTES
[facility]  Palliative Medicine  Progress Note    Patient Name: Michael Shetty  MRN: [id number]  Admission Date: 12/25/2018  Hospital Length of Stay: 13 days  Code Status: Partial Code   Attending Provider: Sary Nichole MD  Consulting Provider: Power Dunn MD  Primary Care Physician: Michael Ellsworth MD  Principal Problem:Acute on chronic respiratory failure with hypoxemia    Patient information was obtained from patient and past medical records.      Assessment/Plan:     Palliative care encounter    Palliative Care Encounter / Goals of care discussion:     Narrative:   Michael Shetty is a 73 y.o. male patient with lung cancer, undergoing CTX, XRT (on hold for PE and PNA), clotting disorder s/p several PE, now severe hypoxema requiring increasing doses of O2 and likely superimposed PNA    1: Pain / Physical symptom Assessment:   - pain assesment: denies   - dyspnea assessment: little sx. At rest but severely short of breath when transitioning.    - anxiety assessment: mild, especially when oxygen drops.    - depression assessment: denies    2: Social Background    - lives with daughter    3: Palliative care meeting participants:    Patient and daughter    4: Goals of care Discussion details:  1/7/19   - pt. Overall feels improved. Using less non rebreather O2 as needed.    - on my last visit the pt. Was wanting to go home with hospice as soon as possible.    - I had discussed with ICU team and accepting hospitalist possibilities of discharge to home with hospice over the weekend.    - hospice able to provide as much as 18 L high flow at home, transport likely able to provide 18-20 L as well, but will need to be arranged (need extra O2 tanks on the van).    - given pt. Lives in [place] the long transport time must be considered.      - on my visit today the pt. Wishes have changed. He is wanting to complete 3 wks of IV meropenem and continue with PT in hopes that he can get back on  "chemotherapy   - when asked about his previous plan and our past discussions he states "I am not going home with hospice, I am going home with home health".    - acknowledged wishes and not discussed further.    - I am concerned that the pt. Remains hopeful for recovery, however he has not improved when therapy so far and home health will only be able to provide regular flow O2 and will likely not accept a patient with profound hypoxemia with transitioning or ambulation.    - will continue to follow and provide support. We will keep discussing his options and goals   - His "swinging" wishes may be part of normal coping.     1/4/19   - status quo with oxygen demands.    - discussed possibility that his O2 demands will not improve   - he is aware and is leaning towards making an attempt to go home very soon.    - we have discussed the possibility that he will have a marked decline should we have to drastically lower his FiO2 to allow for home hospice.    - will look into options with different companies to provide high flow at the home.      1/3/19   - feels better. Oxygenation appears improved, but remains on High flow (20L) and NR oxygen currently.    - slow process to wean FiO2.    - producing thick sputum   - encouraged to get out of bed if possible   - continue with ABX and re-assess at end of the week/ after weekend.    - goals remain to return home.   1/2/19   - tells me he is feeling better. O2 demand remains unchanged   - understands that we will assess his condition throughout the week for improvement   - hopeful he can return home   - no other questions or concerns at this time   - code status remains partial (a shock or a trial of compression but not more). He is aware that this is not optimal care.     12/31/18   - pt. Profound respiratory failure fails to improve   - the pt. Remains hopeful that a longer course of ABX will help   - in d/w Dr. Miles, his ABX were tailored to sensitivity and " "essentially effective Rx. Has been started ~ 3 days ago.    - he may improve some within 7 days of therapy, but if after that no improvement he is not likely to get better     - The pt. Goals is unchanged to "get home". He asked me if I looked into options for home O2 and I explained that high flow is possible on hospice,  but not at this level.    - Will continue to monitor for improvement of oxygenation. If he does not improve the decision will be to remain hospitalized (and do his papers here in the hospital / hospice). Or take a chance, reduce the O2 to home hospice acceptable level and discharge. I did make it clear before that he may not survive transport to the [facility].     12/28/18   Completed power of  yesterday, daughters are POA.    Discussed [place] post today   - we had a long discussion regarding his goals and therapies available to achieve this goal and interventions that may not be beneficial.    - He clearly states that his ultimate goal is to return to his home in [place]. He has some important papers he needs to change (living will).    - he is willing to do anything he can to go home.    - he realizes that his high O2 demands are a challenge and may prevent him from achieving this goal.    - agrees to continue current treatment over the weekend and re-evaluate on Monday     - we spoke about DNR and I have explained that to achieve best outcome all parts of resuscitation are needed including ventilation, chest compression and drugs. . He understands but would like to remain partial code with no intubation. He wants us to try for a bit and let him go if it does not work.    - He makes it clear he did not want the ventilator, feeding tubes, dialysis      - we spoke about hospice and he is agreeable to consider this over home health if it will get him to go home.    - discussed with Dr. Valdivia.   - will follow up  5: Goals of care Decisions / Recommendations / Plan:   - continue current " care.    - maybe some improvement?     6: Follow up plans:    daily    Thank you for your consult. I will follow along with you. Please call [phone] with questions.            I will follow-up with patient. Please contact us if you have any additional questions.    Subjective:     Chief Complaint: No chief complaint on file.      HPI:   Michael Shetty is a nice 72 yo mald with a past history of MTHFR mutation and several past PE and DVT episodes. He had been on several OAC, most recently on Eliquis. He has SCLC and is currently undergoing CTX and XRT, treatment had been paused due to PE and recurrent pseudomonas PNA.   He developed acute worsening of his dyspnea (usually able to ambulate to BR and do his ADL on ~ 5L O2), severe exertional dyspnea and needing to increase his oxygen to 10l/min.   He presented to [facility] but was transferred to our facility for evaluation of catheter assisted thrombolysis.     He was thought not a candidate for thrombolysis due to poor risk benefit ratio, he is felt to have a pneumonic infiltrate and is currently on ABX. He is requiring high flow O2 to sustain O2 saturation of ~89%.     I am being asked to discuss goals of care with the pt.         Hospital Course:  No notes on file    Interval History: Stepped down to hospital medicine. Continues on Morepenem. Overall pt. Feels he is improving.   Remains on high flow at 25L, using less NR mask PRN.   Still very symptomatic just transferring from bed to commode.     Medications:  Continuous Infusions:  Scheduled Meds:   albuterol-ipratropium  3 mL Nebulization Q6H    allopurinol  100 mg Oral Daily    apixaban  5 mg Oral BID    budesonide  0.5 mg Nebulization Q12H    furosemide  20 mg Oral BID    meropenem (MERREM) IVPB  1 g Intravenous Q8H    multivitamin  1 tablet Oral Daily    pantoprazole  40 mg Oral Daily    pravastatin  40 mg Oral QHS    predniSONE  30 mg Oral Daily    tiotropium  1 capsule Inhalation Daily     PRN  Meds:sodium chloride 0.9%    Objective:     Vital Signs (Most Recent):  Temp: 97.8 °F (36.6 °C) (01/07/19 1207)  Pulse: (!) 127 (01/07/19 1207)  Resp: (!) 24 (01/07/19 1207)  BP: 108/73 (01/07/19 1207)  SpO2: (!) 81 % (01/07/19 1207) Vital Signs (24h Range):  Temp:  [96.1 °F (35.6 °C)-97.8 °F (36.6 °C)] 97.8 °F (36.6 °C)  Pulse:  [] 127  Resp:  [18-26] 24  SpO2:  [71 %-92 %] 81 %  BP: ()/(53-73) 108/73     Weight: 78 kg (171 lb 15.3 oz)  Body mass index is 25.39 kg/m².    Review of Symptoms  Symptom Assessment (ESAS 0-10 scale)  ESAS 0 1 2 3 4 5 6 7 8 9 10   Pain x             Dyspnea  x            Anxiety  x            Nausea x             Depression   x            Anorexia x             Fatigue  x            Insomnia   x           Restlessness   x            Agitation x             CAM / Delirium x__ --  ___+   Constipation     x__ --  ___+   Diarrhea           _x_ --  ___+      Bowel Management Plan (BMP): No    Comments:     Pain Assessment: denies    OME in 24 hours:     Performance Status: 40    ECOG Performance Status Grade: 4 - Completely disabled    Physical Exam   Constitutional: He is oriented to person, place, and time. He appears well-developed. No distress.   Neck: Normal range of motion. No JVD present.   Pulmonary/Chest: No respiratory distress.   Abdominal: Soft.   Neurological: He is alert and oriented to person, place, and time.   Skin: Skin is warm.       Significant Labs: All pertinent labs within the past 24 hours have been reviewed.  CBC:   Recent Labs   Lab 01/07/19  0352   WBC 10.08   HGB 13.0*   HCT 43.5   *   *     BMP:  Recent Labs   Lab 01/07/19  0352   *      K 4.1      CO2 31*   BUN 40*   CREATININE 0.9   CALCIUM 8.6*     LFT:  Lab Results   Component Value Date    AST 20 12/06/2018    ALKPHOS 53 12/06/2018    BILITOT 2.0 (H) 12/06/2018     Albumin:   Albumin   Date Value Ref Range Status   12/06/2018 3.6 3.6 - 5.1 g/dL Final   10/01/2018  3.1 3.1 - 4.7 g/dL      Protein:   Total Protein   Date Value Ref Range Status   12/06/2018 6.0 (L) 6.1 - 8.1 g/dL Final     Lactic acid:   No results found for: LACTATE    Significant Imaging: I have reviewed all pertinent imaging results/findings within the past 24 hours.    Advanced Directives::  Living Will: No  LaPOST: No  Do Not Resuscitate Status: Yes partial  Medical Power of : yes, daughter    Decision-Making Capacity: Patient answered questions    Living Arrangements: Lives with family, Lives in apartment    Psychosocial/Cultural:  Patient's most important priorities:  Returning home     Patient's biggest concerns/fears:  Not being able to return home    Previous death/end of life care history:  no    Patient's goals/hopes:  Return home    Spiritual:     F- Donna and Belief: yes but not Anabaptism    I - Importance: some  .  C - Community: no    A - Address in Care: no      > 50% of 50 min visit spent in chart review, face to face discussion of goals of care,  symptom assessment, coordination of care and emotional support.    Power Dunn MD  Palliative Medicine  [facility]

## 2019-01-07 NOTE — NURSING
Assisted patient back to bed from [facility], had large BM, sebas care given. Sitting on edege of bed, dyspnic, , RR 24, O2 sats, 77% with hi flow O2 at 70% on.  Encouraged to purse breath, O2 sats increased to 81%, denies any other c/o, voices SOB improving. Will continue to monitor closely.

## 2019-01-07 NOTE — PT/OT/SLP EVAL
"Physical Therapy Evaluation and Discharge Note    Patient Name:  Michael Shetty   MRN:  [id number]    Recommendations:     Discharge Recommendations:  (HHPT/OT)   Discharge Equipment Recommendations: commode, walker, rolling   Barriers to discharge: None    Assessment:     Michael Shetty is a 73 y.o. male admitted with a medical diagnosis of Acute on chronic respiratory failure with hypoxemia. .  At this time, patient is functioning at their prior level of function and does not require further acute PT services.     Pt demo ability to perform functional mobility without assistance but currently limited by impaired cardiopulmonary function - labored breathing and requiring prolong rest between activities.    O2 sats - upright sitting EOB on 20L 70% comfort flow = 81% saturation and 128 bpm    Recent Surgery: * No surgery found *      Plan:     During this hospitalization, patient does not require further acute PT services.  Please re-consult if situation changes.      Subjective     Chief Complaint: SOB  Patient/Family Comments/goals: "I'm having a hard time catching my breath today."  Pain/Comfort:  · Pain Rating 1: 0/10    Patients cultural, spiritual, Druze conflicts given the current situation: no    Living Environment:  Lives c daughter in elevated [facility] c elevator access.  PTA not driving and no falls.  Prior to admission, patients level of function was independent.  Equipment used at home: oxygen.  DME owned (not currently used): none.  Upon discharge, patient will have assistance from daughter.    Objective:     Communicated with RN and OT prior to session.  Patient found HOB elevated upon PT entry to room found with: peripheral IV, telemetry, oxygen     General Precautions: Standard, fall   Orthopedic Precautions:N/A   Braces: N/A     Exams:  · Cognitive Exam:  Patient is oriented to Person, Place, Time and Situation  · Gross Motor Coordination:  WFL  · Sensation:    · -       Intact  · RLE ROM: WFL  · RLE " Strength: WFL  · LLE ROM: WFL  · LLE Strength: WFL    Functional Mobility:  · Bed Mobility:     · Scooting: modified independence  · Supine to Sit: modified independence  · Transfers:     · Sit to Stand:  contact guard assistance with no AD  · Toilet (BSC) Transfer: contact guard assistance with  no AD  using  Step Transfer  · Gait: ~4 steps to BSC c no AD CGA  · Steady gait, c/o SOB and fatigue  · Balance: sitting (I); standing (CGA)    AM-PAC 6 CLICK MOBILITY  Total Score:20       Therapeutic Activities and Exercises:  Pt educated on: PT role/POC; safety c mobility; benefits of OOB activities; performing therex; d/c recs - v/u  -Sat EOB [id number] d/t fatigue and SOB  -Assisted to BSC for voiding    AM-PAC 6 CLICK MOBILITY  Total Score:20     Patient left on BSC with all lines intact, call button in reach and RN and daughter notified.    GOALS:   Multidisciplinary Problems     Physical Therapy Goals     Not on file          Multidisciplinary Problems (Resolved)        Problem: Physical Therapy Goal    Goal Priority Disciplines Outcome Goal Variances Interventions   Physical Therapy Goal   (Resolved)     PT, PT/OT Outcome(s) achieved     Description:  Pt does not require additional acute PT services at this time d/t baseline c functional mobility    Pt educated on asking medical staff for PT consult if changes in functional status occurs. - v/u                          History:     Past Medical History:   Diagnosis Date    Arthritis     BPH (benign prostatic hyperplasia)     Chemotherapy-induced neutropenia 9/6/2018    Chemotherapy-induced neutropenia 9/6/2018    Coronary artery disease     MI X 2    Gout, chronic     Heartburn     Hypertension     Myocardial infarction     PE (pulmonary embolism)     Presence of dental bridge     UPPER - NOT WEAR MUCH AS DOES NOT FIT WELL    Primary malignant neoplasm of left upper lobe of lung 5/19/2017    Staph infection        Past Surgical History:   Procedure  Laterality Date    CARDIAC SURGERY      CABG X 4 9-11    CTR      BILAT    OSTEOTOMY, METACARPAL BONE Right 3/1/2013    Performed by Daron Jalloh Jr., MD at [facility] OR    ROTATOR CUFF REPAIR      left    TONSILLECTOMY      TRIGGER FINGER RELEASE      right and left hands.       Clinical Decision Making:     History  Co-morbidities and personal factors that may impact the plan of care Examination  Body Structures and Functions, activity limitations and participation restrictions that may impact the plan of care Clinical Presentation   Decision Making/ Complexity Score   Co-morbidities:   [] Time since onset of injury / illness / exacerbation  [] Status of current condition  []Patient's cognitive status and safety concerns    [] Multiple Medical Problems (see med hx)  Personal Factors:   [] Patient's age  [] Prior Level of function   [] Patient's home situation (environment and family support)  [] Patient's level of motivation  [] Expected progression of patient      HISTORY:(criteria)    [x] 15100 - no personal factors/history    [] 73526 - has 1-2 personal factor/comorbidity     [] 80182 - has >3 personal factor/comorbidity     Body Regions:  [] Objective examination findings  [] Head     []  Neck  [] Trunk   [] Upper Extremity  [] Lower Extremity    Body Systems:  [] For communication ability, affect, cognition, language, and learning style: the assessment of the ability to make needs known, consciousness, orientation (person, place, and time), expected emotional /behavioral responses, and learning preferences (eg, learning barriers, education  needs)  [] For the neuromuscular system: a general assessment of gross coordinated movement (eg, balance, gait, locomotion, transfers, and transitions) and motor function  (motor control and motor learning)  [] For the musculoskeletal system: the assessment of gross symmetry, gross range of motion, gross strength, height, and weight  [] For the integumentary system:  the assessment of pliability(texture), presence of scar formation, skin color, and skin integrity  [x] For cardiovascular/pulmonary system: the assessment of heart rate, respiratory rate, blood pressure, and edema     Activity limitations:    [] Patient's cognitive status and saf ety concerns          [] Status of current condition      [] Weight bearing restriction  [] Cardiopulmunary Restriction    Participation Restrictions:   [] Goals and goal agreement with the patient     [] Rehab potential (prognosis) and probable outcome      Examination of Body System: (criteria)    [x] 14465 - addressing 1-2 elements    [] 57886 - addressing a total of 3 or more elements     [] 88704 -  Addressing a total of 4 or more elements         Clinical Presentation: (criteria)  Stable - 55463     On examination of body system using standardized tests and measures patient presents with 1-2 elements from any of the following: body structures and functions, activity limitations, and/or participation restrictions.  Leading to a clinical presentation that is considered stable and/or uncomplicated                              Clinical Decision Making  (Eval Complexity):  Low- 70560     Time Tracking:     PT Received On: 01/07/19  PT Start Time: 1020     PT Stop Time: 1054  PT Total Time (min): 34 min     Billable Minutes: Evaluation 15 min      Felipe Chavis, PT  01/07/2019

## 2019-01-07 NOTE — PLAN OF CARE
Problem: Adult Inpatient Plan of Care  Goal: Plan of Care Review  Outcome: Ongoing (interventions implemented as appropriate)   01/07/19 0259   Plan of Care Review   Plan of Care Reviewed With patient   Pt remains free of falls. Care plan reviewed with pt, understanding voiced, will cont to monitor.

## 2019-01-08 NOTE — PT/OT/SLP EVAL
Occupational Therapy   Evaluation and Discharge Note    Name: Michael Shetty  MRN: [id number]  Admitting Diagnosis:  Acute on chronic respiratory failure with hypoxemia      Recommendations:     Discharge Recommendations: (home with HH)  Discharge Equipment Recommendations:  commode, walker, rolling  Barriers to discharge:  None    History:     Occupational Profile:  Living Environment: Pt lives in 1 story house with elevator access  Previous level of function: Pt with recent decline in functional performance   Equipment Used at home:  oxygen  Assistance upon Discharge: daughter available to assist     Past Medical History:   Diagnosis Date    Arthritis     BPH (benign prostatic hyperplasia)     Chemotherapy-induced neutropenia 9/6/2018    Chemotherapy-induced neutropenia 9/6/2018    Coronary artery disease     MI X 2    Gout, chronic     Heartburn     Hypertension     Myocardial infarction     PE (pulmonary embolism)     Presence of dental bridge     UPPER - NOT WEAR MUCH AS DOES NOT FIT WELL    Primary malignant neoplasm of left upper lobe of lung 5/19/2017    Staph infection        Past Surgical History:   Procedure Laterality Date    CARDIAC SURGERY      CABG X 4 9-11    CTR      BILAT    OSTEOTOMY, METACARPAL BONE Right 3/1/2013    Per[facility] OR    ROTATOR CUFF REPAIR      left    TONSILLECTOMY      TRIGGER FINGER RELEASE      right and left hands.       Subjective     Chief Complaint: poor cardiopulmonary   Patient/Family Comments/goals: return to PLOF     Pain/Comfort:  ·      Patients cultural, spiritual, Druze conflicts given the current situation: no    Objective:     Communicated with: RN prior to session.  Patient found all lines intact and (lines intact ) upon OT entry to room.    General Precautions: Standard, fall   Orthopedic Precautions:N/A   Braces:       Occupational Performance:    Bed Mobility:    · Pt able to perform bed mobility with poor  "cardiopulmonary     Functional Mobility/Transfers:  · Functional Mobility: Pt with CGA for safe bed to BSC transfer     Activities of Daily Living:  · Pt with CGA for safe self care completion     Cognitive/Visual Perceptual:  Cognitive/Psychosocial Skills:     -       Oriented to: Person, Place, Time and Situation   -       Follows Commands/attention:Follows two-step commands  -       Communication: clear/fluent  -       Memory: No Deficits noted  -       Safety awareness/insight to disability: intact   -       Mood/Affect/Coping skills/emotional control: Appropriate to situation    Physical Exam:  Upper Extremity Range of Motion:     -       Right Upper Extremity: WFL  -       Left Upper Extremity: WFL  Upper Extremity Strength:    -       Right Upper Extremity: WFL  -       Left Upper Extremity: WFL    AMPAC 6 Click ADL:  AMPAC Total Score:      Treatment & Education:  Evaluation complete.  Pt educated on safety, role of OT, importance of increased participation in self care for gains , expectations for participation, expectations for gains, POC, energy conservation, caregiver strain. White board updated.     Education:    Patient left up in chair with all lines intact    Assessment:     Michael Shetty is a 73 y.o. male with a medical diagnosis of Acute on chronic respiratory failure with hypoxemia. At this time, patient is functioning at their prior level of function and does not require further acute OT services.     Clinical Decision Makin.  OT Low:  "Pt evaluation falls under low complexity for evaluation coding due to performance deficits noted in 1-3 areas as stated above and 0 co-morbities affecting current functional status. Data obtained from problem focused assessments. No modifications or assistance was required for completion of evaluation. Only brief occupational profile and history review completed."     Plan:     During this hospitalization, patient does not require further acute OT " services.  Please re-consult if situation changes.    · Plan of Care Reviewed with: patient, daughter    This Plan of care has been discussed with the patient who was involved in its development and understands and is in agreement with the identified goals and treatment plan    GOALS:   Multidisciplinary Problems     Occupational Therapy Goals     Not on file                Time Tracking:     OT Date of Treatment: 01/07/19  OT Start Time: 1020  OT Stop Time: 1100  OT Total Time (min): 40 min    Billable Minutes:Evaluation 40    Maggy Lindsey, OT  1/8/2019

## 2019-01-08 NOTE — ASSESSMENT & PLAN NOTE
- Improving, slowly  - Multifactorial etiology; hx of PES and lung cancer with recurrent PE now- on 6L O2 at home prior to admit- sent here from OSH to see if a tpa candidate to the PE- cards said not a candidate, also with multidrug resistant pneumonia causing worsening of chronic respiratory failure  -on high flow NC at 20 L now and 60% FiO2- was unable to wean and sig symptoms- once meropenem was started patient started feeling better however and symptoms improved, however cant wean from high flow in the ICU from 20L recently, even though looks much better symptom wise, have discussed many options/palliative etc.  -plan now: 3 weeks of meropenem treatment per ID recs for pneumonia to give best shot at improving resp status, continue to treat here now in hopes that he can come down off high flow 20 L  -nursing aware not to call MD unless patient sustains sats under 80 AND Is symptomatic, which was ICU order as well. Change o2 to 100% when eating for desats currently.

## 2019-01-08 NOTE — PLAN OF CARE
"Met with patient at bedside to discuss LTAC placement options. Patient is adamant about not going to LTAC due to  a previous bad experience stating, "Oxygen or no oxygen, I'm going home."     Update @ 9:15am  MD ([facility]) aware of patient's choice regarding LTAC.       "

## 2019-01-08 NOTE — CONSULTS
Single lumen 18g x 10cm midline placed to left brachial vein. Max dwell date 2/6/19, Lot# [id number].  Needle advanced into the vessel under real time ultrasound guidance.  Image recorded and saved.

## 2019-01-08 NOTE — ASSESSMENT & PLAN NOTE
- Stable  -s/p CABG. Home BB currently held. Pravastatin on now, no asa on  - Holding for now while treating for acute respiratory failure, will resume as his overall condition improves

## 2019-01-08 NOTE — ASSESSMENT & PLAN NOTE
- Stable  - EF of 25%, home lasix increased to BID in ICU.   - Following I/O closely. Not on ACE, BB held, given overall situation, not inclined to start currently

## 2019-01-08 NOTE — PROGRESS NOTES
"[facility] Hospital Medicine  Progress Note    Patient Name: Michael Shetty  MRN: [id number]  Patient Class: IP- Inpatient   Admission Date: 12/25/2018  Length of Stay: 14 days  Attending Physician: Josh Bateman MD  Primary Care Provider: Michael Ellsworth MD    Hospital Medicine Team: [facility] HOSP MED A Josh Bateman MD    Subjective:     Principal Problem:Acute on chronic respiratory failure with hypoxemia    HPI:  Per ICU H&P:    "Mr. Shetty is a 74yo man with PMH of MTHFR mutation, history of PEs on Eliquis, CAD s/p CABGn with chronically elevated troponin, hypotension on midodrine, squamous cell lung carcinoma of NEIDA s/p 1 cycle of adjuvant CTX & radiation therapy (which was stopped due to recurrent pseudomonas PNA & PEs), COPD, 54 pack years (quit 1990), chronic severe pseudomonas pna (3 abx for over a year) who presents as a transfer from [facility] for Interventional Cardiology evaluation for catheter assisted thrombolysis.      HPI: 4 days ago he had acute SOB requring 10L of oxygen at home when he is at a baseline of 6L. At [facility] his CTPA showed known PE of RLL pulmonary artery & increased size of right sided pleural effusion. Doppler u/s of BLE was negative for DVT. OSH continued patient on home eliquis regimen of 5mg BID and per patient did not notice improvement in oxygen requirements so he was sent to [facility].       Vitals during initial interview: 135/75, -104, 25-20L comfort flow O2 FiO2 70%.       Of note patient has chronic severe pseudomonas PNA for which he takes an alternating regimen of doxycycline, cipro, & cefuroxime. He quit smoking 30 years ago."    Hospital Course:  1/6: stepped down to hospital med to continue meropnem for pseudomonas pneumonia and continue high flow nasal cannula for acute on chronic respiratory failure. No complaints on exam today, reviewed ICU course and goals of care and in agreement with current plan. Requested continue PT/OT evals to move patient out " "of bed as tolerated to help regain strength.   1/7: doing well overall, only mild SOB when going to bedside commode. Day 10 of planned 21 day course of penem. Needs PICC- can consult tomorrow for placement. Discussed plan further- he wants to go home with IV antibiotics on high flow nasal cannula and doesn't want to go to LTAC and wants to go home as soon as possible with IV antibiotics, discussed that when stepped down ICU team wanted to at least do half the course (15 days) in house to see if could titrate down a little bit before transport as the concern was the hour long drive to [facility] and cant do HFNC in ambulance and could risk arrythmia if O2 sats dip very low even if patient asymptomatic and could have cardiac event in ambulance, he understands that, discussed further inpatient antibiotics this week to see if titrating a possibility at all before attempteing to set up home infusions/high flow NC  1/8: Marginally improved dyspnea, awaiting improvement in oxygen requirements. Midline ordered for IV antibiotic administration     Interval History:     Mr. Shetty felt well this morning after his breathing treatment. We discussed his disposition: he is strongly opposed both to hospice and LTAC and desires home with HH IV antibiotics + oxygen (currently on high flow). He stated that if he "runs into trouble," at that point he would transition to hospice.    Review of Systems   Constitutional: Negative for chills and fever.   Respiratory: Positive for cough and shortness of breath.    Cardiovascular: Negative for chest pain and leg swelling.   Gastrointestinal: Negative for abdominal pain, constipation, diarrhea, nausea and vomiting.   Genitourinary: Negative for difficulty urinating.   Musculoskeletal: Negative for myalgias.   Neurological: Negative for weakness and numbness.   Psychiatric/Behavioral: Negative for confusion. The patient is not nervous/anxious.      Objective:     Vital Signs (Most " Recent):  Temp: 97.6 °F (36.4 °C) (01/08/19 0804)  Pulse: 71 (01/08/19 0940)  Resp: (!) 22 (01/08/19 0940)  BP: 97/63 (01/08/19 0804)  SpO2: (!) 91 % (01/08/19 0940) Vital Signs (24h Range):  Temp:  [97.4 °F (36.3 °C)-97.9 °F (36.6 °C)] 97.6 °F (36.4 °C)  Pulse:  [] 71  Resp:  [16-24] 22  SpO2:  [81 %-97 %] 91 %  BP: ()/(62-73) 97/63     Weight: 78 kg (171 lb 15.3 oz)  Body mass index is 25.39 kg/m².    Intake/Output Summary (Last 24 hours) at 1/8/2019 0948  Last data filed at 1/8/2019 0700  Gross per 24 hour   Intake --   Output 1508 ml   Net -1508 ml      Physical Exam   Constitutional: He is oriented to person, place, and time. No distress.   HENT:   HFNC in place   Eyes: Pupils are equal, round, and reactive to light.   Cardiovascular: Normal rate and regular rhythm.   No murmur heard.  Port palpable in right chest wall, no tenderness or erythema   Pulmonary/Chest: Effort normal and breath sounds normal. No respiratory distress. He has no wheezes.   Bibasilar crackles R > L   Abdominal: Soft. Bowel sounds are normal. He exhibits no distension. There is no tenderness.   Musculoskeletal: He exhibits no edema or tenderness.   Neurological: He is alert and oriented to person, place, and time.   Skin: Skin is warm and dry. No rash noted. He is not diaphoretic. No erythema.   Psychiatric: He has a normal mood and affect. His behavior is normal.     Significant Labs:  CBC:  Recent Labs   Lab 01/07/19  0352 01/08/19  0532   WBC 10.08 8.78   HGB 13.0* 13.4*   HCT 43.5 44.4   * 116*     CMP:  Recent Labs   Lab 01/07/19  0352 01/08/19  0532    141   K 4.1 5.0    98   CO2 31* 32*   * 108   BUN 40* 38*   CREATININE 0.9 0.9   CALCIUM 8.6* 8.9   ANIONGAP 8 11   EGFRNONAA >60.0 >60.0     Assessment/Plan:      * Acute on chronic respiratory failure with hypoxemia      - Improving, slowly  - Multifactorial etiology; hx of PES and lung cancer with recurrent PE now- on 6L O2 at home prior to  admit- sent here from OSH to see if a tpa candidate to the PE- cards said not a candidate, also with multidrug resistant pneumonia causing worsening of chronic respiratory failure  -on high flow NC at 20 L now and 60% FiO2- was unable to wean and sig symptoms- once meropenem was started patient started feeling better however and symptoms improved, however cant wean from high flow in the ICU from 20L recently, even though looks much better symptom wise, have discussed many options/palliative etc.  -plan now: 3 weeks of meropenem treatment per ID recs for pneumonia to give best shot at improving resp status, continue to treat here now in hopes that he can come down off high flow 20 L  -nursing aware not to call MD unless patient sustains sats under 80 AND Is symptomatic, which was ICU order as well. Change o2 to 100% when eating for desats currently.        Chronic systolic heart failure      - Stable  - EF of 25%, home lasix increased to BID in ICU.   - Following I/O closely. Not on ACE, BB held, given overall situation, not inclined to start currently       Pseudomonas pneumonia      - See respiratory failure above  - Continue meropenem, now day 11/21  - Midline requested     Goals of care, counseling/discussion      - See respiratory failure above     Coronary artery disease involving coronary bypass graft      - Stable  -s/p CABG. Home BB currently held. Pravastatin on now, no asa on  - Holding for now while treating for acute respiratory failure, will resume as his overall condition improves     Pulmonary emphysema      - Stable  - Continue duonebs, budesonide, spiriva, and prednisone     Chronic hypotension      - Stable  - If recurrent, will resume home midodrine.        Chronic pulmonary embolism      - Stable  - See respiratory failure above     Primary malignant neoplasm of left upper lobe of lung      - Stable  - NEIDA SCC s/p carbo/taxol and XRT, follows with onc as outpatient, stage 2       VTE Risk  Mitigation (From admission, onward)        Ordered     apixaban tablet 5 mg  2 times daily      12/25/18 1648     IP VTE HIGH RISK PATIENT  Once      12/25/18 1413              Josh Bateman MD  Department of Hospital Medicine   [facility]

## 2019-01-08 NOTE — PLAN OF CARE
SW contacted [facility]'s NP Clara to see if they could provide 20L of O2. She said that is up to the insurance company and the home health. Pt has PHN so PHN would set up HH then it would be up to the HH to deal with the O2. Palliative Care with [facility] would be involved to assist in anyway they can with the HH.    Silvia Bradley, [facility]  [phone]

## 2019-01-08 NOTE — HPI
"Per ICU H&P:    "Mr. Shetty is a 74yo man with PMH of MTHFR mutation, history of PEs on Eliquis, CAD s/p CABGn with chronically elevated troponin, hypotension on midodrine, squamous cell lung carcinoma of NEIDA s/p 1 cycle of adjuvant CTX & radiation therapy (which was stopped due to recurrent pseudomonas PNA & PEs), COPD, 54 pack years (quit 1990), chronic severe pseudomonas pna (3 abx for over a year) who presents as a transfer from [facility] for Interventional Cardiology evaluation for catheter assisted thrombolysis.      HPI: 4 days ago he had acute SOB requring 10L of oxygen at home when he is at a baseline of 6L. At [facility] his CTPA showed known PE of RLL pulmonary artery & increased size of right sided pleural effusion. Doppler u/s of BLE was negative for DVT. OSH continued patient on home eliquis regimen of 5mg BID and per patient did not notice improvement in oxygen requirements so he was sent to [facility].       Vitals during initial interview: 135/75, -104, 25-20L comfort flow O2 FiO2 70%.       Of note patient has chronic severe pseudomonas PNA for which he takes an alternating regimen of doxycycline, cipro, & cefuroxime. He quit smoking 30 years ago."  "

## 2019-01-08 NOTE — SUBJECTIVE & OBJECTIVE
"Interval History:     Mr. Shetty felt well this morning after his breathing treatment. We discussed his disposition: he is strongly opposed both to hospice and LTAC and desires home with  IV antibiotics + oxygen (currently on high flow). He stated that if he "runs into trouble," at that point he would transition to hospice.    Review of Systems   Constitutional: Negative for chills and fever.   Respiratory: Positive for cough and shortness of breath.    Cardiovascular: Negative for chest pain and leg swelling.   Gastrointestinal: Negative for abdominal pain, constipation, diarrhea, nausea and vomiting.   Genitourinary: Negative for difficulty urinating.   Musculoskeletal: Negative for myalgias.   Neurological: Negative for weakness and numbness.   Psychiatric/Behavioral: Negative for confusion. The patient is not nervous/anxious.      Objective:     Vital Signs (Most Recent):  Temp: 97.6 °F (36.4 °C) (01/08/19 0804)  Pulse: 71 (01/08/19 0940)  Resp: (!) 22 (01/08/19 0940)  BP: 97/63 (01/08/19 0804)  SpO2: (!) 91 % (01/08/19 0940) Vital Signs (24h Range):  Temp:  [97.4 °F (36.3 °C)-97.9 °F (36.6 °C)] 97.6 °F (36.4 °C)  Pulse:  [] 71  Resp:  [16-24] 22  SpO2:  [81 %-97 %] 91 %  BP: ()/(62-73) 97/63     Weight: 78 kg (171 lb 15.3 oz)  Body mass index is 25.39 kg/m².    Intake/Output Summary (Last 24 hours) at 1/8/2019 0948  Last data filed at 1/8/2019 0700  Gross per 24 hour   Intake --   Output 1508 ml   Net -1508 ml      Physical Exam   Constitutional: He is oriented to person, place, and time. No distress.   HENT:   HFNC in place   Eyes: Pupils are equal, round, and reactive to light.   Cardiovascular: Normal rate and regular rhythm.   No murmur heard.  Port palpable in right chest wall, no tenderness or erythema   Pulmonary/Chest: Effort normal and breath sounds normal. No respiratory distress. He has no wheezes.   Bibasilar crackles R > L   Abdominal: Soft. Bowel sounds are normal. He exhibits no " distension. There is no tenderness.   Musculoskeletal: He exhibits no edema or tenderness.   Neurological: He is alert and oriented to person, place, and time.   Skin: Skin is warm and dry. No rash noted. He is not diaphoretic. No erythema.   Psychiatric: He has a normal mood and affect. His behavior is normal.     Significant Labs:  CBC:  Recent Labs   Lab 01/07/19  0352 01/08/19  0532   WBC 10.08 8.78   HGB 13.0* 13.4*   HCT 43.5 44.4   * 116*     CMP:  Recent Labs   Lab 01/07/19  0352 01/08/19  0532    141   K 4.1 5.0    98   CO2 31* 32*   * 108   BUN 40* 38*   CREATININE 0.9 0.9   CALCIUM 8.6* 8.9   ANIONGAP 8 11   EGFRNONAA >60.0 >60.0

## 2019-01-08 NOTE — SIGNIFICANT EVENT
Artificial Intelligence Nirav      Admit Date: 2018  LOS: 13  Code Status: Partial Code   Date of Consult: 2019  : 1945  Age: 73 y.o.  Weight:   Wt Readings from Last 1 Encounters:   19 78 kg (171 lb 15.3 oz)     Sex: male  Bed: [address] [place] [address] A:   MRN: [id number]  Attending Physician: Sary Nichole MD  Primary Service: [facility] HOSP MED A  Time AI Alert Received: 1220  me at Bedside: 1430           Patient found in bed awake and alert with nasal canula in place with family and Dr. Nichole at bedside. No distress at this time. Dr. Nichole aware of alert and advised to call if consult warranted.       Vital Signs (Most Recent):  Temp: 97.4 °F (36.3 °C) (19 1546)  Pulse: 95 (19 1600)  Resp: (!) 22 (19 1600)  BP: 103/68 (19 1546)  SpO2: (!) 91 % (19 1600) Vital Signs (24h Range):  Temp:  [96.2 °F (35.7 °C)-97.8 °F (36.6 °C)] 97.4 °F (36.3 °C)  Pulse:  [] 95  Resp:  [18-26] 22  SpO2:  [71 %-92 %] 91 %  BP: ()/(58-73) 103/68         This is an  Artificial Intelligence Notification.     Artificial Intelligence alert discussed with Primary team:  Dr. Nichole    Please place a Critical Care consult if evaluation/consultation is needed  The Critical Care Fellow can be reached at x [phone]

## 2019-01-08 NOTE — PLAN OF CARE
Confirmed with [facility], infusion company will be able to infuse Meropenem. Message left on PICC team  [phone]

## 2019-01-09 NOTE — ASSESSMENT & PLAN NOTE
- Improving, very slowly  - Multifactorial etiology; hx of PES and lung cancer with recurrent PE now- on 6L O2 at home prior to admit- sent here from OSH to see if a tpa candidate to the PE- cards said not a candidate, also with multidrug resistant pneumonia causing worsening of chronic respiratory failure  - Continue 3 weeks of meropenem treatment per ID recs for pneumonia to give best shot at improving resp status, continue to treat here now in hopes that he can come down off high flow 20 L  - Change o2 to 100% when eating for desats currently

## 2019-01-09 NOTE — NURSING
Notified MD Bateman of O2 sats of 80% with activity and NC 22L high flow. Patient had just got off commode and was having SOB. neb tx overdue and RT notified. Nurse to call MD if patients sats aren't improved with rest and Neb tx. Will continue to monitor.

## 2019-01-09 NOTE — PROGRESS NOTES
"[facility] Hospital Medicine  Progress Note    Patient Name: Michael Shetty  MRN: [id number]  Patient Class: IP- Inpatient   Admission Date: 12/25/2018  Length of Stay: 15 days  Attending Physician: Josh Bateman MD  Primary Care Provider: Michael Ellsworth MD    Hospital Medicine Team: [facility] HOSP MED A Josh Bateman MD    Subjective:     Principal Problem:Acute on chronic respiratory failure with hypoxemia    HPI:  Per ICU H&P:    "Mr. Shetty is a 74yo man with PMH of MTHFR mutation, history of PEs on Eliquis, CAD s/p CABGn with chronically elevated troponin, hypotension on midodrine, squamous cell lung carcinoma of NEIDA s/p 1 cycle of adjuvant CTX & radiation therapy (which was stopped due to recurrent pseudomonas PNA & PEs), COPD, 54 pack years (quit 1990), chronic severe pseudomonas pna (3 abx for over a year) who presents as a transfer from [facility] for Interventional Cardiology evaluation for catheter assisted thrombolysis.      HPI: 4 days ago he had acute SOB requring 10L of oxygen at home when he is at a baseline of 6L. At [facility] his CTPA showed known PE of RLL pulmonary artery & increased size of right sided pleural effusion. Doppler u/s of BLE was negative for DVT. OSH continued patient on home eliquis regimen of 5mg BID and per patient did not notice improvement in oxygen requirements so he was sent to [facility].       Vitals during initial interview: 135/75, -104, 25-20L comfort flow O2 FiO2 70%.       Of note patient has chronic severe pseudomonas PNA for which he takes an alternating regimen of doxycycline, cipro, & cefuroxime. He quit smoking 30 years ago."    Hospital Course:  1/6: stepped down to hospital med to continue meropnem for pseudomonas pneumonia and continue high flow nasal cannula for acute on chronic respiratory failure. No complaints on exam today, reviewed ICU course and goals of care and in agreement with current plan. Requested continue PT/OT evals to move patient out " of bed as tolerated to help regain strength.   1/7: doing well overall, only mild SOB when going to bedside commode. Day 10 of planned 21 day course of penem. Needs PICC- can consult tomorrow for placement. Discussed plan further- he wants to go home with IV antibiotics on high flow nasal cannula and doesn't want to go to LTAC and wants to go home as soon as possible with IV antibiotics, discussed that when stepped down ICU team wanted to at least do half the course (15 days) in house to see if could titrate down a little bit before transport as the concern was the hour long drive to [facility] and cant do HFNC in ambulance and could risk arrythmia if O2 sats dip very low even if patient asymptomatic and could have cardiac event in ambulance, he understands that, discussed further inpatient antibiotics this week to see if titrating a possibility at all before attempteing to set up home infusions/high flow NC  1/8: Marginally improved dyspnea, awaiting improvement in oxygen requirements. Midline ordered for IV antibiotic administration   1/9: Stable oxygen requirements, stable, adamant about only returning home    Interval History:     Mr. Shetty felt well this morning at rest. He is usually comfortable until he moves, then becomes dyspneic. He is adamant about returning home despite continued high oxygen requirements.     Review of Systems   Constitutional: Negative for chills and fever.   Respiratory: Positive for cough and shortness of breath.    Cardiovascular: Negative for chest pain and leg swelling.   Gastrointestinal: Negative for abdominal pain, constipation, diarrhea, nausea and vomiting.   Genitourinary: Negative for difficulty urinating.   Musculoskeletal: Negative for myalgias.   Neurological: Negative for weakness and numbness.   Psychiatric/Behavioral: Negative for confusion. The patient is not nervous/anxious.      Objective:     Vital Signs (Most Recent):  Temp: 98.6 °F (37 °C) (01/09/19  0911)  Pulse: 88 (01/09/19 1006)  Resp: 18 (01/09/19 1006)  BP: 96/72 (01/09/19 0911)  SpO2: (!) 85 % (01/09/19 0911) Vital Signs (24h Range):  Temp:  [96.6 °F (35.9 °C)-98.6 °F (37 °C)] 98.6 °F (37 °C)  Pulse:  [] 88  Resp:  [18-22] 18  SpO2:  [82 %-95 %] 85 %  BP: ()/(56-82) 96/72     Weight: 78 kg (171 lb 15.3 oz)  Body mass index is 25.39 kg/m².    Intake/Output Summary (Last 24 hours) at 1/9/2019 1312  Last data filed at 1/9/2019 1000  Gross per 24 hour   Intake 250 ml   Output 1250 ml   Net -1000 ml      Physical Exam   Constitutional: He is oriented to person, place, and time. No distress.   HENT:   HFNC in place   Eyes: Pupils are equal, round, and reactive to light.   Cardiovascular: Normal rate and regular rhythm.   No murmur heard.  Port palpable in right chest wall, no tenderness or erythema   Pulmonary/Chest: Effort normal and breath sounds normal. No respiratory distress. He has no wheezes.   Bibasilar crackles R > L   Abdominal: Soft. Bowel sounds are normal. He exhibits no distension. There is no tenderness.   Musculoskeletal: He exhibits no edema or tenderness.   Neurological: He is alert and oriented to person, place, and time.   Skin: Skin is warm and dry. No rash noted. He is not diaphoretic. No erythema.   Psychiatric: He has a normal mood and affect. His behavior is normal.     Significant Labs:  CBC:  Recent Labs   Lab 01/08/19  0532 01/09/19  0401   WBC 8.78 9.60   HGB 13.4* 13.7*   HCT 44.4 44.1   * 132*     CMP:  Recent Labs   Lab 01/08/19  0532 01/09/19  0401    138   K 5.0 3.7   CL 98 98   CO2 32* 28    140*   BUN 38* 33*   CREATININE 0.9 0.8   CALCIUM 8.9 8.5*   ANIONGAP 11 12   EGFRNONAA >60.0 >60.0     Assessment/Plan:      * Acute on chronic respiratory failure with hypoxemia      - Improving, very slowly  - Multifactorial etiology; hx of PES and lung cancer with recurrent PE now- on 6L O2 at home prior to admit- sent here from OSH to see if a tpa  candidate to the PE- cards said not a candidate, also with multidrug resistant pneumonia causing worsening of chronic respiratory failure  - Continue 3 weeks of meropenem treatment per ID recs for pneumonia to give best shot at improving resp status, continue to treat here now in hopes that he can come down off high flow 20 L  - Change o2 to 100% when eating for desats currently     Chronic systolic heart failure      - Stable  - EF of 25%, home lasix increased to BID in ICU.   - Following I/O closely. Not on ACE, BB held, given overall situation, not inclined to start currently     Pseudomonas pneumonia      - See respiratory failure above  - Continue meropenem, now day 11/21  - Midline requested     Goals of care, counseling/discussion      - See respiratory failure above     Coronary artery disease involving coronary bypass graft      - Stable  -s/p CABG. Home BB currently held. Pravastatin on now, no asa on  - Holding for now while treating for acute respiratory failure, will resume as his overall condition improves     Pulmonary emphysema      - Stable  - Continue duonebs, budesonide, spiriva, and prednisone     Chronic hypotension      - Stable  - If recurrent, will resume home midodrine.        Chronic pulmonary embolism      - Stable  - See respiratory failure above     Primary malignant neoplasm of left upper lobe of lung      - Stable  - NEIDA SCC s/p carbo/taxol and XRT, follows with onc as outpatient, stage 2       VTE Risk Mitigation (From admission, onward)        Ordered     apixaban tablet 5 mg  2 times daily      12/25/18 1648     IP VTE HIGH RISK PATIENT  Once      12/25/18 1413              Josh Bateman MD  Department of Hospital Medicine   [facility]

## 2019-01-09 NOTE — SUBJECTIVE & OBJECTIVE
Interval History:     Mr. Shetty felt well this morning at rest. He is usually comfortable until he moves, then becomes dyspneic. He is adamant about returning home despite continued high oxygen requirements.     Review of Systems   Constitutional: Negative for chills and fever.   Respiratory: Positive for cough and shortness of breath.    Cardiovascular: Negative for chest pain and leg swelling.   Gastrointestinal: Negative for abdominal pain, constipation, diarrhea, nausea and vomiting.   Genitourinary: Negative for difficulty urinating.   Musculoskeletal: Negative for myalgias.   Neurological: Negative for weakness and numbness.   Psychiatric/Behavioral: Negative for confusion. The patient is not nervous/anxious.      Objective:     Vital Signs (Most Recent):  Temp: 98.6 °F (37 °C) (01/09/19 0911)  Pulse: 88 (01/09/19 1006)  Resp: 18 (01/09/19 1006)  BP: 96/72 (01/09/19 0911)  SpO2: (!) 85 % (01/09/19 0911) Vital Signs (24h Range):  Temp:  [96.6 °F (35.9 °C)-98.6 °F (37 °C)] 98.6 °F (37 °C)  Pulse:  [] 88  Resp:  [18-22] 18  SpO2:  [82 %-95 %] 85 %  BP: ()/(56-82) 96/72     Weight: 78 kg (171 lb 15.3 oz)  Body mass index is 25.39 kg/m².    Intake/Output Summary (Last 24 hours) at 1/9/2019 1312  Last data filed at 1/9/2019 1000  Gross per 24 hour   Intake 250 ml   Output 1250 ml   Net -1000 ml      Physical Exam   Constitutional: He is oriented to person, place, and time. No distress.   HENT:   HFNC in place   Eyes: Pupils are equal, round, and reactive to light.   Cardiovascular: Normal rate and regular rhythm.   No murmur heard.  Port palpable in right chest wall, no tenderness or erythema   Pulmonary/Chest: Effort normal and breath sounds normal. No respiratory distress. He has no wheezes.   Bibasilar crackles R > L   Abdominal: Soft. Bowel sounds are normal. He exhibits no distension. There is no tenderness.   Musculoskeletal: He exhibits no edema or tenderness.   Neurological: He is alert and  oriented to person, place, and time.   Skin: Skin is warm and dry. No rash noted. He is not diaphoretic. No erythema.   Psychiatric: He has a normal mood and affect. His behavior is normal.     Significant Labs:  CBC:  Recent Labs   Lab 01/08/19  0532 01/09/19  0401   WBC 8.78 9.60   HGB 13.4* 13.7*   HCT 44.4 44.1   * 132*     CMP:  Recent Labs   Lab 01/08/19  0532 01/09/19  0401    138   K 5.0 3.7   CL 98 98   CO2 32* 28    140*   BUN 38* 33*   CREATININE 0.9 0.8   CALCIUM 8.9 8.5*   ANIONGAP 11 12   EGFRNONAA >60.0 >60.0

## 2019-01-09 NOTE — PLAN OF CARE
Problem: Adult Inpatient Plan of Care  Goal: Plan of Care Review  Outcome: Ongoing (interventions implemented as appropriate)   01/09/19 0541   Plan of Care Review   Plan of Care Reviewed With patient     Goal: Absence of Hospital-Acquired Illness or Injury  Outcome: Ongoing (interventions implemented as appropriate)  Intervention: Identify and Manage Fall Risk   01/09/19 0400   Optimize Balance and Safe Activity   Safety Promotion/Fall Prevention assistive device/personal item within reach;bed alarm set;commode/urinal/bedpan at bedside;diversional activities provided;Fall Risk reviewed with patient/family;Fall Risk signage in place;high risk medications identified;lighting adjusted;medications reviewed;nonskid shoes/socks when out of bed;side rails raised x 2;instructed to call staff for mobility     Intervention: Prevent VTE (venous thromboembolism)   01/08/19 2015   Prevent or Manage Embolism   VTE Prevention/Management bleeding precautions maintained         Problem: Fall Injury Risk  Goal: Absence of Fall and Fall-Related Injury    Intervention: Identify and Manage Contributors to Fall Injury Risk   01/08/19 0754 01/08/19 2015   Manage Acute Allergic Reaction   Medication Review/Management --  medications reviewed;high risk medications identified   Identify and Manage Contributors to Fall Injury Risk   Self-Care Promotion independence encouraged;BADL personal objects within reach;BADL personal routines maintained;safe use of adaptive equipment encouraged;meal setup provided --      Intervention: Promote Injury-Free Environment   01/08/19 2015 01/09/19 0400   Optimize Balance and Safe Activity   Safety Promotion/Fall Prevention --  assistive device/personal item within reach;bed alarm set;commode/urinal/bedpan at bedside;diversional activities provided;Fall Risk reviewed with patient/family;Fall Risk signage in place;high risk medications identified;lighting adjusted;medications reviewed;nonskid shoes/socks when  out of bed;side rails raised x 2;instructed to call staff for mobility   Optimize [place] and Functional Mobility   Environmental Safety Modification assistive device/personal items within reach;clutter free environment maintained;lighting adjusted --          Problem: Respiratory Compromise COPD (Chronic Obstructive Pulmonary Disease)  Goal: Effective Oxygenation and Ventilation    Intervention: Promote Airway Secretion Clearance   01/08/19 0754 01/08/19 2015 01/09/19 0400   Promote Airway Secretion Clearance   Activity Management --  --  activity clustered for rest period*   Breathing Techniques/Airway Clearance pursed-lip breathing encouraged --  --    Promote Airway Secretion Clearance   Cough And Deep Breathing --  done independently per patient --      Intervention: Optimize Oxygenation and Ventilation   01/02/19 0715 01/02/19 1900 01/08/19 2015   Prevent Additional Skin Injury   Head of Bed (HOB) --  --  HOB at 30-45 degrees   Monitor and Manage Cardiac Rhythm Effects   Fluid/Electrolyte Management --  fluids provided --    Support Asthma Symptom Control   Airway/Ventilation Management calming measures promoted;airway patency maintained --  --          Problem: Fluid Imbalance (Pneumonia)  Goal: Fluid Balance    Intervention: Monitor and Manage Fluid Balance   01/02/19 1900   Monitor and Manage Cardiac Rhythm Effects   Fluid/Electrolyte Management fluids provided         Problem: Infection (Pneumonia)  Goal: Resolution of Infection Signs/Symptoms    Intervention: Prevent Infection Progression   01/02/19 1500 01/03/19 0500 01/08/19 2015   Prevent or Manage Infection   Fever Reduction/Comfort Measures --  lightweight bedding;lightweight clothing --    Infection Management --  --  aseptic technique maintained   Manage Diarrhea   Isolation Precautions protective environment maintained --  --          Problem: Respiratory Compromise (Pneumonia)  Goal: Effective Oxygenation and Ventilation    Intervention:  Promote Airway Secretion Clearance   01/08/19 0754 01/08/19 2015   Promote Airway Secretion Clearance   Breathing Techniques/Airway Clearance pursed-lip breathing encouraged --    Promote Airway Secretion Clearance   Cough And Deep Breathing --  done independently per patient     Intervention: Optimize Oxygenation and Ventilation   01/02/19 0715 01/08/19 2015   Prevent Additional Skin Injury   Head of Bed (HOB) --  HOB at 30-45 degrees   Support Asthma Symptom Control   Airway/Ventilation Management calming measures promoted;airway patency maintained --          Problem: Coping Ineffective  Goal: Effective Coping    Intervention: Support and Enhance Coping Strategies   01/07/19 0750 01/07/19 2045   Monitor/Manage Chemotherapy Gastrointestinal Effects   Environmental Support calm environment promoted;environmental consistency promoted;personal routine supported;comfort object encouraged;distractions minimized --    Promote Anxiety Reduction   Family/Support System Care presence promoted;involvement promoted;self-care encouraged;support provided --    Supportive Measures --  active listening utilized;relaxation techniques promoted;self-care encouraged;verbalization of feelings encouraged         Problem: Infection  Goal: Infection Symptom Resolution    Intervention: Prevent or Manage Infection   01/02/19 1500 01/03/19 0500 01/08/19 2015   Prevent or Manage Infection   Fever Reduction/Comfort Measures --  lightweight bedding;lightweight clothing --    Infection Management --  --  aseptic technique maintained   Manage Diarrhea   Isolation Precautions protective environment maintained --  --

## 2019-01-10 NOTE — CARE UPDATE
Rapid Response Nurse Note     Rapid Response Metrics:     Admit Date: 2018  LOS: 16  Code Status: Partial Code   Date of Consult: 01/10/2019  : 1945  Age: 73 y.o.  Weight:   Wt Readings from Last 1 Encounters:   19 78 kg (171 lb 15.3 oz)     Sex: male  Race: White   Bed: [address] [place] [address] A:   MRN: [id number]  Time Rapid Response Team page Received: 1402  Time Rapid Response Team at Bedside: 1406  Time Rapid Response Team left Bedside: 1452  Was the patient discharged from an ICU this admission?   no  Was the patient discharged from a PACU within last 24 hours?  no  Did the patient receive conscious sedation/general anesthesia within last 24 hours?  no  Was the patient in the ED within the past 24 hours?  no  Was the patient started on NIPPV within the past 24 hours?  no  Did this progress into an ARC or CPA:  no  Attending Physician: Josh Bateman MD  Primary Service: [place] [facility] MED A  Consult Requested By: Josh Bateman MD   Rapid Response Indication(s): respiratory distress, hypoxemia    SITUATION:     Reason for Call:   Called to evaluate the patient for Respiratory    BACKGROUND:     Why is the patient in the hospital?: Chronic respiratory failure  What changed?: respiratory distress, hypoxemia; patient had coughing episode after choking on lunch. SpO2 decreased to 60s. He was switched from comfort flow to %    ASSESSMENT:     What did you find: On arrival to bedside, patient on % FiO2, SpO2-78%, patent/clear airway. Patient awake, deep inspirations. Dr. Bateman at bedside. SpO2 gradually increasing. ECG/chest x-ray done. Comfort flow 30LPM 100% FiO2 placed on patient in addition to NRB. SpO2 increased to 94% with decreased RR-20/min. ABG obtained. CCS consulted by Dr. Bateman. Patient reports improvement in status prior to my departure from bedside.     RECOMMENDATIONS:     We recommend: continue current plan of care, wean FiO2 as tolerated    FOLLOW-UP/CONTINGENCY PLAN:     Call  the rapid response Nurse at [phone] for additional questions or concerns.      PHYSICIAN ESCALATION:     Orders received and case discussed with [facility]     Disposition: Remain in room 656.

## 2019-01-10 NOTE — ASSESSMENT & PLAN NOTE
- Improving, very slowly, though setback today with the aspiration event. CXR ordered  - Multifactorial etiology; hx of PES and lung cancer with recurrent PE now- on 6L O2 at home prior to admit- sent here from OSH to see if a tpa candidate to the PE- cards said not a candidate, also with multidrug resistant pneumonia causing worsening of chronic respiratory failure  - Continue 3 weeks of meropenem treatment per ID recs for pneumonia to give best shot at improving resp status, continue to treat here now in hopes that he can come down off high flow 20 L  - Change o2 to 100% when eating for desats currently  - Will wean FiO2 off of 100% as tolerated. ABG after the event relatively good with PO2 of 82 (though on very high oxygen supplementation)

## 2019-01-10 NOTE — SUBJECTIVE & OBJECTIVE
Interval History:     Mr. Shetty felt better this morning compared to yesterday. Discussed his disposition with  and his daughter at bedside extensively. The current plan is to continue antibiotics in the hopes that we can wean his oxygen to a level acceptable at home. Though subjectively improved, we have made little progress on his FiO2.     In the afternoon he choked on a piece of hamburger and rapidly desatted, prompting a rapid response. With coughing on his part and aggressive suctioning on ours, as well as increasing his oxygen to both 30L 100% comfort flow and 100% NRB we were able to get him back to above baseline. His SOB improved with the increasing SpO2. ICU was called, who are very familiar with him, who evaluated and recommended staying on the floor given that he is DNI and does not want to be transferred to the ICU.    Review of Systems   Constitutional: Negative for chills and fever.   Respiratory: Positive for cough and shortness of breath.    Cardiovascular: Negative for chest pain and leg swelling.   Gastrointestinal: Negative for abdominal pain, constipation, diarrhea, nausea and vomiting.   Genitourinary: Negative for difficulty urinating.   Musculoskeletal: Negative for myalgias.   Neurological: Negative for weakness and numbness.   Psychiatric/Behavioral: Negative for confusion. The patient is not nervous/anxious.      Objective:     Vital Signs (Most Recent):  Temp: 97.8 °F (36.6 °C) (01/10/19 1208)  Pulse: (!) 126 (01/10/19 1239)  Resp: 20 (01/10/19 1239)  BP: 102/70 (01/10/19 1208)  SpO2: (!) 88 % (01/10/19 1239) Vital Signs (24h Range):  Temp:  [96.2 °F (35.7 °C)-97.9 °F (36.6 °C)] 97.8 °F (36.6 °C)  Pulse:  [] 126  Resp:  [18-24] 20  SpO2:  [80 %-95 %] 88 %  BP: ()/(57-70) 102/70     Weight: 78 kg (171 lb 15.3 oz)  Body mass index is 25.39 kg/m².    Intake/Output Summary (Last 24 hours) at 1/10/2019 1324  Last data filed at 1/10/2019 1300  Gross per 24 hour   Intake 540 ml    Output 1275 ml   Net -735 ml      Physical Exam   Constitutional: He is oriented to person, place, and time. No distress.   HENT:   HFNC in place   Eyes: Pupils are equal, round, and reactive to light.   Cardiovascular: Normal rate and regular rhythm.   No murmur heard.  Port palpable in right chest wall, no tenderness or erythema   Pulmonary/Chest: Effort normal and breath sounds normal. No respiratory distress. He has no wheezes.   Bibasilar crackles R > L   Abdominal: Soft. Bowel sounds are normal. He exhibits no distension. There is no tenderness.   Musculoskeletal: He exhibits no edema or tenderness.   Neurological: He is alert and oriented to person, place, and time.   Skin: Skin is warm and dry. No rash noted. He is not diaphoretic. No erythema.   Psychiatric: He has a normal mood and affect. His behavior is normal.     Significant Labs:  CBC:  Recent Labs   Lab 01/09/19  0401 01/10/19  0349   WBC 9.60 9.23   HGB 13.7* 13.1*   HCT 44.1 43.1   * 116*     CMP:  Recent Labs   Lab 01/09/19  0401 01/10/19  0349    142   K 3.7 4.0   CL 98 99   CO2 28 27   * 105   BUN 33* 31*   CREATININE 0.8 0.9   CALCIUM 8.5* 8.7   ANIONGAP 12 16   EGFRNONAA >60.0 >60.0

## 2019-01-10 NOTE — CODE DOCUMENTATION
"Called to pt room by family member for "choking" and difficulty breathing, rapid response initiated, pt sats 68% on [place] oxygen on arrival to room,. Pt reports choking after eating a hamburger. Placed on non-rebreather and O2 sats came up to 77%. Left on nonrebreather currently pending stat chest xray and EKG and post ABG 15 min from switch to non-rebreather   "

## 2019-01-10 NOTE — CONSULTS
"Patient seen by Critical Care Medicine today for worsening hypoxemia after an aspiration event. Patient found resting comfortably on 100% comfort flow and 100% NRB with improvement in SOB. He reported choking on a hamburger today causing O2 sats to drop to 68% while on comfort flow. Rapid response was called at that time and patient placed on 100% NRB. Michael Shetty is well known to the CCS team and has had many GOC discussions with our staff. Dr. Worthington discussed code status again and he reported he still does not want to be intubated under any circumstance or return to the ICU. He wants to complete is antibiotic regimen and go home. Patient made aware and verbalized understanding that if another "choking" event were to occur and we could not intervene, it could result in death. At this point, there is nothing further the ICU can offer him. Continue supportive care and antibiotics on hospital medicine floor.       Thank you for the consult. We will sign off. Please contact us if you have any additional questions.      Dominga Prieto PA-C  Critical Care Medicine  1/10/2019   3:31 PM      "

## 2019-01-10 NOTE — PROGRESS NOTES
"[facility] Hospital Medicine  Progress Note    Patient Name: Michael Shetty  MRN: [id number]  Patient Class: IP- Inpatient   Admission Date: 12/25/2018  Length of Stay: 16 days  Attending Physician: Josh Bateman MD  Primary Care Provider: Michael Ellsworth MD    [place] [facility] [place] Josh Bateman [place]    Subjective:     Principal Problem:Acute on chronic respiratory failure with hypoxemia    HPI:  Per ICU H&P:    "Mr. Shetty is a 74yo man with PMH of MTHFR mutation, history of PEs on Eliquis, CAD s/p CABGn with chronically elevated troponin, hypotension on midodrine, squamous cell lung carcinoma of NEIDA s/p 1 cycle of adjuvant CTX & radiation therapy (which was stopped due to recurrent pseudomonas PNA & PEs), COPD, 54 pack years (quit 1990), chronic severe pseudomonas pna (3 abx for over a year) who presents as a transfer from [facility] for Interventional Cardiology evaluation for catheter assisted thrombolysis.      HPI: 4 days ago he had acute SOB requring 10L of oxygen at home when he is at a baseline of 6L. At [facility] his CTPA showed known PE of RLL pulmonary artery & increased size of right sided pleural effusion. Doppler u/s of BLE was negative for DVT. OSH continued patient on home eliquis regimen of 5mg BID and per patient did not notice improvement in oxygen requirements so [place] [facility] [place]       Vitals during initial interview: 135/75, -104, 25-20L comfort flow O2 FiO2 70%.       Of note patient has chronic severe pseudomonas PNA for which he takes an alternating regimen of doxycycline, cipro, & cefuroxime. He quit smoking 30 years ago."    Hospital Course:  1/6: stepped down to hospital med to continue meropnem for pseudomonas pneumonia and continue high flow nasal cannula for acute on chronic respiratory failure. No complaints on exam today, reviewed ICU course and goals of care and in agreement with current plan. Requested continue PT/OT evals to move patient out " of bed as tolerated to help regain strength.   1/7: doing well overall, only mild SOB when going to bedside commode. Day 10 of planned 21 day course of penem. Needs PICC- can consult tomorrow for placement. Discussed plan further- he wants to go home with IV antibiotics on high flow nasal cannula and doesn't want to go to LTAC and wants to go home as soon as possible with IV antibiotics, discussed that when stepped down ICU team wanted to at least do half the course (15 days) in house to see if could titrate down a little bit before transport as the concern was the hour long drive to [facility] and cant do HFNC in ambulance and could risk arrythmia if O2 sats dip very low even if patient asymptomatic and could have cardiac event in ambulance, he understands that, discussed further inpatient antibiotics this week to see if titrating a possibility at all before attempteing to set up home infusions/high flow NC  1/8: Marginally improved dyspnea, awaiting improvement in oxygen requirements. Midline ordered for IV antibiotic administration   1/9: Stable oxygen requirements, stable, adamant about only returning home  1/10: Stable oxygen requirements, decreasing frequency of lab draws that have been stable for days    Interval History:     Mr. Shetty felt better this morning compared to yesterday. Discussed his disposition with hm and his daughter at bedside extensively. The current plan is to continue antibiotics in the hopes that we can wean his oxygen to a level acceptable at home. Though subjectively improved, we have made little progress on his FiO2.     In the afternoon he choked on a piece of hamburger and rapidly desatted, prompting a rapid response. With coughing on his part and aggressive suctioning on ours, as well as increasing his oxygen to both 30L 100% comfort flow and 100% NRB we were able to get him back to above baseline. His SOB improved with the increasing SpO2. ICU was called, who are very familiar  with him, who evaluated and recommended staying on the floor given that he is DNI and does not want to be transferred to the ICU.    Review of Systems   Constitutional: Negative for chills and fever.   Respiratory: Positive for cough and shortness of breath.    Cardiovascular: Negative for chest pain and leg swelling.   Gastrointestinal: Negative for abdominal pain, constipation, diarrhea, nausea and vomiting.   Genitourinary: Negative for difficulty urinating.   Musculoskeletal: Negative for myalgias.   Neurological: Negative for weakness and numbness.   Psychiatric/Behavioral: Negative for confusion. The patient is not nervous/anxious.      Objective:     Vital Signs (Most Recent):  Temp: 97.8 °F (36.6 °C) (01/10/19 1208)  Pulse: (!) 126 (01/10/19 1239)  Resp: 20 (01/10/19 1239)  BP: 102/70 (01/10/19 1208)  SpO2: (!) 88 % (01/10/19 1239) Vital Signs (24h Range):  Temp:  [96.2 °F (35.7 °C)-97.9 °F (36.6 °C)] 97.8 °F (36.6 °C)  Pulse:  [] 126  Resp:  [18-24] 20  SpO2:  [80 %-95 %] 88 %  BP: ()/(57-70) 102/70     Weight: 78 kg (171 lb 15.3 oz)  Body mass index is 25.39 kg/m².    Intake/Output Summary (Last 24 hours) at 1/10/2019 1324  Last data filed at 1/10/2019 1300  Gross per 24 hour   Intake 540 ml   Output 1275 ml   Net -735 ml      Physical Exam   Constitutional: He is oriented to person, place, and time. No distress.   HENT:   HFNC in place   Eyes: Pupils are equal, round, and reactive to light.   Cardiovascular: Normal rate and regular rhythm.   No murmur heard.  Port palpable in right chest wall, no tenderness or erythema   Pulmonary/Chest: Effort normal and breath sounds normal. No respiratory distress. He has no wheezes.   Bibasilar crackles R > L   Abdominal: Soft. Bowel sounds are normal. He exhibits no distension. There is no tenderness.   Musculoskeletal: He exhibits no edema or tenderness.   Neurological: He is alert and oriented to person, place, and time.   Skin: Skin is warm and dry. No  rash noted. He is not diaphoretic. No erythema.   Psychiatric: He has a normal mood and affect. His behavior is normal.     Significant Labs:  CBC:  Recent Labs   Lab 01/09/19  0401 01/10/19  0349   WBC 9.60 9.23   HGB 13.7* 13.1*   HCT 44.1 43.1   * 116*     CMP:  Recent Labs   Lab 01/09/19  0401 01/10/19  0349    142   K 3.7 4.0   CL 98 99   CO2 28 27   * 105   BUN 33* 31*   CREATININE 0.8 0.9   CALCIUM 8.5* 8.7   ANIONGAP 12 16   EGFRNONAA >60.0 >60.0     Assessment/Plan:      * Acute on chronic respiratory failure with hypoxemia      - Improving, very slowly, though setback today with the aspiration event. CXR ordered  - Multifactorial etiology; hx of PES and lung cancer with recurrent PE now- on 6L O2 at home prior to admit- sent here from OSH to see if a tpa candidate to the PE- cards said not a candidate, also with multidrug resistant pneumonia causing worsening of chronic respiratory failure  - Continue 3 weeks of meropenem treatment per ID recs for pneumonia to give best shot at improving resp status, continue to treat here now in hopes that he can come down off high flow 20 L  - Change o2 to 100% when eating for desats currently  - Will wean FiO2 off of 100% as tolerated. ABG after the event relatively good with PO2 of 82 (though on very high oxygen supplementation)     Chronic systolic heart failure      - Stable  - EF of 25%, home lasix increased to BID in ICU.   - Following I/O closely. Not on ACE, BB held, given overall situation, not inclined to start currently     Pseudomonas pneumonia      - See respiratory failure above  - Continue meropenem, now day 11/21  - Midline requested     Goals of care, counseling/discussion      - See respiratory failure above     Coronary artery disease involving coronary bypass graft      - Stable  -s/p CABG. Home BB currently held. Pravastatin on now, no asa on  - Holding for now while treating for acute respiratory failure, will resume as his  overall condition improves     Pulmonary emphysema      - Stable  - Continue duonebs, budesonide, spiriva, and prednisone     Chronic hypotension      - Stable  - If recurrent, will resume home midodrine.        Chronic pulmonary embolism      - Stable  - See respiratory failure above     Primary malignant neoplasm of left upper lobe of lung      - Stable  - NEIDA SCC s/p carbo/taxol and XRT, follows with onc as outpatient, stage 2       VTE Risk Mitigation (From admission, onward)        Ordered     apixaban tablet 5 mg  2 times daily      12/25/18 1648     IP VTE HIGH RISK PATIENT  Once      12/25/18 1413              Josh Bateman MD  Department of Hospital Medicine   [facility]

## 2019-01-10 NOTE — PLAN OF CARE
Problem: Adult Inpatient Plan of Care  Goal: Plan of Care Review  Outcome: Ongoing (interventions implemented as appropriate)  POC reviewed with pt. AAO x4. High flow oxygen @ 20L. Rapid response called today. ICU consult pending.   Pt remained free from falls. Questions and concerns addressed. Pt progressing towards goals. Will continue to monitor. See flow sheets for full assessment and VS

## 2019-01-10 NOTE — PLAN OF CARE
Problem: Adult Inpatient Plan of Care  Goal: Plan of Care Review  Outcome: Ongoing (interventions implemented as appropriate)  Pt AAOx4, calm and cooperative to care. Showed the understanding of POC. O2 via high-flow n/c @ 20L 70% in progress. SOB noted on exertion, however, breathes easy most of the time. IV ABT in progress w/o A/R. Midline to IVON patent and intact. Kept comfortable. Slept well. Call light within easy reach. No c/o voiced.

## 2019-01-11 NOTE — PLAN OF CARE
Patient has refused LTAC and wishes to be discharged to home with HH. Not medically stable for DC  at this time.       01/11/19 1330   Discharge Reassessment   Assessment Type Discharge Planning Reassessment   Provided patient/caregiver education on the expected discharge date and the discharge plan Yes   Do you have any problems affording any of your prescribed medications? No   Discharge Plan A Home Health   DME Needed Upon Discharge  oxygen   Anticipated Discharge Disposition Home-Health   Can the patient answer the patient profile reliably? Yes, cognitively intact   How does the patient rate their overall health at the present time? Fair   Describe the patient's ability to walk at the present time. Minor restrictions or changes   How often would a person be available to care for the patient? Occasionally   Number of comorbid conditions (as recorded on the chart) Four   During the past month, has the patient often been bothered by feeling down, depressed or hopeless? No   During the past month, has the patient often been bothered by little interest or pleasure in doing things? No

## 2019-01-11 NOTE — PLAN OF CARE
Problem: Adult Inpatient Plan of Care  Goal: Plan of Care Review    Recommendations     Recommendation/Intervention: 1. Continue current Regular diet. If PO intake declines, add Boost Plus ONS to aid in caloric intake. 2. RD to monitor & follow-up.  Goals: PO intake >50%  Nutrition Goal Status: new  Communication of RD Recs: reviewed with RN     Reason for Assessment     Reason For Assessment: length of stay  Diagnosis: other (see comments)(Resp. fx, Lung ca)  Interdisciplinary Rounds: attended  General Information Comments: pt  S/P choking event 1/10. O2Sat remains stable. No further episode of choking at time of visit. pt state good po intake no c/o NVDC, NFPE completed 1/4. pt continue to show no signs of malnutrition.  Nutrition Discharge Planning: Adequate PO intake

## 2019-01-11 NOTE — ASSESSMENT & PLAN NOTE
- Improving, very slowly. He has recovered fully from yesterday's choking event  - Multifactorial etiology; hx of PES and lung cancer with recurrent PE now- on 6L O2 at home prior to admit- sent here from OSH to see if a tpa candidate to the PE- cards said not a candidate, also with multidrug resistant pneumonia causing worsening of chronic respiratory failure  - Continue 3 weeks of meropenem treatment per ID recs for pneumonia to give best shot at improving resp status, continue to treat here now in hopes that he can come down off high flow 20 L  - Change o2 to 100% when eating for desats currently  - Will wean FiO2 off of 100% as tolerated; communication orders in place with both nursing and respiratory staff

## 2019-01-11 NOTE — PROGRESS NOTES
"[facility]  Adult Nutrition  Progress Note    SUMMARY       Recommendations    Recommendation/Intervention: 1. Continue current Regular diet. If PO intake declines, add Boost Plus ONS to aid in caloric intake. 2. RD to monitor & follow-up.  Goals: PO intake >50%  Nutrition Goal Status: new  Communication of RD Recs: reviewed with RN    Reason for Assessment    Reason For Assessment: length of stay  Diagnosis: other (see comments)(Resp. fx, Lung ca)  Interdisciplinary Rounds: attended  General Information Comments: pt  S/P choking event 1/10. O2Sat remains stable. No further episode of choking at time of visit. pt state good po intake no c/o NVDC, NFPE completed 1/4. pt continue to show no signs of malnutrition.  Nutrition Discharge Planning: Adequate PO intake    Nutrition Risk Screen    Nutrition Risk Screen: no indicators present    Nutrition/Diet History    Patient Reported Diet/Restrictions/Preferences: general  Spiritual, Cultural Beliefs, Amish Practices, Values that Affect Care: no  Factors Affecting Nutritional Intake: None identified at this time    Anthropometrics    Temp: 96.2 °F (35.7 °C)  Height Method: Stated  Height: 5' 9" (175.3 cm)  Height (inches): 69 in  Weight Method: Bed Scale  Weight: 78 kg (171 lb 15.3 oz)  Weight (lb): 171.96 lb  Ideal Body Weight (IBW), Male: 160 lb  % Ideal Body Weight, Male (lb): 107.48 lb  BMI (Calculated): 25.4  BMI Grade: 25 - 29.9 - overweight       Lab/Procedures/Meds    Pertinent Labs Reviewed: reviewed  Pertinent Labs Comments: BUN-31  Pertinent Medications Reviewed: reviewed  Pertinent Medications Comments: lasix, MVI, pantoprazole, statin, prednisone    Physical Findings/Assessment         Estimated/Assessed Needs    Weight Used For Calorie Calculations: 78 kg (171 lb 15.3 oz)  Energy Calorie Requirements (kcal): 2340 kcal/d  Energy Need Method: Kcal/kg(30 kcal/kg)  Protein Requirements: 94 g/d (1.2 g/kg)  Weight Used For Protein " Calculations: 78 kg (171 lb 15.3 oz)     Estimated Fluid Requirement Method: other (see comments)(Per MD or 1 mL/kcal)  RDA Method (mL): 2340         Nutrition Prescription Ordered    Current Diet Order: Regular    Evaluation of Received Nutrient/Fluid Intake    I/O: -15.6 L SA  Energy Calories Required: meeting needs  Protein Required: meeting needs  Fluid Required: meeting needs  Comments: LBM: 1/9/19  Tolerance: tolerating  % Intake of Estimated Energy Needs: 75 - 100 %  % Meal Intake: 75 - 100 %    Nutrition Risk    Level of Risk/Frequency of Follow-up: (1x/week)     Assessment and Plan    No nutritional dx at this time    Monitor and Evaluation    Food and Nutrient Intake: energy intake, food and beverage intake  Food and Nutrient Administration: diet order  Physical Activity and Function: nutrition-related ADLs and IADLs  Anthropometric Measurements: weight, weight change  Biochemical Data, Medical Tests and Procedures: lipid profile, inflammatory profile, glucose/endocrine profile, gastrointestinal profile, electrolyte and renal panel  Nutrition-Focused Physical Findings: overall appearance     Malnutrition Assessment  Malnutrition Type: (does not meet criteria)          Weight Loss (Malnutrition): 1-2% in 1 week(lasix given pt w/ 15l fl loss)  Energy Intake (Malnutrition): (100%)   Orbital Region (Subcutaneous Fat Loss): well nourished  Upper Arm Region (Subcutaneous Fat Loss): well nourished  Thoracic and Lumbar Region: well nourished   Synagogue Region (Muscle Loss): well nourished  Clavicle Bone Region (Muscle Loss): well nourished  Posterior Calf Region (Muscle Loss): well nourished   Edema (Fluid Accumulation): 2-->mild             Nutrition Follow-Up    RD Follow-up?: Yes

## 2019-01-11 NOTE — PLAN OF CARE
Problem: Adult Inpatient Plan of Care  Goal: Plan of Care Review  Outcome: Ongoing (interventions implemented as appropriate)  POC reviewed with pt. AAO x4.  Pt remained free from falls. Comfort flow oxygen. Pt tolerating diet well. IV antibiotics administered.  Questions and concerns addressed. Pt progressing towards goals. Will continue to monitor. See flow sheets for full assessment and VS

## 2019-01-11 NOTE — PROGRESS NOTES
"[facility] Hospital Medicine  Progress Note    Patient Name: Michael Shetty  MRN: [id number]  Patient Class: IP- Inpatient   Admission Date: 12/25/2018  Length of Stay: 17 days  Attending Physician: Josh Bateman MD  Primary Care Provider: Michael Ellsworth MD    Hospital Medicine Team: [facility] HOSP MED A Josh Bateman MD    Subjective:     Principal Problem:Acute on chronic respiratory failure with hypoxemia    HPI:  Per ICU H&P:    "Mr. Shetty is a 72yo man with PMH of MTHFR mutation, history of PEs on Eliquis, CAD s/p CABGn with chronically elevated troponin, hypotension on midodrine, squamous cell lung carcinoma of NEIDA s/p 1 cycle of adjuvant CTX & radiation therapy (which was stopped due to recurrent pseudomonas PNA & PEs), COPD, 54 pack years (quit 1990), chronic severe pseudomonas pna (3 abx for over a year) who presents as a transfer from [facility] for Interventional Cardiology evaluation for catheter assisted thrombolysis.      HPI: 4 days ago he had acute SOB requring 10L of oxygen at home when he is at a baseline of 6L. At [facility] his CTPA showed known PE of RLL pulmonary artery & increased size of right sided pleural effusion. Doppler u/s of BLE was negative for DVT. OSH continued patient on home eliquis regimen of 5mg BID and per patient did not notice improvement in oxygen requirements so he was sent to [facility].       Vitals during initial interview: 135/75, -104, 25-20L comfort flow O2 FiO2 70%.       Of note patient has chronic severe pseudomonas PNA for which he takes an alternating regimen of doxycycline, cipro, & cefuroxime. He quit smoking 30 years ago."    Hospital Course:  1/6: stepped down to hospital med to continue meropnem for pseudomonas pneumonia and continue high flow nasal cannula for acute on chronic respiratory failure. No complaints on exam today, reviewed ICU course and goals of care and in agreement with current plan. Requested continue PT/OT evals to move patient out " of bed as tolerated to help regain strength.   1/7: doing well overall, only mild SOB when going to bedside commode. Day 10 of planned 21 day course of penem. Needs PICC- can consult tomorrow for placement. Discussed plan further- he wants to go home with IV antibiotics on high flow nasal cannula and doesn't want to go to LTAC and wants to go home as soon as possible with IV antibiotics, discussed that when stepped down ICU team wanted to at least do half the course (15 days) in house to see if could titrate down a little bit before transport as the concern was the hour long drive to [facility] and cant do HFNC in ambulance and could risk arrythmia if O2 sats dip very low even if patient asymptomatic and could have cardiac event in ambulance, he understands that, discussed further inpatient antibiotics this week to see if titrating a possibility at all before attempteing to set up home infusions/high flow NC  1/8: Marginally improved dyspnea, awaiting improvement in oxygen requirements. Midline ordered for IV antibiotic administration   1/9: Stable oxygen requirements, stable, adamant about only returning home  1/10: Stable oxygen requirements, decreasing frequency of lab draws that have been stable for days. Acute event following choking episode later in the afternoon, recovered  1/11: Trying to slowly wean FiO2    Interval History:     Mr. Shetty felt great this morning. His breathing improved significantly after yesterday's choking episode. He did have one desat overnight when his HFNC tubing was accidentally disconnected, but other than that has done well. Working on weaning today as tolerated.    Review of Systems   Constitutional: Negative for chills and fever.   Respiratory: Positive for cough and shortness of breath.    Cardiovascular: Negative for chest pain and leg swelling.   Gastrointestinal: Negative for abdominal pain, constipation, diarrhea, nausea and vomiting.   Genitourinary: Negative for  difficulty urinating.   Musculoskeletal: Negative for myalgias.   Neurological: Negative for weakness and numbness.   Psychiatric/Behavioral: Negative for confusion. The patient is not nervous/anxious.      Objective:     Vital Signs (Most Recent):  Temp: 96.2 °F (35.7 °C) (01/11/19 1143)  Pulse: (!) 114 (01/11/19 1143)  Resp: (!) 22 (01/11/19 1143)  BP: 93/63 (01/11/19 1143)  SpO2: (!) 91 % (01/11/19 1143) Vital Signs (24h Range):  Temp:  [96.2 °F (35.7 °C)-97.9 °F (36.6 °C)] 96.2 °F (35.7 °C)  Pulse:  [] 114  Resp:  [18-22] 22  SpO2:  [68 %-100 %] 91 %  BP: ()/(56-90) 93/63     Weight: 78 kg (171 lb 15.3 oz)  Body mass index is 25.39 kg/m².    Intake/Output Summary (Last 24 hours) at 1/11/2019 1229  Last data filed at 1/11/2019 1200  Gross per 24 hour   Intake 300 ml   Output 2075 ml   Net -1775 ml      Physical Exam   Constitutional: He is oriented to person, place, and time. No distress.   HENT:   HFNC in place   Eyes: Pupils are equal, round, and reactive to light.   Cardiovascular: Normal rate and regular rhythm.   No murmur heard.  Port palpable in right chest wall, no tenderness or erythema   Pulmonary/Chest: Effort normal and breath sounds normal. No respiratory distress. He has no wheezes.   Bibasilar crackles R > L   Abdominal: Soft. Bowel sounds are normal. He exhibits no distension. There is no tenderness.   Musculoskeletal: He exhibits no edema or tenderness.   Neurological: He is alert and oriented to person, place, and time.   Skin: Skin is warm and dry. No rash noted. He is not diaphoretic. No erythema.   Psychiatric: He has a normal mood and affect. His behavior is normal.     Significant Labs:  CBC:  Recent Labs   Lab 01/10/19  0349   WBC 9.23   HGB 13.1*   HCT 43.1   *     CMP:  Recent Labs   Lab 01/10/19  0349      K 4.0   CL 99   CO2 27      BUN 31*   CREATININE 0.9   CALCIUM 8.7   ANIONGAP 16   EGFRNONAA >60.0     Assessment/Plan:      * Acute on chronic  respiratory failure with hypoxemia      - Improving, very slowly. He has recovered fully from yesterday's choking event  - Multifactorial etiology; hx of PES and lung cancer with recurrent PE now- on 6L O2 at home prior to admit- sent here from OSH to see if a tpa candidate to the PE- cards said not a candidate, also with multidrug resistant pneumonia causing worsening of chronic respiratory failure  - Continue 3 weeks of meropenem treatment per ID recs for pneumonia to give best shot at improving resp status, continue to treat here now in hopes that he can come down off high flow 20 L  - Change o2 to 100% when eating for desats currently  - Will wean FiO2 off of 100% as tolerated; communication orders in place with both nursing and respiratory staff     Chronic systolic heart failure      - Stable  - EF of 25%, home lasix increased to BID in ICU.   - Following I/O closely. Not on ACE, BB held, given overall situation, not inclined to start currently     Pseudomonas pneumonia      - See respiratory failure above  - Continue meropenem, now day 11/21  - Midline requested     Goals of care, counseling/discussion      - See respiratory failure above     Coronary artery disease involving coronary bypass graft      - Stable  -s/p CABG. Home BB currently held. Pravastatin on now, no asa on  - Holding for now while treating for acute respiratory failure, will resume as his overall condition improves     Pulmonary emphysema      - Stable  - Continue duonebs, budesonide, spiriva, and prednisone     Chronic hypotension      - Stable  - If recurrent, will resume home midodrine.        Chronic pulmonary embolism      - Stable  - See respiratory failure above     Primary malignant neoplasm of left upper lobe of lung      - Stable  - NEIDA SCC s/p carbo/taxol and XRT, follows with onc as outpatient, stage 2       VTE Risk Mitigation (From admission, onward)        Ordered     apixaban tablet 5 mg  2 times daily      12/25/18 3292      IP VTE HIGH RISK PATIENT  Once      12/25/18 1413              Josh Bateman MD  Department of Hospital Medicine   [facility]

## 2019-01-11 NOTE — PLAN OF CARE
Problem: Adult Inpatient Plan of Care  Goal: Plan of Care Review  Outcome: Ongoing (interventions implemented as appropriate)  Pt alert and oriented, calm and cooperative to care. S/P choking event. O2Sat remains stable. No further episode of choking as of this time of the shift. No SOB noted. Scheduled neb tx done by RT. Continuous O2 via high-flow n/c continues. Titrated as needed. Kept comfortable. Pt slept well. No A/R noted IV ABT. Continue to monitor and document any changes.

## 2019-01-11 NOTE — SUBJECTIVE & OBJECTIVE
Interval History:     Mr. Shetty felt great this morning. His breathing improved significantly after yesterday's choking episode. He did have one desat overnight when his HFNC tubing was accidentally disconnected, but other than that has done well. Working on weaning today as tolerated.    Review of Systems   Constitutional: Negative for chills and fever.   Respiratory: Positive for cough and shortness of breath.    Cardiovascular: Negative for chest pain and leg swelling.   Gastrointestinal: Negative for abdominal pain, constipation, diarrhea, nausea and vomiting.   Genitourinary: Negative for difficulty urinating.   Musculoskeletal: Negative for myalgias.   Neurological: Negative for weakness and numbness.   Psychiatric/Behavioral: Negative for confusion. The patient is not nervous/anxious.      Objective:     Vital Signs (Most Recent):  Temp: 96.2 °F (35.7 °C) (01/11/19 1143)  Pulse: (!) 114 (01/11/19 1143)  Resp: (!) 22 (01/11/19 1143)  BP: 93/63 (01/11/19 1143)  SpO2: (!) 91 % (01/11/19 1143) Vital Signs (24h Range):  Temp:  [96.2 °F (35.7 °C)-97.9 °F (36.6 °C)] 96.2 °F (35.7 °C)  Pulse:  [] 114  Resp:  [18-22] 22  SpO2:  [68 %-100 %] 91 %  BP: ()/(56-90) 93/63     Weight: 78 kg (171 lb 15.3 oz)  Body mass index is 25.39 kg/m².    Intake/Output Summary (Last 24 hours) at 1/11/2019 1229  Last data filed at 1/11/2019 1200  Gross per 24 hour   Intake 300 ml   Output 2075 ml   Net -1775 ml      Physical Exam   Constitutional: He is oriented to person, place, and time. No distress.   HENT:   HFNC in place   Eyes: Pupils are equal, round, and reactive to light.   Cardiovascular: Normal rate and regular rhythm.   No murmur heard.  Port palpable in right chest wall, no tenderness or erythema   Pulmonary/Chest: Effort normal and breath sounds normal. No respiratory distress. He has no wheezes.   Bibasilar crackles R > L   Abdominal: Soft. Bowel sounds are normal. He exhibits no distension. There is no  tenderness.   Musculoskeletal: He exhibits no edema or tenderness.   Neurological: He is alert and oriented to person, place, and time.   Skin: Skin is warm and dry. No rash noted. He is not diaphoretic. No erythema.   Psychiatric: He has a normal mood and affect. His behavior is normal.     Significant Labs:  CBC:  Recent Labs   Lab 01/10/19  0349   WBC 9.23   HGB 13.1*   HCT 43.1   *     CMP:  Recent Labs   Lab 01/10/19  0349      K 4.0   CL 99   CO2 27      BUN 31*   CREATININE 0.9   CALCIUM 8.7   ANIONGAP 16   EGFRNONAA >60.0

## 2019-01-12 NOTE — ASSESSMENT & PLAN NOTE
- Improving, very slowly. PT/OT evals ordered  - Multifactorial etiology; hx of PES and lung cancer with recurrent PE now- on 6L O2 at home prior to admit- sent here from OSH to see if a tpa candidate to the PE- cards said not a candidate, also with multidrug resistant pneumonia causing worsening of chronic respiratory failure  - Continue 3 weeks of meropenem treatment per ID recs for pneumonia to give best shot at improving resp status, continue to treat here now in hopes that he can come down off high flow 20 L  - Will wean FiO2 off of 100% as tolerated; communication orders in place with both nursing and respiratory staff. He still needs to be on increased supplementation with any exertion or when eating

## 2019-01-12 NOTE — PROGRESS NOTES
"[facility] Hospital Medicine  Progress Note    Patient Name: Michael Shetty  MRN: [id number]  Patient Class: IP- Inpatient   Admission Date: 12/25/2018  Length of Stay: 18 days  Attending Physician: Josh Bateman MD  Primary Care Provider: Michael Ellsworth MD    Hospital Medicine Team: [facility] HOSP MED A Josh Bateman MD    Subjective:     Principal Problem:Acute on chronic respiratory failure with hypoxemia    HPI:  Per ICU H&P:    "Mr. Shetty is a 72yo man with PMH of MTHFR mutation, history of PEs on Eliquis, CAD s/p CABGn with chronically elevated troponin, hypotension on midodrine, squamous cell lung carcinoma of NEIDA s/p 1 cycle of adjuvant CTX & radiation therapy (which was stopped due to recurrent pseudomonas PNA & PEs), COPD, 54 pack years (quit 1990), chronic severe pseudomonas pna (3 abx for over a year) who presents as a transfer from [facility] for Interventional Cardiology evaluation for catheter assisted thrombolysis.      HPI: 4 days ago he had acute SOB requring 10L of oxygen at home when he is at a baseline of 6L. At [facility] his CTPA showed known PE of RLL pulmonary artery & increased size of right sided pleural effusion. Doppler u/s of BLE was negative for DVT. OSH continued patient on home eliquis regimen of 5mg BID and per patient did not notice improvement in oxygen requirements so he was sent to [facility].       Vitals during initial interview: 135/75, -104, 25-20L comfort flow O2 FiO2 70%.       Of note patient has chronic severe pseudomonas PNA for which he takes an alternating regimen of doxycycline, cipro, & cefuroxime. He quit smoking 30 years ago."    Hospital Course:  1/6: stepped down to hospital med to continue meropnem for pseudomonas pneumonia and continue high flow nasal cannula for acute on chronic respiratory failure. No complaints on exam today, reviewed ICU course and goals of care and in agreement with current plan. Requested continue PT/OT evals to move patient out " of bed as tolerated to help regain strength.   1/7: doing well overall, only mild SOB when going to bedside commode. Day 10 of planned 21 day course of penem. Needs PICC- can consult tomorrow for placement. Discussed plan further- he wants to go home with IV antibiotics on high flow nasal cannula and doesn't want to go to LTAC and wants to go home as soon as possible with IV antibiotics, discussed that when stepped down ICU team wanted to at least do half the course (15 days) in house to see if could titrate down a little bit before transport as the concern was the hour long drive to [facility] and cant do HFNC in ambulance and could risk arrythmia if O2 sats dip very low even if patient asymptomatic and could have cardiac event in ambulance, he understands that, discussed further inpatient antibiotics this week to see if titrating a possibility at all before attempteing to set up home infusions/high flow NC  1/8: Marginally improved dyspnea, awaiting improvement in oxygen requirements. Midline ordered for IV antibiotic administration   1/9: Stable oxygen requirements, stable, adamant about only returning home  1/10: Stable oxygen requirements, decreasing frequency of lab draws that have been stable for days. Acute event following choking episode later in the afternoon, recovered  1/11: Trying to slowly wean FiO2  1/12: Stable examination, FiO2 slowly weaning    Interval History:     Mr. Shetty felt well again this morning. FiO2 requirements largely stable overnight. Continuing to wean slowly. PT/OT evals ordered    Review of Systems   Constitutional: Negative for chills and fever.   Respiratory: Positive for cough and shortness of breath.    Cardiovascular: Negative for chest pain and leg swelling.   Gastrointestinal: Negative for abdominal pain, constipation, diarrhea, nausea and vomiting.   Genitourinary: Negative for difficulty urinating.   Musculoskeletal: Negative for myalgias.   Neurological: Negative for  weakness and numbness.   Psychiatric/Behavioral: Negative for confusion. The patient is not nervous/anxious.      Objective:     Vital Signs (Most Recent):  Temp: 98.2 °F (36.8 °C) (01/12/19 1222)  Pulse: 101 (01/12/19 1300)  Resp: 16 (01/12/19 1300)  BP: 108/61 (01/12/19 1222)  SpO2: (!) 93 % (01/12/19 1300) Vital Signs (24h Range):  Temp:  [96.2 °F (35.7 °C)-98.2 °F (36.8 °C)] 98.2 °F (36.8 °C)  Pulse:  [] 101  Resp:  [16-22] 16  SpO2:  [85 %-98 %] 93 %  BP: ()/(56-75) 108/61     Weight: 78 kg (171 lb 15.3 oz)  Body mass index is 25.39 kg/m².    Intake/Output Summary (Last 24 hours) at 1/12/2019 1503  Last data filed at 1/12/2019 0900  Gross per 24 hour   Intake 240 ml   Output 2180 ml   Net -1940 ml      Physical Exam   Constitutional: He is oriented to person, place, and time. No distress.   HENT:   HFNC in place   Eyes: Pupils are equal, round, and reactive to light.   Cardiovascular: Normal rate and regular rhythm.   No murmur heard.  Port palpable in right chest wall, no tenderness or erythema   Pulmonary/Chest: Effort normal and breath sounds normal. No respiratory distress. He has no wheezes.   Bibasilar crackles R > L   Abdominal: Soft. Bowel sounds are normal. He exhibits no distension. There is no tenderness.   Musculoskeletal: He exhibits no edema or tenderness.   Neurological: He is alert and oriented to person, place, and time.   Skin: Skin is warm and dry. No rash noted. He is not diaphoretic. No erythema.   Psychiatric: He has a normal mood and affect. His behavior is normal.     Significant Labs:  CBC:  Recent Labs   Lab 01/12/19  0353   WBC 7.79   HGB 12.4*   HCT 41.6   *     CMP:  Recent Labs   Lab 01/12/19  0353      K 3.3*   CL 99   CO2 29   *   BUN 28*   CREATININE 0.8   CALCIUM 8.1*   ANIONGAP 12   EGFRNONAA >60.0     Assessment/Plan:      * Acute on chronic respiratory failure with hypoxemia      - Improving, very slowly. PT/OT evals ordered  - Multifactorial  etiology; hx of PES and lung cancer with recurrent PE now- on 6L O2 at home prior to admit- sent here from OSH to see if a tpa candidate to the PE- cards said not a candidate, also with multidrug resistant pneumonia causing worsening of chronic respiratory failure  - Continue 3 weeks of meropenem treatment per ID recs for pneumonia to give best shot at improving resp status, continue to treat here now in hopes that he can come down off high flow 20 L  - Will wean FiO2 off of 100% as tolerated; communication orders in place with both nursing and respiratory staff. He still needs to be on increased supplementation with any exertion or when eating     Chronic systolic heart failure      - Stable  - EF of 25%, home lasix increased to BID in ICU.   - Following I/O closely. Not on ACE, BB held, given overall situation, not inclined to start currently     Pseudomonas pneumonia      - See respiratory failure above  - Continue meropenem, now day 11/21  - Midline requested     Goals of care, counseling/discussion      - See respiratory failure above     Coronary artery disease involving coronary bypass graft      - Stable  -s/p CABG. Home BB currently held. Pravastatin on now, no asa on  - Holding for now while treating for acute respiratory failure, will resume as his overall condition improves     Pulmonary emphysema      - Stable  - Continue duonebs, budesonide, spiriva, and prednisone     Chronic hypotension      - Stable  - If recurrent, will resume home midodrine.        Chronic pulmonary embolism      - Stable  - See respiratory failure above     Primary malignant neoplasm of left upper lobe of lung      - Stable  - NEIDA SCC s/p carbo/taxol and XRT, follows with onc as outpatient, stage 2       VTE Risk Mitigation (From admission, onward)        Ordered     apixaban tablet 5 mg  2 times daily      12/25/18 1648     IP VTE HIGH RISK PATIENT  Once      12/25/18 1413              Josh Bateman MD  Department of  Hospital Medicine [facility]

## 2019-01-12 NOTE — SUBJECTIVE & OBJECTIVE
Interval History:     Mr. Shetty felt well again this morning. FiO2 requirements largely stable overnight. Continuing to wean slowly. PT/OT evals ordered    Review of Systems   Constitutional: Negative for chills and fever.   Respiratory: Positive for cough and shortness of breath.    Cardiovascular: Negative for chest pain and leg swelling.   Gastrointestinal: Negative for abdominal pain, constipation, diarrhea, nausea and vomiting.   Genitourinary: Negative for difficulty urinating.   Musculoskeletal: Negative for myalgias.   Neurological: Negative for weakness and numbness.   Psychiatric/Behavioral: Negative for confusion. The patient is not nervous/anxious.      Objective:     Vital Signs (Most Recent):  Temp: 98.2 °F (36.8 °C) (01/12/19 1222)  Pulse: 101 (01/12/19 1300)  Resp: 16 (01/12/19 1300)  BP: 108/61 (01/12/19 1222)  SpO2: (!) 93 % (01/12/19 1300) Vital Signs (24h Range):  Temp:  [96.2 °F (35.7 °C)-98.2 °F (36.8 °C)] 98.2 °F (36.8 °C)  Pulse:  [] 101  Resp:  [16-22] 16  SpO2:  [85 %-98 %] 93 %  BP: ()/(56-75) 108/61     Weight: 78 kg (171 lb 15.3 oz)  Body mass index is 25.39 kg/m².    Intake/Output Summary (Last 24 hours) at 1/12/2019 1503  Last data filed at 1/12/2019 0900  Gross per 24 hour   Intake 240 ml   Output 2180 ml   Net -1940 ml      Physical Exam   Constitutional: He is oriented to person, place, and time. No distress.   HENT:   HFNC in place   Eyes: Pupils are equal, round, and reactive to light.   Cardiovascular: Normal rate and regular rhythm.   No murmur heard.  Port palpable in right chest wall, no tenderness or erythema   Pulmonary/Chest: Effort normal and breath sounds normal. No respiratory distress. He has no wheezes.   Bibasilar crackles R > L   Abdominal: Soft. Bowel sounds are normal. He exhibits no distension. There is no tenderness.   Musculoskeletal: He exhibits no edema or tenderness.   Neurological: He is alert and oriented to person, place, and time.   Skin:  Skin is warm and dry. No rash noted. He is not diaphoretic. No erythema.   Psychiatric: He has a normal mood and affect. His behavior is normal.     Significant Labs:  CBC:  Recent Labs   Lab 01/12/19  0353   WBC 7.79   HGB 12.4*   HCT 41.6   *     CMP:  Recent Labs   Lab 01/12/19  0353      K 3.3*   CL 99   CO2 29   *   BUN 28*   CREATININE 0.8   CALCIUM 8.1*   ANIONGAP 12   EGFRNONAA >60.0

## 2019-01-13 NOTE — PROGRESS NOTES
01/12/19 1952   Patient Assessment/Suction   Level of Consciousness (AVPU) alert   Respiratory Effort Labored   RLL Breath Sounds Anterior:;Posterior:;diminished   Cough Frequency infrequent   PRE-TX-O2-ETCO2   O2 Device (Oxygen Therapy) Comfort Flow   $ High Flow Oxygen Device  Yes   Flow (L/min) 20   Oxygen Concentration (%) 70   SpO2 (!) 79 %   Pulse 75   Resp (!) 24    entering pt room, he appeared to be very short of breath, fio2 has been increased to 80% on comfort flow, with an increased flow rate of 20 liters. YOLIS Ramos has been notified

## 2019-01-13 NOTE — PROGRESS NOTES
"[facility] Hospital Medicine  Progress Note    Patient Name: Michael Shetty  MRN: [id number]  Patient Class: IP- Inpatient   Admission Date: 12/25/2018  Length of Stay: 19 days  Attending Physician: Josh Bateman MD  Primary Care Provider: Michael Ellsworth MD    Hospital Medicine Team: [facility] HOSP MED A Josh Bateman MD    Subjective:     Principal Problem:Acute on chronic respiratory failure with hypoxemia    HPI:  Per ICU H&P:    "Mr. Shetty is a 72yo man with PMH of MTHFR mutation, history of PEs on Eliquis, CAD s/p CABGn with chronically elevated troponin, hypotension on midodrine, squamous cell lung carcinoma of NEIDA s/p 1 cycle of adjuvant CTX & radiation therapy (which was stopped due to recurrent pseudomonas PNA & PEs), COPD, 54 pack years (quit 1990), chronic severe pseudomonas pna (3 abx for over a year) who presents as a transfer from [facility] for Interventional Cardiology evaluation for catheter assisted thrombolysis.      HPI: 4 days ago he had acute SOB requring 10L of oxygen at home when he is at a baseline of 6L. At [facility] his CTPA showed known PE of RLL pulmonary artery & increased size of right sided pleural effusion. Doppler u/s of BLE was negative for DVT. OSH continued patient on home eliquis regimen of 5mg BID and per patient did not notice improvement in oxygen requirements so he was sent to [facility].       Vitals during initial interview: 135/75, -104, 25-20L comfort flow O2 FiO2 70%.       Of note patient has chronic severe pseudomonas PNA for which he takes an alternating regimen of doxycycline, cipro, & cefuroxime. He quit smoking 30 years ago."    Hospital Course:  1/6: stepped down to hospital med to continue meropnem for pseudomonas pneumonia and continue high flow nasal cannula for acute on chronic respiratory failure. No complaints on exam today, reviewed ICU course and goals of care and in agreement with current plan. Requested continue PT/OT evals to move patient out " of bed as tolerated to help regain strength.   1/7: doing well overall, only mild SOB when going to bedside commode. Day 10 of planned 21 day course of penem. Needs PICC- can consult tomorrow for placement. Discussed plan further- he wants to go home with IV antibiotics on high flow nasal cannula and doesn't want to go to LTAC and wants to go home as soon as possible with IV antibiotics, discussed that when stepped down ICU team wanted to at least do half the course (15 days) in house to see if could titrate down a little bit before transport as the concern was the hour long drive to [facility] and cant do HFNC in ambulance and could risk arrythmia if O2 sats dip very low even if patient asymptomatic and could have cardiac event in ambulance, he understands that, discussed further inpatient antibiotics this week to see if titrating a possibility at all before attempteing to set up home infusions/high flow NC  1/8: Marginally improved dyspnea, awaiting improvement in oxygen requirements. Midline ordered for IV antibiotic administration   1/9: Stable oxygen requirements, stable, adamant about only returning home  1/10: Stable oxygen requirements, decreasing frequency of lab draws that have been stable for days. Acute event following choking episode later in the afternoon, recovered  1/11: Trying to slowly wean FiO2  1/12: Stable examination, FiO2 weaned with quick desaturation and dyspnea  1/13: Stable exam, tolerating FiO2 of 70%    Interval History:     Mr. Shetty felt well again this morning. FiO2 decreased yesterday to 70%, after which he desaturated and quickly became dyspneic. It was increased, then today another trial was performed in the morning which he was tolerating well. No new complaints.    Review of Systems   Constitutional: Negative for chills and fever.   Respiratory: Positive for cough and shortness of breath.    Cardiovascular: Negative for chest pain and leg swelling.   Gastrointestinal: Negative  for abdominal pain, constipation, diarrhea, nausea and vomiting.   Genitourinary: Negative for difficulty urinating.   Musculoskeletal: Negative for myalgias.   Neurological: Negative for weakness and numbness.   Psychiatric/Behavioral: Negative for confusion. The patient is not nervous/anxious.      Objective:     Vital Signs (Most Recent):  Temp: 97.8 °F (36.6 °C) (01/13/19 0730)  Pulse: 98 (01/13/19 0827)  Resp: 18 (01/13/19 0827)  BP: 95/68 (01/13/19 0730)  SpO2: (!) 81 %(nurse at bedside; aware sats are 81%) (01/13/19 0815) Vital Signs (24h Range):  Temp:  [97.5 °F (36.4 °C)-98.9 °F (37.2 °C)] 97.8 °F (36.6 °C)  Pulse:  [] 98  Resp:  [16-24] 18  SpO2:  [79 %-93 %] 81 %  BP: ()/(53-77) 95/68     Weight: 78 kg (171 lb 15.3 oz)  Body mass index is 25.39 kg/m².    Intake/Output Summary (Last 24 hours) at 1/13/2019 0944  Last data filed at 1/13/2019 0700  Gross per 24 hour   Intake --   Output 1225 ml   Net -1225 ml      Physical Exam   Constitutional: He is oriented to person, place, and time. No distress.   HENT:   HFNC in place   Eyes: Pupils are equal, round, and reactive to light.   Cardiovascular: Normal rate and regular rhythm.   No murmur heard.  Port palpable in right chest wall, no tenderness or erythema   Pulmonary/Chest: Effort normal and breath sounds normal. No respiratory distress. He has no wheezes.   Bibasilar crackles R > L   Abdominal: Soft. Bowel sounds are normal. He exhibits no distension. There is no tenderness.   Musculoskeletal: He exhibits no edema or tenderness.   Neurological: He is alert and oriented to person, place, and time.   Skin: Skin is warm and dry. No rash noted. He is not diaphoretic. No erythema.   Psychiatric: He has a normal mood and affect. His behavior is normal.     Significant Labs:  CBC:  Recent Labs   Lab 01/12/19  0353   WBC 7.79   HGB 12.4*   HCT 41.6   *     CMP:  Recent Labs   Lab 01/12/19  0353      K 3.3*   CL 99   CO2 29   *   BUN  28*   CREATININE 0.8   CALCIUM 8.1*   ANIONGAP 12   EGFRNONAA >60.0     Assessment/Plan:      * Acute on chronic respiratory failure with hypoxemia      - Improving, very slowly. PT/OT evals ordered  - Multifactorial etiology; hx of PES and lung cancer with recurrent PE now- on 6L O2 at home prior to admit- sent here from OSH to see if a tpa candidate to the PE- cards said not a candidate, also with multidrug resistant pneumonia causing worsening of chronic respiratory failure  - Continue 3 weeks of meropenem treatment per ID recs for pneumonia to give best shot at improving resp status, continue to treat here now in hopes that he can come down off high flow 20 L  - Weaned down to 70% this morning, tolerating well symptomatically     Chronic systolic heart failure      - Stable  - EF of 25%, home lasix increased to BID in ICU.   - Following I/O closely. Not on ACE, BB held, given overall situation, not inclined to start currently     Pseudomonas pneumonia      - See respiratory failure above  - Continue meropenem, now day 11/21  - Midline requested     Goals of care, counseling/discussion      - See respiratory failure above     Coronary artery disease involving coronary bypass graft      - Stable  -s/p CABG. Home BB currently held. Pravastatin on now, no asa on  - Holding for now while treating for acute respiratory failure, will resume as his overall condition improves     Pulmonary emphysema      - Stable  - Continue duonebs, budesonide, spiriva, and prednisone     Chronic hypotension      - Stable  - If recurrent, will resume home midodrine.        Chronic pulmonary embolism      - Stable  - See respiratory failure above     Primary malignant neoplasm of left upper lobe of lung      - Stable  - NEIDA SCC s/p carbo/taxol and XRT, follows with onc as outpatient, stage 2       VTE Risk Mitigation (From admission, onward)        Ordered     apixaban tablet 5 mg  2 times daily      12/25/18 2750     IP VTE HIGH RISK  PATIENT  Once      12/25/18 1413              Josh Bateman MD  Department of Hospital Medicine   [facility]

## 2019-01-13 NOTE — PLAN OF CARE
Problem: Adult Inpatient Plan of Care  Goal: Plan of Care Review  Outcome: Ongoing (interventions implemented as appropriate)  DID not tolerate weaning was place back on 80% and 20 L comfort flow High flow. Sats Mid 90's to 91%. Turn self in bed without any problem. I applied barrier oint to perineal area. Slight  redness noted. .VOiding adequately per urinal.Mary in color. Denies pain. SBP . Hr Mid . No falls. Side rails up times 2. Call light in reach. Bed in low position.

## 2019-01-13 NOTE — ASSESSMENT & PLAN NOTE
- Improving, very slowly. PT/OT evals ordered  - Multifactorial etiology; hx of PES and lung cancer with recurrent PE now- on 6L O2 at home prior to admit- sent here from OSH to see if a tpa candidate to the PE- cards said not a candidate, also with multidrug resistant pneumonia causing worsening of chronic respiratory failure  - Continue 3 weeks of meropenem treatment per ID recs for pneumonia to give best shot at improving resp status, continue to treat here now in hopes that he can come down off high flow 20 L  - Weaned down to 70% this morning, tolerating well symptomatically

## 2019-01-13 NOTE — SUBJECTIVE & OBJECTIVE
Interval History:     Mr. Shetty felt well again this morning. FiO2 decreased yesterday to 70%, after which he desaturated and quickly became dyspneic. It was increased, then today another trial was performed in the morning which he was tolerating well. No new complaints.    Review of Systems   Constitutional: Negative for chills and fever.   Respiratory: Positive for cough and shortness of breath.    Cardiovascular: Negative for chest pain and leg swelling.   Gastrointestinal: Negative for abdominal pain, constipation, diarrhea, nausea and vomiting.   Genitourinary: Negative for difficulty urinating.   Musculoskeletal: Negative for myalgias.   Neurological: Negative for weakness and numbness.   Psychiatric/Behavioral: Negative for confusion. The patient is not nervous/anxious.      Objective:     Vital Signs (Most Recent):  Temp: 97.8 °F (36.6 °C) (01/13/19 0730)  Pulse: 98 (01/13/19 0827)  Resp: 18 (01/13/19 0827)  BP: 95/68 (01/13/19 0730)  SpO2: (!) 81 %(nurse at bedside; aware sats are 81%) (01/13/19 0815) Vital Signs (24h Range):  Temp:  [97.5 °F (36.4 °C)-98.9 °F (37.2 °C)] 97.8 °F (36.6 °C)  Pulse:  [] 98  Resp:  [16-24] 18  SpO2:  [79 %-93 %] 81 %  BP: ()/(53-77) 95/68     Weight: 78 kg (171 lb 15.3 oz)  Body mass index is 25.39 kg/m².    Intake/Output Summary (Last 24 hours) at 1/13/2019 0944  Last data filed at 1/13/2019 0700  Gross per 24 hour   Intake --   Output 1225 ml   Net -1225 ml      Physical Exam   Constitutional: He is oriented to person, place, and time. No distress.   HENT:   HFNC in place   Eyes: Pupils are equal, round, and reactive to light.   Cardiovascular: Normal rate and regular rhythm.   No murmur heard.  Port palpable in right chest wall, no tenderness or erythema   Pulmonary/Chest: Effort normal and breath sounds normal. No respiratory distress. He has no wheezes.   Bibasilar crackles R > L   Abdominal: Soft. Bowel sounds are normal. He exhibits no distension. There is  no tenderness.   Musculoskeletal: He exhibits no edema or tenderness.   Neurological: He is alert and oriented to person, place, and time.   Skin: Skin is warm and dry. No rash noted. He is not diaphoretic. No erythema.   Psychiatric: He has a normal mood and affect. His behavior is normal.     Significant Labs:  CBC:  Recent Labs   Lab 01/12/19  0353   WBC 7.79   HGB 12.4*   HCT 41.6   *     CMP:  Recent Labs   Lab 01/12/19  0353      K 3.3*   CL 99   CO2 29   *   BUN 28*   CREATININE 0.8   CALCIUM 8.1*   ANIONGAP 12   EGFRNONAA >60.0

## 2019-01-14 NOTE — PROGRESS NOTES
Requested to get up to bedside commode.O2 setting currently  100% and 20 l on comfort flow. Was able to get up with one person assisting. After sitting on BSC for approximately 20 minutes Patient became too weak to and required two person Moderate assistance back to bed to edge of bed.. Sat was 75% but after about 2 minutes increased back up to 86%.Once O2 SATS  Increased,  placed back in bed upright..Had large formed BM brown in color and voided as well .

## 2019-01-14 NOTE — ASSESSMENT & PLAN NOTE
- Improving, very slowly, mostly subjectively  - Multifactorial etiology; hx of PES and lung cancer with recurrent PE now- on 6L O2 at home prior to admit- sent here from OSH to see if a tpa candidate to the PE- cards said not a candidate, also with multidrug resistant pneumonia causing worsening of chronic respiratory failure  - Continue 3 weeks of meropenem treatment per ID recs (verbal, they wrote 2) for pneumonia to give best shot at improving resp status, continue to treat here now in hopes that he can come down off high flow 20 L  - Intermittently on 70-80% FiO2, tolerating well symptomatically  - LTAC referral in place for weaning in long-term setting

## 2019-01-14 NOTE — SUBJECTIVE & OBJECTIVE
Interval History:     Mr. Shetty felt well again this morning. We had a lengthy discussion regarding his limited options. He desperately wants HH. There simply is no home health company that is going to accept him on 20L and 70-80% FiO2. We re-discussed LTAC. He had previously been hesitant, after a prior bad experience, but is now willing to try. Referrals placed.    Review of Systems   Constitutional: Negative for chills and fever.   Respiratory: Positive for cough and shortness of breath.    Cardiovascular: Negative for chest pain and leg swelling.   Gastrointestinal: Negative for abdominal pain, constipation, diarrhea, nausea and vomiting.   Genitourinary: Negative for difficulty urinating.   Musculoskeletal: Negative for myalgias.   Neurological: Negative for weakness and numbness.   Psychiatric/Behavioral: Negative for confusion. The patient is not nervous/anxious.      Objective:     Vital Signs (Most Recent):  Temp: 96.3 °F (35.7 °C) (01/14/19 0753)  Pulse: 85 (01/14/19 0914)  Resp: 18 (01/14/19 0914)  BP: (!) 116/56 (01/14/19 0753)  SpO2: (!) 91 % (01/14/19 0753) Vital Signs (24h Range):  Temp:  [96.3 °F (35.7 °C)-99.5 °F (37.5 °C)] 96.3 °F (35.7 °C)  Pulse:  [] 85  Resp:  [16-20] 18  SpO2:  [74 %-92 %] 91 %  BP: ()/(56-66) 116/56     Weight: 78 kg (171 lb 15.3 oz)  Body mass index is 25.39 kg/m².    Intake/Output Summary (Last 24 hours) at 1/14/2019 1131  Last data filed at 1/14/2019 0913  Gross per 24 hour   Intake 90 ml   Output 1000 ml   Net -910 ml      Physical Exam   Constitutional: He is oriented to person, place, and time. No distress.   HENT:   HFNC in place   Eyes: Pupils are equal, round, and reactive to light.   Cardiovascular: Normal rate and regular rhythm.   No murmur heard.  Port palpable in right chest wall, no tenderness or erythema  L. Brachial midline clean and dry at insertion   Pulmonary/Chest: Effort normal and breath sounds normal. No respiratory distress. He has no  wheezes.   Bibasilar crackles R > L   Abdominal: Soft. Bowel sounds are normal. He exhibits no distension. There is no tenderness.   Musculoskeletal: He exhibits no edema or tenderness.   Neurological: He is alert and oriented to person, place, and time.   Skin: Skin is warm and dry. No rash noted. He is not diaphoretic. No erythema.   Psychiatric: He has a normal mood and affect. His behavior is normal.     Significant Labs:  CBC:  Recent Labs   Lab 01/14/19  0536   WBC 5.90   HGB 13.1*   HCT 42.0   *     CMP:  Recent Labs   Lab 01/14/19  0536      K 3.6      CO2 25   GLU 85   BUN 28*   CREATININE 0.8   CALCIUM 8.7   ANIONGAP 15   EGFRNONAA >60.0

## 2019-01-14 NOTE — PLAN OF CARE
Problem: Adult Inpatient Plan of Care  Goal: Plan of Care Review  Outcome: Ongoing (interventions implemented as appropriate)  O2 Sat has remain  Greater than 82% since assisted back to bed. Rested without complaining of shortness of breath. VSS. No falls this shift. Safety measures in place examples. Bed place in low position. Call light kept close to patient. Non skid socks on. Side rails up time 2. Daughter at bedside this shift.No complain of pain voiced

## 2019-01-14 NOTE — ASSESSMENT & PLAN NOTE
- Discussed hospice at length multiple times. He is amenable to this as a last resort, i.e. after exhausting other options including LTAC  - His goals are in keeping with hospice care and are almost entirely comfort-oriented

## 2019-01-14 NOTE — PLAN OF CARE
SW sent via [facility] pt's referral to LTAC.    Silvia Bradley LCSW  [id number]     01/14/19 1120   Post-Acute Status   Post-Acute Authorization Placement  (OLTAC)   Post-Acute Placement Status Referrals Sent

## 2019-01-14 NOTE — PROGRESS NOTES
"[facility] Hospital Medicine  Progress Note    Patient Name: Michael Shetty  MRN: [id number]  Patient Class: IP- Inpatient   Admission Date: 12/25/2018  Length of Stay: 20 days  Attending Physician: Josh Bateman MD  Primary Care Provider: Michael Ellsworth MD    Hospital Medicine Team: [facility] Josh Bateman MD    Subjective:     Principal Problem:Acute on chronic respiratory failure with hypoxemia    HPI:  Per ICU H&P:    "Mr. Shetty is a 74yo man with PMH of MTHFR mutation, history of PEs on Eliquis, CAD s/p CABGn with chronically elevated troponin, hypotension on midodrine, squamous cell lung carcinoma of NEIDA s/p 1 cycle of adjuvant CTX & radiation therapy (which was stopped due to recurrent pseudomonas PNA & PEs), COPD, 54 pack years (quit 1990), chronic severe pseudomonas pna (3 abx for over a year) who presents as a transfer from [facility] for Interventional Cardiology evaluation for catheter assisted thrombolysis.      HPI: 4 days ago he had acute SOB requring 10L of oxygen at home when he is at a baseline of 6L. At [facility] his CTPA showed known PE of RLL pulmonary artery & increased size of right sided pleural effusion. Doppler u/s of BLE was negative for DVT. OSH continued patient on home eliquis regimen of 5mg BID and per patient did not notice improvement in oxygen requirements so he was sent to [facility].       Vitals during initial interview: 135/75, -104, 25-20L comfort flow O2 FiO2 70%.       Of note patient has chronic severe pseudomonas PNA for which he takes an alternating regimen of doxycycline, cipro, & cefuroxime. He quit smoking 30 years ago."    Hospital Course:  1/6: stepped down to hospital med to continue meropnem for pseudomonas pneumonia and continue high flow nasal cannula for acute on chronic respiratory failure. No complaints on exam today, reviewed ICU course and goals of care and in agreement with current plan. Requested continue PT/OT evals to move patient out " of bed as tolerated to help regain strength.   1/7: doing well overall, only mild SOB when going to bedside commode. Day 10 of planned 21 day course of penem. Needs PICC- can consult tomorrow for placement. Discussed plan further- he wants to go home with IV antibiotics on high flow nasal cannula and doesn't want to go to LTAC and wants to go home as soon as possible with IV antibiotics, discussed that when stepped down ICU team wanted to at least do half the course (15 days) in house to see if could titrate down a little bit before transport as the concern was the hour long drive to [facility] and cant do HFNC in ambulance and could risk arrythmia if O2 sats dip very low even if patient asymptomatic and could have cardiac event in ambulance, he understands that, discussed further inpatient antibiotics this week to see if titrating a possibility at all before attempteing to set up home infusions/high flow NC  1/8: Marginally improved dyspnea, awaiting improvement in oxygen requirements. Midline ordered for IV antibiotic administration   1/9: Stable oxygen requirements, stable, adamant about only returning home  1/10: Stable oxygen requirements, decreasing frequency of lab draws that have been stable for days. Acute event following choking episode later in the afternoon, recovered  1/11: Trying to slowly wean FiO2  1/12: Stable examination, FiO2 weaned with quick desaturation and dyspnea  1/13: Stable exam, tolerating FiO2 of 70%  1/14: Stable exam, stable FiO2 requirements, had an lengthy discussion about his current limited options, now open to LTAC with referrals in place    Interval History:     Mr. Shetty felt well again this morning. We had a lengthy discussion regarding his limited options. He desperately wants HH. There simply is no home health company that is going to accept him on 20L and 70-80% FiO2. We re-discussed LTAC. He had previously been hesitant, after a prior bad experience, but is now willing to  try. Referrals placed.    Review of Systems   Constitutional: Negative for chills and fever.   Respiratory: Positive for cough and shortness of breath.    Cardiovascular: Negative for chest pain and leg swelling.   Gastrointestinal: Negative for abdominal pain, constipation, diarrhea, nausea and vomiting.   Genitourinary: Negative for difficulty urinating.   Musculoskeletal: Negative for myalgias.   Neurological: Negative for weakness and numbness.   Psychiatric/Behavioral: Negative for confusion. The patient is not nervous/anxious.      Objective:     Vital Signs (Most Recent):  Temp: 96.3 °F (35.7 °C) (01/14/19 0753)  Pulse: 85 (01/14/19 0914)  Resp: 18 (01/14/19 0914)  BP: (!) 116/56 (01/14/19 0753)  SpO2: (!) 91 % (01/14/19 0753) Vital Signs (24h Range):  Temp:  [96.3 °F (35.7 °C)-99.5 °F (37.5 °C)] 96.3 °F (35.7 °C)  Pulse:  [] 85  Resp:  [16-20] 18  SpO2:  [74 %-92 %] 91 %  BP: ()/(56-66) 116/56     Weight: 78 kg (171 lb 15.3 oz)  Body mass index is 25.39 kg/m².    Intake/Output Summary (Last 24 hours) at 1/14/2019 1131  Last data filed at 1/14/2019 0913  Gross per 24 hour   Intake 90 ml   Output 1000 ml   Net -910 ml      Physical Exam   Constitutional: He is oriented to person, place, and time. No distress.   HENT:   HFNC in place   Eyes: Pupils are equal, round, and reactive to light.   Cardiovascular: Normal rate and regular rhythm.   No murmur heard.  Port palpable in right chest wall, no tenderness or erythema  L. Brachial midline clean and dry at insertion   Pulmonary/Chest: Effort normal and breath sounds normal. No respiratory distress. He has no wheezes.   Bibasilar crackles R > L   Abdominal: Soft. Bowel sounds are normal. He exhibits no distension. There is no tenderness.   Musculoskeletal: He exhibits no edema or tenderness.   Neurological: He is alert and oriented to person, place, and time.   Skin: Skin is warm and dry. No rash noted. He is not diaphoretic. No erythema.    Psychiatric: He has a normal mood and affect. His behavior is normal.     Significant Labs:  CBC:  Recent Labs   Lab 01/14/19  0536   WBC 5.90   HGB 13.1*   HCT 42.0   *     CMP:  Recent Labs   Lab 01/14/19  0536      K 3.6      CO2 25   GLU 85   BUN 28*   CREATININE 0.8   CALCIUM 8.7   ANIONGAP 15   EGFRNONAA >60.0     Assessment/Plan:      * Acute on chronic respiratory failure with hypoxemia      - Improving, very slowly, mostly subjectively  - Multifactorial etiology; hx of PES and lung cancer with recurrent PE now- on 6L O2 at home prior to admit- sent here from OSH to see if a tpa candidate to the PE- cards said not a candidate, also with multidrug resistant pneumonia causing worsening of chronic respiratory failure  - Continue 3 weeks of meropenem treatment per ID recs (verbal, they wrote 2) for pneumonia to give best shot at improving resp status, continue to treat here now in hopes that he can come down off high flow 20 L  - Intermittently on 70-80% FiO2, tolerating well symptomatically  - LTAC referral in place for weaning in long-term setting     Chronic systolic heart failure      - Stable  - EF of 25%, home lasix increased to BID in ICU.   - Following I/O closely. Not on ACE, BB held, given overall situation, not inclined to start currently     Pseudomonas pneumonia      - See respiratory failure above  - Continue meropenem, now day 16/21+  - Midline in place     Goals of care, counseling/discussion      - Discussed hospice at length multiple times. He is amenable to this as a last resort, i.e. after exhausting other options including LTAC  - His goals are in keeping with hospice care and are almost entirely comfort-oriented     Coronary artery disease involving coronary bypass graft      - Stable  -s/p CABG. Home BB currently held. Pravastatin on now, no asa on  - Holding for now while treating for acute respiratory failure, will resume as his overall condition improves      Pulmonary emphysema      - Stable  - Continue duonebs, budesonide, spiriva, and prednisone     Chronic hypotension      - Stable  - If recurrent, will resume home midodrine.        Chronic pulmonary embolism      - Stable  - See respiratory failure above     Primary malignant neoplasm of left upper lobe of lung      - Stable  - NEIDA SCC s/p carbo/taxol and XRT, follows with onc as outpatient, stage 2       VTE Risk Mitigation (From admission, onward)        Ordered     apixaban tablet 5 mg  2 times daily      12/25/18 1648     IP VTE HIGH RISK PATIENT  Once      12/25/18 1413              Josh Bateman MD  Department of Hospital Medicine   [facility]

## 2019-01-15 PROBLEM — Z29.9 PREVENTIVE MEASURE: Status: ACTIVE | Noted: 2019-01-01

## 2019-01-15 PROBLEM — C34.12 PRIMARY MALIGNANT NEOPLASM OF LEFT UPPER LOBE OF LUNG: Chronic | Status: ACTIVE | Noted: 2017-05-19

## 2019-01-15 PROBLEM — N40.0 BPH (BENIGN PROSTATIC HYPERPLASIA): Status: ACTIVE | Noted: 2019-01-01

## 2019-01-15 PROBLEM — M1A.9XX0 GOUT, CHRONIC: Status: ACTIVE | Noted: 2019-01-01

## 2019-01-15 NOTE — PLAN OF CARE
Problem: Adult Inpatient Plan of Care  Goal: Plan of Care Review  Outcome: Ongoing (interventions implemented as appropriate)  POC reviewed,voiced understanding,comfort flow maintained,no reports of pain,no fall at this entry,ctm.

## 2019-01-15 NOTE — PLAN OF CARE
[facility]    FACILITY TRANSFER ORDERS      Patient Name: Michael Shetty  YOB: 1945    PCP: Michael Ellsworth MD   PCP Address: [address] [place] [address] [address] / [place] [address]  PCP Phone Number: [phone]  PCP Fax: [phone]    Encounter Date: 01/15/2019    Admit to:     [facility]    Vital Signs:      Routine    Diagnoses:   Active Hospital Problems    Diagnosis  POA    *Acute on chronic respiratory failure with hypoxemia [J96.21]  Yes     Priority: 1 - High    Pseudomonas pneumonia [J15.1]  Yes     Priority: 2     Primary malignant neoplasm of left upper lobe of lung [C34.12]  Yes     Priority: 2     Chronic pulmonary embolism [I27.82]  Yes     Priority: 3     Pulmonary emphysema [J43.9]  Yes     Priority: 4     Goals of care, counseling/discussion [Z71.89]  Not Applicable     Priority: 5     Chronic systolic heart failure [I50.22]  Yes     Priority: 6     Coronary artery disease involving coronary bypass graft [I25.810]  Yes     Priority: 7     Palliative care encounter [Z51.5]  Not Applicable    Chronic hypotension [I95.89]  Yes    Chronic pneumonia [J18.9]  Yes      Resolved Hospital Problems   No resolved problems to display.       Allergies:  Review of patient's allergies indicates:   Allergen Reactions    Shellfish containing products      soft shell crabs       Diet:     regular diet    Activities:     Activity as tolerated    Nursing:     Per facility protocol     OXYGEN - HIGH FLOW NASAL CANNULA 20 LITERS @ 80% FIO2  WEAN TO MAINTAIN O2 SATS 85-88%      Labs:     CBC Q48H  BMP Q48H    CONSULTS:    Physical Therapy to evaluate and treat. , Occupational Therapy to evaluate and treat. and  to evaluate for community resources/long-range planning.  Respiratory care for HFNC  Pulmonology for FiO2 weaning  Infectious disease for IV antibiotics    MISCELLANEOUS CARE:  Routine Skin for Bedridden Patients: Apply moisture barrier cream to all skin  folds and wet areas in perineal area daily and after baths and all bowel movements.    WOUND CARE ORDERS  None    Medications: Review discharge medications with patient and family and provide education.      Current Discharge Medication List      START taking these medications    Details   budesonide (PULMICORT) 0.5 mg/2 mL nebulizer solution Take 2 mLs (0.5 mg total) by nebulization every 12 (twelve) hours. Controller  Refills: 0      furosemide (LASIX) 20 MG tablet Take 1 tablet (20 mg total) by mouth 2 (two) times daily.  Qty: 60 tablet, Refills: 11      levalbuterol (XOPENEX) 1.25 mg/0.5 mL nebulizer solution Take 0.5 mLs (1.25 mg total) by nebulization every 6 (six) hours. Rescue  Refills: 0      meropenem-0.9% sodium chloride 1 gram/50 mL PgBk Inject 50 mLs (1 g total) into the vein every 8 (eight) hours. FOR PSEUDOMONAS PNA. END DATE 1/17/19 END OF DAY      multivitamin (THERAGRAN) tablet Take 1 tablet by mouth once daily.      pantoprazole (PROTONIX) 40 MG tablet Take 1 tablet (40 mg total) by mouth once daily.  Qty: 30 tablet, Refills: 11      predniSONE (DELTASONE) 10 MG tablet Take 2 tablets (20 mg total) by mouth once daily for 7 days, THEN 1 tablet (10 mg total) once daily for 7 days.    IF PATIENT DOES NOT TOLERATE WEANING KEEP ON 20MG OR ESCALATE BACK TO 30MG     Qty: 21 tablet, Refills: 0      tiotropium (SPIRIVA) 18 mcg inhalation capsule Inhale 1 capsule (18 mcg total) into the lungs once daily. Controller  Refills: 0         CONTINUE these medications which have CHANGED    Details   apixaban (ELIQUIS) 5 mg Tab Take 1 tablet (5 mg total) by mouth 2 (two) times daily.         CONTINUE these medications which have NOT CHANGED    Details   allopurinol (ZYLOPRIM) 100 MG tablet Take 100 mg by mouth once daily.       multivitamin (MEN'S MULTI-VITAMIN) per tablet Take 1 tablet by mouth once daily.      pravastatin (PRAVACHOL) 40 MG tablet Take 40 mg by mouth nightly.      VENTOLIN HFA 90 mcg/actuation  inhaler Inhale 1-2 puffs into the lungs every 6 (six) hours as needed.          STOP taking these medications       atenolol (TENORMIN) 25 MG tablet Comments:   Reason for Stopping:         cefUROXime (CEFTIN) 500 MG tablet Comments:   Reason for Stopping:         cilostazol (PLETAL) 100 MG Tab Comments:   Reason for Stopping:         ciprofloxacin HCl (CIPRO) 250 MG tablet Comments:   Reason for Stopping:         doxycycline (VIBRA-TABS) 100 MG tablet Comments:   Reason for Stopping:         nitroGLYCERIN (NITROSTAT) 0.3 MG SL tablet Comments:   Reason for Stopping:         PREDNISOLONE ORAL Comments:   Reason for Stopping:         ranitidine (ZANTAC 75) 75 MG tablet Comments:   Reason for Stopping:         rivaroxaban (XARELTO) 20 mg Tab Comments:   Reason for Stopping:         tamsulosin (FLOMAX) 0.4 mg Cap Comments:   Reason for Stopping:         terazosin (HYTRIN) 2 MG capsule Comments:   Reason for Stopping:                    _________________________________  Xander Riddle MD  01/15/2019

## 2019-01-15 NOTE — PLAN OF CARE
Problem: Adult Inpatient Plan of Care  Goal: Plan of Care Review  Outcome: Ongoing (interventions implemented as appropriate)  Plan of Care reviewed. Pt remain free of injury. No distress noted. Will cont to monitor.

## 2019-01-15 NOTE — PLAN OF CARE
01/15/19 1449   Final Note   Assessment Type Final Discharge Note   Anticipated Discharge Disposition LTAC   What phone number can be called within the next 1-3 days to see how you are doing after discharge? ([phone])   Hospital Follow Up  Appt(s) scheduled? No   Discharge plans and expectations educations in teach back method with documentation complete? Yes   Right Care Referral Info   Post Acute Recommendation Other   Facility Name ([facility])

## 2019-01-15 NOTE — NURSING
Patient discharged to [facility] with all his belongings.

## 2019-01-15 NOTE — PLAN OF CARE
MIGUEL received phone call from Alexandra at [facility] and pt has been accepted. Nydia RN can call report to [phone]; pt is going to room 232. MIGUEL will schedule transportation for 3pm. PHN ambualnce auth is [id number].    Transportation call Center is [phone]     01/15/19 1353   Post-Acute Status   Post-Acute Authorization Placement  ([facility])   Post-Acute Placement Status Authorization Obtained       Silvia Bradley LCSW  [id number]

## 2019-01-16 PROBLEM — R23.9 ALTERATION IN SKIN INTEGRITY: Status: ACTIVE | Noted: 2019-01-01

## 2019-01-16 NOTE — DISCHARGE SUMMARY
"[facility] Hospital Medicine  Discharge Summary      Patient Name: Michael Shetty  MRN: [id number]  Admission Date: 12/25/2018  Hospital Length of Stay: 21 days  Discharge Date and Time: 1/15/2019  5:22 PM  Attending Physician: Josh Bateman MD, Xander Riddle MD  Discharging Provider: Xander Riddle MD  Primary Care Provider: Michael Ellsworth MD    Hospital Medicine Team: [facility] HOSP MED A Xander Riddle MD    HPI:   Per ICU H&P:     "Mr. Shetty is a 72yo man with PMH of MTHFR mutation, history of PEs on Eliquis, CAD s/p CABGn with chronically elevated troponin, hypotension on midodrine, squamous cell lung carcinoma of NEIDA s/p 1 cycle of adjuvant CTX & radiation therapy (which was stopped due to recurrent pseudomonas PNA & PEs), COPD, 54 pack years (quit 1990), chronic severe pseudomonas pna (3 abx for over a year) who presents as a transfer from [facility] for Interventional Cardiology evaluation for catheter assisted thrombolysis.      HPI: 4 days ago he had acute SOB requring 10L of oxygen at home when he is at a baseline of 6L. At [facility] his CTPA showed known PE of RLL pulmonary artery & increased size of right sided pleural effusion. Doppler u/s of BLE was negative for DVT. OSH continued patient on home eliquis regimen of 5mg BID and per patient did not notice improvement in oxygen requirements so he was sent to [facility].       Vitals during initial interview: 135/75, -104, 25-20L comfort flow O2 FiO2 70%.       Of note patient has chronic severe pseudomonas PNA for which he takes an alternating regimen of doxycycline, cipro, & cefuroxime. He quit smoking 30 years ago."        * No surgery found *      Hospital Course:   1/6: stepped down to hospital med to continue meropnem for pseudomonas pneumonia and continue high flow nasal cannula for acute on chronic respiratory failure. No complaints on exam today, reviewed ICU course and goals of care and in agreement with current plan. Requested " continue PT/OT evals to move patient out of bed as tolerated to help regain strength.   1/7: doing well overall, only mild SOB when going to bedside commode. Day 10 of planned 21 day course of penem. Needs PICC- can consult tomorrow for placement. Discussed plan further- he wants to go home with IV antibiotics on high flow nasal cannula and doesn't want to go to LTAC and wants to go home as soon as possible with IV antibiotics, discussed that when stepped down ICU team wanted to at least do half the course (15 days) in house to see if could titrate down a little bit before transport as the concern was the hour long drive to [facility] and cant do HFNC in ambulance and could risk arrythmia if O2 sats dip very low even if patient asymptomatic and could have cardiac event in ambulance, he understands that, discussed further inpatient antibiotics this week to see if titrating a possibility at all before attempteing to set up home infusions/high flow NC  1/8: Marginally improved dyspnea, awaiting improvement in oxygen requirements. Midline ordered for IV antibiotic administration   1/9: Stable oxygen requirements, stable, adamant about only returning home  1/10: Stable oxygen requirements, decreasing frequency of lab draws that have been stable for days. Acute event following choking episode later in the afternoon, recovered  1/11: Trying to slowly wean FiO2  1/12: Stable examination, FiO2 weaned with quick desaturation and dyspnea  1/13: Stable exam, tolerating FiO2 of 70%  1/14: Stable exam, stable FiO2 requirements, had an lengthy discussion about his current limited options, now open to LTAC with referrals in place  Mr. Shetty felt well again this morning. We had a lengthy discussion regarding his limited options. He desperately wants HH. There simply is no home health company that is going to accept him on 20L and 70-80% FiO2. We re-discussed LTAC. He had previously been hesitant, after a prior bad experience, but  is now willing to try. Referrals placed.    * Acute on chronic respiratory failure with hypoxemia      - Improving, very slowly, mostly subjectively  - Multifactorial etiology; hx of PES and lung cancer with recurrent PE now- on 6L O2 at home prior to admit- sent here from OSH to see if a tpa candidate to the PE- cards said not a candidate, also with multidrug resistant pneumonia causing worsening of chronic respiratory failure  - Continue 3 weeks of meropenem treatment per ID recs (verbal, they wrote 2) for pneumonia to give best shot at improving resp status, continue to treat here now in hopes that he can come down off high flow 20 L.    - Intermittently on 70-80% FiO2, tolerating well symptomatically  - LTAC referral in place for weaning in long-term setting    Patient has been on Prednisone since admission, approx 2 weeks ago tapered from 60mg to 30mg without set plan.  Have placed LTACH facility orders to Taper Patient to 20mg x 1 week and then 10mg x 1 week and off.  However given patient's low pulmonary reserve, tenous status if he does not tolerate the taper, consider increasing back to 30mg and Pulmonary Consultation.       Chronic systolic heart failure        - Stable  - EF of 25%, home lasix increased to BID in ICU.   - Following I/O closely. Not on ACE, BB held, given overall situation, not inclined to start currently      Pseudomonas pneumonia        - See respiratory failure above  - Continue meropenem, now day 19/21 on Discharge From IP Unit  - Midline in place      Goals of care, counseling/discussion        - Discussed hospice at length multiple times. He is amenable to this as a last resort, i.e. after exhausting other options including LTAC  - His goals are in keeping with hospice care and are almost entirely comfort-oriented      Coronary artery disease involving coronary bypass graft        - Stable  -s/p CABG. Home BB currently held. Pravastatin on now, no asa on  - Holding for now while  treating for acute respiratory failure, will resume as his overall condition improves      Pulmonary emphysema        - Stable  - Continue duonebs, budesonide, spiriva, and prednisone      Chronic hypotension       - Stable  - If recurrent, will resume home midodrine.          Chronic pulmonary embolism        - Stable  - See respiratory failure above      Primary malignant neoplasm of left upper lobe of lung        - Stable  - NEIDA SCC s/p carbo/taxol and XRT, follows with onc as outpatient, stage 2           Seen and examined day of discharge    Physical Exam   Constitutional: He is oriented to person, place, and time. No distress.   HENT:   HFNC in place 20 L @ 80% fio2  Eyes: Pupils are equal, round, and reactive to light.   Cardiovascular: Normal rate and regular rhythm.   No murmur heard.  Port palpable in right chest wall, no tenderness or erythema  Pulmonary/Chest: Effort normal and breath sounds coarse throughout lung fields . No respiratory distress. He has no wheezes. Bibasilar crackles R > L  Abdominal: Soft. Bowel sounds are normal. He exhibits no distension. There is no tenderness.   Musculoskeletal: He exhibits no edema or tenderness.   Neurological: He is alert and oriented to person, place, and time.   Skin: Skin is warm and dry. No rash noted. He is not diaphoretic. No erythema.   Psychiatric: He has a normal mood and affect. His behavior is normal.               Consults:   Consults (From admission, onward)        Status Ordering Provider     Inpatient consult to Critical Care Medicine  Once     Provider:  (Not yet assigned)    JOSEP Tirado     Inpatient consult to Infectious Diseases  Once     Provider:  (Not yet assigned)    Completed KIESHA SANDHU     Inpatient consult to Interventional Cardiology  Once     Provider:  (Not yet assigned)    Completed KIESHA SANDHU     Inpatient consult to Midline team  Once     Provider:  (Not yet assigned)    JOSEP Tirado      Inpatient consult to Palliative Care  Once     Provider:  (Not yet assigned)    Completed KIESHA SANDHU          Final Active Diagnoses:    Diagnosis Date Noted POA    PRINCIPAL PROBLEM:  Acute on chronic respiratory failure with hypoxemia [J96.21] 12/25/2018 Yes    Pseudomonas pneumonia [J15.1] 12/29/2018 Yes    Primary malignant neoplasm of left upper lobe of lung [C34.12] 05/19/2017 Yes     Chronic    Chronic pulmonary embolism [I27.82] 12/25/2018 Yes    Pulmonary emphysema [J43.9] 12/25/2018 Yes    Goals of care, counseling/discussion [Z71.89] 12/26/2018 Not Applicable    Chronic systolic heart failure [I50.22] 01/02/2019 Yes    Coronary artery disease involving coronary bypass graft [I25.810] 12/25/2018 Yes    Palliative care encounter [Z51.5] 12/26/2018 Not Applicable    Chronic hypotension [I95.89] 12/25/2018 Yes    Chronic pneumonia [J18.9] 12/25/2018 Yes      Problems Resolved During this Admission:      Discharged Condition: poor    Disposition: Home or Self Care    Follow Up:    Patient Instructions:      Diet Adult Regular     Medications:            START taking these medications     Details   budesonide (PULMICORT) 0.5 mg/2 mL nebulizer solution Take 2 mLs (0.5 mg total) by nebulization every 12 (twelve) hours. Controller  Refills: 0       furosemide (LASIX) 20 MG tablet Take 1 tablet (20 mg total) by mouth 2 (two) times daily.  Qty: 60 tablet, Refills: 11       levalbuterol (XOPENEX) 1.25 mg/0.5 mL nebulizer solution Take 0.5 mLs (1.25 mg total) by nebulization every 6 (six) hours. Rescue  Refills: 0       meropenem-0.9% sodium chloride 1 gram/50 mL PgBk Inject 50 mLs (1 g total) into the vein every 8 (eight) hours. FOR PSEUDOMONAS PNA. END DATE 1/17/19 END OF DAY       multivitamin (THERAGRAN) tablet Take 1 tablet by mouth once daily.       pantoprazole (PROTONIX) 40 MG tablet Take 1 tablet (40 mg total) by mouth once daily.  Qty: 30 tablet, Refills: 11       predniSONE (DELTASONE) 10 MG  tablet Take 2 tablets (20 mg total) by mouth once daily for 7 days, THEN 1 tablet (10 mg total) once daily for 7 days.     IF PATIENT DOES NOT TOLERATE WEANING KEEP ON 20MG OR ESCALATE BACK TO 30MG      Qty: 21 tablet, Refills: 0       tiotropium (SPIRIVA) 18 mcg inhalation capsule Inhale 1 capsule (18 mcg total) into the lungs once daily. Controller  Refills: 0                 CONTINUE these medications which have CHANGED     Details   apixaban (ELIQUIS) 5 mg Tab Take 1 tablet (5 mg total) by mouth 2 (two) times daily.                 CONTINUE these medications which have NOT CHANGED     Details   allopurinol (ZYLOPRIM) 100 MG tablet Take 100 mg by mouth once daily.        multivitamin (MEN'S MULTI-VITAMIN) per tablet Take 1 tablet by mouth once daily.       pravastatin (PRAVACHOL) 40 MG tablet Take 40 mg by mouth nightly.       VENTOLIN HFA 90 mcg/actuation inhaler Inhale 1-2 puffs into the lungs every 6 (six) hours as needed.                 STOP taking these medications         atenolol (TENORMIN) 25 MG tablet Comments:   Reason for Stopping:            cefUROXime (CEFTIN) 500 MG tablet Comments:   Reason for Stopping:            cilostazol (PLETAL) 100 MG Tab Comments:   Reason for Stopping:            ciprofloxacin HCl (CIPRO) 250 MG tablet Comments:   Reason for Stopping:            doxycycline (VIBRA-TABS) 100 MG tablet Comments:   Reason for Stopping:            nitroGLYCERIN (NITROSTAT) 0.3 MG SL tablet Comments:   Reason for Stopping:            PREDNISOLONE ORAL Comments:   Reason for Stopping:            ranitidine (ZANTAC 75) 75 MG tablet Comments:   Reason for Stopping:            rivaroxaban (XARELTO) 20 mg Tab Comments:   Reason for Stopping:            tamsulosin (FLOMAX) 0.4 mg Cap Comments:   Reason for Stopping:            terazosin (HYTRIN) 2 MG capsule Comments:   Reason for Stopping:                             Significant Diagnostic Studies:   Results for ZAYNAB MORAN (MRN [id number]) as of  1/16/2019 08:21   Ref. Range 1/10/2019 03:49 1/12/2019 03:53 1/14/2019 05:36   WBC Latest Ref Range: 3.90 - 12.70 K/uL 9.23 7.79 5.90   RBC Latest Ref Range: 4.60 - 6.20 M/uL 4.27 (L) 4.06 (L) 4.11 (L)   Hemoglobin Latest Ref Range: 14.0 - 18.0 g/dL 13.1 (L) 12.4 (L) 13.1 (L)   Hematocrit Latest Ref Range: 40.0 - 54.0 % 43.1 41.6 42.0   MCV Latest Ref Range: 82 - 98 fL 101 (H) 103 (H) 102 (H)   MCH Latest Ref Range: 27.0 - 31.0 pg 30.7 30.5 31.9 (H)   MCHC Latest Ref Range: 32.0 - 36.0 g/dL 30.4 (L) 29.8 (L) 31.2 (L)   RDW Latest Ref Range: 11.5 - 14.5 % 17.6 (H) 17.4 (H) 17.7 (H)   Platelets Latest Ref Range: 150 - 350 K/uL 116 (L) 108 (L) 101 (L)   MPV Latest Ref Range: 9.2 - 12.9 fL 9.9 9.8 9.3   Gran% Latest Ref Range: 38.0 - 73.0 % 83.8 (H) 80.9 (H) 76.2 (H)   Gran # (ANC) Latest Ref Range: 1.8 - 7.7 K/uL 7.7 6.3 4.5   Immature Granulocytes Latest Ref Range: 0.0 - 0.5 % 1.5 (H) 1.3 (H) 1.5 (H)   Immature Grans (Abs) Latest Ref Range: 0.00 - 0.04 K/uL 0.14 (H) 0.10 (H) 0.09 (H)   Lymph% Latest Ref Range: 18.0 - 48.0 % 10.9 (L) 12.2 (L) 16.1 (L)   Lymph # Latest Ref Range: 1.0 - 4.8 K/uL 1.0 1.0 1.0   Mono% Latest Ref Range: 4.0 - 15.0 % 3.4 (L) 4.5 4.9   Mono # Latest Ref Range: 0.3 - 1.0 K/uL 0.3 0.4 0.3   Eosinophil% Latest Ref Range: 0.0 - 8.0 % 0.4 1.0 1.0   Eos # Latest Ref Range: 0.0 - 0.5 K/uL 0.0 0.1 0.1   Basophil% Latest Ref Range: 0.0 - 1.9 % 0.0 0.1 0.3     Results for ZAYNAB MORAN (MRN [id number]) as of 1/16/2019 08:21   Ref. Range 1/10/2019 03:49 1/12/2019 03:53 1/14/2019 05:36   Sodium Latest Ref Range: 136 - 145 mmol/L 142 140 140   Potassium Latest Ref Range: 3.5 - 5.1 mmol/L 4.0 3.3 (L) 3.6   Chloride Latest Ref Range: 95 - 110 mmol/L 99 99 100   CO2 Latest Ref Range: 23 - 29 mmol/L 27 29 25   Anion Gap Latest Ref Range: 8 - 16 mmol/L 16 12 15   BUN, Bld Latest Ref Range: 8 - 23 mg/dL 31 (H) 28 (H) 28 (H)   Creatinine Latest Ref Range: 0.5 - 1.4 mg/dL 0.9 0.8 0.8   eGFR if non African American  Latest Ref Range: >60 mL/min/1.73 m^2 >60.0 >60.0 >60.0   eGFR if African American Latest Ref Range: >60 mL/min/1.73 m^2 >60.0 >60.0 >60.0   Glucose Latest Ref Range: 70 - 110 mg/dL 105 118 (H) 85   Calcium Latest Ref Range: 8.7 - 10.5 mg/dL 8.7 8.1 (L) 8.7     Results for ZAYNAB MORAN (MRN [id number]) as of 1/16/2019 08:21   Ref. Range 12/25/2018 15:02 1/10/2019 14:39   POC PH Latest Ref Range: 7.35 - 7.45  7.469 (H) 7.473 (H)   POC PCO2 Latest Ref Range: 35 - 45 mmHg 36.6 35.6   POC PO2 Latest Ref Range: 80 - 100 mmHg 63 (L) 82   POC BE Latest Ref Range: -2 to 2 mmol/L 3 2   POC HCO3 Latest Ref Range: 24 - 28 mmol/L 26.5 26.1   POC SATURATED O2 Latest Ref Range: 95 - 100 % 93 (L) 97   POC TCO2 Latest Ref Range: 23 - 27 mmol/L 28 (H) 27   FiO2 Unknown 60 100   Flow Unknown 15 30   Sample Unknown ARTERIAL ARTERIAL   DelSys Unknown PRB NRB   Allens Test Unknown Pass Pass   Site Unknown RR RR   Mode Unknown SPONT SPONT       XR CHEST 1 VIEW    CLINICAL HISTORY:  SOB;    TECHNIQUE:  Single frontal view of the chest was performed.    COMPARISON:  Non 01/04/2019 e    FINDINGS:  Postoperative changes as before.  Central venous catheter in the SVC.  Diffuse interstitial lung disease, COPD and the subsegmental atelectatic changes at the lung bases as before.      Impression       No significant changes      Electronically signed by: Anjel Galdamez MD  Date: 01/10/2019     Transthoracic ECHO 12/25/18  · Severely decreased left ventricular systolic function. The estimated ejection fraction is 25%  · Global hypokinetic wall motion.  · Moderate right ventricular enlargement.  · Left ventricular diastolic dysfunction.  · Moderate right atrial enlargement.  · Moderately to severely reduced right ventricular systolic function.  · Septal wall has abnormal motion. Systolic flattening of the interventricular septum consistent with right ventricle pressure overload.  · The estimated PA systolic pressure is 66 mm Hg  · Moderate to severe  tricuspid regurgitation.  · Mild mitral regurgitation.  · Normal central venous pressure (3 mm Hg).    Pending Diagnostic Studies:     None        Indwelling Lines/Drains at time of discharge:   Lines/Drains/Airways          None          Time spent on the discharge of patient: 40 minutes  Patient was seen and examined on the date of discharge and determined to be suitable for discharge.         Xander Riddle MD  Department of Hospital Medicine  [facility]

## 2019-01-22 PROBLEM — D69.6 THROMBOCYTOPENIA, UNSPECIFIED: Status: ACTIVE | Noted: 2019-01-01

## 2019-01-23 PROBLEM — E87.6 HYPOKALEMIA: Status: ACTIVE | Noted: 2019-01-01

## 2019-01-24 PROBLEM — N40.1 BPH WITH URINARY OBSTRUCTION: Status: ACTIVE | Noted: 2019-01-01

## 2019-01-24 PROBLEM — N13.8 BPH WITH URINARY OBSTRUCTION: Status: ACTIVE | Noted: 2019-01-01

## 2019-01-27 PROBLEM — R31.9 HEMATURIA: Status: ACTIVE | Noted: 2019-01-01

## 2019-01-27 PROBLEM — I48.91 ATRIAL FIBRILLATION: Status: ACTIVE | Noted: 2019-01-01

## 2019-01-28 PROBLEM — R33.9 URINARY RETENTION: Status: ACTIVE | Noted: 2019-01-01

## 2019-01-28 PROBLEM — R57.9 SHOCK: Status: ACTIVE | Noted: 2019-01-01

## 2019-01-28 PROBLEM — R79.89 ELEVATED SERUM CREATININE: Status: ACTIVE | Noted: 2019-01-01

## 2019-01-28 PROBLEM — R06.02 SOB (SHORTNESS OF BREATH): Status: ACTIVE | Noted: 2019-01-01

## 2019-01-28 NOTE — SUBJECTIVE & OBJECTIVE
Past Medical History:   Diagnosis Date    Arthritis     BPH (benign prostatic hyperplasia)     Chemotherapy-induced neutropenia 2018    Chemotherapy-induced neutropenia 2018    Coronary artery disease     MI X 2    Gout, chronic     Heartburn     Hypertension     Myocardial infarction     PE (pulmonary embolism)     Presence of dental bridge     UPPER - NOT WEAR MUCH AS DOES NOT FIT WELL    Primary malignant neoplasm of left upper lobe of lung 2017    Staph infection        Past Surgical History:   Procedure Laterality Date    CARDIAC SURGERY      CABG X 4 9-11    CTR      BILAT    OSTEOTOMY, METACARPAL BONE Right 3/1/2013    Performed by Daron Jalloh Jr., MD at [facility] OR    ROTATOR CUFF REPAIR      left    TONSILLECTOMY      TRIGGER FINGER RELEASE      right and left hands.       Review of patient's allergies indicates:   Allergen Reactions    Shellfish containing products      soft shell crabs       Family History     Problem Relation (Age of Onset)    Cancer Sister        Tobacco Use    Smoking status: Former Smoker     Packs/day: 2.00     Years: 25.00     Pack years: 50.00     Last attempt to quit: 1990     Years since quittin.9    Smokeless tobacco: Never Used   Substance and Sexual Activity    Alcohol use: Yes     Comment: 2 PER DAY    Drug use: No    Sexual activity: Not on file      Review of Systems   Unable to perform ROS: Acuity of condition   Constitutional: Positive for activity change. Negative for fever.   HENT: Positive for congestion.    Respiratory: Positive for shortness of breath.    Cardiovascular: Positive for palpitations. Negative for chest pain.   Genitourinary: Positive for decreased urine volume and hematuria.     Objective:     Vital Signs (Most Recent):  Pulse: (!) 115 (19 1610)  Resp: (!) 32 (19 1610)  BP: 109/62 (19 1607)  SpO2: (!) 77 % (19 1610) Vital Signs (24h Range):  Temp:  [97.5 °F (36.4 °C)-98.4 °F (36.9  °C)] 98.4 °F (36.9 °C)  Pulse:  [100-132] 115  Resp:  [15-36] 32  SpO2:  [61 %-94 %] 77 %  BP: ()/(35-91) 109/62  Arterial Line BP: ()/(60-71) 108/68   Weight: 83 kg (182 lb 15.7 oz)  Body mass index is 27.02 kg/m².      Intake/Output Summary (Last 24 hours) at 1/28/2019 1616  Last data filed at 1/28/2019 1559  Gross per 24 hour   Intake 1000 ml   Output 6170 ml   Net -5170 ml       Physical Exam   Constitutional: He is oriented to person, place, and time. He appears distressed.   Eyes: Pupils are equal, round, and reactive to light.   Neck: JVD present.   Cardiovascular:   Sinus tachycardia   Pulmonary/Chest: He is in respiratory distress.   Abdominal: He exhibits no distension. There is no tenderness.   Neurological: He is alert and oriented to person, place, and time.   Skin: He is diaphoretic.       Vents:  Oxygen Concentration (%): 100 (01/28/19 1529)  Lines/Drains/Airways     Drain                 Urethral Catheter 01/28/19 1312 Straight-tip 24 Fr. less than 1 day          Peripheral Intravenous Line                 Peripheral IV - Single Lumen 01/19/19 1730 Anterior;Left Wrist 8 days         Peripheral IV - Single Lumen 01/27/19 0200 Right Antecubital 1 day              Significant Labs:    CBC/Anemia Profile:  Recent Labs   Lab 01/27/19  1950 01/28/19  0456 01/28/19  1256 01/28/19  1305   WBC 6.31 6.99  --  9.62   HGB 10.2* 11.0*  --  11.2*   HCT 32.4* 35.1* 34* 37.1*   PLT 90* 99*  --  123*   MCV 99* 102*  --  101*   RDW 18.3* 18.4*  --  18.3*        Chemistries:  Recent Labs   Lab 01/27/19  0500 01/28/19  0457 01/28/19  1305    143 142   K 3.4* 5.0 5.0    99 99   CO2 28 31* 25   BUN 30* 42* 46*   CREATININE 1.0 1.3 1.6*   CALCIUM 8.9 9.4 9.7   ALBUMIN 2.4* 2.6* 2.7*   PROT 5.7* 6.1 6.6   BILITOT 2.9* 3.5* 4.0*   ALKPHOS 74 80 81   ALT 38 38 37   AST 25 22 22   MG 1.9 2.2  --    PHOS 4.2 6.1*  --        CMP:   Recent Labs   Lab 01/27/19  0500 01/28/19  0457 01/28/19  1305     143 142   K 3.4* 5.0 5.0    99 99   CO2 28 31* 25   * 123* 191*   BUN 30* 42* 46*   CREATININE 1.0 1.3 1.6*   CALCIUM 8.9 9.4 9.7   PROT 5.7* 6.1 6.6   ALBUMIN 2.4* 2.6* 2.7*   BILITOT 2.9* 3.5* 4.0*   ALKPHOS 74 80 81   AST 25 22 22   ALT 38 38 37   ANIONGAP 13 13 18*   EGFRNONAA >60.0 54.1* 42.1*     Lactic Acid:   Recent Labs   Lab 01/28/19  1305   LACTATE 7.0*     Troponin:   Recent Labs   Lab 01/28/19  1305   TROPONINI 0.199*       Significant Imaging: CXR: I have reviewed all pertinent results/findings within the past 24 hours and my personal findings are:  There is slight smaller lung volumes likely to poor inspiratory effort.  There is diffuse interstitial opacities throughout the lungs similar but increased from prior concerning for scarring with possible superimposed edema.  There is poor definition of the lung bases concerning for effusions with atelectasis.  No large pneumothorax.

## 2019-01-28 NOTE — CONSULTS
[facility]  Interventional Cardiology  Consult Note    Patient Name: Michael Shetty  MRN: [id number]  Admission Date: 1/28/2019  Hospital Length of Stay: 0 days  Code Status: DNR   Attending Provider: Kathia Owens MD  Consulting Provider: Ab Kirby MD  Primary Care Physician: Michael Ellsworth MD  Principal Problem:Acute on chronic respiratory failure with hypoxemia    Patient information was obtained from patient and ER records.     Inpatient consult to Interventional Cardiology  Consult performed by: Ab Kirby MD  Consult ordered by: Augustin Galaviz MD        Subjective:     Chief Complaint:  SOB     HPI:  Mr Shetty is a 74 y.o male presented with worsening. Interventional cardiology consulted for catheter directed TPA. Patient has PMH of PEs on Eliquis (dilated RV from 12/2018), CAD s/p CABGn, hypotension, squamous cell lung carcinoma of NEIDA s/p 1 cycle of adjuvant CTX & radiation therapy (which was stopped due to recurrent pseudomonas PNA & PEs), COPD, 54 pack years (quit 1990), chronic severe pseudomonas PNA.       Past Medical History:   Diagnosis Date    Arthritis     BPH (benign prostatic hyperplasia)     Chemotherapy-induced neutropenia 9/6/2018    Chemotherapy-induced neutropenia 9/6/2018    Coronary artery disease     MI X 2    Gout, chronic     Heartburn     Hypertension     Myocardial infarction     PE (pulmonary embolism)     Presence of dental bridge     UPPER - NOT WEAR MUCH AS DOES NOT FIT WELL    Primary malignant neoplasm of left upper lobe of lung 5/19/2017    Staph infection        Past Surgical History:   Procedure Laterality Date    CARDIAC SURGERY      CABG X 4 9-11    CTR      BILAT    OSTEOTOMY, METACARPAL BONE Right 3/1/2013    Performed by [facility] Jalloh [facility] at [facility] OR    ROTATOR CUFF REPAIR      left    TONSILLECTOMY      TRIGGER FINGER RELEASE      right and left hands.       Review of patient's allergies indicates:    Allergen Reactions    Shellfish containing products      soft shell crabs         (Not in a hospital admission)  Family History     Problem Relation (Age of Onset)    Cancer Sister        Tobacco Use    Smoking status: Former Smoker     Packs/day: 2.00     Years: 25.00     Pack years: 50.00     Last attempt to quit: 1990     Years since quittin.9    Smokeless tobacco: Never Used   Substance and Sexual Activity    Alcohol use: Yes     Comment: 2 PER DAY    Drug use: No    Sexual activity: Not on file     Review of Systems   Reason unable to perform ROS: Severe SOB.     Objective:     Vital Signs (Most Recent):  Pulse: (!) 115 (19)  Resp: (!) 32 (19)  BP: 109/62 (19 1607)  SpO2: (!) 77 % (19) Vital Signs (24h Range):  Temp:  [97.5 °F (36.4 °C)-98.4 °F (36.9 °C)] 98.4 °F (36.9 °C)  Pulse:  [100-132] 115  Resp:  [15-36] 32  SpO2:  [61 %-94 %] 77 %  BP: ()/(35-91) 109/62  Arterial Line BP: ()/(60-71) 108/68     Weight: 83 kg (182 lb 15.7 oz)  Body mass index is 27.02 kg/m².    SpO2: (!) 77 %  O2 Device (Oxygen Therapy): BiPAP      Intake/Output Summary (Last 24 hours) at 2019 1640  Last data filed at 2019 1559  Gross per 24 hour   Intake 1000 ml   Output 6170 ml   Net -5170 ml       Lines/Drains/Airways     Drain                 Urethral Catheter 19 1312 Straight-tip 24 Fr. less than 1 day          Peripheral Intravenous Line                 Peripheral IV - Single Lumen 19 1730 Anterior;Left Wrist 8 days         Peripheral IV - Single Lumen 19 0200 Right Antecubital 1 day                Physical Exam   Constitutional: He appears well-developed.   HENT:   Head: Normocephalic.   Pulmonary/Chest: He is in respiratory distress.   Skin: Skin is warm.       Significant Labs:   BMP:   Recent Labs   Lab 19  0500 19  0457 19  1305   * 123* 191*    143 142   K 3.4* 5.0 5.0    99 99   CO2 28 31* 25    BUN 30* 42* 46*   CREATININE 1.0 1.3 1.6*   CALCIUM 8.9 9.4 9.7   MG 1.9 2.2  --    , CMP   Recent Labs   Lab 01/27/19  0500 01/28/19  0457 01/28/19  1305    143 142   K 3.4* 5.0 5.0    99 99   CO2 28 31* 25   * 123* 191*   BUN 30* 42* 46*   CREATININE 1.0 1.3 1.6*   CALCIUM 8.9 9.4 9.7   PROT 5.7* 6.1 6.6   ALBUMIN 2.4* 2.6* 2.7*   BILITOT 2.9* 3.5* 4.0*   ALKPHOS 74 80 81   AST 25 22 22   ALT 38 38 37   ANIONGAP 13 13 18*   ESTGFRAFRICA >60.0 >60.0 48.7*   EGFRNONAA >60.0 54.1* 42.1*    and CBC   Recent Labs   Lab 01/27/19  1950 01/28/19  0456  01/28/19  1305   WBC 6.31 6.99  --  9.62   HGB 10.2* 11.0*  --  11.2*   HCT 32.4* 35.1*   < > 37.1*   PLT 90* 99*  --  123*    < > = values in this interval not displayed.         Assessment and Plan:     * Acute on chronic respiratory failure with hypoxemia    - Critically sick patient with PMH of f PEs on Eliquis (dilated RV from 12/2018) and squamous cell lung carcinoma of NEIDA s/p chemo and radiation therapy.  - Patient is not  Candidate for catheter directed TPA giving his co morbidities.  - Discussed with Dr Curry.         Thank you for your consult, please call Interventional cariology for any question.     Ab Kirby MD  Cardiology Fellow  Pager: [phone]

## 2019-01-28 NOTE — ASSESSMENT & PLAN NOTE
75 yo M with gross hematuria and clot retention    -24 Fr Roseanne 3-way catheter placed and patient placed on CBI  -Recommend continuing CBI  -We will continue to follow    Please call with questions.

## 2019-01-28 NOTE — ASSESSMENT & PLAN NOTE
- Critically sick patient with PMH of f PEs on Eliquis (dilated RV from 12/2018) and squamous cell lung carcinoma of NEIDA s/p chemo and radiation therapy.  - Patient is not  Candidate for catheter directed TPA giving his co morbidities.  - Discussed with Dr Curry.

## 2019-01-28 NOTE — ASSESSMENT & PLAN NOTE
2/2 lucio cath placement, patient on eliquis for afib and PE  Irrigated  - monitor H&H  - urology consulted about starting heparin in the setting hematuria for PE prophylaxis, appreciate mellissa

## 2019-01-28 NOTE — CONSULTS
Patient seen in evaluated in ED. He will be admitted to Critical Care medicine. See full H&P    Augustin Galaviz DO  PGY3 Internal Medicine  Pager: [phone]

## 2019-01-28 NOTE — HPI
The patient is a 74 y.o. male w/ pmh CAD, HTN, MI, PE , lung cancer, chemotherapy induced neutropenia, arthritis, and gout, who presents to the ED with respiratory distress (currenlty on BiPAP and pressors) and urinary retention. He has had minimal bloody urine output at LTAC since yesterday, he had a 16 Fr Bassett catheter in place, they attempted to exchange this for a 22 Fr Bassett catheter but were unsuccessful.    The patient is currently DNI. He is on eliquis.     A 24 Fr Roseanne Catheter was placed with 20ml in the balloon. The catheter was irrigated but was not able to be completely cleared, and the patient was placed on CBI.

## 2019-01-28 NOTE — PROGRESS NOTES
Patient seen and examined by me on morning rounds in the LTAC. I agree with the trainee's separate note except as modified.     Placed on BIPAP and transferred to the ICU over the weekend. Some issues with blood clots in Lucio.     Afebrile, I/O -580. HR 100s, MAP 66, 91% on 100% BIPAP.     BUN rising    On my exam: Lucio bag with bloody fluid. Increased work of breathing on BIPAP, although improved to when I last saw him on Friday    Plan:   -cont BIPAP with high FiO2  -to the ER today for evaluation of lucio issues  -diuresis once urologic issues are resolved

## 2019-01-28 NOTE — SUBJECTIVE & OBJECTIVE
Past Medical History:   Diagnosis Date    Arthritis     BPH (benign prostatic hyperplasia)     Chemotherapy-induced neutropenia 2018    Chemotherapy-induced neutropenia 2018    Coronary artery disease     MI X 2    Gout, chronic     Heartburn     Hypertension     Myocardial infarction     PE (pulmonary embolism)     Presence of dental bridge     UPPER - NOT WEAR MUCH AS DOES NOT FIT WELL    Primary malignant neoplasm of left upper lobe of lung 2017    Staph infection        Past Surgical History:   Procedure Laterality Date    CARDIAC SURGERY      CABG X 4 9-11    CTR      BILAT    OSTEOTOMY, METACARPAL BONE Right 3/1/2013    Performed by [facility] J[facility]oh Jr[facility] at [facility] OR    ROTATOR CUFF REPAIR      left    TONSILLECTOMY      TRIGGER FINGER RELEASE      right and left hands.       Review of patient's allergies indicates:   Allergen Reactions    Shellfish containing products      soft shell crabs       Family History     Problem Relation (Age of Onset)    Cancer Sister          Tobacco Use    Smoking status: Former Smoker     Packs/day: 2.00     Years: 25.00     Pack years: 50.00     Last attempt to quit: 1990     Years since quittin.9    Smokeless tobacco: Never Used   Substance and Sexual Activity    Alcohol use: Yes     Comment: 2 PER DAY    Drug use: No    Sexual activity: Not on file       Review of Systems   Unable to perform ROS: Mental status change       Objective:     Temp:  [97.5 °F (36.4 °C)-98.4 °F (36.9 °C)] 98.4 °F (36.9 °C)  Pulse:  [100-132] 119  Resp:  [15-38] 19  SpO2:  [61 %-94 %] 86 %  BP: ()/(35-73) 92/61     Body mass index is 27.02 kg/m².    Date 19 0700 - 19 0659   Shift 9055-2504 1636-2346 4621-9454 24 Hour Total   INTAKE   Shift Total(mL/kg)       OUTPUT   Urine(mL/kg/hr) 20   20   Shift Total(mL/kg) 20(0.2)   20(0.2)   Weight (kg) 83 83 83 83          Drains          None          Physical Exam   Nursing note and vitals  reviewed.  Constitutional: He appears well-developed and well-nourished. No distress.   HENT:   Head: Normocephalic and atraumatic.   Eyes: Right eye exhibits no discharge. Left eye exhibits no discharge.   Neck: Normal range of motion.   Pulmonary/Chest:   On BiPAP, in respiratory distress   Abdominal: Soft. He exhibits no distension. There is no tenderness.   Genitourinary:   Genitourinary Comments: Circumcised  24 Fr Roseanne in place on CBI     Musculoskeletal: Normal range of motion.   Neurological:   Minimally arousable   Skin: Skin is warm and dry. He is not diaphoretic.         Significant Labs:    BMP:  Recent Labs   Lab 01/27/19  0500 01/28/19  0457 01/28/19  1305    143 142   K 3.4* 5.0 5.0    99 99   CO2 28 31* 25   BUN 30* 42* 46*   CREATININE 1.0 1.3 1.6*   CALCIUM 8.9 9.4 9.7       CBC:  Recent Labs   Lab 01/27/19  1950 01/28/19  0456 01/28/19  1256 01/28/19  1305   WBC 6.31 6.99  --  9.62   HGB 10.2* 11.0*  --  11.2*   HCT 32.4* 35.1* 34* 37.1*   PLT 90* 99*  --  123*       Urine Culture: No results for input(s): LABURIN in the last 168 hours.  All pertinent labs results from the past 24 hours have been reviewed.    Significant Imaging:  All pertinent imaging results/findings from the past 24 hours have been reviewed.

## 2019-01-28 NOTE — HPI
The patient is a 74 y.o. 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 Patient was sent from LTAC for urinary retention. His caregiver states his urine output since yesterday has been 100cc of blood. LTAC was unsuccessful placing a lucio. Patient on eliquis. He arrived on BiPAP which his caregiver states he has been on all weekend. He has gradually declined this weekend and was recently changed from PO to IV lasix. Caregiver states his blood pressure has been in low 80s. She also states he has been tachycardic in the past with afib rvr. Of note, patient given PO dilaudid PTA. Patient is DNR. Bed side US in ED showed right subsegmental lung PE and enlarged RV c/w diastolic HF 2/2 pHTN and PE. Cardiology was consulted about the risk and benefits of thrombolysis. Patient is not a candidate at this state. Accordingly, there is no need for CTA as well. Patient was admitted to MICU for respiratory failure.

## 2019-01-28 NOTE — ED PROVIDER NOTES
Encounter Date: 1/28/2019    SCRIBE #1 NOTE: I, Iliana Spangler, am scribing for, and in the presence of,  Dr. Owens. I have scribed the following portions of the note - Other sections scribed: HPI.       History     Chief Complaint   Patient presents with    Respiratory Distress     Patient sent from LTAC for urinary retention; patient on Bipap 100% (baseline at LTAC) sats at 77% upon arrival, tachypnic, patient is DNI    Urinary Retention     patient on eloquis; only output since yesterday has been 100cc of blood. LTAC unsuccessful with Lucio placement     Time patient was seen by the provider: 1:04 PM      The patient is a 74 y.o. male with co-morbidities including: CAD, HTN, MI, PE, primary malignant neoplasm of left upper lobe of lung, chemotherapy induced neutropenia, arthritis, and gout, who presents to the ED with a complaint of respiratory distress and urinary retention. Patient was sent from LTAC for urinary retention. His caregiver states his urine output since yesterday has been 100cc of blood. LTAC was unsuccessful placing a lucio. Patient on eliquis. He arrived on BiPAP which his caregiver states he has been on all weekend. He has gradually declined this weekend and was recently changed from PO to IV lasix. Caregiver states his blood pressure is not typically this low. She also states he has been tachycardic in the past with afib rvr. Of note, patient given PO dilaudid PTA. Patient is DNI.       The history is provided by a caregiver. The history is limited by the condition of the patient.     Review of patient's allergies indicates:   Allergen Reactions    Shellfish containing products      soft shell crabs     Past Medical History:   Diagnosis Date    Arthritis     BPH (benign prostatic hyperplasia)     Chemotherapy-induced neutropenia 9/6/2018    Chemotherapy-induced neutropenia 9/6/2018    Coronary artery disease     MI X 2    Gout, chronic     Heartburn     Hypertension     Myocardial  infarction     PE (pulmonary embolism)     Presence of dental bridge     UPPER - NOT WEAR MUCH AS DOES NOT FIT WELL    Primary malignant neoplasm of left upper lobe of lung 2017    Staph infection      Past Surgical History:   Procedure Laterality Date    CARDIAC SURGERY      CABG X 4 9-11    CTR      BILAT    OSTEOTOMY, METACARPAL BONE Right 3/1/2013    Performed by [facility] at [facility] OR    ROTATOR CUFF REPAIR      left    TONSILLECTOMY      TRIGGER FINGER RELEASE      right and left hands.     Family History   Problem Relation Age of Onset    Cancer Sister         gallbladder     Social History     Tobacco Use    Smoking status: Former Smoker     Packs/day: 2.00     Years: 25.00     Pack years: 50.00     Last attempt to quit: 1990     Years since quittin.9    Smokeless tobacco: Never Used   Substance Use Topics    Alcohol use: Yes     Comment: 2 PER DAY    Drug use: No     Review of Systems   Unable to perform ROS: Acuity of condition     Physical Exam     Initial Vitals [19 1244]   BP Pulse Resp Temp SpO2   (!) 98/56 (!) 127 (!) 30 -- (!) 77 %      MAP       --         Physical Exam    Constitutional: He appears well-developed. He is not diaphoretic.   BiPAP in place   HENT:   Head: Normocephalic.   Eyes: EOM are normal. Pupils are equal, round, and reactive to light.   Neck: Normal range of motion. Neck supple.   Cardiovascular: Regular rhythm. Tachycardia present.    Pulmonary/Chest: Tachypnea noted. He is in respiratory distress.   Abdominal: There is no tenderness.   Genitourinary:   Genitourinary Comments: No lucio in place. Mild bleeding from penile meatus.   Musculoskeletal: Normal range of motion.   Neurological: He is alert.   Skin: Skin is warm and dry.       ED Course   Procedures  Labs Reviewed   CBC W/ AUTO DIFFERENTIAL - Abnormal; Notable for the following components:       Result Value    RBC 3.66 (*)     Hemoglobin 11.2 (*)     Hematocrit 37.1 (*)       (*)     MCHC 30.2 (*)     RDW 18.3 (*)     Platelets 123 (*)     Immature Granulocytes 0.9 (*)     Gran # (ANC) 8.3 (*)     Immature Grans (Abs) 0.09 (*)     Lymph # 0.7 (*)     nRBC 1 (*)     Gran% 85.8 (*)     Lymph% 7.0 (*)     All other components within normal limits   COMPREHENSIVE METABOLIC PANEL - Abnormal; Notable for the following components:    Glucose 191 (*)     BUN, Bld 46 (*)     Creatinine 1.6 (*)     Albumin 2.7 (*)     Total Bilirubin 4.0 (*)     Anion Gap 18 (*)     eGFR if  48.7 (*)     eGFR if non  42.1 (*)     All other components within normal limits   LACTIC ACID, PLASMA - Abnormal; Notable for the following components:    Lactate (Lactic Acid) 7.0 (*)     All other components within normal limits   B-TYPE NATRIURETIC PEPTIDE - Abnormal; Notable for the following components:     (*)     All other components within normal limits    Narrative:     ADD ON BNP PER DR BENJAMIN GARCIA  01/28/2019  14:35   ADD ON TEST TROPONIN PER DR DANIELLE KING ORDER #[id number]    01/28/2019  15:20   ADD-ON [facility] #[id number] PER TAMICA PUCKETT MD 15:23  01/28/2019    TROPONIN I - Abnormal; Notable for the following components:    Troponin I 0.199 (*)     All other components within normal limits    Narrative:     ADD ON BNP PER DR BENJAMIN GARCIA  01/28/2019  14:35   ADD ON TEST TROPONIN PER DR DANIELLE KING ORDER #[id number]    01/28/2019  15:20   ADD-ON [facility] #[id number] PER TAMICA PUCKETT MD 15:23  01/28/2019    ISTAT PROCEDURE - Abnormal; Notable for the following components:    POC PH 7.294 (*)     POC PCO2 52.5 (*)     POC PO2 32 (*)     POC SATURATED O2 54 (*)     All other components within normal limits   ISTAT PROCEDURE - Abnormal; Notable for the following components:    POC Glucose 189 (*)     POC BUN 40 (*)     POC Creatinine 1.6 (*)     POC Hematocrit 34 (*)     All other components within normal limits   ISTAT PROCEDURE - Abnormal; Notable for the  following components:    POC PO2 54 (*)     POC HCO3 21.1 (*)     POC SATURATED O2 87 (*)     POC TCO2 22 (*)     All other components within normal limits   CULTURE, BLOOD   CULTURE, BLOOD   B-TYPE NATRIURETIC PEPTIDE   TROPONIN I   HEMOGLOBIN A1C   HEMOGLOBIN A1C    Narrative:     ADD ON BNP PER DR BENJAMIN GARCIA  01/28/2019  14:35   ADD ON TEST TROPONIN PER DR DANIELLE KING ORDER #[id number]    01/28/2019  15:20   ADD-ON [facility] #[id number] PER TAMICA PUCKETT MD 15:23  01/28/2019    URINALYSIS, REFLEX TO URINE CULTURE   ISTAT CHEM8          Imaging Results          X-Ray Chest AP Portable (Final result)  Result time 01/28/19 13:11:00    Final result by Ricky Dumont DO (01/28/19 13:11:00)                 Impression:      Please see above      Electronically signed by: Ricky Dumont DO  Date:    01/28/2019  Time:    13:11             Narrative:    EXAMINATION:  XR CHEST AP PORTABLE    CLINICAL HISTORY:  Sepsis;    TECHNIQUE:  Single frontal view of the chest was performed.    COMPARISON:  01/16/2019    FINDINGS:  Right-sided Port-A-Cath stable.  Sternotomy wires and surgical clips overlie the cardiomediastinal silhouette unchanged.  There is slight smaller lung volumes likely to poor inspiratory effort.  There is diffuse interstitial opacities throughout the lungs similar but increased from prior concerning for scarring with possible superimposed edema.  There is poor definition of the lung bases concerning for effusions with atelectasis.  No large pneumothorax.  Clinical correlation and follow-up advised.  Continued atherosclerotic aorta.                                       APC / Resident Notes:   74 year old male with medical history of CAD, HTN, MI, PE, primary malignant neoplasm of left upper lobe of lung, chemotherapy induced neutropenia, arthritis, and gout presenting to the ED from LTAC facility for evaluation of respiratory distress and urinary retention. DDx includes but not limited to respiratory  failure, pneumonia, pulmonary embolism, cardiac arrhythmia, sepsis, urinary retention, urinary obstruction, UTI.     1:11 PM  Patient hypotensive with SBP 60's on arrival which is below his baseline. Will initiate pressors and give judicious fluids. Will continue BiPAP. Vanc and Zosyn ordered for broad coverage.   Urology at bedside to place lucio. Will discuss with MICU given respiratory distress and hypotension requiring pressors.     Work-up shows WBC 9.6, H/H 11.2/37.1, Plt 123, Crt elevated 1.6 (1.3 BL), AG 18, Lactate elevated 7.0, BNP elevated 498 ([phone]).   ECG shows sinus tachycardia at 126 bpm with PACs and RBBB  CXR shows diffuse interstitial opacities throughout the lungs similar but increased from prior concerning for scarring with possible superimposed edema. Poor definition of the lung bases concerning for effusions with atelectasis.  No large pneumothorax.    24 Kazakh Roseanne 3-way catheter placed by Urology. Patient evaluated by MICU at bedside and will admit to their service for ongoing management of acute on chronic respiratory failure. I have discussed the care of this patient with my supervising physician.     I attest, a sepsis perfusion exam was performed within 6 hours of Septic Shock presentation, following fluid resuscitation.    I, Kristopher Luevano, personally performed the services described in this documentation. All medical record entries made by the scribe were at my direction and in my presence.  I have reviewed the chart and agree that the record reflects my personal performance and is accurate and complete.        Scribe Attestation:   Scribe #1: I performed the above scribed service and the documentation accurately describes the services I performed. I attest to the accuracy of the note.       [unfilled]        Clinical Impression:   The primary encounter diagnosis was Acute on chronic respiratory failure, unspecified whether with hypoxia or hypercapnia. Diagnoses of SOB (shortness  of breath) and Urinary retention were also pertinent to this visit.      Disposition:   Disposition: Admitted  Condition: Critical                        Kristopher Luevano PA-C  01/28/19 3657

## 2019-01-28 NOTE — HPI
Mr Shetty is a 74 y.o male presented with worsening. Interventional cardiology consulted for catheter directed TPA. Patient has PMH of PEs on Eliquis (dilated RV from 12/2018), CAD s/p CABGn, hypotension, squamous cell lung carcinoma of NEIDA s/p 1 cycle of adjuvant CTX & radiation therapy (which was stopped due to recurrent pseudomonas PNA & PEs), COPD, 54 pack years (quit 1990), chronic severe pseudomonas PNA.

## 2019-01-28 NOTE — PROGRESS NOTES
"[facility] Pul[facility] Medicine  Consult Follow Up    Primary Attending:  Kathia Owens MD   Consultant Attending: RUSULA Kraus   Consultant Fellow: Delonte Gee MD     Chief Complaint/Reason for Consult      Hypoxemic respiratory failure     Interval History      Through the weekend there was increasing issues with lasix administration and urinary outflow obstruction. Despite resuming his BPH medication, continued to complain of difficulty with voiding. Decision was made to place lucio catheter. Developed severe pain and gross hematuria after placement of catheter. No significant UOP since catheter placed and patient has been noted to have urine leaking around the catheter. Patient has remained hypotensive and severely hypoxemic. He was transferred to the higher acuity unit for closer monitoring. Due to increased work of breathing patient was placed on continuous BiPAP, which has improved his breathlessness.     Updated Review of Systems      Unable to obtain due to biPAP mask, however patient nods that his breathing is currently comfortable.     Medications and nursing orders have been reviewed    On Examination     BP (!) 77/50   Pulse (!) 124   Resp (!) 31   Ht 5' 9" (1.753 m)   Wt 83 kg (182 lb 15.7 oz)   SpO2 (!) 79%   BMI 27.02 kg/m²     Physical Exam   Constitutional: He is oriented to person, place, and time. He appears well-developed and well-nourished. No distress.   HENT:   Head: Normocephalic and atraumatic.   Eyes: Conjunctivae are normal. No scleral icterus.   Neck: Neck supple.   Cardiovascular: Regular rhythm and normal pulses. Tachycardia present.   Pulmonary/Chest: Effort normal. Tachypnea noted. No respiratory distress. He has decreased breath sounds. He has no wheezes. He has no rhonchi. He has rales.   Abdominal: Soft. Normal appearance. There is tenderness in the suprapubic area.       Musculoskeletal: He exhibits no edema.   Neurological: He is alert and oriented to person, place, and time. "   Skin: Skin is warm and dry.   Psychiatric: He has a normal mood and affect. His behavior is normal.       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, III, 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)  · Recurring pseudomonal pneumonia  · Hx of COPD by chart review  · Hx of pulmonary fibrosis by chart review  · Squamous cell cancer of the lung, presently not on active treatment   · Acute on chronic hypoxemic respiratory failure secondary to above items      · Acute decompensation likely related to hypervolemia  · New gross hematuria  · Concern for acute urinary outflow obsruction    - Agree with urgent referral to urology to address possible obstruction and urethral injury  - May be close to euvolemia, however with his significant pain and hematuria -- difficult to assess his cardiorespiratory status  - Hold further doses of lasix until urologic issues resolved  - Would maintain on BiPAP for now  - Titrate FIO2 to maintain sats in the mid to high 80s  - Will reassess once he is stabilized from  perspective      This plan was discussed with staff pulmonologist Dr. Boateng    We will continue to follow with you, thank you for the opportunity to be involved in Mr. Shetty's care.    Please call or message directly through EPIC with any additional questions or concerns.    Delonte Gee MD  [facility] Fellow  Pager: [phone]  Cell: [phone]

## 2019-01-28 NOTE — ED TRIAGE NOTES
Patient presents from LTAC for concerns for urinary retention and respiratory distress. Patient arrives on bipap with oxygen saturation 77%. Patient is DNI. Patient is responsive upon arrival and following commands.

## 2019-01-28 NOTE — SUBJECTIVE & OBJECTIVE
Past Medical History:   Diagnosis Date    Arthritis     BPH (benign prostatic hyperplasia)     Chemotherapy-induced neutropenia 2018    Chemotherapy-induced neutropenia 2018    Coronary artery disease     MI X 2    Gout, chronic     Heartburn     Hypertension     Myocardial infarction     PE (pulmonary embolism)     Presence of dental bridge     UPPER - NOT WEAR MUCH AS DOES NOT FIT WELL    Primary malignant neoplasm of left upper lobe of lung 2017    Staph infection        Past Surgical History:   Procedure Laterality Date    CARDIAC SURGERY      CABG X 4 9-11    CTR      BILAT    OSTEOTOMY, METACARPAL BONE Right 3/1/2013    Performed by Daron Jalloh Jr., MD at [facility] OR    ROTATOR CUFF REPAIR      left    TONSILLECTOMY      TRIGGER FINGER RELEASE      right and left hands.       Review of patient's allergies indicates:   Allergen Reactions    Shellfish containing products      soft shell crabs         (Not in a hospital admission)  Family History     Problem Relation (Age of Onset)    Cancer Sister        Tobacco Use    Smoking status: Former Smoker     Packs/day: 2.00     Years: 25.00     Pack years: 50.00     Last attempt to quit: 1990     Years since quittin.9    Smokeless tobacco: Never Used   Substance and Sexual Activity    Alcohol use: Yes     Comment: 2 PER DAY    Drug use: No    Sexual activity: Not on file     Review of Systems   Reason unable to perform ROS: Severe SOB.     Objective:     Vital Signs (Most Recent):  Pulse: (!) 115 (19 161)  Resp: (!) 32 (19)  BP: 109/62 (19 1607)  SpO2: (!) 77 % (19) Vital Signs (24h Range):  Temp:  [97.5 °F (36.4 °C)-98.4 °F (36.9 °C)] 98.4 °F (36.9 °C)  Pulse:  [100-132] 115  Resp:  [15-36] 32  SpO2:  [61 %-94 %] 77 %  BP: ()/(35-91) 109/62  Arterial Line BP: ()/(60-71) 108/68     Weight: 83 kg (182 lb 15.7 oz)  Body mass index is 27.02 kg/m².    SpO2: (!) 77 %  O2 Device  (Oxygen Therapy): BiPAP      Intake/Output Summary (Last 24 hours) at 1/28/2019 1640  Last data filed at 1/28/2019 1559  Gross per 24 hour   Intake 1000 ml   Output 6170 ml   Net -5170 ml       Lines/Drains/Airways     Drain                 Urethral Catheter 01/28/19 1312 Straight-tip 24 Fr. less than 1 day          Peripheral Intravenous Line                 Peripheral IV - Single Lumen 01/19/19 1730 Anterior;Left Wrist 8 days         Peripheral IV - Single Lumen 01/27/19 0200 Right Antecubital 1 day                Physical Exam   Constitutional: He appears well-developed.   HENT:   Head: Normocephalic.   Pulmonary/Chest: He is in respiratory distress.   Skin: Skin is warm.       Significant Labs:   BMP:   Recent Labs   Lab 01/27/19  0500 01/28/19  0457 01/28/19  1305   * 123* 191*    143 142   K 3.4* 5.0 5.0    99 99   CO2 28 31* 25   BUN 30* 42* 46*   CREATININE 1.0 1.3 1.6*   CALCIUM 8.9 9.4 9.7   MG 1.9 2.2  --    , CMP   Recent Labs   Lab 01/27/19  0500 01/28/19  0457 01/28/19  1305    143 142   K 3.4* 5.0 5.0    99 99   CO2 28 31* 25   * 123* 191*   BUN 30* 42* 46*   CREATININE 1.0 1.3 1.6*   CALCIUM 8.9 9.4 9.7   PROT 5.7* 6.1 6.6   ALBUMIN 2.4* 2.6* 2.7*   BILITOT 2.9* 3.5* 4.0*   ALKPHOS 74 80 81   AST 25 22 22   ALT 38 38 37   ANIONGAP 13 13 18*   ESTGFRAFRICA >60.0 >60.0 48.7*   EGFRNONAA >60.0 54.1* 42.1*    and CBC   Recent Labs   Lab 01/27/19  1950 01/28/19  0456  01/28/19  1305   WBC 6.31 6.99  --  9.62   HGB 10.2* 11.0*  --  11.2*   HCT 32.4* 35.1*   < > 37.1*   PLT 90* 99*  --  123*    < > = values in this interval not displayed.

## 2019-01-28 NOTE — ED NOTES
Critical care MD at bedside. Patient is alert at this time and following commands. Patient denies any pain currently. Patient verified that he does not wish to be intubated. Patient being update on plan of care. All safety precautions remain in place.

## 2019-01-28 NOTE — ED NOTES
Critical care remains at bedside with patient. Patient continues to be hypotensive and hypoxic on bipap. Patient remains alert to verbal stimuli. P

## 2019-01-28 NOTE — ASSESSMENT & PLAN NOTE
Patient with Hx of lung Cancer and PE presented with SOB started from few days ago, currently on CPAP, O2sat 78-81  AB.35, 37.4, 21.1   Lactic acid 6  Bedside US: right lung PE  CXR: diffuse interstitial opacities throughout the lungs similar but increased from prior concerning for scarring with possible superimposed edema.  There is poor definition of the lung bases concerning for effusions with atelectasis.  No large pneumothorax.    Patient is actively bleeding from urinary tract, possibly 2/2 trauma after attempting to put lucio catheter in  Given the active malignancy and active bleeding patient is not a candidate for catheter thrombolysis. Also considering his active respiratory failure will not be able to go through scanning for CTA.  Patient is DNR, no intubation will be considered.  - continous cardiac monitoring and pulse oxymetry  - continue BiPAP, FiO2 100%, 15/7  - consult interventional cardiology  - consult urology, discuss if it is ok to start heparin gtt for prophylaxis considering his hematuria  - start vancomycin and zosyn for possible respiratory infection or sepsis, given elevated lactic acid  - f/u BCx and UCx   - strict intake output

## 2019-01-29 NOTE — CONSULTS
[facility]  Urology  Consult Note    Patient Name: Michael Shetty  MRN: [id number]  Admission Date: 1/28/2019  Hospital Length of Stay: 0   Code Status: DNR   Attending Provider: Kathia Owens MD   Consulting Provider: Arsen Ayala MD  Primary Care Physician: Michael Ellsworth MD  Principal Problem:Acute on chronic respiratory failure with hypoxemia    Inpatient consult to Urology  Consult performed by: Arsen Ayala MD  Consult ordered by: Myrna Moon MD          Subjective:     HPI:  The patient is a 74 y.o. male w/ pmh CAD, HTN, MI, PE , lung cancer, chemotherapy induced neutropenia, arthritis, and gout, who presents to the ED with respiratory distress (currenlty on BiPAP and pressors) and urinary retention. He has had minimal bloody urine output at LTAC since yesterday, he had a 16 Fr Bassett catheter in place, they attempted to exchange this for a 22 Fr Bassett catheter but were unsuccessful.    The patient is currently DNI. He is on eliquis.     A 24 Fr Roseanne Catheter was placed with 20ml in the balloon. The catheter was irrigated but was not able to be completely cleared, and the patient was placed on CBI.       Past Medical History:   Diagnosis Date    Arthritis     BPH (benign prostatic hyperplasia)     Chemotherapy-induced neutropenia 9/6/2018    Chemotherapy-induced neutropenia 9/6/2018    Coronary artery disease     MI X 2    Gout, chronic     Heartburn     Hypertension     Myocardial infarction     PE (pulmonary embolism)     Presence of dental bridge     UPPER - NOT WEAR MUCH AS DOES NOT FIT WELL    Primary malignant neoplasm of left upper lobe of lung 5/19/2017    Staph infection        Past Surgical History:   Procedure Laterality Date    CARDIAC SURGERY      CABG X 4 9-11    CTR      BILAT    OSTEOTOMY, METACARPAL BONE Right 3/1/2013    Performed by [facility] at [facility]    ROTATOR CUFF REPAIR      left    TONSILLECTOMY      TRIGGER FINGER RELEASE       right and left hands.       Review of patient's allergies indicates:   Allergen Reactions    Shellfish containing products      soft shell crabs       Family History     Problem Relation (Age of Onset)    Cancer Sister          Tobacco Use    Smoking status: Former Smoker     Packs/day: 2.00     Years: 25.00     Pack years: 50.00     Last attempt to quit: 1990     Years since quittin.9    Smokeless tobacco: Never Used   Substance and Sexual Activity    Alcohol use: Yes     Comment: 2 PER DAY    Drug use: No    Sexual activity: Not on file       Review of Systems   Unable to perform ROS: Mental status change       Objective:     Temp:  [97.5 °F (36.4 °C)-98.4 °F (36.9 °C)] 98.4 °F (36.9 °C)  Pulse:  [100-132] 119  Resp:  [15-38] 19  SpO2:  [61 %-94 %] 86 %  BP: ()/(35-73) 92/61     Body mass index is 27.02 kg/m².    Date 19 0700 - 19 0659   Shift 6816-9193 6181-1355 6486-3936 24 Hour Total   INTAKE   Shift Total(mL/kg)       OUTPUT   Urine(mL/kg/hr) 20   20   Shift Total(mL/kg) 20(0.2)   20(0.2)   Weight (kg) 83 83 83 83          Drains          None          Physical Exam   Nursing note and vitals reviewed.  Constitutional: He appears well-developed and well-nourished. No distress.   HENT:   Head: Normocephalic and atraumatic.   Eyes: Right eye exhibits no discharge. Left eye exhibits no discharge.   Neck: Normal range of motion.   Pulmonary/Chest:   On BiPAP, in respiratory distress   Abdominal: Soft. He exhibits no distension. There is no tenderness.   Genitourinary:   Genitourinary Comments: Circumcised  24 Fr Roseanne in place on CBI     Musculoskeletal: Normal range of motion.   Neurological:   Minimally arousable   Skin: Skin is warm and dry. He is not diaphoretic.         Significant Labs:    BMP:  Recent Labs   Lab 19  0500 19  0457 19  1305    143 142   K 3.4* 5.0 5.0    99 99   CO2 28 31* 25   BUN 30* 42* 46*   CREATININE 1.0 1.3 1.6*    CALCIUM 8.9 9.4 9.7       CBC:  Recent Labs   Lab 01/27/19  1950 01/28/19  0456 01/28/19  1256 01/28/19  1305   WBC 6.31 6.99  --  9.62   HGB 10.2* 11.0*  --  11.2*   HCT 32.4* 35.1* 34* 37.1*   PLT 90* 99*  --  123*       Urine Culture: No results for input(s): LABURIN in the last 168 hours.  All pertinent labs results from the past 24 hours have been reviewed.    Significant Imaging:  All pertinent imaging results/findings from the past 24 hours have been reviewed.                    Assessment and Plan:     Hematuria    73 yo M with gross hematuria and clot retention    -24 Fr Roseanne 3-way catheter placed and patient placed on CBI  -Recommend continuing CBI  -We will continue to follow    Please call with questions.          VTE Risk Mitigation (From admission, onward)        Ordered     heparin 25,000 units in dextrose 5% (100 units/ml) IV bolus from bag - ADDITIONAL PRN BOLUS - 60 units/kg  As needed (PRN)      01/28/19 1619     heparin 25,000 units in dextrose 5% (100 units/ml) IV bolus from bag - ADDITIONAL PRN BOLUS - 30 units/kg  As needed (PRN)      01/28/19 1619     heparin 25,000 units in dextrose 5% 250 mL (100 units/mL) infusion HIGH INTENSITY nomogram - [facility]  Continuous      01/28/19 1619          Thank you for your consult. I will follow-up with patient. Please contact us if you have any additional questions.    Arsen Ayala MD  Urology  [facility]

## 2019-01-29 NOTE — ED NOTES
Spoke with Dr Ramirez about patient's MAP. States to continue titrating levo and keep him updated.

## 2019-01-29 NOTE — SIGNIFICANT EVENT
Date : 01/29/2019 , 12:37 AM   Death Note     Called to see patient for unresponsiveness. On exam the patient did not respond to verbal or physical stimuli. Absent heart and breath sounds. Absent peripheral pulses. Pupils are fixed and dilated. Patient pronounced at 00:32 . Dr. Ramirez notified. Daughter notified and at bedside.         Medina Pa MD, MPH  Internal Medicine PGY2  Critical Care [id number]  Email for LEERS : [email or web address]

## 2019-01-29 NOTE — PROCEDURES
"Michael Shetty is a 74 y.o. male patient.    Temp: 96.7 °F (35.9 °C) (01/28/19 2220)  Pulse: (!) 12 (01/29/19 0029)  Resp: (!) 0 (01/29/19 0029)  BP: (!) 75/51 (01/28/19 2303)  SpO2: (!) 47 % (01/29/19 0028)  Weight: 83 kg (182 lb 15.7 oz) (01/28/19 1258)  Height: 5' 9" (175.3 cm) (01/28/19 1258)       Central Line  Date/Time: 1/28/2019 9:00 PM  Performed by: Thierno Ramirez MD  Pre-operative Diagnosis: shock  Post-operative diagnosis: shock  Consent Done: Yes  Time out: Immediately prior to procedure a "time out" was called to verify the correct patient, procedure, equipment, support staff and site/side marked as required.  Indications: med administration  Anesthesia: local infiltration    Anesthesia:  Local Anesthetic: lidocaine 1% without epinephrine  Anesthetic total: 5 mL  Preparation: skin prepped with ChloraPrep  Skin prep agent dried: skin prep agent completely dried prior to procedure  Sterile barriers: all five maximum sterile barriers used - cap, mask, sterile gown, sterile gloves, and large sterile sheet  Hand hygiene: hand hygiene performed prior to central venous catheter insertion  Location details: left femoral  Site selection rationale: Patient unable to lie supine  Catheter type: triple lumen  Catheter size: 7 Fr  Catheter Length: 20cm    Ultrasound guidance: yes  Vessel Caliber: medium, compressibility normal  Needle advanced into vessel with real time Ultrasound guidance.  Guidewire confirmed in vessel.  Sterile sheath used.  Number of attempts: 1  Estimated blood loss (mL): 3  Post-procedure: line sutured,  chlorhexidine patch,  sterile dressing applied and blood return through all ports  Complications: No          Thierno Ramirez  1/29/2019  "

## 2019-01-29 NOTE — HOSPITAL COURSE
Called by nursing for patient unresponsiveness. Daughter at  Bedside and expected his passing. Emotional support provided to daughter,  called. Patient pronounced at 12/32 01/29/2019.

## 2019-01-29 NOTE — ED NOTES
Charge Nurse Proactive Rounding     Patient presents with abnormal vital signs,     BP: 38/29  Pulse: 124  Resp: 19      Attending Physician: Taryn Kim MD aware.  Primary  RN, Gayla SIMPSON aware and will maintain follow up.      Team instructed to call supervisor on duty at  [phone] for further concerns or assistance.

## 2019-01-29 NOTE — PROCEDURES
"Michael Shetty is a 74 y.o. male patient.    Pulse: (!) 114 (01/28/19 1952)  Resp: 15 (01/28/19 1952)  BP: 111/62 (01/28/19 1707)  SpO2: (!) 82 % (01/28/19 1952)  Weight: 83 kg (182 lb 15.7 oz) (01/28/19 1258)  Height: 5' 9" (175.3 cm) (01/28/19 1258)       Arterial Line  Date/Time: 1/28/2019 8:14 PM  Location procedure was performed: [facility] EMERGENCY DEPARTMENT  Performed by: Augustin Galaviz MD  Authorized by: Augustin Galaviz MD   Pre-op Diagnosis: shock   Post-operative diagnosis: shock   Consent Done: Emergent Situation  Preparation: Patient was prepped and draped in the usual sterile fashion.  Indications: hemodynamic monitoring  Location: right radial  Patient sedated: no  Needle gauge: 18  Number of attempts: 2  Complications: No  Estimated blood loss (mL): 5  Post-procedure: line sutured and dressing applied  Post-procedure CMS: normal  Patient tolerance: Patient tolerated the procedure well with no immediate complications          Augustin Galaviz  1/28/2019  "

## 2019-01-29 NOTE — DISCHARGE SUMMARY
[facility]  Critical Care Medicine  Discharge Summary      Patient Name: Michael Shetty  MRN: [id number]  Admission Date: 1/28/2019  Hospital Length of Stay: 1 days  Discharge Date and Time:  01/29/2019 12:40 AM  Attending Physician: Taryn Kim MD   Discharging Provider: Medina Pa MD  Primary Care Provider: Michael Ellsworth MD  Reason for Admission: Respiratory failure     HPI:    The patient is a 74 y.o. 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 Patient was sent from LTAC for urinary retention. His caregiver states his urine output since yesterday has been 100cc of blood. LTAC was unsuccessful placing a lucio. Patient on eliquis. He arrived on BiPAP which his caregiver states he has been on all weekend. He has gradually declined this weekend and was recently changed from PO to IV lasix. Caregiver states his blood pressure has been in low 80s. She also states he has been tachycardic in the past with afib rvr. Of note, patient given PO dilaudid PTA. Patient is DNR.  Bed side US in ED showed right subsegmental lung PE and enlarged RV c/w diastolic HF 2/2 pHTN and PE. Cardiology was consulted about the risk and benefits of thrombolysis. Patient is not a candidate at this state. Accordingly, there is no need for CTA as well. Patient was admitted to MICU for respiratory failure.             * No surgery found *    Indwelling Lines/Drains at Time of Discharge:   Lines/Drains/Airways     Central Venous Catheter Line                 Percutaneous Central Line Insertion/Assessment - triple lumen  01/28/19 2100 left femoral vein less than 1 day          Drain                 Urethral Catheter 01/28/19 1312  Straight-tip 24 Fr. less than 1 day              Hospital Course:   Called by nursing for patient unresponsiveness. Daughter at  Bedside and expected his passing. Emotional support provided to daughter,  called. Patient pronounced at 12/32 2019.        Consults (From admission, onward)        Status Ordering Provider     Inpatient consult to Critical Care Medicine  Once     Provider:  (Not yet assigned)    Completed YVETTE CARRANZA     Inpatient consult to Interventional Cardiology  Once     Provider:  (Not yet assigned)    Completed DANIELLE KING     Inpatient consult to Urology  Once     Provider:  (Not yet assigned)    Completed GAURI JOSEPH        Significant Labs:  All pertinent labs within the past 24 hours have been reviewed.    Significant Imaging:      Pending Diagnostic Studies:     None        Final Active Diagnoses:    Diagnosis Date Noted POA    PRINCIPAL PROBLEM:  Acute on chronic respiratory failure with hypoxemia [J96.21] 2018 Yes    Shock [R57.9] 2019 Yes    Urinary retention [R33.9] 2019 Yes    Hematuria [R31.9] 2019 Yes    Thrombocytopenia, unspecified [D69.6] 2019 Yes    Squamous cell carcinoma of bronchus in left upper lobe [C34.12] 2017 Yes      Problems Resolved During this Admission:     Hematology   Thrombocytopenia, unspecified    Chronically thrombocytopenic, most likely 2/2 malignancy and chemo  plt count 123  [facility]     Oncology   Squamous cell carcinoma of bronchus in left upper lobe    Squamous cell lung carcinoma, left lung s/p chemo and radiation     Other   Shock    Tachycardic, low BP, tachypenic  On levo  Broad spectrum abx started  B/Cx and UCx sent         Discharged Condition:     Disposition:       Patient Instructions:   No discharge procedures on file.  Medications:  None (patient  at medical facility)     Medina Pa MD  Critical Care Medicine  [facility]

## 2019-02-02 LAB
BACTERIA BLD CULT: NORMAL
BACTERIA BLD CULT: NORMAL

## 2019-02-02 NOTE — PHYSICIAN QUERY
PT Name: Michael Shetty  MR #: [id number]    Physician Query Form - Perfusion Diagnosis Clarification     CDS/: Ailyn Milian RN CCDS             Contact information: [email or web address]  This form is a permanent document in the medical record.     Query Date: February 2, 2019    By submitting this query, we are merely seeking further clarification of documentation. Please utilize your independent clinical judgment when addressing the question(s) below.    The medical record contains the following:    Indicators   Supporting Clinical Findings   Location in Medical Record   x Acute Illness (e.g. AMI, Sepsis, etc.) advanced lung disease (ILD/pulmoanry fibrosis, emphysema & lung cancer  Admitted with acute on chronic respiratory failure  ATN, Acute on Chronic right heart failure H&P   x Acidosis documented Lactic Acidosis  H&P    ABGs / Labs     x Vital Signs Vital Signs (24h Range):  Temp:  [97.5 °F (36.4 °C)-98.4 °F   Pulse: 100-132  Resp:  15-36  SpO2:  61-94  BP: ()/(35-91)    H&P   x Hypotension or Low Blood Pressure documented Shock. Resuscitated with crystalloid & vasopressors. Started on abx for possible sepsis.    H&P    Altered Mental Status or Confusion      Diaphoresis, Cold Extremities or Cyanosis      Oliguria     x Medication/Treatment:  -Vasopressors  -Inotropic Drugs  -IV Fluids  -Cardiac Assist Devices  -Hemodynamic Monitoring  -Blood/Blood Products Norepinephrine  Midodrine  LR infusion MAR   x Other: attest, a sepsis perfusion exam was performed within 6 hours of Septic Shock presentation, following fluid resuscitation.   ED provider note     Please further clarify the type of shock.  Thank you.    [   ] Septic Shock   [   ] Hypovolemic Shock   [   ] Cardiogenic Shock   [   ] Other Shock (please specify):   [ x  ] Shock Unspecified   [   ] Other Condition (please specify):   [  ] Clinically Undetermined         Please document in your progress notes daily for the duration of treatment  until resolved and include in your discharge summary.

## 2019-02-06 NOTE — PHYSICIAN QUERY
PT Name: Michael Shetty  MR #: [id number]     Physician Query Form - Diagnosis Clarification      CDS/: Ailyn Milian RN CCDS              Contact information: [email or web address]    This form is a permanent document in the medical record.     Query Date: February 6, 2019    By submitting this query, we are merely seeking further clarification of documentation.  Please utilize your independent clinical judgment when addressing the question(s) below.     The medical record contains the following:      Findings Supporting Clinical Information Location in Medical Record       ATN   Pt looked to have intra-vascular volume depletion. Gave small, measured doses of crystalloid & supported with levophed.      ATN. Improve renal perfusion with above treatments. Monitor urine output.     NS bolus 500ml x2  LR bolus 500ml      BUN= 42, 46  Creatinine= 1.3, 1.6  GFR=  54.1, 42.1    UOP= 20 ML from 0700 1/28- 0700 1/29   H&P              MAR        Labs 1/28, 1/29        Urology consult note     Please clarify if the ______ATN____________ diagnosis has been:    [x  ] Ruled In   [  ] Ruled Out   [  ] Other/Clarification of findings (please specify):   [  ] Clinically insignificant     [  ] Clinically undetermined     Please document in your progress notes daily for the duration of treatment, until resolved, and include in your discharge summary.

## 2019-11-19 NOTE — H&P
[facility]  Critical Care Medicine  History & Physical    Patient Name: Michael Shetty  MRN: [id number]  Admission Date: 1/28/2019  Hospital Length of Stay: 0 days  Code Status: DNR  Attending Physician: Kathia Owens MD   Primary Care Provider: Michael Ellsworth MD   Principal Problem: Acute on chronic respiratory failure with hypoxemia    Subjective:     HPI:   The patient is a 74 y.o. 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 Patient was sent from LTAC for urinary retention. His caregiver states his urine output since yesterday has been 100cc of blood. LTAC was unsuccessful placing a lucio. Patient on eliquis. He arrived on BiPAP which his caregiver states he has been on all weekend. He has gradually declined this weekend and was recently changed from PO to IV lasix. Caregiver states his blood pressure has been in low 80s. She also states he has been tachycardic in the past with afib rvr. Of note, patient given PO dilaudid PTA. Patient is DNR.  Bed side US in ED showed right subsegmental lung PE and enlarged RV c/w diastolic HF 2/2 pHTN and PE. Cardiology was consulted about the risk and benefits of thrombolysis. Patient is not a candidate at this state. Accordingly, there is no need for CTA as well. Patient was admitted to MICU for respiratory failure.       Hospital/ICU Course:  No notes on file     Past Medical History:   Diagnosis Date    Arthritis     BPH (benign prostatic hyperplasia)     Chemotherapy-induced neutropenia 9/6/2018    Chemotherapy-induced neutropenia 9/6/2018    Coronary artery disease     MI X 2    Gout, chronic     Heartburn     Hypertension     Myocardial infarction     PE  Provided signed hard copy of letter. Letter was faxed to [address] [facility] Review section and fax confirmation was recevied. Copy of letter was placed in pending appeal drawer. Patient send copy of letter via [address] [place] [address] [address] (pulmonary embolism)     Presence of dental bridge     UPPER - NOT WEAR MUCH AS DOES NOT FIT WELL    Primary malignant neoplasm of left upper lobe of lung 2017    Staph infection        Past Surgical History:   Procedure Laterality Date    CARDIAC SURGERY      CABG X 4 9-11    CTR      BILAT    OSTEOTOMY, METACARPAL BONE Right 3/1/2013    Performed by Daron [facility]alloh Jr[facility] OR    ROTATOR CUFF REPAIR      left    TONSILLECTOMY      TRIGGER FINGER RELEASE      right and left hands.       Review of patient's allergies indicates:   Allergen Reactions    Shellfish containing products      soft shell crabs       Family History     Problem Relation (Age of Onset)    Cancer Sister        Tobacco Use    Smoking status: Former Smoker     Packs/day: 2.00     Years: 25.00     Pack years: 50.00     Last attempt to quit: 1990     Years since quittin.9    Smokeless tobacco: Never Used   Substance and Sexual Activity    Alcohol use: Yes     Comment: 2 PER DAY    Drug use: No    Sexual activity: Not on file      Review of Systems   Unable to perform ROS: Acuity of condition   Constitutional: Positive for activity change. Negative for fever.   HENT: Positive for congestion.    Respiratory: Positive for shortness of breath.    Cardiovascular: Positive for palpitations. Negative for chest pain.   Genitourinary: Positive for decreased urine volume and hematuria.     Objective:     Vital Signs (Most Recent):  Pulse: (!) 115 (19 161)  Resp: (!) 32 (19)  BP: 109/62 (19 1607)  SpO2: (!) 77 % (19) Vital Signs (24h Range):  Temp:  [97.5 °F (36.4 °C)-98.4 °F (36.9 °C)] 98.4 °F (36.9 °C)  Pulse:  [100-132] 115  Resp:  [15-36] 32  SpO2:  [61 %-94 %] 77 %  BP: ()/(35-91) 109/62  Arterial Line BP: ()/(60-71) 108/68   Weight: 83 kg (182 lb 15.7 oz)  Body mass index is 27.02 kg/m².      Intake/Output Summary (Last 24 hours) at 2019 1616  Last data filed at  1/28/2019 1559  Gross per 24 hour   Intake 1000 ml   Output 6170 ml   Net -5170 ml       Physical Exam   Constitutional: He is oriented to person, place, and time. He appears distressed.   Eyes: Pupils are equal, round, and reactive to light.   Neck: JVD present.   Cardiovascular:   Sinus tachycardia   Pulmonary/Chest: He is in respiratory distress.   Abdominal: He exhibits no distension. There is no tenderness.   Neurological: He is alert and oriented to person, place, and time.   Skin: He is diaphoretic.       Vents:  Oxygen Concentration (%): 100 (01/28/19 1529)  Lines/Drains/Airways     Drain                 Urethral Catheter 01/28/19 1312 Straight-tip 24 Fr. less than 1 day          Peripheral Intravenous Line                 Peripheral IV - Single Lumen 01/19/19 1730 Anterior;Left Wrist 8 days         Peripheral IV - Single Lumen 01/27/19 0200 Right Antecubital 1 day              Significant Labs:    CBC/Anemia Profile:  Recent Labs   Lab 01/27/19  1950 01/28/19  0456 01/28/19  1256 01/28/19  1305   WBC 6.31 6.99  --  9.62   HGB 10.2* 11.0*  --  11.2*   HCT 32.4* 35.1* 34* 37.1*   PLT 90* 99*  --  123*   MCV 99* 102*  --  101*   RDW 18.3* 18.4*  --  18.3*        Chemistries:  Recent Labs   Lab 01/27/19  0500 01/28/19  0457 01/28/19  1305    143 142   K 3.4* 5.0 5.0    99 99   CO2 28 31* 25   BUN 30* 42* 46*   CREATININE 1.0 1.3 1.6*   CALCIUM 8.9 9.4 9.7   ALBUMIN 2.4* 2.6* 2.7*   PROT 5.7* 6.1 6.6   BILITOT 2.9* 3.5* 4.0*   ALKPHOS 74 80 81   ALT 38 38 37   AST 25 22 22   MG 1.9 2.2  --    PHOS 4.2 6.1*  --        CMP:   Recent Labs   Lab 01/27/19  0500 01/28/19  0457 01/28/19  1305    143 142   K 3.4* 5.0 5.0    99 99   CO2 28 31* 25   * 123* 191*   BUN 30* 42* 46*   CREATININE 1.0 1.3 1.6*   CALCIUM 8.9 9.4 9.7   PROT 5.7* 6.1 6.6   ALBUMIN 2.4* 2.6* 2.7*   BILITOT 2.9* 3.5* 4.0*   ALKPHOS 74 80 81   AST 25 22 22   ALT 38 38 37   ANIONGAP 13 13 18*   EGFRNONAA >60.0 54.1*  42.1*     Lactic Acid:   Recent Labs   Lab 19  1305   LACTATE 7.0*     Troponin:   Recent Labs   Lab 19  1305   TROPONINI 0.199*       Significant Imaging: CXR: I have reviewed all pertinent results/findings within the past 24 hours and my personal findings are:  There is slight smaller lung volumes likely to poor inspiratory effort.  There is diffuse interstitial opacities throughout the lungs similar but increased from prior concerning for scarring with possible superimposed edema.  There is poor definition of the lung bases concerning for effusions with atelectasis.  No large pneumothorax.    Assessment/Plan:     Pulmonary   * Acute on chronic respiratory failure with hypoxemia    Patient with Hx of lung Cancer and PE presented with SOB started from few days ago, currently on CPAP, O2sat 78-81  AB.35, 37.4, 21.1   Lactic acid 6  Bedside US: right lung PE  CXR: diffuse interstitial opacities throughout the lungs similar but increased from prior concerning for scarring with possible superimposed edema.  There is poor definition of the lung bases concerning for effusions with atelectasis.  No large pneumothorax.    Patient is actively bleeding from urinary tract, possibly 2/2 trauma after attempting to put lucio catheter in  Given the active malignancy and active bleeding patient is not a candidate for catheter thrombolysis. Also considering his active respiratory failure will not be able to go through scanning for CTA.  Patient is DNR, no intubation will be considered.  - continous cardiac monitoring and pulse oxymetry  - continue BiPAP, FiO2 100%, 15/7  - levophed infusion  - heparin gtt  - interventional cardiology consulted, not a candidate for cath-thrombolysis  - urology consulted, ok to start heparin gtt for prophylaxis considering his hematuria  - start vancomycin and zosyn for possible respiratory infection or sepsis, given elevated lactic acid  - f/u BCx and UCx   - strict intake  output         Renal/   Hematuria    2/2 lucio cath placement, patient on eliquis for afib and PE  Irrigated, lucio placed, currently UOP with clear color  - monitor H&H  - urology consulted, plan to continue bladder irrigation tonight       Shock  Tachycardic, low BP, tachypenic  On levo  Broad spectrum abx started  B/Cx and UCx sent    Squamous cell carcinoma of bronchus in left upper lobe  Squamous cell lung carcinoma, left lung s/p chemo and radiation    Thrombocytopenia, unspecified  Chronically thrombocytopenic, most likely 2/2 malignancy and chemo  plt count 123  wctm   Critical Care Daily Checklist:    A: Awake: RASS Goal/Actual Goal:    Actual:     B: Spontaneous Breathing Trial Performed?     C: SAT & SBT Coordinated?                       D: Delirium: CAM-ICU     E: Early Mobility Performed? No   F: Feeding Goal:    Status:     Current Diet Order   No orders of the defined types were placed in this encounter.      AS: Analgesia/Sedation none   T: Thromboembolic Prophylaxis Heparin    H: HOB > 300 Yes   U: Stress Ulcer Prophylaxis (if needed) yes   G: Glucose Control    B: Bowel Function     I: Indwelling Catheter (Lines & Lucio) Necessity lucio   D: De-escalation of Antimicrobials/Pharmacotherapies Continue vanc and zosyn    Plan for the day/ETD Continue BiPAP, abx, and heparin    Code Status:  Family/Goals of Care: DNR         Critical secondary to Patient has a condition that poses threat to life and bodily function: Severe Respiratory Distress     Critical care was time spent personally by me on the following activities: development of treatment plan with patient or surrogate and bedside caregivers, discussions with consultants, evaluation of patient's response to treatment, examination of patient, ordering and performing treatments and interventions, ordering and review of laboratory studies, ordering and review of radiographic studies, pulse oximetry, re-evaluation of patient's condition. This  critical care time did not overlap with that of any other provider or involve time for any procedures.     Myrna Moon MD  Critical Care Medicine  [facility]        Late entry for care provided between 1/28/2019 at 1900 & midnight:   I saw & examined the patient. I personally reviewed all pertinent data in Epic. I discussed the patient and management with the resident. I reviewed the residents note and agree with the documented findings and plan of care. Additionally:    74 yom with advanced lung disease (ILD/pulmoanry fibrosis, emphysema & lung cancer) who presents with:  1) Acute on chronic hypoxemic respiratory failure. Supported with noninvasive ventilation.  2) Shock. Resuscitated with crystalloid & vasopressors. Started on abx for possible sepsis.  3) Acute on chronic right heart failure. Pt looked to have intra-vascular volume depletion. Gave small, measured doses of crystalloid & supported with levophed.  4) ATN. Improve renal perfusion with above treatments. Monitor urine output.  5) Lactic acidosis  6) Emphysema. Treat with duonebs.  7) Pulmonary fibrosis/ILD. Noted.  8) History of pulmonary embolus. Treated with heparin infusion.  9) End of life care. Mr Shetty did not respond to our treatments. I met with him & his daughter at the bedside. We discussed his illness & goals of care together. We agreed to attempt to rescue him from his acute illness with noninvasive ventilation & vasopressors. He did not respond to our treatments & was air hungry. We administered morphine. He passed peacefully with his daughter at his bedside.    Critical Care Time: 130 minutes  Critical care was time spent personally by me on the following activities: evaluating this patient's organ dysfunction, development of treatment plan, discussing treatment plan with patient or surrogate and bedside caregivers, discussions with consultants, evaluation of patient's response to treatment, examination of patient,  ordering and performing treatments and interventions, ordering and review of laboratory studies, ordering and review of radiographic studies, re-evaluation of patient's condition. This critical care time did not overlap with that of any other provider or involve time for any procedures.    Thierno Ramirez MD  [facility]

## 2020-03-24 NOTE — ASSESSMENT & PLAN NOTE
74yo presents with acute SOB on a background of PEs & MTHFR mutation found to have recurrent PE on CTPA & MDR Pseudomonas PNA   -Interventional cardiology consulted  -bedside TTE performed 01/04/2019  -interventional cardiology does not think patient is a candidate for catheter directed thrombectomy  -continuing eliquis 5mg BID for now for chronic recurrent PEs     -still requiring Comfort flow 20L & FiO2 70% and NRB 20L   -DNI status however would like 1 round of CPR  -meropenem day 8 (previously on zosyn which PNA was resistant to)   -patient looking forward to being discharged home with PICC line to continue IV abx, however will need to continue to work on weaning down oxygen requirements   xray at bedside

## 2021-02-08 NOTE — PLAN OF CARE
Problem: Adult Inpatient Plan of Care  Goal: Plan of Care Review  Outcome: Ongoing (interventions implemented as appropriate)    Pt oriented x4.  VSS.  Comfort flow currently @15L and 50%; NRB at 25L to maintain oxygen saturation >88%.  Regular diet, pt consumed >75% of all meals.   Pt voiding per urinal, adequate UOP. No BM throughout shift.  Denies pain throughout shift.  Transfer orders in place, awaiting bed assignment on floor.   Will continue to monitor.           86.2

## 2022-04-06 NOTE — PROGRESS NOTES
Pt AAOx4, calm and cooperative to care. O2 via high-flow comforter via n/c continues. No SOB noted. However, dyspnea persists on exertion. No episode of respiratory distress noted. Scheduled neb tx done by RT with tolerated. ABT IV meroprem continues w/o A/R. Midline to LA patent and intact. Kept comfortable. No c/o voiced.    Wound Care: No ointment

## 2023-02-03 NOTE — LETTER
August 8, 2018      Michael Ellsworth MD  [address] [place] [address] [address]  [place] [address]           [facility] - Hematology Oncology  [address] [facility]  [address]  [place] [address]  Phone: [phone]  Fax: [phone]          Patient: Michael Shetty   MR Number: [id number]   YOB: 1945   Date of Visit: 8/8/2018       Dear Dr. Michael Ellsworth:    Thank you for referring Michael Shetty to me for evaluation. Attached you will find relevant portions of my assessment and plan of care.    If you have questions, please do not hesitate to call me. I look forward to following Michael Shetty along with you.    Sincerely,    Chris Cage MD    Enclosure  CC:  No Recipients    If you would like to receive this communication electronically, please contact [email or web address] or [phone] to request more information on [employer] access.    For providers and/or their staff who would like to refer a patient to [facility], please contact us through our one-stop-shop provider referral line, [facility], at [phone].    If you feel you have received this communication in error or would no longer like to receive these types of communications, please e-mail [email or web address]          No assistance needed